# Patient Record
Sex: FEMALE | Race: WHITE
[De-identification: names, ages, dates, MRNs, and addresses within clinical notes are randomized per-mention and may not be internally consistent; named-entity substitution may affect disease eponyms.]

---

## 2022-01-01 ENCOUNTER — TRANSCRIPTION ENCOUNTER (OUTPATIENT)
Age: 82
End: 2022-01-01

## 2022-01-01 ENCOUNTER — NON-APPOINTMENT (OUTPATIENT)
Age: 82
End: 2022-01-01

## 2022-01-01 ENCOUNTER — APPOINTMENT (OUTPATIENT)
Dept: ORTHOPEDIC SURGERY | Facility: CLINIC | Age: 82
End: 2022-01-01

## 2022-01-01 ENCOUNTER — APPOINTMENT (OUTPATIENT)
Dept: NEUROLOGY | Facility: CLINIC | Age: 82
End: 2022-01-01

## 2022-01-01 ENCOUNTER — APPOINTMENT (OUTPATIENT)
Dept: CARDIOLOGY | Facility: CLINIC | Age: 82
End: 2022-01-01

## 2022-01-01 ENCOUNTER — APPOINTMENT (OUTPATIENT)
Dept: MRI IMAGING | Facility: CLINIC | Age: 82
End: 2022-01-01

## 2022-01-01 ENCOUNTER — RESULT CHARGE (OUTPATIENT)
Age: 82
End: 2022-01-01

## 2022-01-01 ENCOUNTER — INPATIENT (INPATIENT)
Facility: HOSPITAL | Age: 82
LOS: 3 days | Discharge: HOME HEALTH | End: 2022-09-13
Attending: STUDENT IN AN ORGANIZED HEALTH CARE EDUCATION/TRAINING PROGRAM | Admitting: STUDENT IN AN ORGANIZED HEALTH CARE EDUCATION/TRAINING PROGRAM

## 2022-01-01 ENCOUNTER — INPATIENT (INPATIENT)
Facility: HOSPITAL | Age: 82
LOS: 6 days | Discharge: NURSING, REHAB OR LONG-TERM CARE | End: 2022-09-21
Attending: SURGERY | Admitting: SURGERY

## 2022-01-01 ENCOUNTER — INPATIENT (INPATIENT)
Facility: HOSPITAL | Age: 82
LOS: 4 days | Discharge: HOME HEALTH | End: 2022-12-07
Attending: INTERNAL MEDICINE | Admitting: INTERNAL MEDICINE
Payer: MEDICARE

## 2022-01-01 ENCOUNTER — APPOINTMENT (OUTPATIENT)
Dept: SURGERY | Facility: CLINIC | Age: 82
End: 2022-01-01

## 2022-01-01 ENCOUNTER — EMERGENCY (EMERGENCY)
Facility: HOSPITAL | Age: 82
LOS: 0 days | Discharge: HOME | End: 2022-08-16
Attending: EMERGENCY MEDICINE | Admitting: EMERGENCY MEDICINE

## 2022-01-01 VITALS — BODY MASS INDEX: 20.49 KG/M2 | HEIGHT: 64 IN | WEIGHT: 120 LBS

## 2022-01-01 VITALS
HEART RATE: 85 BPM | TEMPERATURE: 96 F | OXYGEN SATURATION: 90 % | DIASTOLIC BLOOD PRESSURE: 58 MMHG | SYSTOLIC BLOOD PRESSURE: 160 MMHG | RESPIRATION RATE: 22 BRPM

## 2022-01-01 VITALS
TEMPERATURE: 98 F | DIASTOLIC BLOOD PRESSURE: 67 MMHG | WEIGHT: 117.95 LBS | RESPIRATION RATE: 17 BRPM | OXYGEN SATURATION: 98 % | SYSTOLIC BLOOD PRESSURE: 165 MMHG | HEART RATE: 73 BPM

## 2022-01-01 VITALS
SYSTOLIC BLOOD PRESSURE: 120 MMHG | HEART RATE: 80 BPM | TEMPERATURE: 99 F | DIASTOLIC BLOOD PRESSURE: 52 MMHG | HEART RATE: 67 BPM | TEMPERATURE: 97 F | SYSTOLIC BLOOD PRESSURE: 111 MMHG | DIASTOLIC BLOOD PRESSURE: 56 MMHG | RESPIRATION RATE: 17 BRPM

## 2022-01-01 VITALS
HEART RATE: 69 BPM | RESPIRATION RATE: 189 BRPM | HEIGHT: 64 IN | SYSTOLIC BLOOD PRESSURE: 135 MMHG | WEIGHT: 117.95 LBS | OXYGEN SATURATION: 99 % | DIASTOLIC BLOOD PRESSURE: 75 MMHG | TEMPERATURE: 98 F

## 2022-01-01 VITALS
HEART RATE: 66 BPM | WEIGHT: 118 LBS | BODY MASS INDEX: 20.14 KG/M2 | SYSTOLIC BLOOD PRESSURE: 121 MMHG | DIASTOLIC BLOOD PRESSURE: 55 MMHG | HEIGHT: 64 IN

## 2022-01-01 VITALS
HEIGHT: 64 IN | WEIGHT: 118 LBS | SYSTOLIC BLOOD PRESSURE: 126 MMHG | DIASTOLIC BLOOD PRESSURE: 64 MMHG | BODY MASS INDEX: 20.14 KG/M2

## 2022-01-01 VITALS
TEMPERATURE: 98 F | OXYGEN SATURATION: 96 % | DIASTOLIC BLOOD PRESSURE: 62 MMHG | HEART RATE: 97 BPM | SYSTOLIC BLOOD PRESSURE: 116 MMHG | RESPIRATION RATE: 18 BRPM

## 2022-01-01 VITALS
BODY MASS INDEX: 17.07 KG/M2 | HEIGHT: 64 IN | OXYGEN SATURATION: 100 % | HEART RATE: 69 BPM | WEIGHT: 100 LBS | SYSTOLIC BLOOD PRESSURE: 130 MMHG | DIASTOLIC BLOOD PRESSURE: 70 MMHG

## 2022-01-01 VITALS
OXYGEN SATURATION: 96 % | WEIGHT: 100.09 LBS | TEMPERATURE: 98 F | HEART RATE: 62 BPM | DIASTOLIC BLOOD PRESSURE: 69 MMHG | RESPIRATION RATE: 18 BRPM | SYSTOLIC BLOOD PRESSURE: 155 MMHG | HEIGHT: 64 IN

## 2022-01-01 VITALS — BODY MASS INDEX: 20.14 KG/M2 | WEIGHT: 118 LBS | HEIGHT: 64 IN

## 2022-01-01 DIAGNOSIS — R21 RASH AND OTHER NONSPECIFIC SKIN ERUPTION: ICD-10-CM

## 2022-01-01 DIAGNOSIS — Z88.2 ALLERGY STATUS TO SULFONAMIDES: ICD-10-CM

## 2022-01-01 DIAGNOSIS — M06.9 RHEUMATOID ARTHRITIS, UNSPECIFIED: ICD-10-CM

## 2022-01-01 DIAGNOSIS — D64.9 ANEMIA, UNSPECIFIED: ICD-10-CM

## 2022-01-01 DIAGNOSIS — Z90.710 ACQUIRED ABSENCE OF BOTH CERVIX AND UTERUS: Chronic | ICD-10-CM

## 2022-01-01 DIAGNOSIS — E78.00 PURE HYPERCHOLESTEROLEMIA, UNSPECIFIED: ICD-10-CM

## 2022-01-01 DIAGNOSIS — Z86.79 PERSONAL HISTORY OF OTHER DISEASES OF THE CIRCULATORY SYSTEM: ICD-10-CM

## 2022-01-01 DIAGNOSIS — K74.60 UNSPECIFIED CIRRHOSIS OF LIVER: ICD-10-CM

## 2022-01-01 DIAGNOSIS — Z98.890 OTHER SPECIFIED POSTPROCEDURAL STATES: Chronic | ICD-10-CM

## 2022-01-01 DIAGNOSIS — D62 ACUTE POSTHEMORRHAGIC ANEMIA: ICD-10-CM

## 2022-01-01 DIAGNOSIS — Z82.49 FAMILY HISTORY OF ISCHEMIC HEART DISEASE AND OTHER DISEASES OF THE CIRCULATORY SYSTEM: ICD-10-CM

## 2022-01-01 DIAGNOSIS — L89.151 PRESSURE ULCER OF SACRAL REGION, STAGE 1: ICD-10-CM

## 2022-01-01 DIAGNOSIS — Z79.890 HORMONE REPLACEMENT THERAPY: ICD-10-CM

## 2022-01-01 DIAGNOSIS — Z86.39 PERSONAL HISTORY OF OTHER ENDOCRINE, NUTRITIONAL AND METABOLIC DISEASE: ICD-10-CM

## 2022-01-01 DIAGNOSIS — R06.02 SHORTNESS OF BREATH: ICD-10-CM

## 2022-01-01 DIAGNOSIS — Z79.82 LONG TERM (CURRENT) USE OF ASPIRIN: ICD-10-CM

## 2022-01-01 DIAGNOSIS — I10 ESSENTIAL (PRIMARY) HYPERTENSION: ICD-10-CM

## 2022-01-01 DIAGNOSIS — R32 UNSPECIFIED URINARY INCONTINENCE: ICD-10-CM

## 2022-01-01 DIAGNOSIS — K76.6 PORTAL HYPERTENSION: ICD-10-CM

## 2022-01-01 DIAGNOSIS — F03.90 UNSPECIFIED DEMENTIA WITHOUT BEHAVIORAL DISTURBANCE: ICD-10-CM

## 2022-01-01 DIAGNOSIS — Z87.891 PERSONAL HISTORY OF NICOTINE DEPENDENCE: ICD-10-CM

## 2022-01-01 DIAGNOSIS — K92.0 HEMATEMESIS: ICD-10-CM

## 2022-01-01 DIAGNOSIS — M10.9 GOUT, UNSPECIFIED: ICD-10-CM

## 2022-01-01 DIAGNOSIS — Z63.4 DISAPPEARANCE AND DEATH OF FAMILY MEMBER: ICD-10-CM

## 2022-01-01 DIAGNOSIS — K74.69 OTHER CIRRHOSIS OF LIVER: ICD-10-CM

## 2022-01-01 DIAGNOSIS — E03.9 HYPOTHYROIDISM, UNSPECIFIED: ICD-10-CM

## 2022-01-01 DIAGNOSIS — Z20.822 CONTACT WITH AND (SUSPECTED) EXPOSURE TO COVID-19: ICD-10-CM

## 2022-01-01 DIAGNOSIS — I47.20 VENTRICULAR TACHYCARDIA, UNSPECIFIED: ICD-10-CM

## 2022-01-01 DIAGNOSIS — I73.9 PERIPHERAL VASCULAR DISEASE, UNSPECIFIED: ICD-10-CM

## 2022-01-01 DIAGNOSIS — M25.531 PAIN IN RIGHT WRIST: ICD-10-CM

## 2022-01-01 DIAGNOSIS — Z88.5 ALLERGY STATUS TO NARCOTIC AGENT: ICD-10-CM

## 2022-01-01 DIAGNOSIS — E87.6 HYPOKALEMIA: ICD-10-CM

## 2022-01-01 DIAGNOSIS — F32.A DEPRESSION, UNSPECIFIED: ICD-10-CM

## 2022-01-01 DIAGNOSIS — I50.33 ACUTE ON CHRONIC DIASTOLIC (CONGESTIVE) HEART FAILURE: ICD-10-CM

## 2022-01-01 DIAGNOSIS — G62.9 POLYNEUROPATHY, UNSPECIFIED: ICD-10-CM

## 2022-01-01 DIAGNOSIS — Z87.19 PERSONAL HISTORY OF OTHER DISEASES OF THE DIGESTIVE SYSTEM: ICD-10-CM
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
## 2022-01-01 DIAGNOSIS — Z85.820 PERSONAL HISTORY OF MALIGNANT MELANOMA OF SKIN: ICD-10-CM

## 2022-01-01 DIAGNOSIS — S72.452D DISPLACED SUPRACONDYLAR FRACTURE W/OUT INTRACONDYLAR EXTENSION OF LOWER END OF LEFT FEMUR, SUBSEQUENT ENCOUNTER FOR CLOSED FRACTURE WITH ROUTINE HEALING: ICD-10-CM

## 2022-01-01 DIAGNOSIS — Z95.820 PERIPHERAL VASCULAR ANGIOPLASTY STATUS WITH IMPLANTS AND GRAFTS: ICD-10-CM

## 2022-01-01 DIAGNOSIS — F41.9 ANXIETY DISORDER, UNSPECIFIED: ICD-10-CM

## 2022-01-01 DIAGNOSIS — Z88.1 ALLERGY STATUS TO OTHER ANTIBIOTIC AGENTS STATUS: ICD-10-CM

## 2022-01-01 DIAGNOSIS — G31.84 MILD COGNITIVE IMPAIRMENT, SO STATED: ICD-10-CM
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
## 2022-01-01 DIAGNOSIS — E78.5 HYPERLIPIDEMIA, UNSPECIFIED: ICD-10-CM

## 2022-01-01 DIAGNOSIS — K21.9 GASTRO-ESOPHAGEAL REFLUX DISEASE WITHOUT ESOPHAGITIS: ICD-10-CM

## 2022-01-01 DIAGNOSIS — K29.00 ACUTE GASTRITIS WITHOUT BLEEDING: ICD-10-CM

## 2022-01-01 DIAGNOSIS — I34.0 NONRHEUMATIC MITRAL (VALVE) INSUFFICIENCY: ICD-10-CM

## 2022-01-01 DIAGNOSIS — S72.452A DISPLACED SUPRACONDYLAR FRACTURE WITHOUT INTRACONDYLAR EXTENSION OF LOWER END OF LEFT FEMUR, INITIAL ENCOUNTER FOR CLOSED FRACTURE: ICD-10-CM

## 2022-01-01 DIAGNOSIS — E87.70 FLUID OVERLOAD, UNSPECIFIED: ICD-10-CM

## 2022-01-01 DIAGNOSIS — R06.89 OTHER ABNORMALITIES OF BREATHING: ICD-10-CM

## 2022-01-01 DIAGNOSIS — I85.11 SECONDARY ESOPHAGEAL VARICES WITH BLEEDING: ICD-10-CM

## 2022-01-01 DIAGNOSIS — Z86.718 PERSONAL HISTORY OF OTHER VENOUS THROMBOSIS AND EMBOLISM: ICD-10-CM

## 2022-01-01 DIAGNOSIS — S52.501A UNSPECIFIED FRACTURE OF THE LOWER END OF RIGHT RADIUS, INITIAL ENCOUNTER FOR CLOSED FRACTURE: ICD-10-CM
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
## 2022-01-01 DIAGNOSIS — I34.9 NONRHEUMATIC MITRAL VALVE DISORDER, UNSPECIFIED: ICD-10-CM

## 2022-01-01 DIAGNOSIS — G89.11 ACUTE PAIN DUE TO TRAUMA: ICD-10-CM

## 2022-01-01 DIAGNOSIS — Z87.39 PERSONAL HISTORY OF OTHER DISEASES OF THE MUSCULOSKELETAL SYSTEM AND CONNECTIVE TISSUE: ICD-10-CM

## 2022-01-01 DIAGNOSIS — I51.7 CARDIOMEGALY: ICD-10-CM
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
## 2022-01-01 DIAGNOSIS — I11.0 HYPERTENSIVE HEART DISEASE WITH HEART FAILURE: ICD-10-CM

## 2022-01-01 DIAGNOSIS — K74.4: ICD-10-CM
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
## 2022-01-01 DIAGNOSIS — I67.9 CEREBROVASCULAR DISEASE, UNSPECIFIED: ICD-10-CM

## 2022-01-01 DIAGNOSIS — S72.92XA UNSPECIFIED FRACTURE OF LEFT FEMUR, INITIAL ENCOUNTER FOR CLOSED FRACTURE: ICD-10-CM

## 2022-01-01 DIAGNOSIS — Z96.642 PRESENCE OF LEFT ARTIFICIAL HIP JOINT: ICD-10-CM

## 2022-01-01 DIAGNOSIS — R18.8 OTHER ASCITES: ICD-10-CM

## 2022-01-01 DIAGNOSIS — I11.9 HYPERTENSIVE HEART DISEASE WITHOUT HEART FAILURE: ICD-10-CM

## 2022-01-01 DIAGNOSIS — Z88.8 ALLERGY STATUS TO OTHER DRUGS, MEDICAMENTS AND BIOLOGICAL SUBSTANCES STATUS: ICD-10-CM

## 2022-01-01 DIAGNOSIS — Z79.01 LONG TERM (CURRENT) USE OF ANTICOAGULANTS: ICD-10-CM

## 2022-01-01 DIAGNOSIS — T45.1X5A ADVERSE EFFECT OF ANTINEOPLASTIC AND IMMUNOSUPPRESSIVE DRUGS, INITIAL ENCOUNTER: ICD-10-CM

## 2022-01-01 DIAGNOSIS — E83.42 HYPOMAGNESEMIA: ICD-10-CM

## 2022-01-01 LAB
ALBUMIN SERPL ELPH-MCNC: 2.4 G/DL — LOW (ref 3.5–5.2)
ALBUMIN SERPL ELPH-MCNC: 2.6 G/DL — LOW (ref 3.5–5.2)
ALBUMIN SERPL ELPH-MCNC: 2.7 G/DL — LOW (ref 3.5–5.2)
ALBUMIN SERPL ELPH-MCNC: 2.7 G/DL — LOW (ref 3.5–5.2)
ALBUMIN SERPL ELPH-MCNC: 2.8 G/DL
ALBUMIN SERPL ELPH-MCNC: 2.8 G/DL — LOW (ref 3.5–5.2)
ALBUMIN SERPL ELPH-MCNC: 2.8 G/DL — LOW (ref 3.5–5.2)
ALBUMIN SERPL ELPH-MCNC: 2.9 G/DL — LOW (ref 3.5–5.2)
ALBUMIN SERPL ELPH-MCNC: 3 G/DL — LOW (ref 3.5–5.2)
ALBUMIN SERPL ELPH-MCNC: 3 G/DL — LOW (ref 3.5–5.2)
ALBUMIN SERPL ELPH-MCNC: 3.2 G/DL — LOW (ref 3.5–5.2)
ALBUMIN SERPL ELPH-MCNC: 3.3 G/DL — LOW (ref 3.5–5.2)
ALP BLD-CCNC: 142 U/L
ALP SERPL-CCNC: 102 U/L — SIGNIFICANT CHANGE UP (ref 30–115)
ALP SERPL-CCNC: 104 U/L — SIGNIFICANT CHANGE UP (ref 30–115)
ALP SERPL-CCNC: 105 U/L — SIGNIFICANT CHANGE UP (ref 30–115)
ALP SERPL-CCNC: 111 U/L — SIGNIFICANT CHANGE UP (ref 30–115)
ALP SERPL-CCNC: 112 U/L — SIGNIFICANT CHANGE UP (ref 30–115)
ALP SERPL-CCNC: 135 U/L — HIGH (ref 30–115)
ALP SERPL-CCNC: 139 U/L — HIGH (ref 30–115)
ALP SERPL-CCNC: 148 U/L — HIGH (ref 30–115)
ALP SERPL-CCNC: 149 U/L — HIGH (ref 30–115)
ALP SERPL-CCNC: 166 U/L — HIGH (ref 30–115)
ALP SERPL-CCNC: 173 U/L — HIGH (ref 30–115)
ALP SERPL-CCNC: 99 U/L — SIGNIFICANT CHANGE UP (ref 30–115)
ALT FLD-CCNC: 10 U/L — SIGNIFICANT CHANGE UP (ref 0–41)
ALT FLD-CCNC: 10 U/L — SIGNIFICANT CHANGE UP (ref 0–41)
ALT FLD-CCNC: 11 U/L — SIGNIFICANT CHANGE UP (ref 0–41)
ALT FLD-CCNC: 12 U/L — SIGNIFICANT CHANGE UP (ref 0–41)
ALT FLD-CCNC: 14 U/L — SIGNIFICANT CHANGE UP (ref 0–41)
ALT FLD-CCNC: 16 U/L — SIGNIFICANT CHANGE UP (ref 0–41)
ALT FLD-CCNC: 19 U/L — SIGNIFICANT CHANGE UP (ref 0–41)
ALT FLD-CCNC: 19 U/L — SIGNIFICANT CHANGE UP (ref 0–41)
ALT SERPL-CCNC: 16 U/L
ANION GAP SERPL CALC-SCNC: 10 MMOL/L — SIGNIFICANT CHANGE UP (ref 7–14)
ANION GAP SERPL CALC-SCNC: 10 MMOL/L — SIGNIFICANT CHANGE UP (ref 7–14)
ANION GAP SERPL CALC-SCNC: 11 MMOL/L — SIGNIFICANT CHANGE UP (ref 7–14)
ANION GAP SERPL CALC-SCNC: 12 MMOL/L — SIGNIFICANT CHANGE UP (ref 7–14)
ANION GAP SERPL CALC-SCNC: 13 MMOL/L
ANION GAP SERPL CALC-SCNC: 6 MMOL/L — LOW (ref 7–14)
ANION GAP SERPL CALC-SCNC: 7 MMOL/L — SIGNIFICANT CHANGE UP (ref 7–14)
ANION GAP SERPL CALC-SCNC: 8 MMOL/L — SIGNIFICANT CHANGE UP (ref 7–14)
ANION GAP SERPL CALC-SCNC: 9 MMOL/L — SIGNIFICANT CHANGE UP (ref 7–14)
ANISOCYTOSIS BLD QL: SIGNIFICANT CHANGE UP
ANISOCYTOSIS BLD QL: SIGNIFICANT CHANGE UP
APTT BLD: 31.9 SEC — SIGNIFICANT CHANGE UP (ref 27–39.2)
APTT BLD: 32 SEC — SIGNIFICANT CHANGE UP (ref 27–39.2)
APTT BLD: 32.1 SEC — SIGNIFICANT CHANGE UP (ref 27–39.2)
APTT BLD: 32.4 SEC — SIGNIFICANT CHANGE UP (ref 27–39.2)
APTT BLD: 32.5 SEC — SIGNIFICANT CHANGE UP (ref 27–39.2)
APTT BLD: 33.4 SEC — SIGNIFICANT CHANGE UP (ref 27–39.2)
APTT BLD: 37.7 SEC — SIGNIFICANT CHANGE UP (ref 27–39.2)
AST SERPL-CCNC: 18 U/L — SIGNIFICANT CHANGE UP (ref 0–41)
AST SERPL-CCNC: 19 U/L — SIGNIFICANT CHANGE UP (ref 0–41)
AST SERPL-CCNC: 19 U/L — SIGNIFICANT CHANGE UP (ref 0–41)
AST SERPL-CCNC: 20 U/L — SIGNIFICANT CHANGE UP (ref 0–41)
AST SERPL-CCNC: 21 U/L — SIGNIFICANT CHANGE UP (ref 0–41)
AST SERPL-CCNC: 22 U/L — SIGNIFICANT CHANGE UP (ref 0–41)
AST SERPL-CCNC: 22 U/L — SIGNIFICANT CHANGE UP (ref 0–41)
AST SERPL-CCNC: 25 U/L — SIGNIFICANT CHANGE UP (ref 0–41)
AST SERPL-CCNC: 27 U/L — SIGNIFICANT CHANGE UP (ref 0–41)
AST SERPL-CCNC: 35 U/L
AST SERPL-CCNC: 37 U/L — SIGNIFICANT CHANGE UP (ref 0–41)
AST SERPL-CCNC: 38 U/L — SIGNIFICANT CHANGE UP (ref 0–41)
AST SERPL-CCNC: 42 U/L — HIGH (ref 0–41)
BASE EXCESS BLDA CALC-SCNC: -2.3 MMOL/L — LOW (ref -2–3)
BASE EXCESS BLDV CALC-SCNC: 5.8 MMOL/L — HIGH (ref -2–3)
BASOPHILS # BLD AUTO: 0.01 K/UL — SIGNIFICANT CHANGE UP (ref 0–0.2)
BASOPHILS # BLD AUTO: 0.02 K/UL
BASOPHILS # BLD AUTO: 0.02 K/UL — SIGNIFICANT CHANGE UP (ref 0–0.2)
BASOPHILS # BLD AUTO: 0.03 K/UL — SIGNIFICANT CHANGE UP (ref 0–0.2)
BASOPHILS # BLD AUTO: 0.04 K/UL — SIGNIFICANT CHANGE UP (ref 0–0.2)
BASOPHILS # BLD AUTO: 0.09 K/UL — SIGNIFICANT CHANGE UP (ref 0–0.2)
BASOPHILS NFR BLD AUTO: 0.3 % — SIGNIFICANT CHANGE UP (ref 0–1)
BASOPHILS NFR BLD AUTO: 0.4 %
BASOPHILS NFR BLD AUTO: 0.4 % — SIGNIFICANT CHANGE UP (ref 0–1)
BASOPHILS NFR BLD AUTO: 0.5 % — SIGNIFICANT CHANGE UP (ref 0–1)
BASOPHILS NFR BLD AUTO: 0.6 % — SIGNIFICANT CHANGE UP (ref 0–1)
BASOPHILS NFR BLD AUTO: 0.7 % — SIGNIFICANT CHANGE UP (ref 0–1)
BILIRUB DIRECT SERPL-MCNC: <0.2 MG/DL — SIGNIFICANT CHANGE UP (ref 0–0.3)
BILIRUB INDIRECT FLD-MCNC: >0.1 MG/DL — LOW (ref 0.2–1.2)
BILIRUB SERPL-MCNC: 0.3 MG/DL — SIGNIFICANT CHANGE UP (ref 0.2–1.2)
BILIRUB SERPL-MCNC: 0.3 MG/DL — SIGNIFICANT CHANGE UP (ref 0.2–1.2)
BILIRUB SERPL-MCNC: 0.4 MG/DL
BILIRUB SERPL-MCNC: 0.4 MG/DL — SIGNIFICANT CHANGE UP (ref 0.2–1.2)
BILIRUB SERPL-MCNC: 0.5 MG/DL — SIGNIFICANT CHANGE UP (ref 0.2–1.2)
BILIRUB SERPL-MCNC: 0.8 MG/DL — SIGNIFICANT CHANGE UP (ref 0.2–1.2)
BILIRUB SERPL-MCNC: 0.9 MG/DL — SIGNIFICANT CHANGE UP (ref 0.2–1.2)
BILIRUB SERPL-MCNC: 0.9 MG/DL — SIGNIFICANT CHANGE UP (ref 0.2–1.2)
BLD GP AB SCN SERPL QL: SIGNIFICANT CHANGE UP
BUN SERPL-MCNC: 13 MG/DL — SIGNIFICANT CHANGE UP (ref 10–20)
BUN SERPL-MCNC: 14 MG/DL — SIGNIFICANT CHANGE UP (ref 10–20)
BUN SERPL-MCNC: 15 MG/DL — SIGNIFICANT CHANGE UP (ref 10–20)
BUN SERPL-MCNC: 15 MG/DL — SIGNIFICANT CHANGE UP (ref 10–20)
BUN SERPL-MCNC: 16 MG/DL — SIGNIFICANT CHANGE UP (ref 10–20)
BUN SERPL-MCNC: 16 MG/DL — SIGNIFICANT CHANGE UP (ref 10–20)
BUN SERPL-MCNC: 18 MG/DL — SIGNIFICANT CHANGE UP (ref 10–20)
BUN SERPL-MCNC: 19 MG/DL — SIGNIFICANT CHANGE UP (ref 10–20)
BUN SERPL-MCNC: 21 MG/DL — HIGH (ref 10–20)
BUN SERPL-MCNC: 22 MG/DL — HIGH (ref 10–20)
BUN SERPL-MCNC: 29 MG/DL — HIGH (ref 10–20)
BUN SERPL-MCNC: 33 MG/DL
BUN SERPL-MCNC: 36 MG/DL — HIGH (ref 10–20)
CA-I SERPL-SCNC: 1.16 MMOL/L — SIGNIFICANT CHANGE UP (ref 1.15–1.33)
CALCIUM SERPL-MCNC: 7.4 MG/DL — LOW (ref 8.4–10.5)
CALCIUM SERPL-MCNC: 7.6 MG/DL — LOW (ref 8.4–10.5)
CALCIUM SERPL-MCNC: 7.7 MG/DL — LOW (ref 8.4–10.5)
CALCIUM SERPL-MCNC: 7.8 MG/DL — LOW (ref 8.4–10.4)
CALCIUM SERPL-MCNC: 7.8 MG/DL — LOW (ref 8.4–10.5)
CALCIUM SERPL-MCNC: 7.9 MG/DL — LOW (ref 8.4–10.5)
CALCIUM SERPL-MCNC: 8 MG/DL — LOW (ref 8.4–10.4)
CALCIUM SERPL-MCNC: 8.1 MG/DL — LOW (ref 8.4–10.4)
CALCIUM SERPL-MCNC: 8.1 MG/DL — LOW (ref 8.5–10.1)
CALCIUM SERPL-MCNC: 8.2 MG/DL — LOW (ref 8.4–10.5)
CALCIUM SERPL-MCNC: 8.2 MG/DL — LOW (ref 8.5–10.1)
CALCIUM SERPL-MCNC: 8.2 MG/DL — LOW (ref 8.5–10.1)
CALCIUM SERPL-MCNC: 8.3 MG/DL — LOW (ref 8.4–10.5)
CALCIUM SERPL-MCNC: 8.4 MG/DL — SIGNIFICANT CHANGE UP (ref 8.4–10.5)
CALCIUM SERPL-MCNC: 8.5 MG/DL — SIGNIFICANT CHANGE UP (ref 8.4–10.5)
CALCIUM SERPL-MCNC: 8.5 MG/DL — SIGNIFICANT CHANGE UP (ref 8.5–10.1)
CALCIUM SERPL-MCNC: 8.6 MG/DL — SIGNIFICANT CHANGE UP (ref 8.4–10.5)
CALCIUM SERPL-MCNC: 8.6 MG/DL — SIGNIFICANT CHANGE UP (ref 8.4–10.5)
CALCIUM SERPL-MCNC: 8.7 MG/DL — SIGNIFICANT CHANGE UP (ref 8.4–10.5)
CALCIUM SERPL-MCNC: 8.8 MG/DL
CALCIUM SERPL-MCNC: 8.9 MG/DL — SIGNIFICANT CHANGE UP (ref 8.4–10.5)
CALCIUM SERPL-MCNC: 9.1 MG/DL — SIGNIFICANT CHANGE UP (ref 8.4–10.5)
CHLORIDE SERPL-SCNC: 100 MMOL/L — SIGNIFICANT CHANGE UP (ref 98–110)
CHLORIDE SERPL-SCNC: 101 MMOL/L — SIGNIFICANT CHANGE UP (ref 98–110)
CHLORIDE SERPL-SCNC: 101 MMOL/L — SIGNIFICANT CHANGE UP (ref 98–110)
CHLORIDE SERPL-SCNC: 102 MMOL/L — SIGNIFICANT CHANGE UP (ref 98–110)
CHLORIDE SERPL-SCNC: 103 MMOL/L — SIGNIFICANT CHANGE UP (ref 98–110)
CHLORIDE SERPL-SCNC: 104 MMOL/L — SIGNIFICANT CHANGE UP (ref 98–110)
CHLORIDE SERPL-SCNC: 104 MMOL/L — SIGNIFICANT CHANGE UP (ref 98–110)
CHLORIDE SERPL-SCNC: 105 MMOL/L — SIGNIFICANT CHANGE UP (ref 98–110)
CHLORIDE SERPL-SCNC: 109 MMOL/L — SIGNIFICANT CHANGE UP (ref 98–110)
CHLORIDE SERPL-SCNC: 96 MMOL/L
CHLORIDE SERPL-SCNC: 96 MMOL/L — LOW (ref 98–110)
CHLORIDE SERPL-SCNC: 97 MMOL/L — LOW (ref 98–110)
CHOLEST SERPL-MCNC: 169 MG/DL
CK SERPL-CCNC: 434 U/L — HIGH (ref 0–225)
CK SERPL-CCNC: 68 U/L — SIGNIFICANT CHANGE UP (ref 0–225)
CO2 SERPL-SCNC: 19 MMOL/L — SIGNIFICANT CHANGE UP (ref 17–32)
CO2 SERPL-SCNC: 21 MMOL/L — SIGNIFICANT CHANGE UP (ref 17–32)
CO2 SERPL-SCNC: 21 MMOL/L — SIGNIFICANT CHANGE UP (ref 17–32)
CO2 SERPL-SCNC: 22 MMOL/L — SIGNIFICANT CHANGE UP (ref 17–32)
CO2 SERPL-SCNC: 22 MMOL/L — SIGNIFICANT CHANGE UP (ref 17–32)
CO2 SERPL-SCNC: 23 MMOL/L — SIGNIFICANT CHANGE UP (ref 17–32)
CO2 SERPL-SCNC: 24 MMOL/L — SIGNIFICANT CHANGE UP (ref 17–32)
CO2 SERPL-SCNC: 25 MMOL/L — SIGNIFICANT CHANGE UP (ref 17–32)
CO2 SERPL-SCNC: 26 MMOL/L — SIGNIFICANT CHANGE UP (ref 17–32)
CO2 SERPL-SCNC: 27 MMOL/L — SIGNIFICANT CHANGE UP (ref 17–32)
CO2 SERPL-SCNC: 28 MMOL/L
CO2 SERPL-SCNC: 28 MMOL/L — SIGNIFICANT CHANGE UP (ref 17–32)
CO2 SERPL-SCNC: 29 MMOL/L — SIGNIFICANT CHANGE UP (ref 17–32)
CO2 SERPL-SCNC: 29 MMOL/L — SIGNIFICANT CHANGE UP (ref 17–32)
CO2 SERPL-SCNC: 30 MMOL/L — SIGNIFICANT CHANGE UP (ref 17–32)
CO2 SERPL-SCNC: 30 MMOL/L — SIGNIFICANT CHANGE UP (ref 17–32)
CREAT SERPL-MCNC: 0.5 MG/DL — LOW (ref 0.7–1.5)
CREAT SERPL-MCNC: 0.6 MG/DL — LOW (ref 0.7–1.5)
CREAT SERPL-MCNC: 0.6 MG/DL — LOW (ref 0.7–1.5)
CREAT SERPL-MCNC: 0.7 MG/DL — SIGNIFICANT CHANGE UP (ref 0.7–1.5)
CREAT SERPL-MCNC: 0.8 MG/DL
CREAT SERPL-MCNC: 0.8 MG/DL — SIGNIFICANT CHANGE UP (ref 0.7–1.5)
CREAT SERPL-MCNC: 0.9 MG/DL — SIGNIFICANT CHANGE UP (ref 0.7–1.5)
CREAT SERPL-MCNC: 0.9 MG/DL — SIGNIFICANT CHANGE UP (ref 0.7–1.5)
EGFR: 64 ML/MIN/1.73M2 — SIGNIFICANT CHANGE UP
EGFR: 64 ML/MIN/1.73M2 — SIGNIFICANT CHANGE UP
EGFR: 74 ML/MIN/1.73M2
EGFR: 74 ML/MIN/1.73M2 — SIGNIFICANT CHANGE UP
EGFR: 87 ML/MIN/1.73M2 — SIGNIFICANT CHANGE UP
EGFR: 90 ML/MIN/1.73M2 — SIGNIFICANT CHANGE UP
EGFR: 90 ML/MIN/1.73M2 — SIGNIFICANT CHANGE UP
EGFR: 94 ML/MIN/1.73M2 — SIGNIFICANT CHANGE UP
EOSINOPHIL # BLD AUTO: 0.06 K/UL — SIGNIFICANT CHANGE UP (ref 0–0.7)
EOSINOPHIL # BLD AUTO: 0.12 K/UL — SIGNIFICANT CHANGE UP (ref 0–0.7)
EOSINOPHIL # BLD AUTO: 0.14 K/UL — SIGNIFICANT CHANGE UP (ref 0–0.7)
EOSINOPHIL # BLD AUTO: 0.15 K/UL
EOSINOPHIL # BLD AUTO: 0.15 K/UL — SIGNIFICANT CHANGE UP (ref 0–0.7)
EOSINOPHIL # BLD AUTO: 0.15 K/UL — SIGNIFICANT CHANGE UP (ref 0–0.7)
EOSINOPHIL # BLD AUTO: 0.17 K/UL — SIGNIFICANT CHANGE UP (ref 0–0.7)
EOSINOPHIL # BLD AUTO: 0.2 K/UL — SIGNIFICANT CHANGE UP (ref 0–0.7)
EOSINOPHIL # BLD AUTO: 0.22 K/UL — SIGNIFICANT CHANGE UP (ref 0–0.7)
EOSINOPHIL # BLD AUTO: 0.23 K/UL — SIGNIFICANT CHANGE UP (ref 0–0.7)
EOSINOPHIL # BLD AUTO: 0.24 K/UL — SIGNIFICANT CHANGE UP (ref 0–0.7)
EOSINOPHIL # BLD AUTO: 0.25 K/UL — SIGNIFICANT CHANGE UP (ref 0–0.7)
EOSINOPHIL # BLD AUTO: 0.26 K/UL — SIGNIFICANT CHANGE UP (ref 0–0.7)
EOSINOPHIL # BLD AUTO: 0.3 K/UL — SIGNIFICANT CHANGE UP (ref 0–0.7)
EOSINOPHIL # BLD AUTO: 0.32 K/UL — SIGNIFICANT CHANGE UP (ref 0–0.7)
EOSINOPHIL # BLD AUTO: 0.36 K/UL — SIGNIFICANT CHANGE UP (ref 0–0.7)
EOSINOPHIL # BLD AUTO: 0.76 K/UL — HIGH (ref 0–0.7)
EOSINOPHIL NFR BLD AUTO: 1.3 % — SIGNIFICANT CHANGE UP (ref 0–8)
EOSINOPHIL NFR BLD AUTO: 1.5 % — SIGNIFICANT CHANGE UP (ref 0–8)
EOSINOPHIL NFR BLD AUTO: 1.5 % — SIGNIFICANT CHANGE UP (ref 0–8)
EOSINOPHIL NFR BLD AUTO: 1.6 % — SIGNIFICANT CHANGE UP (ref 0–8)
EOSINOPHIL NFR BLD AUTO: 2.6 % — SIGNIFICANT CHANGE UP (ref 0–8)
EOSINOPHIL NFR BLD AUTO: 2.7 % — SIGNIFICANT CHANGE UP (ref 0–8)
EOSINOPHIL NFR BLD AUTO: 2.8 %
EOSINOPHIL NFR BLD AUTO: 2.9 % — SIGNIFICANT CHANGE UP (ref 0–8)
EOSINOPHIL NFR BLD AUTO: 3 % — SIGNIFICANT CHANGE UP (ref 0–8)
EOSINOPHIL NFR BLD AUTO: 3.1 % — SIGNIFICANT CHANGE UP (ref 0–8)
EOSINOPHIL NFR BLD AUTO: 4.3 % — SIGNIFICANT CHANGE UP (ref 0–8)
EOSINOPHIL NFR BLD AUTO: 4.3 % — SIGNIFICANT CHANGE UP (ref 0–8)
EOSINOPHIL NFR BLD AUTO: 4.7 % — SIGNIFICANT CHANGE UP (ref 0–8)
EOSINOPHIL NFR BLD AUTO: 5.4 % — SIGNIFICANT CHANGE UP (ref 0–8)
EOSINOPHIL NFR BLD AUTO: 5.6 % — SIGNIFICANT CHANGE UP (ref 0–8)
EOSINOPHIL NFR BLD AUTO: 5.7 % — SIGNIFICANT CHANGE UP (ref 0–8)
EOSINOPHIL NFR BLD AUTO: 5.9 % — SIGNIFICANT CHANGE UP (ref 0–8)
EOSINOPHIL NFR BLD AUTO: 6.1 % — SIGNIFICANT CHANGE UP (ref 0–8)
EOSINOPHIL NFR BLD AUTO: 7.2 % — SIGNIFICANT CHANGE UP (ref 0–8)
FLUAV AG NPH QL: SIGNIFICANT CHANGE UP
FLUBV AG NPH QL: SIGNIFICANT CHANGE UP
GAS PNL BLDA: SIGNIFICANT CHANGE UP
GAS PNL BLDV: 134 MMOL/L — LOW (ref 136–145)
GAS PNL BLDV: SIGNIFICANT CHANGE UP
GLUCOSE SERPL-MCNC: 101 MG/DL — HIGH (ref 70–99)
GLUCOSE SERPL-MCNC: 104 MG/DL
GLUCOSE SERPL-MCNC: 108 MG/DL — HIGH (ref 70–99)
GLUCOSE SERPL-MCNC: 109 MG/DL — HIGH (ref 70–99)
GLUCOSE SERPL-MCNC: 109 MG/DL — HIGH (ref 70–99)
GLUCOSE SERPL-MCNC: 113 MG/DL — HIGH (ref 70–99)
GLUCOSE SERPL-MCNC: 122 MG/DL — HIGH (ref 70–99)
GLUCOSE SERPL-MCNC: 124 MG/DL — HIGH (ref 70–99)
GLUCOSE SERPL-MCNC: 125 MG/DL — HIGH (ref 70–99)
GLUCOSE SERPL-MCNC: 62 MG/DL — LOW (ref 70–99)
GLUCOSE SERPL-MCNC: 79 MG/DL — SIGNIFICANT CHANGE UP (ref 70–99)
GLUCOSE SERPL-MCNC: 84 MG/DL — SIGNIFICANT CHANGE UP (ref 70–99)
GLUCOSE SERPL-MCNC: 86 MG/DL — SIGNIFICANT CHANGE UP (ref 70–99)
GLUCOSE SERPL-MCNC: 88 MG/DL — SIGNIFICANT CHANGE UP (ref 70–99)
GLUCOSE SERPL-MCNC: 90 MG/DL — SIGNIFICANT CHANGE UP (ref 70–99)
GLUCOSE SERPL-MCNC: 92 MG/DL — SIGNIFICANT CHANGE UP (ref 70–99)
GLUCOSE SERPL-MCNC: 93 MG/DL — SIGNIFICANT CHANGE UP (ref 70–99)
GLUCOSE SERPL-MCNC: 94 MG/DL — SIGNIFICANT CHANGE UP (ref 70–99)
GLUCOSE SERPL-MCNC: 97 MG/DL — SIGNIFICANT CHANGE UP (ref 70–99)
GLUCOSE SERPL-MCNC: 98 MG/DL — SIGNIFICANT CHANGE UP (ref 70–99)
HCO3 BLDA-SCNC: 21 MMOL/L — SIGNIFICANT CHANGE UP (ref 21–28)
HCO3 BLDV-SCNC: 32 MMOL/L — HIGH (ref 22–29)
HCT VFR BLD CALC: 20.6 % — LOW (ref 37–47)
HCT VFR BLD CALC: 22.2 % — LOW (ref 37–47)
HCT VFR BLD CALC: 22.6 % — LOW (ref 37–47)
HCT VFR BLD CALC: 23.4 % — LOW (ref 37–47)
HCT VFR BLD CALC: 23.7 % — LOW (ref 37–47)
HCT VFR BLD CALC: 24.2 % — LOW (ref 37–47)
HCT VFR BLD CALC: 24.3 % — LOW (ref 37–47)
HCT VFR BLD CALC: 25.2 % — LOW (ref 37–47)
HCT VFR BLD CALC: 25.4 % — LOW (ref 37–47)
HCT VFR BLD CALC: 25.5 % — LOW (ref 37–47)
HCT VFR BLD CALC: 26.1 % — LOW (ref 37–47)
HCT VFR BLD CALC: 26.8 % — LOW (ref 37–47)
HCT VFR BLD CALC: 27.9 % — LOW (ref 37–47)
HCT VFR BLD CALC: 29.6 % — LOW (ref 37–47)
HCT VFR BLD CALC: 30.8 % — LOW (ref 37–47)
HCT VFR BLD CALC: 32.9 % — LOW (ref 37–47)
HCT VFR BLD CALC: 33.1 % — LOW (ref 37–47)
HCT VFR BLD CALC: 33.3 %
HCT VFR BLD CALC: 33.8 % — LOW (ref 37–47)
HCT VFR BLD CALC: 34.4 % — LOW (ref 37–47)
HCT VFR BLD CALC: 36.4 % — LOW (ref 37–47)
HCT VFR BLD CALC: 36.9 % — LOW (ref 37–47)
HCT VFR BLD CALC: 37 % — SIGNIFICANT CHANGE UP (ref 37–47)
HCT VFR BLD CALC: 37.3 % — SIGNIFICANT CHANGE UP (ref 37–47)
HCT VFR BLD CALC: 38.9 % — SIGNIFICANT CHANGE UP (ref 37–47)
HCT VFR BLDA CALC: 41 % — SIGNIFICANT CHANGE UP (ref 39–51)
HDLC SERPL-MCNC: 34 MG/DL
HGB BLD CALC-MCNC: 13.8 G/DL — SIGNIFICANT CHANGE UP (ref 12.6–17.4)
HGB BLD-MCNC: 10.2 G/DL — LOW (ref 12–16)
HGB BLD-MCNC: 10.2 G/DL — LOW (ref 12–16)
HGB BLD-MCNC: 10.4 G/DL — LOW (ref 12–16)
HGB BLD-MCNC: 10.8 G/DL — LOW (ref 12–16)
HGB BLD-MCNC: 11.4 G/DL — LOW (ref 12–16)
HGB BLD-MCNC: 11.4 G/DL — LOW (ref 12–16)
HGB BLD-MCNC: 11.6 G/DL — LOW (ref 12–16)
HGB BLD-MCNC: 12.1 G/DL — SIGNIFICANT CHANGE UP (ref 12–16)
HGB BLD-MCNC: 12.1 G/DL — SIGNIFICANT CHANGE UP (ref 12–16)
HGB BLD-MCNC: 6.4 G/DL — CRITICAL LOW (ref 12–16)
HGB BLD-MCNC: 6.8 G/DL — CRITICAL LOW (ref 12–16)
HGB BLD-MCNC: 7 G/DL — LOW (ref 12–16)
HGB BLD-MCNC: 7.3 G/DL — LOW (ref 12–16)
HGB BLD-MCNC: 7.4 G/DL — LOW (ref 12–16)
HGB BLD-MCNC: 7.6 G/DL — LOW (ref 12–16)
HGB BLD-MCNC: 8 G/DL — LOW (ref 12–16)
HGB BLD-MCNC: 8 G/DL — LOW (ref 12–16)
HGB BLD-MCNC: 8.1 G/DL — LOW (ref 12–16)
HGB BLD-MCNC: 8.2 G/DL — LOW (ref 12–16)
HGB BLD-MCNC: 9.2 G/DL — LOW (ref 12–16)
HGB BLD-MCNC: 9.7 G/DL — LOW (ref 12–16)
HGB BLD-MCNC: 9.9 G/DL
HGB BLD-MCNC: 9.9 G/DL — LOW (ref 12–16)
HYPOCHROMIA BLD QL: SLIGHT — SIGNIFICANT CHANGE UP
HYPOCHROMIA BLD QL: SLIGHT — SIGNIFICANT CHANGE UP
IMM GRANULOCYTES NFR BLD AUTO: 0.2 % — SIGNIFICANT CHANGE UP (ref 0.1–0.3)
IMM GRANULOCYTES NFR BLD AUTO: 0.4 %
IMM GRANULOCYTES NFR BLD AUTO: 0.4 % — HIGH (ref 0.1–0.3)
IMM GRANULOCYTES NFR BLD AUTO: 0.5 % — HIGH (ref 0.1–0.3)
IMM GRANULOCYTES NFR BLD AUTO: 0.6 % — HIGH (ref 0.1–0.3)
IMM GRANULOCYTES NFR BLD AUTO: 0.7 % — HIGH (ref 0.1–0.3)
IMM GRANULOCYTES NFR BLD AUTO: 0.9 % — HIGH (ref 0.1–0.3)
INR BLD: 1.09 RATIO — SIGNIFICANT CHANGE UP (ref 0.65–1.3)
INR BLD: 1.12 RATIO — SIGNIFICANT CHANGE UP (ref 0.65–1.3)
INR BLD: 1.12 RATIO — SIGNIFICANT CHANGE UP (ref 0.65–1.3)
INR BLD: 1.16 RATIO — SIGNIFICANT CHANGE UP (ref 0.65–1.3)
INR BLD: 1.17 RATIO — SIGNIFICANT CHANGE UP (ref 0.65–1.3)
INR BLD: 1.19 RATIO — SIGNIFICANT CHANGE UP (ref 0.65–1.3)
INR BLD: 1.25 RATIO — SIGNIFICANT CHANGE UP (ref 0.65–1.3)
LACTATE BLDV-MCNC: 1.1 MMOL/L — SIGNIFICANT CHANGE UP (ref 0.5–2)
LDLC SERPL CALC-MCNC: 124 MG/DL
LYMPHOCYTES # BLD AUTO: 0.46 K/UL — LOW (ref 1.2–3.4)
LYMPHOCYTES # BLD AUTO: 0.52 K/UL — LOW (ref 1.2–3.4)
LYMPHOCYTES # BLD AUTO: 0.52 K/UL — LOW (ref 1.2–3.4)
LYMPHOCYTES # BLD AUTO: 0.53 K/UL — LOW (ref 1.2–3.4)
LYMPHOCYTES # BLD AUTO: 0.54 K/UL — LOW (ref 1.2–3.4)
LYMPHOCYTES # BLD AUTO: 0.57 K/UL — LOW (ref 1.2–3.4)
LYMPHOCYTES # BLD AUTO: 0.58 K/UL — LOW (ref 1.2–3.4)
LYMPHOCYTES # BLD AUTO: 0.62 K/UL — LOW (ref 1.2–3.4)
LYMPHOCYTES # BLD AUTO: 0.62 K/UL — LOW (ref 1.2–3.4)
LYMPHOCYTES # BLD AUTO: 0.63 K/UL — LOW (ref 1.2–3.4)
LYMPHOCYTES # BLD AUTO: 0.65 K/UL — LOW (ref 1.2–3.4)
LYMPHOCYTES # BLD AUTO: 0.65 K/UL — LOW (ref 1.2–3.4)
LYMPHOCYTES # BLD AUTO: 0.66 K/UL — LOW (ref 1.2–3.4)
LYMPHOCYTES # BLD AUTO: 0.69 K/UL — LOW (ref 1.2–3.4)
LYMPHOCYTES # BLD AUTO: 0.69 K/UL — LOW (ref 1.2–3.4)
LYMPHOCYTES # BLD AUTO: 0.71 K/UL
LYMPHOCYTES # BLD AUTO: 0.71 K/UL — LOW (ref 1.2–3.4)
LYMPHOCYTES # BLD AUTO: 0.9 K/UL — LOW (ref 1.2–3.4)
LYMPHOCYTES # BLD AUTO: 0.95 K/UL — LOW (ref 1.2–3.4)
LYMPHOCYTES # BLD AUTO: 1.43 K/UL — SIGNIFICANT CHANGE UP (ref 1.2–3.4)
LYMPHOCYTES # BLD AUTO: 1.44 K/UL — SIGNIFICANT CHANGE UP (ref 1.2–3.4)
LYMPHOCYTES # BLD AUTO: 10.2 % — LOW (ref 20.5–51.1)
LYMPHOCYTES # BLD AUTO: 10.2 % — LOW (ref 20.5–51.1)
LYMPHOCYTES # BLD AUTO: 10.7 % — LOW (ref 20.5–51.1)
LYMPHOCYTES # BLD AUTO: 10.8 % — LOW (ref 20.5–51.1)
LYMPHOCYTES # BLD AUTO: 12.3 % — LOW (ref 20.5–51.1)
LYMPHOCYTES # BLD AUTO: 12.5 % — LOW (ref 20.5–51.1)
LYMPHOCYTES # BLD AUTO: 12.8 % — LOW (ref 20.5–51.1)
LYMPHOCYTES # BLD AUTO: 13.2 % — LOW (ref 20.5–51.1)
LYMPHOCYTES # BLD AUTO: 13.9 % — LOW (ref 20.5–51.1)
LYMPHOCYTES # BLD AUTO: 14.3 % — LOW (ref 20.5–51.1)
LYMPHOCYTES # BLD AUTO: 15.2 % — LOW (ref 20.5–51.1)
LYMPHOCYTES # BLD AUTO: 15.6 % — LOW (ref 20.5–51.1)
LYMPHOCYTES # BLD AUTO: 16.4 % — LOW (ref 20.5–51.1)
LYMPHOCYTES # BLD AUTO: 17.8 % — LOW (ref 20.5–51.1)
LYMPHOCYTES # BLD AUTO: 6.5 % — LOW (ref 20.5–51.1)
LYMPHOCYTES # BLD AUTO: 8.8 % — LOW (ref 20.5–51.1)
LYMPHOCYTES # BLD AUTO: 9.1 % — LOW (ref 20.5–51.1)
LYMPHOCYTES # BLD AUTO: 9.4 % — LOW (ref 20.5–51.1)
LYMPHOCYTES # BLD AUTO: 9.7 % — LOW (ref 20.5–51.1)
LYMPHOCYTES # BLD AUTO: 9.9 % — LOW (ref 20.5–51.1)
LYMPHOCYTES NFR BLD AUTO: 13.3 %
MACROCYTES BLD QL: SLIGHT — SIGNIFICANT CHANGE UP
MACROCYTES BLD QL: SLIGHT — SIGNIFICANT CHANGE UP
MAGNESIUM SERPL-MCNC: 1.7 MG/DL — LOW (ref 1.8–2.4)
MAGNESIUM SERPL-MCNC: 1.8 MG/DL — SIGNIFICANT CHANGE UP (ref 1.8–2.4)
MAGNESIUM SERPL-MCNC: 1.8 MG/DL — SIGNIFICANT CHANGE UP (ref 1.8–2.4)
MAGNESIUM SERPL-MCNC: 1.9 MG/DL — SIGNIFICANT CHANGE UP (ref 1.8–2.4)
MAGNESIUM SERPL-MCNC: 2 MG/DL — SIGNIFICANT CHANGE UP (ref 1.8–2.4)
MAGNESIUM SERPL-MCNC: 2.1 MG/DL — SIGNIFICANT CHANGE UP (ref 1.8–2.4)
MAGNESIUM SERPL-MCNC: 2.1 MG/DL — SIGNIFICANT CHANGE UP (ref 1.8–2.4)
MAGNESIUM SERPL-MCNC: 2.2 MG/DL — SIGNIFICANT CHANGE UP (ref 1.8–2.4)
MAGNESIUM SERPL-MCNC: 2.6 MG/DL — HIGH (ref 1.8–2.4)
MAGNESIUM SERPL-MCNC: 2.9 MG/DL — HIGH (ref 1.8–2.4)
MAN DIFF?: NORMAL
MANUAL SMEAR VERIFICATION: SIGNIFICANT CHANGE UP
MANUAL SMEAR VERIFICATION: SIGNIFICANT CHANGE UP
MCHC RBC-ENTMCNC: 25.4 PG — LOW (ref 27–31)
MCHC RBC-ENTMCNC: 25.5 PG — LOW (ref 27–31)
MCHC RBC-ENTMCNC: 25.6 PG — LOW (ref 27–31)
MCHC RBC-ENTMCNC: 25.8 PG — LOW (ref 27–31)
MCHC RBC-ENTMCNC: 25.9 PG — LOW (ref 27–31)
MCHC RBC-ENTMCNC: 25.9 PG — LOW (ref 27–31)
MCHC RBC-ENTMCNC: 26 PG — LOW (ref 27–31)
MCHC RBC-ENTMCNC: 26.3 PG — LOW (ref 27–31)
MCHC RBC-ENTMCNC: 26.4 PG
MCHC RBC-ENTMCNC: 26.4 PG — LOW (ref 27–31)
MCHC RBC-ENTMCNC: 26.6 PG — LOW (ref 27–31)
MCHC RBC-ENTMCNC: 26.6 PG — LOW (ref 27–31)
MCHC RBC-ENTMCNC: 26.7 PG — LOW (ref 27–31)
MCHC RBC-ENTMCNC: 26.9 PG — LOW (ref 27–31)
MCHC RBC-ENTMCNC: 27.3 PG — SIGNIFICANT CHANGE UP (ref 27–31)
MCHC RBC-ENTMCNC: 27.6 PG — SIGNIFICANT CHANGE UP (ref 27–31)
MCHC RBC-ENTMCNC: 28 PG — SIGNIFICANT CHANGE UP (ref 27–31)
MCHC RBC-ENTMCNC: 28.3 PG — SIGNIFICANT CHANGE UP (ref 27–31)
MCHC RBC-ENTMCNC: 28.3 PG — SIGNIFICANT CHANGE UP (ref 27–31)
MCHC RBC-ENTMCNC: 28.5 PG — SIGNIFICANT CHANGE UP (ref 27–31)
MCHC RBC-ENTMCNC: 29.7 G/DL
MCHC RBC-ENTMCNC: 30 G/DL — LOW (ref 32–37)
MCHC RBC-ENTMCNC: 30.2 G/DL — LOW (ref 32–37)
MCHC RBC-ENTMCNC: 30.6 G/DL — LOW (ref 32–37)
MCHC RBC-ENTMCNC: 30.7 G/DL — LOW (ref 32–37)
MCHC RBC-ENTMCNC: 30.8 G/DL — LOW (ref 32–37)
MCHC RBC-ENTMCNC: 30.9 G/DL — LOW (ref 32–37)
MCHC RBC-ENTMCNC: 31 G/DL — LOW (ref 32–37)
MCHC RBC-ENTMCNC: 31.1 G/DL — LOW (ref 32–37)
MCHC RBC-ENTMCNC: 31.2 G/DL — LOW (ref 32–37)
MCHC RBC-ENTMCNC: 31.4 G/DL — LOW (ref 32–37)
MCHC RBC-ENTMCNC: 31.9 G/DL — LOW (ref 32–37)
MCHC RBC-ENTMCNC: 32.1 G/DL — SIGNIFICANT CHANGE UP (ref 32–37)
MCHC RBC-ENTMCNC: 32.8 G/DL — SIGNIFICANT CHANGE UP (ref 32–37)
MCHC RBC-ENTMCNC: 32.8 G/DL — SIGNIFICANT CHANGE UP (ref 32–37)
MCHC RBC-ENTMCNC: 33 G/DL — SIGNIFICANT CHANGE UP (ref 32–37)
MCHC RBC-ENTMCNC: 33.2 G/DL — SIGNIFICANT CHANGE UP (ref 32–37)
MCV RBC AUTO: 81.4 FL — SIGNIFICANT CHANGE UP (ref 81–99)
MCV RBC AUTO: 82.4 FL — SIGNIFICANT CHANGE UP (ref 81–99)
MCV RBC AUTO: 83.3 FL — SIGNIFICANT CHANGE UP (ref 81–99)
MCV RBC AUTO: 83.6 FL — SIGNIFICANT CHANGE UP (ref 81–99)
MCV RBC AUTO: 83.7 FL — SIGNIFICANT CHANGE UP (ref 81–99)
MCV RBC AUTO: 84.2 FL — SIGNIFICANT CHANGE UP (ref 81–99)
MCV RBC AUTO: 84.4 FL — SIGNIFICANT CHANGE UP (ref 81–99)
MCV RBC AUTO: 84.5 FL — SIGNIFICANT CHANGE UP (ref 81–99)
MCV RBC AUTO: 84.7 FL — SIGNIFICANT CHANGE UP (ref 81–99)
MCV RBC AUTO: 85 FL — SIGNIFICANT CHANGE UP (ref 81–99)
MCV RBC AUTO: 85.4 FL — SIGNIFICANT CHANGE UP (ref 81–99)
MCV RBC AUTO: 85.5 FL — SIGNIFICANT CHANGE UP (ref 81–99)
MCV RBC AUTO: 85.6 FL — SIGNIFICANT CHANGE UP (ref 81–99)
MCV RBC AUTO: 85.8 FL — SIGNIFICANT CHANGE UP (ref 81–99)
MCV RBC AUTO: 85.9 FL — SIGNIFICANT CHANGE UP (ref 81–99)
MCV RBC AUTO: 86 FL — SIGNIFICANT CHANGE UP (ref 81–99)
MCV RBC AUTO: 86.1 FL — SIGNIFICANT CHANGE UP (ref 81–99)
MCV RBC AUTO: 86.3 FL — SIGNIFICANT CHANGE UP (ref 81–99)
MCV RBC AUTO: 86.4 FL — SIGNIFICANT CHANGE UP (ref 81–99)
MCV RBC AUTO: 86.7 FL — SIGNIFICANT CHANGE UP (ref 81–99)
MCV RBC AUTO: 86.9 FL — SIGNIFICANT CHANGE UP (ref 81–99)
MCV RBC AUTO: 87 FL — SIGNIFICANT CHANGE UP (ref 81–99)
MCV RBC AUTO: 88.8 FL
MICROCYTES BLD QL: SLIGHT — SIGNIFICANT CHANGE UP
MICROCYTES BLD QL: SLIGHT — SIGNIFICANT CHANGE UP
MONOCYTES # BLD AUTO: 0.11 K/UL — SIGNIFICANT CHANGE UP (ref 0.1–0.6)
MONOCYTES # BLD AUTO: 0.17 K/UL — SIGNIFICANT CHANGE UP (ref 0.1–0.6)
MONOCYTES # BLD AUTO: 0.19 K/UL — SIGNIFICANT CHANGE UP (ref 0.1–0.6)
MONOCYTES # BLD AUTO: 0.21 K/UL — SIGNIFICANT CHANGE UP (ref 0.1–0.6)
MONOCYTES # BLD AUTO: 0.23 K/UL — SIGNIFICANT CHANGE UP (ref 0.1–0.6)
MONOCYTES # BLD AUTO: 0.24 K/UL — SIGNIFICANT CHANGE UP (ref 0.1–0.6)
MONOCYTES # BLD AUTO: 0.25 K/UL — SIGNIFICANT CHANGE UP (ref 0.1–0.6)
MONOCYTES # BLD AUTO: 0.26 K/UL
MONOCYTES # BLD AUTO: 0.26 K/UL — SIGNIFICANT CHANGE UP (ref 0.1–0.6)
MONOCYTES # BLD AUTO: 0.27 K/UL — SIGNIFICANT CHANGE UP (ref 0.1–0.6)
MONOCYTES # BLD AUTO: 0.27 K/UL — SIGNIFICANT CHANGE UP (ref 0.1–0.6)
MONOCYTES # BLD AUTO: 0.28 K/UL — SIGNIFICANT CHANGE UP (ref 0.1–0.6)
MONOCYTES # BLD AUTO: 0.29 K/UL — SIGNIFICANT CHANGE UP (ref 0.1–0.6)
MONOCYTES # BLD AUTO: 0.29 K/UL — SIGNIFICANT CHANGE UP (ref 0.1–0.6)
MONOCYTES # BLD AUTO: 0.31 K/UL — SIGNIFICANT CHANGE UP (ref 0.1–0.6)
MONOCYTES # BLD AUTO: 0.32 K/UL — SIGNIFICANT CHANGE UP (ref 0.1–0.6)
MONOCYTES # BLD AUTO: 0.34 K/UL — SIGNIFICANT CHANGE UP (ref 0.1–0.6)
MONOCYTES # BLD AUTO: 0.4 K/UL — SIGNIFICANT CHANGE UP (ref 0.1–0.6)
MONOCYTES # BLD AUTO: 0.47 K/UL — SIGNIFICANT CHANGE UP (ref 0.1–0.6)
MONOCYTES # BLD AUTO: 0.61 K/UL — HIGH (ref 0.1–0.6)
MONOCYTES # BLD AUTO: 0.62 K/UL — HIGH (ref 0.1–0.6)
MONOCYTES NFR BLD AUTO: 1.9 % — SIGNIFICANT CHANGE UP (ref 1.7–9.3)
MONOCYTES NFR BLD AUTO: 3.8 % — SIGNIFICANT CHANGE UP (ref 1.7–9.3)
MONOCYTES NFR BLD AUTO: 3.9 % — SIGNIFICANT CHANGE UP (ref 1.7–9.3)
MONOCYTES NFR BLD AUTO: 4 % — SIGNIFICANT CHANGE UP (ref 1.7–9.3)
MONOCYTES NFR BLD AUTO: 4.2 % — SIGNIFICANT CHANGE UP (ref 1.7–9.3)
MONOCYTES NFR BLD AUTO: 4.3 % — SIGNIFICANT CHANGE UP (ref 1.7–9.3)
MONOCYTES NFR BLD AUTO: 4.5 % — SIGNIFICANT CHANGE UP (ref 1.7–9.3)
MONOCYTES NFR BLD AUTO: 4.6 % — SIGNIFICANT CHANGE UP (ref 1.7–9.3)
MONOCYTES NFR BLD AUTO: 4.9 %
MONOCYTES NFR BLD AUTO: 5.2 % — SIGNIFICANT CHANGE UP (ref 1.7–9.3)
MONOCYTES NFR BLD AUTO: 5.3 % — SIGNIFICANT CHANGE UP (ref 1.7–9.3)
MONOCYTES NFR BLD AUTO: 5.6 % — SIGNIFICANT CHANGE UP (ref 1.7–9.3)
MONOCYTES NFR BLD AUTO: 5.7 % — SIGNIFICANT CHANGE UP (ref 1.7–9.3)
MONOCYTES NFR BLD AUTO: 5.8 % — SIGNIFICANT CHANGE UP (ref 1.7–9.3)
MONOCYTES NFR BLD AUTO: 5.9 % — SIGNIFICANT CHANGE UP (ref 1.7–9.3)
MONOCYTES NFR BLD AUTO: 6.1 % — SIGNIFICANT CHANGE UP (ref 1.7–9.3)
MONOCYTES NFR BLD AUTO: 6.2 % — SIGNIFICANT CHANGE UP (ref 1.7–9.3)
MONOCYTES NFR BLD AUTO: 6.5 % — SIGNIFICANT CHANGE UP (ref 1.7–9.3)
MONOCYTES NFR BLD AUTO: 8.1 % — SIGNIFICANT CHANGE UP (ref 1.7–9.3)
NEUTROPHILS # BLD AUTO: 10.26 K/UL — HIGH (ref 1.4–6.5)
NEUTROPHILS # BLD AUTO: 2.04 K/UL — SIGNIFICANT CHANGE UP (ref 1.4–6.5)
NEUTROPHILS # BLD AUTO: 2.41 K/UL — SIGNIFICANT CHANGE UP (ref 1.4–6.5)
NEUTROPHILS # BLD AUTO: 2.96 K/UL — SIGNIFICANT CHANGE UP (ref 1.4–6.5)
NEUTROPHILS # BLD AUTO: 3.4 K/UL — SIGNIFICANT CHANGE UP (ref 1.4–6.5)
NEUTROPHILS # BLD AUTO: 3.58 K/UL — SIGNIFICANT CHANGE UP (ref 1.4–6.5)
NEUTROPHILS # BLD AUTO: 3.59 K/UL — SIGNIFICANT CHANGE UP (ref 1.4–6.5)
NEUTROPHILS # BLD AUTO: 3.76 K/UL — SIGNIFICANT CHANGE UP (ref 1.4–6.5)
NEUTROPHILS # BLD AUTO: 3.95 K/UL — SIGNIFICANT CHANGE UP (ref 1.4–6.5)
NEUTROPHILS # BLD AUTO: 4.03 K/UL — SIGNIFICANT CHANGE UP (ref 1.4–6.5)
NEUTROPHILS # BLD AUTO: 4.18 K/UL
NEUTROPHILS # BLD AUTO: 4.39 K/UL — SIGNIFICANT CHANGE UP (ref 1.4–6.5)
NEUTROPHILS # BLD AUTO: 4.64 K/UL — SIGNIFICANT CHANGE UP (ref 1.4–6.5)
NEUTROPHILS # BLD AUTO: 4.71 K/UL — SIGNIFICANT CHANGE UP (ref 1.4–6.5)
NEUTROPHILS # BLD AUTO: 5.33 K/UL — SIGNIFICANT CHANGE UP (ref 1.4–6.5)
NEUTROPHILS # BLD AUTO: 5.39 K/UL — SIGNIFICANT CHANGE UP (ref 1.4–6.5)
NEUTROPHILS # BLD AUTO: 5.84 K/UL — SIGNIFICANT CHANGE UP (ref 1.4–6.5)
NEUTROPHILS # BLD AUTO: 5.99 K/UL — SIGNIFICANT CHANGE UP (ref 1.4–6.5)
NEUTROPHILS # BLD AUTO: 6.18 K/UL — SIGNIFICANT CHANGE UP (ref 1.4–6.5)
NEUTROPHILS # BLD AUTO: 8.12 K/UL — HIGH (ref 1.4–6.5)
NEUTROPHILS # BLD AUTO: 8.55 K/UL — HIGH (ref 1.4–6.5)
NEUTROPHILS NFR BLD AUTO: 69.6 % — SIGNIFICANT CHANGE UP (ref 42.2–75.2)
NEUTROPHILS NFR BLD AUTO: 72.8 % — SIGNIFICANT CHANGE UP (ref 42.2–75.2)
NEUTROPHILS NFR BLD AUTO: 72.8 % — SIGNIFICANT CHANGE UP (ref 42.2–75.2)
NEUTROPHILS NFR BLD AUTO: 72.9 % — SIGNIFICANT CHANGE UP (ref 42.2–75.2)
NEUTROPHILS NFR BLD AUTO: 73.1 % — SIGNIFICANT CHANGE UP (ref 42.2–75.2)
NEUTROPHILS NFR BLD AUTO: 73.8 % — SIGNIFICANT CHANGE UP (ref 42.2–75.2)
NEUTROPHILS NFR BLD AUTO: 74.6 % — SIGNIFICANT CHANGE UP (ref 42.2–75.2)
NEUTROPHILS NFR BLD AUTO: 76.9 % — HIGH (ref 42.2–75.2)
NEUTROPHILS NFR BLD AUTO: 77.1 % — HIGH (ref 42.2–75.2)
NEUTROPHILS NFR BLD AUTO: 77.5 % — HIGH (ref 42.2–75.2)
NEUTROPHILS NFR BLD AUTO: 77.5 % — HIGH (ref 42.2–75.2)
NEUTROPHILS NFR BLD AUTO: 78.2 %
NEUTROPHILS NFR BLD AUTO: 79.9 % — HIGH (ref 42.2–75.2)
NEUTROPHILS NFR BLD AUTO: 80.4 % — HIGH (ref 42.2–75.2)
NEUTROPHILS NFR BLD AUTO: 81.5 % — HIGH (ref 42.2–75.2)
NEUTROPHILS NFR BLD AUTO: 81.5 % — HIGH (ref 42.2–75.2)
NEUTROPHILS NFR BLD AUTO: 81.9 % — HIGH (ref 42.2–75.2)
NEUTROPHILS NFR BLD AUTO: 82.5 % — HIGH (ref 42.2–75.2)
NEUTROPHILS NFR BLD AUTO: 83.3 % — HIGH (ref 42.2–75.2)
NEUTROPHILS NFR BLD AUTO: 84.5 % — HIGH (ref 42.2–75.2)
NEUTROPHILS NFR BLD AUTO: 86.5 % — HIGH (ref 42.2–75.2)
NEUTS BAND # BLD: 2 % — SIGNIFICANT CHANGE UP (ref 0–6)
NONHDLC SERPL-MCNC: 135 MG/DL
NRBC # BLD: 0 /100 WBCS — SIGNIFICANT CHANGE UP (ref 0–0)
NRBC # BLD: 0 /100 — SIGNIFICANT CHANGE UP (ref 0–0)
NT-PROBNP SERPL-MCNC: 1782 PG/ML
NT-PROBNP SERPL-SCNC: HIGH PG/ML (ref 0–300)
OVALOCYTES BLD QL SMEAR: SLIGHT — SIGNIFICANT CHANGE UP
OVALOCYTES BLD QL SMEAR: SLIGHT — SIGNIFICANT CHANGE UP
PCO2 BLDA: 33 MMHG — SIGNIFICANT CHANGE UP (ref 25–48)
PCO2 BLDV: 50 MMHG — HIGH (ref 39–42)
PH BLDA: 7.42 — SIGNIFICANT CHANGE UP (ref 7.35–7.45)
PH BLDV: 7.41 — SIGNIFICANT CHANGE UP (ref 7.32–7.43)
PHOSPHATE SERPL-MCNC: 2.8 MG/DL — SIGNIFICANT CHANGE UP (ref 2.1–4.9)
PHOSPHATE SERPL-MCNC: 2.9 MG/DL — SIGNIFICANT CHANGE UP (ref 2.1–4.9)
PHOSPHATE SERPL-MCNC: 3 MG/DL — SIGNIFICANT CHANGE UP (ref 2.1–4.9)
PHOSPHATE SERPL-MCNC: 3.1 MG/DL — SIGNIFICANT CHANGE UP (ref 2.1–4.9)
PHOSPHATE SERPL-MCNC: 3.1 MG/DL — SIGNIFICANT CHANGE UP (ref 2.1–4.9)
PHOSPHATE SERPL-MCNC: 3.4 MG/DL — SIGNIFICANT CHANGE UP (ref 2.1–4.9)
PHOSPHATE SERPL-MCNC: 3.5 MG/DL — SIGNIFICANT CHANGE UP (ref 2.1–4.9)
PHOSPHATE SERPL-MCNC: 3.6 MG/DL — SIGNIFICANT CHANGE UP (ref 2.1–4.9)
PLAT MORPH BLD: NORMAL — SIGNIFICANT CHANGE UP
PLAT MORPH BLD: NORMAL — SIGNIFICANT CHANGE UP
PLATELET # BLD AUTO: 133 K/UL — SIGNIFICANT CHANGE UP (ref 130–400)
PLATELET # BLD AUTO: 136 K/UL — SIGNIFICANT CHANGE UP (ref 130–400)
PLATELET # BLD AUTO: 142 K/UL — SIGNIFICANT CHANGE UP (ref 130–400)
PLATELET # BLD AUTO: 144 K/UL — SIGNIFICANT CHANGE UP (ref 130–400)
PLATELET # BLD AUTO: 146 K/UL — SIGNIFICANT CHANGE UP (ref 130–400)
PLATELET # BLD AUTO: 147 K/UL — SIGNIFICANT CHANGE UP (ref 130–400)
PLATELET # BLD AUTO: 149 K/UL — SIGNIFICANT CHANGE UP (ref 130–400)
PLATELET # BLD AUTO: 150 K/UL — SIGNIFICANT CHANGE UP (ref 130–400)
PLATELET # BLD AUTO: 151 K/UL — SIGNIFICANT CHANGE UP (ref 130–400)
PLATELET # BLD AUTO: 161 K/UL — SIGNIFICANT CHANGE UP (ref 130–400)
PLATELET # BLD AUTO: 169 K/UL — SIGNIFICANT CHANGE UP (ref 130–400)
PLATELET # BLD AUTO: 178 K/UL — SIGNIFICANT CHANGE UP (ref 130–400)
PLATELET # BLD AUTO: 181 K/UL — SIGNIFICANT CHANGE UP (ref 130–400)
PLATELET # BLD AUTO: 182 K/UL — SIGNIFICANT CHANGE UP (ref 130–400)
PLATELET # BLD AUTO: 185 K/UL — SIGNIFICANT CHANGE UP (ref 130–400)
PLATELET # BLD AUTO: 186 K/UL — SIGNIFICANT CHANGE UP (ref 130–400)
PLATELET # BLD AUTO: 195 K/UL — SIGNIFICANT CHANGE UP (ref 130–400)
PLATELET # BLD AUTO: 212 K/UL — SIGNIFICANT CHANGE UP (ref 130–400)
PLATELET # BLD AUTO: 215 K/UL — SIGNIFICANT CHANGE UP (ref 130–400)
PLATELET # BLD AUTO: 218 K/UL
PLATELET # BLD AUTO: 220 K/UL — SIGNIFICANT CHANGE UP (ref 130–400)
PLATELET # BLD AUTO: 222 K/UL — SIGNIFICANT CHANGE UP (ref 130–400)
PLATELET # BLD AUTO: 247 K/UL — SIGNIFICANT CHANGE UP (ref 130–400)
PLATELET # BLD AUTO: 259 K/UL — SIGNIFICANT CHANGE UP (ref 130–400)
PLATELET # BLD AUTO: 264 K/UL — SIGNIFICANT CHANGE UP (ref 130–400)
PO2 BLDA: 109 MMHG — HIGH (ref 83–108)
PO2 BLDV: 28 MMHG — SIGNIFICANT CHANGE UP
POTASSIUM BLDV-SCNC: 4.1 MMOL/L — SIGNIFICANT CHANGE UP (ref 3.5–5.1)
POTASSIUM SERPL-MCNC: 3.5 MMOL/L — SIGNIFICANT CHANGE UP (ref 3.5–5)
POTASSIUM SERPL-MCNC: 3.7 MMOL/L — SIGNIFICANT CHANGE UP (ref 3.5–5)
POTASSIUM SERPL-MCNC: 3.8 MMOL/L — SIGNIFICANT CHANGE UP (ref 3.5–5)
POTASSIUM SERPL-MCNC: 3.9 MMOL/L — SIGNIFICANT CHANGE UP (ref 3.5–5)
POTASSIUM SERPL-MCNC: 4 MMOL/L — SIGNIFICANT CHANGE UP (ref 3.5–5)
POTASSIUM SERPL-MCNC: 4.1 MMOL/L — SIGNIFICANT CHANGE UP (ref 3.5–5)
POTASSIUM SERPL-MCNC: 4.2 MMOL/L — SIGNIFICANT CHANGE UP (ref 3.5–5)
POTASSIUM SERPL-MCNC: 4.2 MMOL/L — SIGNIFICANT CHANGE UP (ref 3.5–5)
POTASSIUM SERPL-MCNC: 4.3 MMOL/L — SIGNIFICANT CHANGE UP (ref 3.5–5)
POTASSIUM SERPL-MCNC: 4.4 MMOL/L — SIGNIFICANT CHANGE UP (ref 3.5–5)
POTASSIUM SERPL-MCNC: 4.5 MMOL/L — SIGNIFICANT CHANGE UP (ref 3.5–5)
POTASSIUM SERPL-MCNC: 4.6 MMOL/L — SIGNIFICANT CHANGE UP (ref 3.5–5)
POTASSIUM SERPL-MCNC: 4.8 MMOL/L — SIGNIFICANT CHANGE UP (ref 3.5–5)
POTASSIUM SERPL-MCNC: 4.8 MMOL/L — SIGNIFICANT CHANGE UP (ref 3.5–5)
POTASSIUM SERPL-SCNC: 3.5 MMOL/L — SIGNIFICANT CHANGE UP (ref 3.5–5)
POTASSIUM SERPL-SCNC: 3.7 MMOL/L — SIGNIFICANT CHANGE UP (ref 3.5–5)
POTASSIUM SERPL-SCNC: 3.8 MMOL/L — SIGNIFICANT CHANGE UP (ref 3.5–5)
POTASSIUM SERPL-SCNC: 3.9 MMOL/L — SIGNIFICANT CHANGE UP (ref 3.5–5)
POTASSIUM SERPL-SCNC: 4 MMOL/L — SIGNIFICANT CHANGE UP (ref 3.5–5)
POTASSIUM SERPL-SCNC: 4.1 MMOL/L — SIGNIFICANT CHANGE UP (ref 3.5–5)
POTASSIUM SERPL-SCNC: 4.2 MMOL/L — SIGNIFICANT CHANGE UP (ref 3.5–5)
POTASSIUM SERPL-SCNC: 4.2 MMOL/L — SIGNIFICANT CHANGE UP (ref 3.5–5)
POTASSIUM SERPL-SCNC: 4.3 MMOL/L
POTASSIUM SERPL-SCNC: 4.3 MMOL/L — SIGNIFICANT CHANGE UP (ref 3.5–5)
POTASSIUM SERPL-SCNC: 4.4 MMOL/L — SIGNIFICANT CHANGE UP (ref 3.5–5)
POTASSIUM SERPL-SCNC: 4.5 MMOL/L — SIGNIFICANT CHANGE UP (ref 3.5–5)
POTASSIUM SERPL-SCNC: 4.6 MMOL/L — SIGNIFICANT CHANGE UP (ref 3.5–5)
POTASSIUM SERPL-SCNC: 4.8 MMOL/L — SIGNIFICANT CHANGE UP (ref 3.5–5)
POTASSIUM SERPL-SCNC: 4.8 MMOL/L — SIGNIFICANT CHANGE UP (ref 3.5–5)
PROT SERPL-MCNC: 5.4 G/DL — LOW (ref 6–8)
PROT SERPL-MCNC: 5.4 G/DL — LOW (ref 6–8)
PROT SERPL-MCNC: 5.8 G/DL — LOW (ref 6–8)
PROT SERPL-MCNC: 5.9 G/DL — LOW (ref 6–8)
PROT SERPL-MCNC: 6 G/DL — SIGNIFICANT CHANGE UP (ref 6–8)
PROT SERPL-MCNC: 6.1 G/DL — SIGNIFICANT CHANGE UP (ref 6–8)
PROT SERPL-MCNC: 6.2 G/DL — SIGNIFICANT CHANGE UP (ref 6–8)
PROT SERPL-MCNC: 6.7 G/DL — SIGNIFICANT CHANGE UP (ref 6–8)
PROT SERPL-MCNC: 6.9 G/DL — SIGNIFICANT CHANGE UP (ref 6–8)
PROT SERPL-MCNC: 6.9 G/DL — SIGNIFICANT CHANGE UP (ref 6–8)
PROT SERPL-MCNC: 7.3 G/DL — SIGNIFICANT CHANGE UP (ref 6–8)
PROT SERPL-MCNC: 7.6 G/DL — SIGNIFICANT CHANGE UP (ref 6–8)
PROT SERPL-MCNC: 7.6 G/DL — SIGNIFICANT CHANGE UP (ref 6–8)
PROT SERPL-MCNC: 7.8 G/DL
PROT SERPL-MCNC: 8 G/DL — SIGNIFICANT CHANGE UP (ref 6–8)
PROTHROM AB SERPL-ACNC: 12.5 SEC — SIGNIFICANT CHANGE UP (ref 9.95–12.87)
PROTHROM AB SERPL-ACNC: 12.9 SEC — HIGH (ref 9.95–12.87)
PROTHROM AB SERPL-ACNC: 12.9 SEC — HIGH (ref 9.95–12.87)
PROTHROM AB SERPL-ACNC: 13.3 SEC — HIGH (ref 9.95–12.87)
PROTHROM AB SERPL-ACNC: 13.4 SEC — HIGH (ref 9.95–12.87)
PROTHROM AB SERPL-ACNC: 13.7 SEC — HIGH (ref 9.95–12.87)
PROTHROM AB SERPL-ACNC: 14.4 SEC — HIGH (ref 9.95–12.87)
RBC # BLD: 2.4 M/UL — LOW (ref 4.2–5.4)
RBC # BLD: 2.56 M/UL — LOW (ref 4.2–5.4)
RBC # BLD: 2.6 M/UL — LOW (ref 4.2–5.4)
RBC # BLD: 2.74 M/UL — LOW (ref 4.2–5.4)
RBC # BLD: 2.8 M/UL — LOW (ref 4.2–5.4)
RBC # BLD: 2.83 M/UL — LOW (ref 4.2–5.4)
RBC # BLD: 2.86 M/UL — LOW (ref 4.2–5.4)
RBC # BLD: 2.92 M/UL — LOW (ref 4.2–5.4)
RBC # BLD: 2.93 M/UL — LOW (ref 4.2–5.4)
RBC # BLD: 3.04 M/UL — LOW (ref 4.2–5.4)
RBC # BLD: 3.12 M/UL — LOW (ref 4.2–5.4)
RBC # BLD: 3.17 M/UL — LOW (ref 4.2–5.4)
RBC # BLD: 3.25 M/UL — LOW (ref 4.2–5.4)
RBC # BLD: 3.46 M/UL — LOW (ref 4.2–5.4)
RBC # BLD: 3.59 M/UL — LOW (ref 4.2–5.4)
RBC # BLD: 3.75 M/UL
RBC # BLD: 3.82 M/UL — LOW (ref 4.2–5.4)
RBC # BLD: 3.92 M/UL — LOW (ref 4.2–5.4)
RBC # BLD: 3.93 M/UL — LOW (ref 4.2–5.4)
RBC # BLD: 4.06 M/UL — LOW (ref 4.2–5.4)
RBC # BLD: 4.25 M/UL — SIGNIFICANT CHANGE UP (ref 4.2–5.4)
RBC # BLD: 4.38 M/UL — SIGNIFICANT CHANGE UP (ref 4.2–5.4)
RBC # BLD: 4.46 M/UL — SIGNIFICANT CHANGE UP (ref 4.2–5.4)
RBC # BLD: 4.49 M/UL — SIGNIFICANT CHANGE UP (ref 4.2–5.4)
RBC # BLD: 4.67 M/UL — SIGNIFICANT CHANGE UP (ref 4.2–5.4)
RBC # FLD: 17.3 % — HIGH (ref 11.5–14.5)
RBC # FLD: 17.9 % — HIGH (ref 11.5–14.5)
RBC # FLD: 18.4 % — HIGH (ref 11.5–14.5)
RBC # FLD: 18.5 % — HIGH (ref 11.5–14.5)
RBC # FLD: 18.6 % — HIGH (ref 11.5–14.5)
RBC # FLD: 18.6 % — HIGH (ref 11.5–14.5)
RBC # FLD: 19.5 % — HIGH (ref 11.5–14.5)
RBC # FLD: 19.6 % — HIGH (ref 11.5–14.5)
RBC # FLD: 19.8 % — HIGH (ref 11.5–14.5)
RBC # FLD: 19.8 % — HIGH (ref 11.5–14.5)
RBC # FLD: 19.9 % — HIGH (ref 11.5–14.5)
RBC # FLD: 19.9 % — HIGH (ref 11.5–14.5)
RBC # FLD: 20.2 % — HIGH (ref 11.5–14.5)
RBC # FLD: 20.3 % — HIGH (ref 11.5–14.5)
RBC # FLD: 20.4 % — HIGH (ref 11.5–14.5)
RBC # FLD: 20.6 % — HIGH (ref 11.5–14.5)
RBC # FLD: 20.9 % — HIGH (ref 11.5–14.5)
RBC # FLD: 21 % — HIGH (ref 11.5–14.5)
RBC # FLD: 21.2 % — HIGH (ref 11.5–14.5)
RBC # FLD: 21.2 % — HIGH (ref 11.5–14.5)
RBC # FLD: 21.5 % — HIGH (ref 11.5–14.5)
RBC # FLD: 21.5 % — HIGH (ref 11.5–14.5)
RBC # FLD: 21.6 %
RBC # FLD: 21.7 % — HIGH (ref 11.5–14.5)
RBC # FLD: 22 % — HIGH (ref 11.5–14.5)
RBC BLD AUTO: ABNORMAL
RBC BLD AUTO: ABNORMAL
RSV RNA NPH QL NAA+NON-PROBE: SIGNIFICANT CHANGE UP
SAO2 % BLDA: 98.9 % — HIGH (ref 94–98)
SAO2 % BLDV: 43.4 % — SIGNIFICANT CHANGE UP
SARS-COV-2 RNA SPEC QL NAA+PROBE: SIGNIFICANT CHANGE UP
SCHISTOCYTES BLD QL AUTO: SIGNIFICANT CHANGE UP
SODIUM SERPL-SCNC: 131 MMOL/L — LOW (ref 135–146)
SODIUM SERPL-SCNC: 131 MMOL/L — LOW (ref 135–146)
SODIUM SERPL-SCNC: 132 MMOL/L — LOW (ref 135–146)
SODIUM SERPL-SCNC: 133 MMOL/L — LOW (ref 135–146)
SODIUM SERPL-SCNC: 134 MMOL/L — LOW (ref 135–146)
SODIUM SERPL-SCNC: 135 MMOL/L — SIGNIFICANT CHANGE UP (ref 135–146)
SODIUM SERPL-SCNC: 136 MMOL/L — SIGNIFICANT CHANGE UP (ref 135–146)
SODIUM SERPL-SCNC: 137 MMOL/L
SODIUM SERPL-SCNC: 137 MMOL/L — SIGNIFICANT CHANGE UP (ref 135–146)
SODIUM SERPL-SCNC: 137 MMOL/L — SIGNIFICANT CHANGE UP (ref 135–146)
SODIUM SERPL-SCNC: 138 MMOL/L — SIGNIFICANT CHANGE UP (ref 135–146)
SODIUM SERPL-SCNC: 138 MMOL/L — SIGNIFICANT CHANGE UP (ref 135–146)
SODIUM SERPL-SCNC: 142 MMOL/L — SIGNIFICANT CHANGE UP (ref 135–146)
TRIGL SERPL-MCNC: 53 MG/DL
TROPONIN T SERPL-MCNC: <0.01 NG/ML — SIGNIFICANT CHANGE UP
TSH SERPL-ACNC: 2.62 UIU/ML
WBC # BLD: 13.24 K/UL — HIGH (ref 4.8–10.8)
WBC # BLD: 2.8 K/UL — LOW (ref 4.8–10.8)
WBC # BLD: 3.46 K/UL — LOW (ref 4.8–10.8)
WBC # BLD: 4.06 K/UL — LOW (ref 4.8–10.8)
WBC # BLD: 4.47 K/UL — LOW (ref 4.8–10.8)
WBC # BLD: 4.66 K/UL — LOW (ref 4.8–10.8)
WBC # BLD: 4.85 K/UL — SIGNIFICANT CHANGE UP (ref 4.8–10.8)
WBC # BLD: 4.92 K/UL — SIGNIFICANT CHANGE UP (ref 4.8–10.8)
WBC # BLD: 5.13 K/UL — SIGNIFICANT CHANGE UP (ref 4.8–10.8)
WBC # BLD: 5.38 K/UL — SIGNIFICANT CHANGE UP (ref 4.8–10.8)
WBC # BLD: 5.4 K/UL — SIGNIFICANT CHANGE UP (ref 4.8–10.8)
WBC # BLD: 5.62 K/UL — SIGNIFICANT CHANGE UP (ref 4.8–10.8)
WBC # BLD: 5.64 K/UL — SIGNIFICANT CHANGE UP (ref 4.8–10.8)
WBC # BLD: 5.78 K/UL — SIGNIFICANT CHANGE UP (ref 4.8–10.8)
WBC # BLD: 5.84 K/UL — SIGNIFICANT CHANGE UP (ref 4.8–10.8)
WBC # BLD: 6.5 K/UL — SIGNIFICANT CHANGE UP (ref 4.8–10.8)
WBC # BLD: 6.5 K/UL — SIGNIFICANT CHANGE UP (ref 4.8–10.8)
WBC # BLD: 6.75 K/UL — SIGNIFICANT CHANGE UP (ref 4.8–10.8)
WBC # BLD: 7.42 K/UL — SIGNIFICANT CHANGE UP (ref 4.8–10.8)
WBC # BLD: 7.58 K/UL — SIGNIFICANT CHANGE UP (ref 4.8–10.8)
WBC # BLD: 8.11 K/UL — SIGNIFICANT CHANGE UP (ref 4.8–10.8)
WBC # BLD: 8.45 K/UL — SIGNIFICANT CHANGE UP (ref 4.8–10.8)
WBC # BLD: 9.6 K/UL — SIGNIFICANT CHANGE UP (ref 4.8–10.8)
WBC # BLD: 9.89 K/UL — SIGNIFICANT CHANGE UP (ref 4.8–10.8)
WBC # FLD AUTO: 13.24 K/UL — HIGH (ref 4.8–10.8)
WBC # FLD AUTO: 2.8 K/UL — LOW (ref 4.8–10.8)
WBC # FLD AUTO: 3.46 K/UL — LOW (ref 4.8–10.8)
WBC # FLD AUTO: 4.06 K/UL — LOW (ref 4.8–10.8)
WBC # FLD AUTO: 4.47 K/UL — LOW (ref 4.8–10.8)
WBC # FLD AUTO: 4.66 K/UL — LOW (ref 4.8–10.8)
WBC # FLD AUTO: 4.85 K/UL — SIGNIFICANT CHANGE UP (ref 4.8–10.8)
WBC # FLD AUTO: 4.92 K/UL — SIGNIFICANT CHANGE UP (ref 4.8–10.8)
WBC # FLD AUTO: 5.13 K/UL — SIGNIFICANT CHANGE UP (ref 4.8–10.8)
WBC # FLD AUTO: 5.34 K/UL
WBC # FLD AUTO: 5.38 K/UL — SIGNIFICANT CHANGE UP (ref 4.8–10.8)
WBC # FLD AUTO: 5.4 K/UL — SIGNIFICANT CHANGE UP (ref 4.8–10.8)
WBC # FLD AUTO: 5.62 K/UL — SIGNIFICANT CHANGE UP (ref 4.8–10.8)
WBC # FLD AUTO: 5.64 K/UL — SIGNIFICANT CHANGE UP (ref 4.8–10.8)
WBC # FLD AUTO: 5.78 K/UL — SIGNIFICANT CHANGE UP (ref 4.8–10.8)
WBC # FLD AUTO: 5.84 K/UL — SIGNIFICANT CHANGE UP (ref 4.8–10.8)
WBC # FLD AUTO: 6.5 K/UL — SIGNIFICANT CHANGE UP (ref 4.8–10.8)
WBC # FLD AUTO: 6.5 K/UL — SIGNIFICANT CHANGE UP (ref 4.8–10.8)
WBC # FLD AUTO: 6.75 K/UL — SIGNIFICANT CHANGE UP (ref 4.8–10.8)
WBC # FLD AUTO: 7.42 K/UL — SIGNIFICANT CHANGE UP (ref 4.8–10.8)
WBC # FLD AUTO: 7.58 K/UL — SIGNIFICANT CHANGE UP (ref 4.8–10.8)
WBC # FLD AUTO: 8.11 K/UL — SIGNIFICANT CHANGE UP (ref 4.8–10.8)
WBC # FLD AUTO: 8.45 K/UL — SIGNIFICANT CHANGE UP (ref 4.8–10.8)
WBC # FLD AUTO: 9.6 K/UL — SIGNIFICANT CHANGE UP (ref 4.8–10.8)
WBC # FLD AUTO: 9.89 K/UL — SIGNIFICANT CHANGE UP (ref 4.8–10.8)

## 2022-01-01 PROCEDURE — L3908: CPT | Mod: RT

## 2022-01-01 PROCEDURE — 99285 EMERGENCY DEPT VISIT HI MDM: CPT

## 2022-01-01 PROCEDURE — 71260 CT THORAX DX C+: CPT | Mod: 26,MA

## 2022-01-01 PROCEDURE — 93010 ELECTROCARDIOGRAM REPORT: CPT

## 2022-01-01 PROCEDURE — 71045 X-RAY EXAM CHEST 1 VIEW: CPT | Mod: 26

## 2022-01-01 PROCEDURE — 99233 SBSQ HOSP IP/OBS HIGH 50: CPT

## 2022-01-01 PROCEDURE — 99232 SBSQ HOSP IP/OBS MODERATE 35: CPT

## 2022-01-01 PROCEDURE — 73110 X-RAY EXAM OF WRIST: CPT | Mod: RT

## 2022-01-01 PROCEDURE — 99204 OFFICE O/P NEW MOD 45 MIN: CPT | Mod: 25

## 2022-01-01 PROCEDURE — 99204 OFFICE O/P NEW MOD 45 MIN: CPT

## 2022-01-01 PROCEDURE — 99221 1ST HOSP IP/OBS SF/LOW 40: CPT

## 2022-01-01 PROCEDURE — 70450 CT HEAD/BRAIN W/O DYE: CPT | Mod: 26,MA

## 2022-01-01 PROCEDURE — 72170 X-RAY EXAM OF PELVIS: CPT | Mod: 26

## 2022-01-01 PROCEDURE — 73560 X-RAY EXAM OF KNEE 1 OR 2: CPT | Mod: 26,LT

## 2022-01-01 PROCEDURE — 99223 1ST HOSP IP/OBS HIGH 75: CPT

## 2022-01-01 PROCEDURE — 95910 NRV CNDJ TEST 7-8 STUDIES: CPT

## 2022-01-01 PROCEDURE — 73552 X-RAY EXAM OF FEMUR 2/>: CPT | Mod: 26,LT

## 2022-01-01 PROCEDURE — 99223 1ST HOSP IP/OBS HIGH 75: CPT | Mod: 25

## 2022-01-01 PROCEDURE — 99238 HOSP IP/OBS DSCHRG MGMT 30/<: CPT

## 2022-01-01 PROCEDURE — 99203 OFFICE O/P NEW LOW 30 MIN: CPT | Mod: 25

## 2022-01-01 PROCEDURE — 73552 X-RAY EXAM OF FEMUR 2/>: CPT | Mod: LT

## 2022-01-01 PROCEDURE — 74177 CT ABD & PELVIS W/CONTRAST: CPT | Mod: 26,MA

## 2022-01-01 PROCEDURE — 99024 POSTOP FOLLOW-UP VISIT: CPT

## 2022-01-01 PROCEDURE — 70551 MRI BRAIN STEM W/O DYE: CPT

## 2022-01-01 PROCEDURE — 29075 APPL CST ELBW FNGR SHORT ARM: CPT | Mod: RT

## 2022-01-01 PROCEDURE — 99213 OFFICE O/P EST LOW 20 MIN: CPT

## 2022-01-01 PROCEDURE — 73590 X-RAY EXAM OF LOWER LEG: CPT | Mod: 26,LT

## 2022-01-01 PROCEDURE — 99291 CRITICAL CARE FIRST HOUR: CPT

## 2022-01-01 PROCEDURE — 95886 MUSC TEST DONE W/N TEST COMP: CPT

## 2022-01-01 PROCEDURE — 99214 OFFICE O/P EST MOD 30 MIN: CPT

## 2022-01-01 PROCEDURE — 43244 EGD VARICES LIGATION: CPT

## 2022-01-01 PROCEDURE — 99214 OFFICE O/P EST MOD 30 MIN: CPT | Mod: 95

## 2022-01-01 PROCEDURE — 93925 LOWER EXTREMITY STUDY: CPT | Mod: 26

## 2022-01-01 PROCEDURE — 93306 TTE W/DOPPLER COMPLETE: CPT | Mod: 26

## 2022-01-01 PROCEDURE — 72125 CT NECK SPINE W/O DYE: CPT | Mod: 26,MA

## 2022-01-01 PROCEDURE — 71275 CT ANGIOGRAPHY CHEST: CPT | Mod: 26,MA

## 2022-01-01 PROCEDURE — 93000 ELECTROCARDIOGRAM COMPLETE: CPT

## 2022-01-01 PROCEDURE — 99239 HOSP IP/OBS DSCHRG MGMT >30: CPT

## 2022-01-01 RX ORDER — METOPROLOL TARTRATE 50 MG
50 TABLET ORAL
Refills: 0 | Status: DISCONTINUED | OUTPATIENT
Start: 2022-01-01 | End: 2022-01-01

## 2022-01-01 RX ORDER — LEVOTHYROXINE SODIUM 125 MCG
1 TABLET ORAL
Qty: 0 | Refills: 0 | DISCHARGE

## 2022-01-01 RX ORDER — DULOXETINE HYDROCHLORIDE 30 MG/1
60 CAPSULE, DELAYED RELEASE ORAL DAILY
Refills: 0 | Status: DISCONTINUED | OUTPATIENT
Start: 2022-01-01 | End: 2022-01-01

## 2022-01-01 RX ORDER — SACUBITRIL AND VALSARTAN 24; 26 MG/1; MG/1
24-26 TABLET, FILM COATED ORAL TWICE DAILY
Qty: 30 | Refills: 5 | Status: ACTIVE | COMMUNITY
Start: 2022-01-01 | End: 1900-01-01

## 2022-01-01 RX ORDER — ACETAMINOPHEN 500 MG
650 TABLET ORAL EVERY 6 HOURS
Refills: 0 | Status: DISCONTINUED | OUTPATIENT
Start: 2022-01-01 | End: 2022-01-01

## 2022-01-01 RX ORDER — PRASUGREL 5 MG/1
5 TABLET, FILM COATED ORAL DAILY
Refills: 0 | Status: DISCONTINUED | OUTPATIENT
Start: 2022-01-01 | End: 2022-01-01

## 2022-01-01 RX ORDER — OXYCODONE HYDROCHLORIDE 5 MG/1
10 TABLET ORAL ONCE
Refills: 0 | Status: DISCONTINUED | OUTPATIENT
Start: 2022-01-01 | End: 2022-01-01

## 2022-01-01 RX ORDER — CIPROFLOXACIN LACTATE 400MG/40ML
1 VIAL (ML) INTRAVENOUS
Qty: 6 | Refills: 0
Start: 2022-01-01 | End: 2022-01-01

## 2022-01-01 RX ORDER — METOPROLOL TARTRATE 50 MG
1 TABLET ORAL
Qty: 0 | Refills: 0 | DISCHARGE

## 2022-01-01 RX ORDER — KETOROLAC TROMETHAMINE 30 MG/ML
15 SYRINGE (ML) INJECTION ONCE
Refills: 0 | Status: DISCONTINUED | OUTPATIENT
Start: 2022-01-01 | End: 2022-01-01

## 2022-01-01 RX ORDER — OXYCODONE HYDROCHLORIDE 5 MG/1
5 TABLET ORAL EVERY 4 HOURS
Refills: 0 | Status: DISCONTINUED | OUTPATIENT
Start: 2022-01-01 | End: 2022-01-01

## 2022-01-01 RX ORDER — FERROUS SULFATE 325(65) MG
325 TABLET ORAL DAILY
Refills: 0 | Status: DISCONTINUED | OUTPATIENT
Start: 2022-01-01 | End: 2022-01-01

## 2022-01-01 RX ORDER — PANTOPRAZOLE SODIUM 20 MG/1
1 TABLET, DELAYED RELEASE ORAL
Qty: 112 | Refills: 0
Start: 2022-01-01 | End: 2022-01-01

## 2022-01-01 RX ORDER — CARVEDILOL PHOSPHATE 80 MG/1
6.25 CAPSULE, EXTENDED RELEASE ORAL EVERY 12 HOURS
Refills: 0 | Status: DISCONTINUED | OUTPATIENT
Start: 2022-01-01 | End: 2022-01-01

## 2022-01-01 RX ORDER — AMLODIPINE BESYLATE 2.5 MG/1
5 TABLET ORAL DAILY
Refills: 0 | Status: DISCONTINUED | OUTPATIENT
Start: 2022-01-01 | End: 2022-01-01

## 2022-01-01 RX ORDER — FOLIC ACID 1 MG/1
1 TABLET ORAL
Refills: 0 | Status: ACTIVE | COMMUNITY

## 2022-01-01 RX ORDER — VALSARTAN 80 MG/1
40 TABLET ORAL DAILY
Refills: 0 | Status: DISCONTINUED | OUTPATIENT
Start: 2022-01-01 | End: 2022-01-01

## 2022-01-01 RX ORDER — AMLODIPINE BESYLATE 2.5 MG/1
50 TABLET ORAL
Qty: 0 | Refills: 0 | DISCHARGE

## 2022-01-01 RX ORDER — AMLODIPINE BESYLATE 2.5 MG/1
1 TABLET ORAL
Qty: 0 | Refills: 0 | DISCHARGE

## 2022-01-01 RX ORDER — DONEPEZIL HYDROCHLORIDE 10 MG/1
10 TABLET, FILM COATED ORAL AT BEDTIME
Refills: 0 | Status: DISCONTINUED | OUTPATIENT
Start: 2022-01-01 | End: 2022-01-01

## 2022-01-01 RX ORDER — METOPROLOL TARTRATE 50 MG
100 TABLET ORAL EVERY 12 HOURS
Refills: 0 | Status: DISCONTINUED | OUTPATIENT
Start: 2022-01-01 | End: 2022-01-01

## 2022-01-01 RX ORDER — ALLOPURINOL 300 MG
100 TABLET ORAL
Refills: 0 | Status: DISCONTINUED | OUTPATIENT
Start: 2022-01-01 | End: 2022-01-01

## 2022-01-01 RX ORDER — OXYBUTYNIN CHLORIDE 5 MG
10 TABLET ORAL DAILY
Refills: 0 | Status: DISCONTINUED | OUTPATIENT
Start: 2022-01-01 | End: 2022-01-01

## 2022-01-01 RX ORDER — ASPIRIN/CALCIUM CARB/MAGNESIUM 324 MG
81 TABLET ORAL DAILY
Refills: 0 | Status: DISCONTINUED | OUTPATIENT
Start: 2022-01-01 | End: 2022-01-01

## 2022-01-01 RX ORDER — PANTOPRAZOLE SODIUM 40 MG/1
40 GRANULE, DELAYED RELEASE ORAL
Refills: 0 | Status: ACTIVE | COMMUNITY

## 2022-01-01 RX ORDER — FOLIC ACID 0.8 MG
1 TABLET ORAL DAILY
Refills: 0 | Status: DISCONTINUED | OUTPATIENT
Start: 2022-01-01 | End: 2022-01-01

## 2022-01-01 RX ORDER — LEVOTHYROXINE SODIUM 50 MCG
50 TABLET ORAL
Refills: 0 | Status: ACTIVE | COMMUNITY

## 2022-01-01 RX ORDER — LEVOTHYROXINE SODIUM 125 MCG
50 TABLET ORAL DAILY
Refills: 0 | Status: DISCONTINUED | OUTPATIENT
Start: 2022-01-01 | End: 2022-01-01

## 2022-01-01 RX ORDER — HYDRALAZINE HCL 50 MG
10 TABLET ORAL DAILY
Refills: 0 | Status: DISCONTINUED | OUTPATIENT
Start: 2022-01-01 | End: 2022-01-01

## 2022-01-01 RX ORDER — CHLORHEXIDINE GLUCONATE 213 G/1000ML
1 SOLUTION TOPICAL ONCE
Refills: 0 | Status: DISCONTINUED | OUTPATIENT
Start: 2022-01-01 | End: 2022-01-01

## 2022-01-01 RX ORDER — OXYCODONE HYDROCHLORIDE 5 MG/1
5 TABLET ORAL EVERY 6 HOURS
Refills: 0 | Status: DISCONTINUED | OUTPATIENT
Start: 2022-01-01 | End: 2022-01-01

## 2022-01-01 RX ORDER — ASPIRIN/CALCIUM CARB/MAGNESIUM 324 MG
1 TABLET ORAL
Qty: 0 | Refills: 0 | DISCHARGE

## 2022-01-01 RX ORDER — HYDRALAZINE HCL 50 MG
1 TABLET ORAL
Qty: 0 | Refills: 0 | DISCHARGE

## 2022-01-01 RX ORDER — ONDANSETRON 8 MG/1
4 TABLET, FILM COATED ORAL EVERY 8 HOURS
Refills: 0 | Status: DISCONTINUED | OUTPATIENT
Start: 2022-01-01 | End: 2022-01-01

## 2022-01-01 RX ORDER — SODIUM CHLORIDE 9 MG/ML
500 INJECTION, SOLUTION INTRAVENOUS ONCE
Refills: 0 | Status: DISCONTINUED | OUTPATIENT
Start: 2022-01-01 | End: 2022-01-01

## 2022-01-01 RX ORDER — PANTOPRAZOLE SODIUM 20 MG/1
40 TABLET, DELAYED RELEASE ORAL
Refills: 0 | Status: DISCONTINUED | OUTPATIENT
Start: 2022-01-01 | End: 2022-01-01

## 2022-01-01 RX ORDER — CLOPIDOGREL BISULFATE 75 MG/1
75 TABLET, FILM COATED ORAL DAILY
Refills: 0 | Status: DISCONTINUED | OUTPATIENT
Start: 2022-01-01 | End: 2022-01-01

## 2022-01-01 RX ORDER — CIPROFLOXACIN LACTATE 400MG/40ML
500 VIAL (ML) INTRAVENOUS EVERY 12 HOURS
Refills: 0 | Status: DISCONTINUED | OUTPATIENT
Start: 2022-01-01 | End: 2022-01-01

## 2022-01-01 RX ORDER — ALLOPURINOL 300 MG
100 TABLET ORAL DAILY
Refills: 0 | Status: DISCONTINUED | OUTPATIENT
Start: 2022-01-01 | End: 2022-01-01

## 2022-01-01 RX ORDER — DONEPEZIL HYDROCHLORIDE 10 MG/1
10 TABLET ORAL
Qty: 30 | Refills: 0 | Status: ACTIVE | COMMUNITY
Start: 2022-01-01 | End: 1900-01-01

## 2022-01-01 RX ORDER — AMLODIPINE BESYLATE 2.5 MG/1
5 TABLET ORAL
Refills: 0 | Status: DISCONTINUED | OUTPATIENT
Start: 2022-01-01 | End: 2022-01-01

## 2022-01-01 RX ORDER — NIFEDIPINE 30 MG
30 TABLET, EXTENDED RELEASE 24 HR ORAL ONCE
Refills: 0 | Status: COMPLETED | OUTPATIENT
Start: 2022-01-01 | End: 2022-01-01

## 2022-01-01 RX ORDER — METOPROLOL TARTRATE 50 MG
50 TABLET ORAL DAILY
Refills: 0 | Status: DISCONTINUED | OUTPATIENT
Start: 2022-01-01 | End: 2022-01-01

## 2022-01-01 RX ORDER — AMLODIPINE BESYLATE 2.5 MG/1
1 TABLET ORAL
Qty: 0 | Refills: 0 | DISCHARGE
Start: 2022-01-01

## 2022-01-01 RX ORDER — ERGOCALCIFEROL 1.25 MG/1
50000 CAPSULE ORAL DAILY
Refills: 0 | Status: DISCONTINUED | OUTPATIENT
Start: 2022-01-01 | End: 2022-01-01

## 2022-01-01 RX ORDER — FERROUS SULFATE 325(65) MG
0 TABLET ORAL
Qty: 0 | Refills: 0 | DISCHARGE

## 2022-01-01 RX ORDER — SODIUM CHLORIDE 9 MG/ML
1000 INJECTION, SOLUTION INTRAVENOUS
Refills: 0 | Status: DISCONTINUED | OUTPATIENT
Start: 2022-01-01 | End: 2022-01-01

## 2022-01-01 RX ORDER — OXYBUTYNIN CHLORIDE 5 MG
10 TABLET ORAL
Refills: 0 | Status: DISCONTINUED | OUTPATIENT
Start: 2022-01-01 | End: 2022-01-01

## 2022-01-01 RX ORDER — HEPARIN SODIUM 5000 [USP'U]/ML
5000 INJECTION INTRAVENOUS; SUBCUTANEOUS EVERY 8 HOURS
Refills: 0 | Status: DISCONTINUED | OUTPATIENT
Start: 2022-01-01 | End: 2022-01-01

## 2022-01-01 RX ORDER — LANOLIN ALCOHOL/MO/W.PET/CERES
3 CREAM (GRAM) TOPICAL AT BEDTIME
Refills: 0 | Status: DISCONTINUED | OUTPATIENT
Start: 2022-01-01 | End: 2022-01-01

## 2022-01-01 RX ORDER — FUROSEMIDE 40 MG
40 TABLET ORAL DAILY
Refills: 0 | Status: DISCONTINUED | OUTPATIENT
Start: 2022-01-01 | End: 2022-01-01

## 2022-01-01 RX ORDER — CARVEDILOL PHOSPHATE 80 MG/1
1 CAPSULE, EXTENDED RELEASE ORAL
Qty: 60 | Refills: 0
Start: 2022-01-01 | End: 2022-01-01

## 2022-01-01 RX ORDER — KETOROLAC TROMETHAMINE 30 MG/ML
30 SYRINGE (ML) INJECTION EVERY 8 HOURS
Refills: 0 | Status: DISCONTINUED | OUTPATIENT
Start: 2022-01-01 | End: 2022-01-01

## 2022-01-01 RX ORDER — OMEPRAZOLE 10 MG/1
1 CAPSULE, DELAYED RELEASE ORAL
Qty: 0 | Refills: 0 | DISCHARGE

## 2022-01-01 RX ORDER — FERROUS SULFATE 325(65) MG
75 TABLET ORAL DAILY
Refills: 0 | Status: DISCONTINUED | OUTPATIENT
Start: 2022-01-01 | End: 2022-01-01

## 2022-01-01 RX ORDER — ASPIRIN/CALCIUM CARB/MAGNESIUM 324 MG
1 TABLET ORAL
Qty: 42 | Refills: 0
Start: 2022-01-01 | End: 2022-01-01

## 2022-01-01 RX ORDER — IPRATROPIUM/ALBUTEROL SULFATE 18-103MCG
3 AEROSOL WITH ADAPTER (GRAM) INHALATION ONCE
Refills: 0 | Status: DISCONTINUED | OUTPATIENT
Start: 2022-01-01 | End: 2022-01-01

## 2022-01-01 RX ORDER — FUROSEMIDE 40 MG
1 TABLET ORAL
Qty: 30 | Refills: 0
Start: 2022-01-01 | End: 2023-01-01

## 2022-01-01 RX ORDER — CHLORHEXIDINE GLUCONATE 4 %
325 (65 FE) LIQUID (ML) TOPICAL
Refills: 0 | Status: ACTIVE | COMMUNITY

## 2022-01-01 RX ORDER — MAGNESIUM SULFATE 500 MG/ML
2 VIAL (ML) INJECTION ONCE
Refills: 0 | Status: COMPLETED | OUTPATIENT
Start: 2022-01-01 | End: 2022-01-01

## 2022-01-01 RX ORDER — FUROSEMIDE 40 MG
40 TABLET ORAL
Refills: 0 | Status: DISCONTINUED | OUTPATIENT
Start: 2022-01-01 | End: 2022-01-01

## 2022-01-01 RX ORDER — HYDRALAZINE HCL 50 MG
10 TABLET ORAL ONCE
Refills: 0 | Status: COMPLETED | OUTPATIENT
Start: 2022-01-01 | End: 2022-01-01

## 2022-01-01 RX ORDER — CLOPIDOGREL BISULFATE 75 MG/1
1 TABLET, FILM COATED ORAL
Qty: 30 | Refills: 0
Start: 2022-01-01 | End: 2022-01-01

## 2022-01-01 RX ORDER — DULOXETINE HYDROCHLORIDE 60 MG/1
60 CAPSULE, DELAYED RELEASE PELLETS ORAL
Refills: 0 | Status: ACTIVE | COMMUNITY

## 2022-01-01 RX ORDER — CARVEDILOL PHOSPHATE 80 MG/1
3.12 CAPSULE, EXTENDED RELEASE ORAL EVERY 12 HOURS
Refills: 0 | Status: DISCONTINUED | OUTPATIENT
Start: 2022-01-01 | End: 2022-01-01

## 2022-01-01 RX ORDER — CLOPIDOGREL 75 MG/1
75 TABLET, FILM COATED ORAL
Refills: 0 | Status: ACTIVE | COMMUNITY

## 2022-01-01 RX ORDER — PRASUGREL 5 MG/1
10 TABLET, FILM COATED ORAL DAILY
Refills: 0 | Status: DISCONTINUED | OUTPATIENT
Start: 2022-01-01 | End: 2022-01-01

## 2022-01-01 RX ORDER — OXYBUTYNIN CHLORIDE 5 MG
5 TABLET ORAL
Refills: 0 | Status: DISCONTINUED | OUTPATIENT
Start: 2022-01-01 | End: 2022-01-01

## 2022-01-01 RX ORDER — FUROSEMIDE 40 MG
40 TABLET ORAL EVERY 12 HOURS
Refills: 0 | Status: DISCONTINUED | OUTPATIENT
Start: 2022-01-01 | End: 2022-01-01

## 2022-01-01 RX ORDER — ABATACEPT 125 MG/ML
125 INJECTION, SOLUTION SUBCUTANEOUS
Refills: 0 | Status: ACTIVE | COMMUNITY

## 2022-01-01 RX ORDER — AMLODIPINE BESYLATE 5 MG/1
5 TABLET ORAL
Refills: 0 | Status: DISCONTINUED | COMMUNITY
End: 2022-01-01

## 2022-01-01 RX ORDER — PRASUGREL 5 MG/1
1 TABLET, FILM COATED ORAL
Qty: 0 | Refills: 0 | DISCHARGE

## 2022-01-01 RX ORDER — FUROSEMIDE 40 MG
10 TABLET ORAL ONCE
Refills: 0 | Status: COMPLETED | OUTPATIENT
Start: 2022-01-01 | End: 2022-01-01

## 2022-01-01 RX ADMIN — Medication 100 MILLIGRAM(S): at 18:20

## 2022-01-01 RX ADMIN — OXYCODONE HYDROCHLORIDE 5 MILLIGRAM(S): 5 TABLET ORAL at 21:29

## 2022-01-01 RX ADMIN — Medication 1 MILLIGRAM(S): at 11:41

## 2022-01-01 RX ADMIN — CARVEDILOL PHOSPHATE 6.25 MILLIGRAM(S): 80 CAPSULE, EXTENDED RELEASE ORAL at 17:28

## 2022-01-01 RX ADMIN — Medication 40 MILLIGRAM(S): at 06:37

## 2022-01-01 RX ADMIN — Medication 81 MILLIGRAM(S): at 11:27

## 2022-01-01 RX ADMIN — Medication 30 MILLIGRAM(S): at 11:59

## 2022-01-01 RX ADMIN — Medication 50 MICROGRAM(S): at 05:50

## 2022-01-01 RX ADMIN — Medication 50 MICROGRAM(S): at 06:28

## 2022-01-01 RX ADMIN — HEPARIN SODIUM 5000 UNIT(S): 5000 INJECTION INTRAVENOUS; SUBCUTANEOUS at 22:00

## 2022-01-01 RX ADMIN — Medication 100 MILLIGRAM(S): at 06:19

## 2022-01-01 RX ADMIN — Medication 650 MILLIGRAM(S): at 14:01

## 2022-01-01 RX ADMIN — Medication 100 MILLIGRAM(S): at 05:20

## 2022-01-01 RX ADMIN — Medication 1 MILLIGRAM(S): at 11:39

## 2022-01-01 RX ADMIN — Medication 10 MILLIGRAM(S): at 06:28

## 2022-01-01 RX ADMIN — Medication 325 MILLIGRAM(S): at 12:51

## 2022-01-01 RX ADMIN — HEPARIN SODIUM 5000 UNIT(S): 5000 INJECTION INTRAVENOUS; SUBCUTANEOUS at 15:36

## 2022-01-01 RX ADMIN — Medication 5 MILLIGRAM(S): at 06:27

## 2022-01-01 RX ADMIN — AMLODIPINE BESYLATE 5 MILLIGRAM(S): 2.5 TABLET ORAL at 17:24

## 2022-01-01 RX ADMIN — Medication 100 MILLIGRAM(S): at 05:57

## 2022-01-01 RX ADMIN — Medication 325 MILLIGRAM(S): at 11:46

## 2022-01-01 RX ADMIN — Medication 62.5 MILLIMOLE(S): at 03:23

## 2022-01-01 RX ADMIN — Medication 10 MILLIGRAM(S): at 11:41

## 2022-01-01 RX ADMIN — Medication 325 MILLIGRAM(S): at 13:08

## 2022-01-01 RX ADMIN — DULOXETINE HYDROCHLORIDE 60 MILLIGRAM(S): 30 CAPSULE, DELAYED RELEASE ORAL at 11:38

## 2022-01-01 RX ADMIN — Medication 50 MICROGRAM(S): at 05:22

## 2022-01-01 RX ADMIN — Medication 25 GRAM(S): at 14:14

## 2022-01-01 RX ADMIN — AMLODIPINE BESYLATE 5 MILLIGRAM(S): 2.5 TABLET ORAL at 05:57

## 2022-01-01 RX ADMIN — Medication 40 MILLIGRAM(S): at 05:41

## 2022-01-01 RX ADMIN — HEPARIN SODIUM 5000 UNIT(S): 5000 INJECTION INTRAVENOUS; SUBCUTANEOUS at 21:17

## 2022-01-01 RX ADMIN — Medication 1 MILLIGRAM(S): at 11:46

## 2022-01-01 RX ADMIN — CARVEDILOL PHOSPHATE 6.25 MILLIGRAM(S): 80 CAPSULE, EXTENDED RELEASE ORAL at 06:00

## 2022-01-01 RX ADMIN — Medication 50 MICROGRAM(S): at 05:35

## 2022-01-01 RX ADMIN — Medication 100 MILLIGRAM(S): at 12:53

## 2022-01-01 RX ADMIN — Medication 650 MILLIGRAM(S): at 06:27

## 2022-01-01 RX ADMIN — Medication 100 MILLIGRAM(S): at 17:28

## 2022-01-01 RX ADMIN — Medication 100 MILLIGRAM(S): at 17:24

## 2022-01-01 RX ADMIN — Medication 325 MILLIGRAM(S): at 13:50

## 2022-01-01 RX ADMIN — Medication 30 MILLIGRAM(S): at 12:30

## 2022-01-01 RX ADMIN — Medication 1 MILLIGRAM(S): at 12:22

## 2022-01-01 RX ADMIN — HEPARIN SODIUM 5000 UNIT(S): 5000 INJECTION INTRAVENOUS; SUBCUTANEOUS at 21:52

## 2022-01-01 RX ADMIN — Medication 50 MICROGRAM(S): at 06:38

## 2022-01-01 RX ADMIN — Medication 100 MILLIGRAM(S): at 05:29

## 2022-01-01 RX ADMIN — Medication 325 MILLIGRAM(S): at 12:23

## 2022-01-01 RX ADMIN — DULOXETINE HYDROCHLORIDE 60 MILLIGRAM(S): 30 CAPSULE, DELAYED RELEASE ORAL at 12:22

## 2022-01-01 RX ADMIN — Medication 81 MILLIGRAM(S): at 13:48

## 2022-01-01 RX ADMIN — CARVEDILOL PHOSPHATE 3.12 MILLIGRAM(S): 80 CAPSULE, EXTENDED RELEASE ORAL at 05:29

## 2022-01-01 RX ADMIN — Medication 15 MILLIGRAM(S): at 04:33

## 2022-01-01 RX ADMIN — Medication 650 MILLIGRAM(S): at 06:22

## 2022-01-01 RX ADMIN — SODIUM CHLORIDE 100 MILLILITER(S): 9 INJECTION, SOLUTION INTRAVENOUS at 22:15

## 2022-01-01 RX ADMIN — Medication 325 MILLIGRAM(S): at 11:39

## 2022-01-01 RX ADMIN — Medication 100 MILLIGRAM(S): at 05:53

## 2022-01-01 RX ADMIN — Medication 5 MILLIGRAM(S): at 05:09

## 2022-01-01 RX ADMIN — HEPARIN SODIUM 5000 UNIT(S): 5000 INJECTION INTRAVENOUS; SUBCUTANEOUS at 06:07

## 2022-01-01 RX ADMIN — Medication 10 MILLIGRAM(S): at 05:53

## 2022-01-01 RX ADMIN — Medication 650 MILLIGRAM(S): at 11:56

## 2022-01-01 RX ADMIN — Medication 10 MILLIGRAM(S): at 17:24

## 2022-01-01 RX ADMIN — Medication 1 MILLIGRAM(S): at 11:57

## 2022-01-01 RX ADMIN — CARVEDILOL PHOSPHATE 6.25 MILLIGRAM(S): 80 CAPSULE, EXTENDED RELEASE ORAL at 05:56

## 2022-01-01 RX ADMIN — PANTOPRAZOLE SODIUM 40 MILLIGRAM(S): 20 TABLET, DELAYED RELEASE ORAL at 06:27

## 2022-01-01 RX ADMIN — Medication 325 MILLIGRAM(S): at 12:27

## 2022-01-01 RX ADMIN — CARVEDILOL PHOSPHATE 6.25 MILLIGRAM(S): 80 CAPSULE, EXTENDED RELEASE ORAL at 05:09

## 2022-01-01 RX ADMIN — Medication 81 MILLIGRAM(S): at 12:00

## 2022-01-01 RX ADMIN — Medication 50 MILLIGRAM(S): at 05:11

## 2022-01-01 RX ADMIN — Medication 100 MILLIGRAM(S): at 17:58

## 2022-01-01 RX ADMIN — Medication 10 MILLIGRAM(S): at 17:10

## 2022-01-01 RX ADMIN — Medication 100 MILLIGRAM(S): at 17:20

## 2022-01-01 RX ADMIN — Medication 40 MILLIGRAM(S): at 06:28

## 2022-01-01 RX ADMIN — Medication 650 MILLIGRAM(S): at 18:54

## 2022-01-01 RX ADMIN — Medication 1 MILLIGRAM(S): at 12:51

## 2022-01-01 RX ADMIN — PANTOPRAZOLE SODIUM 40 MILLIGRAM(S): 20 TABLET, DELAYED RELEASE ORAL at 18:24

## 2022-01-01 RX ADMIN — Medication 50 MICROGRAM(S): at 06:17

## 2022-01-01 RX ADMIN — AMLODIPINE BESYLATE 5 MILLIGRAM(S): 2.5 TABLET ORAL at 06:37

## 2022-01-01 RX ADMIN — Medication 5 MILLIGRAM(S): at 18:06

## 2022-01-01 RX ADMIN — Medication 650 MILLIGRAM(S): at 17:35

## 2022-01-01 RX ADMIN — Medication 1 MILLIGRAM(S): at 12:27

## 2022-01-01 RX ADMIN — DULOXETINE HYDROCHLORIDE 60 MILLIGRAM(S): 30 CAPSULE, DELAYED RELEASE ORAL at 11:39

## 2022-01-01 RX ADMIN — HEPARIN SODIUM 5000 UNIT(S): 5000 INJECTION INTRAVENOUS; SUBCUTANEOUS at 13:44

## 2022-01-01 RX ADMIN — SODIUM CHLORIDE 75 MILLILITER(S): 9 INJECTION, SOLUTION INTRAVENOUS at 17:25

## 2022-01-01 RX ADMIN — CARVEDILOL PHOSPHATE 6.25 MILLIGRAM(S): 80 CAPSULE, EXTENDED RELEASE ORAL at 21:16

## 2022-01-01 RX ADMIN — Medication 100 MILLIGRAM(S): at 17:22

## 2022-01-01 RX ADMIN — PANTOPRAZOLE SODIUM 40 MILLIGRAM(S): 20 TABLET, DELAYED RELEASE ORAL at 10:07

## 2022-01-01 RX ADMIN — AMLODIPINE BESYLATE 5 MILLIGRAM(S): 2.5 TABLET ORAL at 06:00

## 2022-01-01 RX ADMIN — AMLODIPINE BESYLATE 5 MILLIGRAM(S): 2.5 TABLET ORAL at 17:28

## 2022-01-01 RX ADMIN — Medication 1 MILLIGRAM(S): at 11:27

## 2022-01-01 RX ADMIN — Medication 100 MILLIGRAM(S): at 06:37

## 2022-01-01 RX ADMIN — Medication 325 MILLIGRAM(S): at 11:09

## 2022-01-01 RX ADMIN — Medication 25 GRAM(S): at 12:17

## 2022-01-01 RX ADMIN — AMLODIPINE BESYLATE 5 MILLIGRAM(S): 2.5 TABLET ORAL at 05:11

## 2022-01-01 RX ADMIN — Medication 10 MILLIGRAM(S): at 05:56

## 2022-01-01 RX ADMIN — CARVEDILOL PHOSPHATE 3.12 MILLIGRAM(S): 80 CAPSULE, EXTENDED RELEASE ORAL at 18:20

## 2022-01-01 RX ADMIN — Medication 50 MICROGRAM(S): at 05:29

## 2022-01-01 RX ADMIN — Medication 325 MILLIGRAM(S): at 11:58

## 2022-01-01 RX ADMIN — DULOXETINE HYDROCHLORIDE 60 MILLIGRAM(S): 30 CAPSULE, DELAYED RELEASE ORAL at 13:27

## 2022-01-01 RX ADMIN — PANTOPRAZOLE SODIUM 40 MILLIGRAM(S): 20 TABLET, DELAYED RELEASE ORAL at 06:19

## 2022-01-01 RX ADMIN — Medication 1 MILLIGRAM(S): at 13:08

## 2022-01-01 RX ADMIN — SODIUM CHLORIDE 75 MILLILITER(S): 9 INJECTION, SOLUTION INTRAVENOUS at 14:09

## 2022-01-01 RX ADMIN — Medication 30 MILLIGRAM(S): at 08:20

## 2022-01-01 RX ADMIN — Medication 100 MILLIGRAM(S): at 06:32

## 2022-01-01 RX ADMIN — DULOXETINE HYDROCHLORIDE 60 MILLIGRAM(S): 30 CAPSULE, DELAYED RELEASE ORAL at 13:48

## 2022-01-01 RX ADMIN — Medication 500 MILLIGRAM(S): at 17:20

## 2022-01-01 RX ADMIN — Medication 500 MILLIGRAM(S): at 05:14

## 2022-01-01 RX ADMIN — Medication 1 MILLIGRAM(S): at 13:19

## 2022-01-01 RX ADMIN — Medication 100 MILLIGRAM(S): at 05:21

## 2022-01-01 RX ADMIN — CLOPIDOGREL BISULFATE 75 MILLIGRAM(S): 75 TABLET, FILM COATED ORAL at 11:27

## 2022-01-01 RX ADMIN — Medication 650 MILLIGRAM(S): at 23:28

## 2022-01-01 RX ADMIN — Medication 10 MILLIGRAM(S): at 18:42

## 2022-01-01 RX ADMIN — Medication 10 MILLIGRAM(S): at 05:22

## 2022-01-01 RX ADMIN — Medication 650 MILLIGRAM(S): at 00:00

## 2022-01-01 RX ADMIN — Medication 650 MILLIGRAM(S): at 12:15

## 2022-01-01 RX ADMIN — Medication 25 GRAM(S): at 04:33

## 2022-01-01 RX ADMIN — DULOXETINE HYDROCHLORIDE 60 MILLIGRAM(S): 30 CAPSULE, DELAYED RELEASE ORAL at 12:52

## 2022-01-01 RX ADMIN — Medication 650 MILLIGRAM(S): at 06:57

## 2022-01-01 RX ADMIN — Medication 325 MILLIGRAM(S): at 12:00

## 2022-01-01 RX ADMIN — AMLODIPINE BESYLATE 5 MILLIGRAM(S): 2.5 TABLET ORAL at 17:13

## 2022-01-01 RX ADMIN — Medication 100 MILLIGRAM(S): at 06:29

## 2022-01-01 RX ADMIN — AMLODIPINE BESYLATE 5 MILLIGRAM(S): 2.5 TABLET ORAL at 21:16

## 2022-01-01 RX ADMIN — PANTOPRAZOLE SODIUM 40 MILLIGRAM(S): 20 TABLET, DELAYED RELEASE ORAL at 06:18

## 2022-01-01 RX ADMIN — Medication 650 MILLIGRAM(S): at 18:50

## 2022-01-01 RX ADMIN — CARVEDILOL PHOSPHATE 6.25 MILLIGRAM(S): 80 CAPSULE, EXTENDED RELEASE ORAL at 17:44

## 2022-01-01 RX ADMIN — PANTOPRAZOLE SODIUM 40 MILLIGRAM(S): 20 TABLET, DELAYED RELEASE ORAL at 05:59

## 2022-01-01 RX ADMIN — Medication 81 MILLIGRAM(S): at 12:52

## 2022-01-01 RX ADMIN — DONEPEZIL HYDROCHLORIDE 10 MILLIGRAM(S): 10 TABLET, FILM COATED ORAL at 21:56

## 2022-01-01 RX ADMIN — AMLODIPINE BESYLATE 5 MILLIGRAM(S): 2.5 TABLET ORAL at 17:43

## 2022-01-01 RX ADMIN — Medication 1 MILLIGRAM(S): at 12:00

## 2022-01-01 RX ADMIN — PANTOPRAZOLE SODIUM 40 MILLIGRAM(S): 20 TABLET, DELAYED RELEASE ORAL at 05:30

## 2022-01-01 RX ADMIN — CLOPIDOGREL BISULFATE 75 MILLIGRAM(S): 75 TABLET, FILM COATED ORAL at 13:49

## 2022-01-01 RX ADMIN — AMLODIPINE BESYLATE 5 MILLIGRAM(S): 2.5 TABLET ORAL at 05:35

## 2022-01-01 RX ADMIN — HEPARIN SODIUM 5000 UNIT(S): 5000 INJECTION INTRAVENOUS; SUBCUTANEOUS at 14:31

## 2022-01-01 RX ADMIN — Medication 100 MILLIGRAM(S): at 05:49

## 2022-01-01 RX ADMIN — AMLODIPINE BESYLATE 5 MILLIGRAM(S): 2.5 TABLET ORAL at 05:21

## 2022-01-01 RX ADMIN — Medication 40 MILLIGRAM(S): at 18:31

## 2022-01-01 RX ADMIN — Medication 500 MILLIGRAM(S): at 05:21

## 2022-01-01 RX ADMIN — AMLODIPINE BESYLATE 5 MILLIGRAM(S): 2.5 TABLET ORAL at 17:37

## 2022-01-01 RX ADMIN — Medication 50 MICROGRAM(S): at 05:09

## 2022-01-01 RX ADMIN — Medication 100 MILLIGRAM(S): at 11:27

## 2022-01-01 RX ADMIN — Medication 650 MILLIGRAM(S): at 00:19

## 2022-01-01 RX ADMIN — HEPARIN SODIUM 5000 UNIT(S): 5000 INJECTION INTRAVENOUS; SUBCUTANEOUS at 05:53

## 2022-01-01 RX ADMIN — PANTOPRAZOLE SODIUM 40 MILLIGRAM(S): 20 TABLET, DELAYED RELEASE ORAL at 17:20

## 2022-01-01 RX ADMIN — OXYCODONE HYDROCHLORIDE 10 MILLIGRAM(S): 5 TABLET ORAL at 05:38

## 2022-01-01 RX ADMIN — Medication 40 MILLIGRAM(S): at 05:35

## 2022-01-01 RX ADMIN — Medication 325 MILLIGRAM(S): at 13:19

## 2022-01-01 RX ADMIN — Medication 500 MILLIGRAM(S): at 18:21

## 2022-01-01 RX ADMIN — Medication 650 MILLIGRAM(S): at 11:38

## 2022-01-01 RX ADMIN — CARVEDILOL PHOSPHATE 6.25 MILLIGRAM(S): 80 CAPSULE, EXTENDED RELEASE ORAL at 05:52

## 2022-01-01 RX ADMIN — Medication 10 MILLIGRAM(S): at 11:10

## 2022-01-01 RX ADMIN — HEPARIN SODIUM 5000 UNIT(S): 5000 INJECTION INTRAVENOUS; SUBCUTANEOUS at 06:03

## 2022-01-01 RX ADMIN — AMLODIPINE BESYLATE 5 MILLIGRAM(S): 2.5 TABLET ORAL at 06:28

## 2022-01-01 RX ADMIN — CARVEDILOL PHOSPHATE 6.25 MILLIGRAM(S): 80 CAPSULE, EXTENDED RELEASE ORAL at 05:21

## 2022-01-01 RX ADMIN — Medication 50 MICROGRAM(S): at 05:57

## 2022-01-01 RX ADMIN — Medication 325 MILLIGRAM(S): at 11:38

## 2022-01-01 RX ADMIN — Medication 650 MILLIGRAM(S): at 06:01

## 2022-01-01 RX ADMIN — Medication 100 MILLIGRAM(S): at 06:01

## 2022-01-01 RX ADMIN — Medication 100 MILLIGRAM(S): at 13:49

## 2022-01-01 RX ADMIN — Medication 325 MILLIGRAM(S): at 12:36

## 2022-01-01 RX ADMIN — SODIUM CHLORIDE 75 MILLILITER(S): 9 INJECTION, SOLUTION INTRAVENOUS at 11:03

## 2022-01-01 RX ADMIN — Medication 100 MILLIGRAM(S): at 13:36

## 2022-01-01 RX ADMIN — Medication 100 MILLIGRAM(S): at 05:38

## 2022-01-01 RX ADMIN — Medication 5 MILLIGRAM(S): at 17:58

## 2022-01-01 RX ADMIN — Medication 650 MILLIGRAM(S): at 01:00

## 2022-01-01 RX ADMIN — Medication 10 MILLIGRAM(S): at 06:18

## 2022-01-01 RX ADMIN — Medication 5 MILLIGRAM(S): at 17:22

## 2022-01-01 RX ADMIN — HEPARIN SODIUM 5000 UNIT(S): 5000 INJECTION INTRAVENOUS; SUBCUTANEOUS at 22:09

## 2022-01-01 RX ADMIN — Medication 1 MILLIGRAM(S): at 12:37

## 2022-01-01 RX ADMIN — Medication 10 MILLIGRAM(S): at 17:44

## 2022-01-01 RX ADMIN — AMLODIPINE BESYLATE 5 MILLIGRAM(S): 2.5 TABLET ORAL at 05:38

## 2022-01-01 RX ADMIN — HEPARIN SODIUM 5000 UNIT(S): 5000 INJECTION INTRAVENOUS; SUBCUTANEOUS at 14:37

## 2022-01-01 RX ADMIN — Medication 50 MICROGRAM(S): at 06:00

## 2022-01-01 RX ADMIN — Medication 650 MILLIGRAM(S): at 06:50

## 2022-01-01 RX ADMIN — Medication 650 MILLIGRAM(S): at 05:56

## 2022-01-01 RX ADMIN — PANTOPRAZOLE SODIUM 40 MILLIGRAM(S): 20 TABLET, DELAYED RELEASE ORAL at 05:14

## 2022-01-01 RX ADMIN — Medication 50 MICROGRAM(S): at 05:21

## 2022-01-01 RX ADMIN — SODIUM CHLORIDE 100 MILLILITER(S): 9 INJECTION, SOLUTION INTRAVENOUS at 11:59

## 2022-01-01 RX ADMIN — Medication 650 MILLIGRAM(S): at 18:10

## 2022-01-01 RX ADMIN — HEPARIN SODIUM 5000 UNIT(S): 5000 INJECTION INTRAVENOUS; SUBCUTANEOUS at 21:59

## 2022-01-01 RX ADMIN — Medication 650 MILLIGRAM(S): at 14:40

## 2022-01-01 RX ADMIN — HEPARIN SODIUM 5000 UNIT(S): 5000 INJECTION INTRAVENOUS; SUBCUTANEOUS at 22:15

## 2022-01-01 RX ADMIN — Medication 1 MILLIGRAM(S): at 11:09

## 2022-01-01 RX ADMIN — SODIUM CHLORIDE 75 MILLILITER(S): 9 INJECTION, SOLUTION INTRAVENOUS at 18:27

## 2022-01-01 RX ADMIN — PANTOPRAZOLE SODIUM 40 MILLIGRAM(S): 20 TABLET, DELAYED RELEASE ORAL at 05:55

## 2022-01-01 RX ADMIN — Medication 10 MILLIGRAM(S): at 11:50

## 2022-01-01 RX ADMIN — DULOXETINE HYDROCHLORIDE 60 MILLIGRAM(S): 30 CAPSULE, DELAYED RELEASE ORAL at 11:09

## 2022-01-01 RX ADMIN — Medication 650 MILLIGRAM(S): at 13:08

## 2022-01-01 RX ADMIN — Medication 100 MILLIGRAM(S): at 00:00

## 2022-01-01 RX ADMIN — DULOXETINE HYDROCHLORIDE 60 MILLIGRAM(S): 30 CAPSULE, DELAYED RELEASE ORAL at 13:08

## 2022-01-01 RX ADMIN — Medication 325 MILLIGRAM(S): at 11:27

## 2022-01-01 RX ADMIN — Medication 500 MILLIGRAM(S): at 05:29

## 2022-01-01 RX ADMIN — Medication 30 MILLIGRAM(S): at 07:46

## 2022-01-01 RX ADMIN — PANTOPRAZOLE SODIUM 40 MILLIGRAM(S): 20 TABLET, DELAYED RELEASE ORAL at 06:07

## 2022-01-01 RX ADMIN — Medication 30 MILLIGRAM(S): at 00:47

## 2022-01-01 RX ADMIN — Medication 650 MILLIGRAM(S): at 05:52

## 2022-01-01 RX ADMIN — DONEPEZIL HYDROCHLORIDE 10 MILLIGRAM(S): 10 TABLET, FILM COATED ORAL at 21:05

## 2022-01-01 RX ADMIN — Medication 1 MILLIGRAM(S): at 13:50

## 2022-01-01 RX ADMIN — PANTOPRAZOLE SODIUM 40 MILLIGRAM(S): 20 TABLET, DELAYED RELEASE ORAL at 06:28

## 2022-01-01 RX ADMIN — DULOXETINE HYDROCHLORIDE 60 MILLIGRAM(S): 30 CAPSULE, DELAYED RELEASE ORAL at 11:59

## 2022-01-01 RX ADMIN — Medication 81 MILLIGRAM(S): at 13:19

## 2022-01-01 RX ADMIN — PANTOPRAZOLE SODIUM 40 MILLIGRAM(S): 20 TABLET, DELAYED RELEASE ORAL at 05:11

## 2022-01-01 RX ADMIN — Medication 650 MILLIGRAM(S): at 17:43

## 2022-01-01 RX ADMIN — Medication 81 MILLIGRAM(S): at 11:39

## 2022-01-01 RX ADMIN — CARVEDILOL PHOSPHATE 6.25 MILLIGRAM(S): 80 CAPSULE, EXTENDED RELEASE ORAL at 06:28

## 2022-01-01 RX ADMIN — DONEPEZIL HYDROCHLORIDE 10 MILLIGRAM(S): 10 TABLET, FILM COATED ORAL at 21:10

## 2022-01-01 RX ADMIN — Medication 5 MILLIGRAM(S): at 05:35

## 2022-01-01 RX ADMIN — PANTOPRAZOLE SODIUM 40 MILLIGRAM(S): 20 TABLET, DELAYED RELEASE ORAL at 05:53

## 2022-01-01 RX ADMIN — Medication 1 MILLIGRAM(S): at 11:37

## 2022-01-01 RX ADMIN — Medication 10 MILLIGRAM(S): at 18:30

## 2022-01-01 RX ADMIN — Medication 100 MILLIGRAM(S): at 17:13

## 2022-01-01 RX ADMIN — PANTOPRAZOLE SODIUM 40 MILLIGRAM(S): 20 TABLET, DELAYED RELEASE ORAL at 06:59

## 2022-01-01 RX ADMIN — Medication 100 MILLIGRAM(S): at 17:44

## 2022-01-01 RX ADMIN — Medication 325 MILLIGRAM(S): at 11:41

## 2022-01-01 RX ADMIN — Medication 50 MICROGRAM(S): at 05:38

## 2022-01-01 RX ADMIN — Medication 5 MILLIGRAM(S): at 06:37

## 2022-01-01 RX ADMIN — PANTOPRAZOLE SODIUM 40 MILLIGRAM(S): 20 TABLET, DELAYED RELEASE ORAL at 17:28

## 2022-01-01 RX ADMIN — Medication 100 MILLIGRAM(S): at 13:19

## 2022-01-01 RX ADMIN — DULOXETINE HYDROCHLORIDE 60 MILLIGRAM(S): 30 CAPSULE, DELAYED RELEASE ORAL at 11:58

## 2022-01-01 RX ADMIN — Medication 30 MILLIGRAM(S): at 14:25

## 2022-01-01 RX ADMIN — CLOPIDOGREL BISULFATE 75 MILLIGRAM(S): 75 TABLET, FILM COATED ORAL at 12:18

## 2022-01-01 RX ADMIN — DULOXETINE HYDROCHLORIDE 60 MILLIGRAM(S): 30 CAPSULE, DELAYED RELEASE ORAL at 11:41

## 2022-01-01 RX ADMIN — Medication 10 MILLIGRAM(S): at 11:39

## 2022-01-01 RX ADMIN — HEPARIN SODIUM 5000 UNIT(S): 5000 INJECTION INTRAVENOUS; SUBCUTANEOUS at 06:28

## 2022-01-01 RX ADMIN — DONEPEZIL HYDROCHLORIDE 10 MILLIGRAM(S): 10 TABLET, FILM COATED ORAL at 21:47

## 2022-01-01 RX ADMIN — CLOPIDOGREL BISULFATE 75 MILLIGRAM(S): 75 TABLET, FILM COATED ORAL at 13:19

## 2022-01-01 RX ADMIN — Medication 100 MILLIGRAM(S): at 05:35

## 2022-01-01 RX ADMIN — Medication 3 MILLIGRAM(S): at 21:47

## 2022-01-01 RX ADMIN — Medication 650 MILLIGRAM(S): at 00:44

## 2022-01-01 RX ADMIN — Medication 10 MILLIGRAM(S): at 06:00

## 2022-01-01 RX ADMIN — Medication 81 MILLIGRAM(S): at 12:18

## 2022-01-01 RX ADMIN — SODIUM CHLORIDE 75 MILLILITER(S): 9 INJECTION, SOLUTION INTRAVENOUS at 05:21

## 2022-01-01 RX ADMIN — Medication 650 MILLIGRAM(S): at 12:23

## 2022-01-01 RX ADMIN — Medication 100 MILLIGRAM(S): at 18:05

## 2022-01-01 RX ADMIN — DULOXETINE HYDROCHLORIDE 60 MILLIGRAM(S): 30 CAPSULE, DELAYED RELEASE ORAL at 11:27

## 2022-01-01 RX ADMIN — HEPARIN SODIUM 5000 UNIT(S): 5000 INJECTION INTRAVENOUS; SUBCUTANEOUS at 14:54

## 2022-01-01 RX ADMIN — CARVEDILOL PHOSPHATE 6.25 MILLIGRAM(S): 80 CAPSULE, EXTENDED RELEASE ORAL at 17:13

## 2022-01-01 RX ADMIN — Medication 10 MILLIGRAM(S): at 17:36

## 2022-01-01 RX ADMIN — SODIUM CHLORIDE 100 MILLILITER(S): 9 INJECTION, SOLUTION INTRAVENOUS at 09:15

## 2022-01-01 RX ADMIN — CARVEDILOL PHOSPHATE 6.25 MILLIGRAM(S): 80 CAPSULE, EXTENDED RELEASE ORAL at 05:20

## 2022-01-01 RX ADMIN — DONEPEZIL HYDROCHLORIDE 10 MILLIGRAM(S): 10 TABLET, FILM COATED ORAL at 21:14

## 2022-01-01 RX ADMIN — Medication 5 MILLIGRAM(S): at 17:39

## 2022-01-01 RX ADMIN — Medication 50 MICROGRAM(S): at 05:14

## 2022-01-01 RX ADMIN — Medication 1 MILLIGRAM(S): at 12:19

## 2022-01-01 RX ADMIN — Medication 5 MILLIGRAM(S): at 05:38

## 2022-01-01 RX ADMIN — Medication 100 MILLIGRAM(S): at 12:45

## 2022-01-01 RX ADMIN — PANTOPRAZOLE SODIUM 40 MILLIGRAM(S): 20 TABLET, DELAYED RELEASE ORAL at 05:18

## 2022-01-01 RX ADMIN — CARVEDILOL PHOSPHATE 6.25 MILLIGRAM(S): 80 CAPSULE, EXTENDED RELEASE ORAL at 17:36

## 2022-01-01 RX ADMIN — Medication 650 MILLIGRAM(S): at 05:20

## 2022-01-01 RX ADMIN — Medication 50 MICROGRAM(S): at 05:52

## 2022-01-01 RX ADMIN — CARVEDILOL PHOSPHATE 6.25 MILLIGRAM(S): 80 CAPSULE, EXTENDED RELEASE ORAL at 17:24

## 2022-01-01 RX ADMIN — Medication 100 MILLIGRAM(S): at 05:14

## 2022-01-01 RX ADMIN — DULOXETINE HYDROCHLORIDE 60 MILLIGRAM(S): 30 CAPSULE, DELAYED RELEASE ORAL at 12:27

## 2022-01-01 RX ADMIN — HEPARIN SODIUM 5000 UNIT(S): 5000 INJECTION INTRAVENOUS; SUBCUTANEOUS at 06:23

## 2022-01-01 RX ADMIN — PANTOPRAZOLE SODIUM 40 MILLIGRAM(S): 20 TABLET, DELAYED RELEASE ORAL at 06:36

## 2022-01-01 RX ADMIN — DULOXETINE HYDROCHLORIDE 60 MILLIGRAM(S): 30 CAPSULE, DELAYED RELEASE ORAL at 12:18

## 2022-01-01 RX ADMIN — CLOPIDOGREL BISULFATE 75 MILLIGRAM(S): 75 TABLET, FILM COATED ORAL at 12:52

## 2022-01-01 RX ADMIN — SODIUM CHLORIDE 100 MILLILITER(S): 9 INJECTION, SOLUTION INTRAVENOUS at 03:18

## 2022-01-01 RX ADMIN — HEPARIN SODIUM 5000 UNIT(S): 5000 INJECTION INTRAVENOUS; SUBCUTANEOUS at 06:29

## 2022-01-01 RX ADMIN — DULOXETINE HYDROCHLORIDE 60 MILLIGRAM(S): 30 CAPSULE, DELAYED RELEASE ORAL at 12:36

## 2022-01-01 RX ADMIN — PANTOPRAZOLE SODIUM 40 MILLIGRAM(S): 20 TABLET, DELAYED RELEASE ORAL at 06:04

## 2022-01-01 RX ADMIN — Medication 5 MILLIGRAM(S): at 18:31

## 2022-01-01 RX ADMIN — AMLODIPINE BESYLATE 5 MILLIGRAM(S): 2.5 TABLET ORAL at 05:52

## 2022-01-01 RX ADMIN — Medication 325 MILLIGRAM(S): at 12:18

## 2022-01-01 RX ADMIN — Medication 500 MILLIGRAM(S): at 17:28

## 2022-01-01 RX ADMIN — SODIUM CHLORIDE 100 MILLILITER(S): 9 INJECTION, SOLUTION INTRAVENOUS at 07:34

## 2022-01-01 RX ADMIN — DULOXETINE HYDROCHLORIDE 60 MILLIGRAM(S): 30 CAPSULE, DELAYED RELEASE ORAL at 11:45

## 2022-01-01 RX ADMIN — Medication 40 MILLIGRAM(S): at 05:10

## 2022-01-01 RX ADMIN — Medication 100 MILLIGRAM(S): at 17:39

## 2022-01-01 RX ADMIN — Medication 100 MILLIGRAM(S): at 17:37

## 2022-01-01 RX ADMIN — Medication 500 MILLIGRAM(S): at 09:29

## 2022-01-01 RX ADMIN — OXYCODONE HYDROCHLORIDE 5 MILLIGRAM(S): 5 TABLET ORAL at 03:17

## 2022-01-01 RX ADMIN — CARVEDILOL PHOSPHATE 6.25 MILLIGRAM(S): 80 CAPSULE, EXTENDED RELEASE ORAL at 17:20

## 2022-01-01 RX ADMIN — SODIUM CHLORIDE 100 MILLILITER(S): 9 INJECTION, SOLUTION INTRAVENOUS at 13:26

## 2022-01-01 RX ADMIN — Medication 10 MILLIGRAM(S): at 17:28

## 2022-01-01 RX ADMIN — CARVEDILOL PHOSPHATE 6.25 MILLIGRAM(S): 80 CAPSULE, EXTENDED RELEASE ORAL at 05:14

## 2022-01-01 SDOH — SOCIAL STABILITY - SOCIAL INSECURITY: DISSAPEARANCE AND DEATH OF FAMILY MEMBER: Z63.4

## 2022-07-11 PROBLEM — Z00.00 ENCOUNTER FOR PREVENTIVE HEALTH EXAMINATION: Status: ACTIVE | Noted: 2022-01-01

## 2022-07-12 PROBLEM — M25.531 RIGHT WRIST PAIN: Status: ACTIVE | Noted: 2022-01-01

## 2022-07-12 NOTE — HISTORY OF PRESENT ILLNESS
[de-identified] : 81-year-old female accompanied by her daughter is here today for evaluation of her right wrist.  Patient states she tripped on a box and fell, this happened on Friday she was in [place] at the time and was seen in an emergency room there.  They told her she had a fracture and placed her in a splint. she is moving back air from [place] so she is here today for further evaluation and treatment.  She denies any pain in the elbow, she denies any numbness or tingling in the hand or fingers.

## 2022-07-12 NOTE — IMAGING
[de-identified] :   On examination of her right wrist she has some mild swelling and ecchymosis of the wrist.  She is tender over the distal radius.  She is nontender over the distal ulna.  She is nontender over the snuffbox.  She is nontender over the metacarpals.  She has no tenderness palpation of the fingers.  She is nontender over the proximal forearm or the elbow.  She has full range of motion of the elbow.  She has pain with range of motion of the wrist.  She is able to open and close her fist.  Sensation is intact to all the fingers.  Good brisk capillary refill, 2+ radial pulse.\par \par   X-rays taken in the office today of the right wrist show a nondisplaced distal radius fracture.  Questionable nondisplaced distal ulna fracture.  Degenerative changes.

## 2022-07-12 NOTE — DISCUSSION/SUMMARY
[de-identified] :   At this time I placed her in a well fitted well-molded short-arm cast.  Patient tolerated the procedure well.  I discussed with patient and her daughter she cannot get the cast wet.  Keep the hand up and elevated and work on range of motion of the fingers.  She can take Tylenol or Motrin as needed for pain.  Will see her back in 2 weeks for repeat x-rays and evaluation. Patient will call me if any other problems or concerns.  Patient verbalized understanding and agreed with the plan, all questions were answered in the office today.\par

## 2022-07-29 NOTE — HISTORY OF PRESENT ILLNESS
[de-identified] : Patient comes in for follow-up of her right distal radius fracture.  She is doing relatively well.  The cast is bothering her and causing her some skin irritation.  She does not have other complaints today.  She comes in with her daughter.  It has been almost 3 weeks since her injury.

## 2022-07-29 NOTE — IMAGING
[de-identified] :   Right wrist:  skin intact after cast removal, mild ulcer seen on the dorsal aspect of the index metacarpal head without evidence of infection, mild swelling in that area, minimally tender palpation over distal radius, decreased range of motion of wrist secondary to casting and stiffness, full range of motion of fingers, neurovascularly intact

## 2022-07-29 NOTE — ASSESSMENT
[FreeTextEntry1] :  patient is status post right distal radius fracture.  Because she had the cast irritation on the dorsal aspect of the index finger we took the cast off a little bit early and she was given a cock-up wrist splint.  She is going to wear the cock-up wrist splint as though it is a cast.  She will take it off for bathing.  She will care for the wound on the hand as well which is not look infected in should heal up quite nicely.  In another 3 weeks she will see Rebekah for re-evaluation of the wrist.  She will start with range of motion.  She may start with range of motion in another week which will make it 4 weeks.  She will get new films when she returns as well.

## 2022-08-09 NOTE — PHYSICAL EXAM
[de-identified] :   Physical exam of her right wrist:  Minimal swelling.  Resolving ecchymosis.  Minimal tenderness over the fracture site.  She has some pain with wrist flexion extension.  She can make a full fist.  Sensory and motor are intact.

## 2022-08-09 NOTE — HISTORY OF PRESENT ILLNESS
[de-identified] : Patient is an 81-year-old female here for repeat evaluation of her right wrist.  She is accompanied by family member.  She is 1 month status post a distal radius fracture.  She is doing well.  She is in a cock-up wrist brace.

## 2022-08-09 NOTE — DISCUSSION/SUMMARY
[de-identified] :   She may start to wean herself out of the cock-up wrist brace and work on range of motion of the wrist and hand.\par The patient understands that fractures take about 6 weeks to heal.  Random residual pain can occur for 6 months to a year.\par I wrote a prescription for occupational/physical therapy for stretching, strengthening, range of motion, and different modalities to help with the pain and inflammation.\par I will see her back in 3-4 weeks for repeat evaluation.\par No x-rays needed if she is pain-free.\par All questions were answered today.

## 2022-08-16 NOTE — ED ADULT NURSE NOTE - NSICDXPASTMEDICALHX_GEN_ALL_CORE_FT
PAST MEDICAL HISTORY:  Cirrhosis     High cholesterol     Hypertension     Hypothyroid     Incontinence     Poor circulation     RA (rheumatoid arthritis)

## 2022-08-16 NOTE — ED ADULT NURSE NOTE - NSIMPLEMENTINTERV_GEN_ALL_ED
Implemented All Fall Risk Interventions:  [place] to call system. Call bell, personal items and telephone within reach. Instruct patient to call for assistance. Room bathroom lighting operational. Non-slip footwear when patient is off stretcher. Physically safe environment: no spills, clutter or unnecessary equipment. Stretcher in lowest position, wheels locked, appropriate side rails in place. Provide visual cue, wrist band, yellow gown, etc. Monitor gait and stability. Monitor for mental status changes and reorient to person, place, and time. Review medications for side effects contributing to fall risk. Reinforce activity limits and safety measures with patient and family.

## 2022-08-16 NOTE — ED PROVIDER NOTE - NSFOLLOWUPINSTRUCTIONS_ED_ALL_ED_FT
Vasculitis     Overview  Vasculitis involves inflammation of the blood vessels. The inflammation can cause the walls of the blood vessels to thicken, which reduces the width of the passageway through the vessel. If blood flow is restricted, it can result in organ and tissue damage.    There are many types of vasculitis, and most of them are rare. Vasculitis might affect just one organ, or several. The condition can be short term or long lasting.    Vasculitis can affect anyone, though some types are more common among certain age groups. Depending on the type you have, you may improve without treatment. Most types require medications to control the inflammation and prevent flare-ups.    Symptoms  General signs and symptoms of most types of vasculitis include:    Fever  Headache  Fatigue  Weight loss  General aches and pains  Other signs and symptoms are related to the parts of the body affected, including:    Digestive system. If your stomach or intestines are affected, you may experience pain after eating. Ulcers and perforations are possible and may result in blood in the stool.  Ears. Dizziness, ringing in the ears and abrupt hearing loss may occur.  Eyes. Vasculitis can make your eyes look red and itch or burn. Giant cell arteritis can cause double vision and temporary or permanent blindness in one or both eyes. This is sometimes the first sign of the disease.  Hands or feet. Some types of vasculitis can cause numbness or weakness in a hand or foot. The palms of the hands and soles of the feet might swell or harden.  Lungs. You may develop shortness of breath or even cough up blood if vasculitis affects your lungs.  Skin. Bleeding under the skin can show up as red spots. Vasculitis can also cause lumps or open sores on your skin.  When to see a doctor  Make an appointment with your doctor if you have any signs or symptoms that worry you. Some types of vasculitis can worsen quickly, so early diagnosis is the johnson to getting effective treatment.    Complications  Vasculitis complications depend on the type and severity of your condition. Or they may be related to side effects of the prescription medications you use to treat the condition. Complications of vasculitis include:    Organ damage. Some types of vasculitis can be severe, causing damage to major organs.  Blood clots and aneurysms. A blood clot may form in a blood vessel, obstructing blood flow. Rarely, vasculitis will cause a blood vessel to weaken and bulge, forming an aneurysm ([id number]).  Vision loss or blindness. This is a possible complication of untreated giant cell arteritis.  Infections. Some of the medications used to treat vasculitis may weaken your immune system. This can make you more prone to infections.

## 2022-08-16 NOTE — ED PROVIDER NOTE - PROVIDER TOKENS
PROVIDER:[TOKEN:[[id number]],FOLLOWUP:[1-3 Days]],PROVIDER:[TOKEN:[[id number]],FOLLOWUP:[1-3 Days]]

## 2022-08-16 NOTE — ED PROVIDER NOTE - ATTENDING CONTRIBUTION TO CARE
I personally evaluated patient. I agree with the findings and plan with all documentation on chart except as documented  in my note.    81-year-old female past medical history of hypertension, dyslipidemia, mitral valvular disease presents to the emergency department for an ongoing rash and also patient had a fall this morning.  Patient fell backwards on her buttocks and denies any head trauma.  She is ambulatory at baseline denies any LOC.  Prior to this episode patient in normal state of health.  Patient nonblanching erythematous rash to bilateral legs has been going on for the past several weeks.  This started after patient was scratching the area.  No fever or signs of infectious etiology.  On exam, vital signs reviewed.  Patient well-appearing.  Patient has murmur on exam with clear lungs.  No abdominal tenderness.  GCS 15.  ED work-up performed and shows no acute issues.  Daughter also in the ED as patient is in from [place].  Patient to follow-up with vascular doctor, dermatologist, PMD and cardiologist.  All results given to patient.  Etiology of this rash, though it does not seem to be emergent or deadly, discussed and patient aware they may require further work-up and/or biopsy with dermatology.  This is likely a vasculitis.    Full DC instructions discussed and patient knows when to seek immediate medical attention.  Patient has proper follow up.  All results discussed and patient aware they may require further work up.  Proper follow up ensured. Limitations of ED work up discussed.  Medications administered and prescribed/OTC home meds discussed.  All questions and concerns from patient or family addressed. Understanding of instructions verbalized.

## 2022-08-16 NOTE — ED PROVIDER NOTE - PATIENT PORTAL LINK FT
You can access the FollowMyHealth Patient Portal offered by [facility] by registering at the following website: [email or web address]. By joining [facility]’s FollowMyHealth portal, you will also be able to view your health information using other applications (apps) compatible with our system.

## 2022-08-16 NOTE — ED ADULT TRIAGE NOTE - CHIEF COMPLAINT QUOTE
felt a feeling of falling, fell in shower on sacrum, denies any pain, no LOC  has been going on for a year, has not seen doctor

## 2022-08-16 NOTE — ED PROVIDER NOTE - CARE PROVIDER_API CALL
Caron Davis)  Surgery; Vascular Surgery  [address] [place] [address]  Phone: [phone]  Fax: [phone]  Follow Up Time: 1-3 Days    Vincent Bruner)  Dermatology; Internal Medicine  [address] [place] [address]  Phone: [phone]  Fax: [phone]  Follow Up Time: 1-3 Days

## 2022-08-16 NOTE — ED PROVIDER NOTE - OBJECTIVE STATEMENT
Patient is an 81y Female with Patient is an 81y Female with history of HTN, HChol, and "Mitral valve issue" on anticoagulation brought in by daughter for evaluation of two complaints. The first complaint is of an itchy, erythematous rash to the bilateral legs, buttocks, and back. Patient states that the itching started last night and she scratched her skin causing large areas of erythema. Patient's second complaint is of a fall that occurred this morning around 8am. Patient was attempting to shower on her own when she felt a 'weird sensation in my head' causing her to fall backwards onto her buttocks. Patient denies head trauma, LOC or hip pain. Denies any fevers, chills, recent illness, cp, sob, abd pain, n/v/d, dysuria, hematuria, back pain, extremity pain.

## 2022-08-16 NOTE — ED PROVIDER NOTE - NS ED ROS FT
Constitutional:  See HPI  Eyes:  No visual changes  ENMT: No neck pain or stiffness  Cardiac:  No chest pain  Respiratory:  No cough or respiratory distress.   GI:  No nausea, vomiting, diarrhea or abdominal pain.  :  No dysuria, frequency or burning.  MS:  No back pain.  Neuro:  No headache   Skin: + skin rash  Except as documented in the HPI,  all other systems are negative

## 2022-08-16 NOTE — ED PROVIDER NOTE - PHYSICAL EXAMINATION
CONSTITUTIONAL: NAD  SKIN: Warm dry, erythematous, non blanching rash to the bilateral legs, lower back, and buttocks.   HEAD: NCAT  EYES: NL inspection  ENT: MMM  NECK: Supple; non tender.  CARD: RRR  RESP: CTAB  ABD: S/NT no R/G  EXT: no pedal edema, strength 5/5 upper and lower ext, FROM  NEURO: Grossly unremarkable  PSYCH: Cooperative, appropriate.

## 2022-08-16 NOTE — ED ADULT NURSE NOTE - OBJECTIVE STATEMENT
Patient state she felt like she was falling while she was in the shower, fell back onto her sacrum. No pain, no bleeding, no loc. Patient endorses a rash that started last night.

## 2022-08-16 NOTE — ED PROVIDER NOTE - PROGRESS NOTE DETAILS
PK: Labs, CT, CXR, XR plevis ordered. PK: Labs and imaging reviewed demonstrating no acute abnormalities. PK: Labs and imaging reviewed demonstrating no acute abnormalities.    Discussed all available results with Pt.  Pt understands results, plan of care, outpt follow up as discussed, and signs and symptoms for ED return.  Pt is comfortable with discharge. DC home.

## 2022-08-16 NOTE — ED PROVIDER NOTE - CLINICAL SUMMARY MEDICAL DECISION MAKING FREE TEXT BOX
81-year-old female past medical history of hypertension, dyslipidemia, mitral valvular disease presents to the emergency department for an ongoing rash and also patient had a fall this morning.  Patient fell backwards on her buttocks and denies any head trauma.  She is ambulatory at baseline denies any LOC.  Prior to this episode patient in normal state of health.  Patient nonblanching erythematous rash to bilateral legs has been going on for the past several weeks.  This started after patient was scratching the area.  No fever or signs of infectious etiology.  On exam, vital signs reviewed.  Patient well-appearing.  Patient has murmur on exam with clear lungs.  No abdominal tenderness.  GCS 15.  ED work-up performed and shows no acute issues.  Daughter also in the ED as patient is in from [place].  Patient to follow-up with vascular doctor, dermatologist, PMD and cardiologist.  All results given to patient.  Etiology of this rash, though it does not seem to be emergent or deadly, discussed and patient aware they may require further work-up and/or biopsy with dermatology.  This is likely a vasculitis.    Full DC instructions discussed and patient knows when to seek immediate medical attention.  Patient has proper follow up.  All results discussed and patient aware they may require further work up.  Proper follow up ensured. Limitations of ED work up discussed.  Medications administered and prescribed/OTC home meds discussed.  All questions and concerns from patient or family addressed. Understanding of instructions verbalized.

## 2022-08-16 NOTE — ED ADULT NURSE NOTE - CHPI ED NUR SYMPTOMS NEG
no body aches/no chills/no confusion/no decreased eating/drinking/no fever/no inflammation/no pain/no scaly patches on skin/no vomiting

## 2022-08-25 PROBLEM — R32 UNSPECIFIED URINARY INCONTINENCE: Chronic | Status: ACTIVE | Noted: 2022-01-01

## 2022-08-25 PROBLEM — M06.9 RHEUMATOID ARTHRITIS, UNSPECIFIED: Chronic | Status: ACTIVE | Noted: 2022-01-01

## 2022-08-25 PROBLEM — E03.9 HYPOTHYROIDISM, UNSPECIFIED: Chronic | Status: ACTIVE | Noted: 2022-01-01

## 2022-08-25 PROBLEM — E78.00 PURE HYPERCHOLESTEROLEMIA, UNSPECIFIED: Chronic | Status: ACTIVE | Noted: 2022-01-01

## 2022-08-25 PROBLEM — I10 ESSENTIAL (PRIMARY) HYPERTENSION: Chronic | Status: ACTIVE | Noted: 2022-01-01

## 2022-09-01 PROBLEM — I51.7 LVH (LEFT VENTRICULAR HYPERTROPHY): Status: ACTIVE | Noted: 2022-01-01

## 2022-09-01 PROBLEM — Z87.891 FORMER SMOKER: Status: ACTIVE | Noted: 2022-01-01

## 2022-09-01 PROBLEM — Z82.49 FAMILY HISTORY OF CONGESTIVE HEART FAILURE: Status: ACTIVE | Noted: 2022-01-01

## 2022-09-01 PROBLEM — K74.4 SECONDARY BILIARY CIRRHOSIS: Status: ACTIVE | Noted: 2022-01-01

## 2022-09-01 PROBLEM — Z63.4 WIDOWED: Status: ACTIVE | Noted: 2022-01-01

## 2022-09-01 PROBLEM — M10.9 GOUT: Status: ACTIVE | Noted: 2022-01-01

## 2022-09-01 NOTE — PHYSICAL EXAM
[Well Developed] : well developed [Well Nourished] : well nourished [Normal Conjunctiva] : normal conjunctiva [Normal Venous Pressure] : normal venous pressure [No Carotid Bruit] : no carotid bruit [Normal Rate] : normal [Rhythm Regular] : regular [Normal S1] : normal S1 [Normal S2] : normal S2 [S3] : no S3 [S4] : no S4 [I] : a grade 1 [Clear Lung Fields] : clear lung fields [Soft] : abdomen soft [No Edema] : no edema [No Rash] : no rash [Moves all extremities] : moves all extremities [Alert and Oriented] : alert and oriented

## 2022-09-01 NOTE — HISTORY OF PRESENT ILLNESS
[FreeTextEntry1] : 82 yo was in [place]. PVD stents legs HTN. RA cirrhosis secondary methotrexate..She denies chest pain .She has grijalva. She use walker. Poor balance

## 2022-09-01 NOTE — ASSESSMENT
[FreeTextEntry1] : 82 yo was in [place]. PVD stents legs HTN. RA cirrhosis secondary methotrexate..She denies chest pain .She has grijalva. She use walker. Poor balance. To see [facility] neuro. Told in [place] . She has mitral problem. Will echo. \par Blood by pmd Bp today good. Told try walk. She is sedentary. 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

## 2022-09-01 NOTE — REVIEW OF SYSTEMS
[Fever] : no fever [Chills] : no chills [Blurry Vision] : no blurred vision [Hearing Loss] : hearing loss [Dyspnea on exertion] : dyspnea during exertion [Cough] : no cough [Abdominal Pain] : no abdominal pain [Joint Pain] : joint pain [Finger Pain] : pain in the finger [Dizziness] : no dizziness [Confusion] : no confusion was observed [Easy Bleeding] : no tendency for easy bleeding

## 2022-09-01 NOTE — REASON FOR VISIT
[Symptom and Test Evaluation] : symptom and test evaluation [Hypertension] : hypertension PAST MEDICAL HISTORY:  Breast cancer s/p RT lumpectomy and radiation    Chronic CHF     Glaucoma     HTN (hypertension)     Hypothyroid

## 2022-09-07 PROBLEM — S52.501A NONDISPLACED FRACTURE OF DISTAL END OF RIGHT RADIUS: Status: ACTIVE | Noted: 2022-01-01

## 2022-09-07 NOTE — HISTORY OF PRESENT ILLNESS
[de-identified] : Patient is an 82-year-old female here for repeat evaluation of her right wrist.  She is about 2 months status post a distal radius fracture.  Overall she is doing well.  She recently moved and is still living out of boxes so she feels that she over does it a little bit, but in general she is feeling better

## 2022-09-07 NOTE — DISCUSSION/SUMMARY
[de-identified] :   Patient is 8 weeks out from the injury.\par The patient understands that fractures take about 6 weeks to heal.  Random residual pain can occur for 6 months to a year.\par I wrote a prescription for occupational/physical therapy for stretching, strengthening, range of motion, and different modalities to help with the pain and inflammation.\par She may return to normal activities as tolerated.\par Follow up on an as needed basis. \par All questions were answered in the office today.

## 2022-09-07 NOTE — PHYSICAL EXAM
[de-identified] :  Physical exam of her right wrist:  No obvious deformities.  No swelling or ecchymosis.  Nontender over the distal radius or distal ulna.  Negative anatomical snuffbox tenderness.  She has good range of motion of the wrist and hand.  Sensory and motor are intact.

## 2022-09-09 NOTE — H&P ADULT - NSHPLABSRESULTS_GEN_ALL_CORE
7.3    9.89  )-----------( 259      ( 09 Sep 2022 11:00 )             24.3       09-09    137  |  104  |  18  ----------------------------<  124<H>  4.5   |  24  |  0.8    Ca    8.2<L>      09 Sep 2022 11:00    TPro  5.9<L>  /  Alb  2.8<L>  /  TBili  0.5  /  DBili  x   /  AST  22  /  ALT  12  /  AlkPhos  111  09-09                      PT/INR - ( 09 Sep 2022 11:00 )   PT: 14.40 sec;   INR: 1.25 ratio         PTT - ( 09 Sep 2022 11:00 )  PTT:33.4 sec    Lactate Trend 7.3    9.89  )-----------( 259      ( 09 Sep 2022 11:00 )             24.3       09-09    137  |  104  |  18  ----------------------------<  124<H>  4.5   |  24  |  0.8    Ca    8.2<L>      09 Sep 2022 11:00    TPro  5.9<L>  /  Alb  2.8<L>  /  TBili  0.5  /  DBili  x   /  AST  22  /  ALT  12  /  AlkPhos  111  09-09    PT/INR - ( 09 Sep 2022 11:00 )   PT: 14.40 sec;   INR: 1.25 ratio       PTT - ( 09 Sep 2022 11:00 )  PTT:33.4 sec    RADIOLOGY:  CT Chest w/ IV Cont (09.09.22 @ 12:49)     IMPRESSION:  No contrast extravasation seen.  Small bilateral pleural effusions.  Cirrhosis, mild ascites, portal hypertension, noting a recanalized   umbilical vein and mild splenomegaly. No evidence of a portal vein   thrombosis.      NAY SHIRLEY MD; Attending Radiologist  This document has been electronically signed. Sep  9 2022  1:15PM

## 2022-09-09 NOTE — PATIENT PROFILE ADULT - FALL HARM RISK - HARM RISK INTERVENTIONS

## 2022-09-09 NOTE — CONSULT NOTE ADULT - ASSESSMENT
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    Problem 1-Coffee ground emesis   history of cirrhosis, unlikely variceal bleed, daily aspirin use  ddx PUD, erosive gastritis, erosive esophagitis, less likely esophageal varices   Rec  -EGD today  -patient NPO  -The benefits of endoscopy as well as the risks, such as GI bleeding, aspiration pneumonia, perforation, damage or loss of teeth, reaction to medication, or death  were reviewed with the patient.   -Maintain Hemodynamic Stability   -Monitor CBC  -Negative COVID-19 Test within 3 days for intervention   -CMP,Optimize Electrolytes  -PT,PTT,INR  -EKG, Chest-Xray   -Transfuse prn to hgb >8  -Two large bore IV lines  - PPI BID  -Monitor Vital Signs  -Keep patient NPO  -Monitor Stool For blood, frequency, consistency, melena  -Active Type and Screen  -Iron Studies, Folate, Vitamin B12 levels     Problem 2-Cirrhosis, mild ascites, portal hypertension, noting a recanalized   umbilical vein and mild splenomegaly. No evidence of a portal vein   thrombosis.  Rec  -Abdominal ultrasound AFP every 6 months for HCC screening  -EGD outpatient for esophageal varices screening   -Follow up with our [facility] located at [address] [place] [address]. 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    Problem 1-Coffee ground emesis   history of cirrhosis, unlikely variceal bleed, daily aspirin use  ddx PUD, erosive gastritis, erosive esophagitis, less likely esophageal varices   Rec  -EGD today  -patient NPO  -The benefits of endoscopy as well as the risks, such as GI bleeding, aspiration pneumonia, perforation, damage or loss of teeth, reaction to medication, or death  were reviewed with the patient.   -Maintain Hemodynamic Stability   -MELD score 9  -Monitor CBC  -Negative COVID-19 Test within 3 days for intervention   -CMP,Optimize Electrolytes  -PT,PTT,INR  -EKG, Chest-Xray   -Transfuse prn to hgb >8  -Two large bore IV lines  - PPI BID  -Monitor Vital Signs  -Keep patient NPO  -Monitor Stool For blood, frequency, consistency, melena  -Active Type and Screen  -Iron Studies, Folate, Vitamin B12 levels     Problem 2-Cirrhosis, mild ascites, portal hypertension, noting a recanalized   umbilical vein and mild splenomegaly. No evidence of a portal vein   thrombosis.  Rec  -Abdominal ultrasound AFP every 6 months for HCC screening  -EGD outpatient for esophageal varices screening   -Follow up with our [facility] located at [address] [place] [address]. 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    Problem 1- Coffee ground emesis   history of cirrhosis that is compensated, unlikely variceal bleed, daily aspirin use  nl PLT count and INR < 1.5 do not suggest portal hypertension    ddx PUD, erosive gastritis, erosive esophagitis, less likely esophageal varices   Rec  -EGD today  -patient NPO  -The benefits of endoscopy as well as the risks, such as GI bleeding, aspiration pneumonia, perforation, damage or loss of teeth, reaction to medication, or death  were reviewed with the patient.   -Maintain Hemodynamic Stability   -MELD score 9  -Monitor CBC  -Negative COVID-19 Test within 3 days for intervention   -CMP,Optimize Electrolytes  -PT,PTT,INR  -EKG, Chest-Xray   -Transfuse prn to hgb >8  -Two large bore IV lines  - PPI BID  -Monitor Vital Signs  -Keep patient NPO  -Monitor Stool For blood, frequency, consistency, melena  -Active Type and Screen  -Iron Studies, Folate, Vitamin B12 levels     Problem 2-Cirrhosis, mild ascites, portal hypertension, noting a recanalized   umbilical vein and mild splenomegaly. No evidence of a portal vein   thrombosis.  Rec  -Abdominal ultrasound AFP every 6 months for HCC screening  -avoid NSAIDs  -Follow up with our [facility] located at [address] [place] [address]. Phone Number: [phone] for possible liver transplant referral  -Alcohol Cessation Strongly Advised and Encouraged

## 2022-09-09 NOTE — ED PROVIDER NOTE - ATTENDING CONTRIBUTION TO CARE
Patient complains of vomiting blood.  She has a history of cirrhosis.  Vital signs noted.  NAD.  KRANTHI showed no stool in the vault.  Diagnostic testing reviewed.  Transfusion ordered.  Patient seen by the GI service in the emergency department.  Patient will receive EGD today.

## 2022-09-09 NOTE — CONSULT NOTE ADULT - SUBJECTIVE AND OBJECTIVE BOX
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 Patient denies nausea, vomiting, diarrhea, constipation, abdominal pain. + hematemesis. Patient reports Colonoscopy some time back normal to her knowledge.      PAST MEDICAL & SURGICAL HISTORY:   Hypothyroid      Hypertension      Cirrhosis      RA (rheumatoid arthritis)      High cholesterol      Poor circulation      Incontinence      History of hip surgery            Family history:  FAMILY HISTORY:    No GI cancers in first or second degree relatives    Social History: No smoking. No alcohol. No illegal drug use.    Allergies:   Keflex (Unknown)  losartan (Unknown)  morphine (Unknown)  Rocephin (Unknown)  sulfamethoxazole (Hives)                MEDICATIONS: Home Medications:  allopurinol 100 mg oral tablet: 1 tab(s) orally 2 times a day (09 Sep 2022 14:49)  amLODIPine 5 mg oral tablet: 1 tab(s) orally 2 times a day (09 Sep 2022 14:49)  aspirin 81 mg oral delayed release tablet: 1 tab(s) orally once a day (09 Sep 2022 14:49)  DULoxetine 60 mg oral delayed release capsule: 1 cap(s) orally once a day (09 Sep 2022 14:49)  ferrous sulfate 75 mg (15 mg elemental iron) oral tablet:  (09 Sep 2022 14:49)  folic acid 1 mg oral tablet: 1 tab(s) orally once a day (09 Sep 2022 14:49)  hydrALAZINE 10 mg oral tablet: 1 tab(s) orally once a day (09 Sep 2022 14:49)  levothyroxine 50 mcg (0.05 mg) oral tablet: 1 tab(s) orally once a day (09 Sep 2022 14:49)  metoprolol tartrate 50 mg oral tablet: 1 tab(s) orally 2 times a day (09 Sep 2022 14:49)  omeprazole 20 mg oral delayed release capsule: 1 cap(s) orally once a day (09 Sep 2022 14:49)  oxybutynin 10 mg/24 hr oral tablet, extended release: 1 tab(s) orally 2 times a day (09 Sep 2022 14:49)  prasugrel 10 mg oral tablet: 1 tab(s) orally once a day (09 Sep 2022 14:49)  predniSONE 10 mg oral tablet: 1 tab(s) orally once a day (09 Sep 2022 14:49)          REVIEW OF SYSTEMS  General:  No weight loss, fevers, or chills.  Eyes:  No reported pain or visual changes  ENT:  No sore throat or runny nose.  NECK: No stiffness or lymphadenopathy  CV:  No chest pain or palpitations.  Resp:  No shortness of breath, cough, wheezing or hemoptysis  GI:  No abdominal pain, nausea, vomiting, dysphagia, diarrhea or constipation. + hematemesis, no melena reported  Muscle:  No aches or weakness  Neuro:  No tingling, numbness       VITALS:   T(F): 97.8 (09-09-22 @ 14:28), Max: 98.3 (09-09-22 @ 14:27)  HR: 64 (09-09-22 @ 14:28) (63 - 85)  BP: 136/68 (09-09-22 @ 14:28) (136/68 - 161/65)  RR: 18 (09-09-22 @ 14:28) (18 - 22)  SpO2: 100% (09-09-22 @ 14:28) (90% - 100%)    PHYSICAL EXAM:  GENERAL: AAOx3, no acute distress.  HEAD:  Atraumatic, Normocephalic  EYES: conjunctiva and sclera clear  NECK: Supple, No thyromegaly   CHEST/LUNG: Clear to auscultation bilaterally; No wheeze, rhonchi, or rales  HEART: Regular rate and rhythm; normal S1, S2, No murmurs.  ABDOMEN: Soft, nontender, nondistended; Bowel sounds present  NEUROLOGY: No asterixis or tremor  SKIN: Intact, no jaundice          LABS:  09-09    137  |  104  |  18  ----------------------------<  124<H>  4.5   |  24  |  0.8    Ca    8.2<L>      09 Sep 2022 11:00    TPro  5.9<L>  /  Alb  2.8<L>  /  TBili  0.5  /  DBili  x   /  AST  22  /  ALT  12  /  AlkPhos  111  09-09                          7.3    9.89  )-----------( 259      ( 09 Sep 2022 11:00 )             24.3     LIVER FUNCTIONS - ( 09 Sep 2022 11:00 )  Alb: 2.8 g/dL / Pro: 5.9 g/dL / ALK PHOS: 111 U/L / ALT: 12 U/L / AST: 22 U/L / GGT: x           PT/INR - ( 09 Sep 2022 11:00 )   PT: 14.40 sec;   INR: 1.25 ratio         PTT - ( 09 Sep 2022 11:00 )  PTT:33.4 sec    IMAGING:    < from: CT Abdomen and Pelvis w/ IV Cont (09.09.22 @ 12:48) >    ACC: [id number] EXAM:  CT CHEST IC                        ACC: [id number] EXAM:  CT ABDOMEN AND PELVIS IC                          PROCEDURE DATE:  09/09/2022          INTERPRETATION:  CLINICAL HISTORY / REASON FOR EXAM: History of bright   red blood emesis    TECHNIQUE: Contiguous axial CT images were obtained from the lower chest   to the pubic symphysis following administration of 100 mL Optiray 320   intravenous contrast. Oral contrast was not administered. Coronal,   sagittal and 3D/MIP reformatted images are also submitted.    COMPARISON CT: None.    OTHER STUDIES USED FOR CORRELATION: None.      FINDINGS:    CHEST:    TUBES/LINES: None.    LUNGS, PLEURA, AND AIRWAYS: Central airways are patent. Small bilateral   pleural effusions. Bibasilar atelectasis.    MEDIASTINUM/THORACIC NODES: No mediastinal or axillary lymphadenopathy.   Coarse calcification seen in the left thyroid lobe.    HEART/GREAT VESSELS: No pericardial effusion. Heart size unremarkable.   Coronary artery and thoracic aorta calcifications. No aneurysmal dilation   of the thoracic aorta.      ABDOMEN/PELVIS:    HEPATOBILIARY: Cirrhotic liver. No focal liver lesions seen on this   single phase study. No portal vein thrombosis seen. Recanalized umbilical   vein, compatible with portal hypertension. Cholelithiasis noted.    SPLEEN: Splenomegaly measuring 14.0 cm in AP dimension.    PANCREAS: Unremarkable.    ADRENAL GLANDS: Unremarkable.    KIDNEYS: Excreted contrast seen in the collecting system bilaterally.    ABDOMINOPELVIC NODES: Unremarkable.    PELVIC ORGANS: Status post hysterectomy.    PERITONEUM/MESENTERY/BOWEL: No bowel obstruction or free air. Mild   abdominal pelvic ascites. Normal appendix.    BONES/SOFT TISSUES: No acute osseous abnormality. Status post left gamma   nail placement.    VASCULAR: Calcified atherosclerotic disease of the abdominal aorta.      IMPRESSION:    No contrast extravasation seen.    Small bilateral pleural effusions.    Cirrhosis, mild ascites, portal hypertension, noting a recanalized   umbilical vein and mild splenomegaly. No evidence of a portal vein   thrombosis.    --- End of Report ---            NAY SHIRLEY MD; Attending Radiologist  This document has been electronically signed. 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   Patient denies nausea, vomiting, diarrhea, constipation, abdominal pain.   + hematemesis of dark bloody material yesterday x 1.   she had endoscopic eval recently in [place] for the same chief complaint and was unsure of the findings.   Patient reports Colonoscopy some time back normal to her knowledge.    Hb significantly dropped from baseline of 11 around 3 weeks ago down to 7.3 on admission.  no CP or SOB, no dizziness or lightheadedness.   only c/o fatigue.     ROS otherwise negative.      PAST MEDICAL & SURGICAL HISTORY:   Hypothyroid      Hypertension      Cirrhosis      RA (rheumatoid arthritis)      High cholesterol      Poor circulation      Incontinence      History of hip surgery      Surgical hx: hysterectomy      Family history:  FAMILY HISTORY:    No GI cancers in first or second degree relatives    Social History: No smoking. No alcohol. No illegal drug use.    Allergies:   Keflex (Unknown)  losartan (Unknown)  morphine (Unknown)  Rocephin (Unknown)  sulfamethoxazole (Hives)                MEDICATIONS: Home Medications:  allopurinol 100 mg oral tablet: 1 tab(s) orally 2 times a day (09 Sep 2022 14:49)  amLODIPine 5 mg oral tablet: 1 tab(s) orally 2 times a day (09 Sep 2022 14:49)  aspirin 81 mg oral delayed release tablet: 1 tab(s) orally once a day (09 Sep 2022 14:49)  DULoxetine 60 mg oral delayed release capsule: 1 cap(s) orally once a day (09 Sep 2022 14:49)  ferrous sulfate 75 mg (15 mg elemental iron) oral tablet:  (09 Sep 2022 14:49)  folic acid 1 mg oral tablet: 1 tab(s) orally once a day (09 Sep 2022 14:49)  hydrALAZINE 10 mg oral tablet: 1 tab(s) orally once a day (09 Sep 2022 14:49)  levothyroxine 50 mcg (0.05 mg) oral tablet: 1 tab(s) orally once a day (09 Sep 2022 14:49)  metoprolol tartrate 50 mg oral tablet: 1 tab(s) orally 2 times a day (09 Sep 2022 14:49)  omeprazole 20 mg oral delayed release capsule: 1 cap(s) orally once a day (09 Sep 2022 14:49)  oxybutynin 10 mg/24 hr oral tablet, extended release: 1 tab(s) orally 2 times a day (09 Sep 2022 14:49)  prasugrel 10 mg oral tablet: 1 tab(s) orally once a day (09 Sep 2022 14:49)  predniSONE 10 mg oral tablet: 1 tab(s) orally once a day (09 Sep 2022 14:49)          REVIEW OF SYSTEMS  General:  No weight loss, fevers, or chills.  Eyes:  No reported pain or visual changes  ENT:  No sore throat or runny nose.  NECK: No stiffness or lymphadenopathy  CV:  No chest pain or palpitations.  Resp:  No shortness of breath, cough, wheezing or hemoptysis  GI:  No abdominal pain, nausea, vomiting, dysphagia, diarrhea or constipation. + hematemesis, no melena reported  Muscle:  No aches or weakness  Neuro:  No tingling, numbness       VITALS:   T(F): 97.8 (09-09-22 @ 14:28), Max: 98.3 (09-09-22 @ 14:27)  HR: 64 (09-09-22 @ 14:28) (63 - 85)  BP: 136/68 (09-09-22 @ 14:28) (136/68 - 161/65)  RR: 18 (09-09-22 @ 14:28) (18 - 22)  SpO2: 100% (09-09-22 @ 14:28) (90% - 100%)    PHYSICAL EXAM:  GENERAL: AAOx3, no acute distress.  HEAD:  Atraumatic, Normocephalic  EYES: conjunctiva and sclera clear  NECK: Supple, No thyromegaly   CHEST/LUNG: Clear to auscultation bilaterally; No wheeze, rhonchi, or rales  HEART: Regular rate and rhythm; normal S1, S2, No murmurs.  ABDOMEN: Soft, nontender, slightly distended; Bowel sounds present  NEUROLOGY: No asterixis or tremor  SKIN: Intact, no jaundice          LABS:  09-09    137  |  104  |  18  ----------------------------<  124<H>  4.5   |  24  |  0.8    Ca    8.2<L>      09 Sep 2022 11:00    TPro  5.9<L>  /  Alb  2.8<L>  /  TBili  0.5  /  DBili  x   /  AST  22  /  ALT  12  /  AlkPhos  111  09-09                          7.3    9.89  )-----------( 259      ( 09 Sep 2022 11:00 )             24.3     LIVER FUNCTIONS - ( 09 Sep 2022 11:00 )  Alb: 2.8 g/dL / Pro: 5.9 g/dL / ALK PHOS: 111 U/L / ALT: 12 U/L / AST: 22 U/L / GGT: x           PT/INR - ( 09 Sep 2022 11:00 )   PT: 14.40 sec;   INR: 1.25 ratio         PTT - ( 09 Sep 2022 11:00 )  PTT:33.4 sec    IMAGING:    < from: CT Abdomen and Pelvis w/ IV Cont (09.09.22 @ 12:48) >    ACC: [id number] EXAM:  CT CHEST IC                        ACC: [id number] EXAM:  CT ABDOMEN AND PELVIS IC                          PROCEDURE DATE:  09/09/2022          INTERPRETATION:  CLINICAL HISTORY / REASON FOR EXAM: History of bright   red blood emesis    TECHNIQUE: Contiguous axial CT images were obtained from the lower chest   to the pubic symphysis following administration of 100 mL Optiray 320   intravenous contrast. Oral contrast was not administered. Coronal,   sagittal and 3D/MIP reformatted images are also submitted.    COMPARISON CT: None.    OTHER STUDIES USED FOR CORRELATION: None.      FINDINGS:    CHEST:    TUBES/LINES: None.    LUNGS, PLEURA, AND AIRWAYS: Central airways are patent. Small bilateral   pleural effusions. Bibasilar atelectasis.    MEDIASTINUM/THORACIC NODES: No mediastinal or axillary lymphadenopathy.   Coarse calcification seen in the left thyroid lobe.    HEART/GREAT VESSELS: No pericardial effusion. Heart size unremarkable.   Coronary artery and thoracic aorta calcifications. No aneurysmal dilation   of the thoracic aorta.      ABDOMEN/PELVIS:    HEPATOBILIARY: Cirrhotic liver. No focal liver lesions seen on this   single phase study. No portal vein thrombosis seen. Recanalized umbilical   vein, compatible with portal hypertension. Cholelithiasis noted.    SPLEEN: Splenomegaly measuring 14.0 cm in AP dimension.    PANCREAS: Unremarkable.    ADRENAL GLANDS: Unremarkable.    KIDNEYS: Excreted contrast seen in the collecting system bilaterally.    ABDOMINOPELVIC NODES: Unremarkable.    PELVIC ORGANS: Status post hysterectomy.    PERITONEUM/MESENTERY/BOWEL: No bowel obstruction or free air. Mild   abdominal pelvic ascites. Normal appendix.    BONES/SOFT TISSUES: No acute osseous abnormality. Status post left gamma   nail placement.    VASCULAR: Calcified atherosclerotic disease of the abdominal aorta.      IMPRESSION:    No contrast extravasation seen.    Small bilateral pleural effusions.    Cirrhosis, mild ascites, portal hypertension, noting a recanalized   umbilical vein and mild splenomegaly. No evidence of a portal vein   thrombosis.    --- End of Report ---            NAY SHIRLEY MD; Attending Radiologist  This document has been electronically signed. Sep  9 2022  1:15PM    < end of copied text >

## 2022-09-09 NOTE — CONSULT NOTE ADULT - NS ATTEND AMEND GEN_ALL_CORE FT
I edited the note .     Time-based billing (NON-critical care).     70 minutes spent on total encounter; more than 50% of the visit was spent counseling and / or coordinating care by the attending physician.  The necessity of the time spent during the encounter on this date of service was due to:     Coordination of care.

## 2022-09-09 NOTE — CHART NOTE - NSCHARTNOTEFT_GEN_A_CORE
As per FORD Willis during sign out, HTN meds are to be held as per day shift Hospitalist. After PACU for EGD, I reconciled all meds, but held lopressor, hydralazine, and amlodipine.
PACU ANESTHESIA ADMISSION NOTE      Procedure: EGD with banding  Post op diagnosis:  upper GI bleed    ____  Intubated  TV:______       Rate: ______      FiO2: ______    _x_  Patent Airway    _x___  Full return of protective reflexes  x  ____  Full recovery from anesthesia / back to baseline     Vitals:   VSS      Mental Status:  ____ Awake   _____ Alert   ___x__ Drowsy   _____ Sedated    Nausea/Vomiting:  __x__ NO  ______Yes,   See Post - Op Orders          Pain Scale (0-10):  _0____    Treatment: _x___ None    ____ See Post - Op/PCA Orders    Post - Operative Fluids:   ____ Oral   _x___ See Post - Op Orders    Plan: Discharge:   ____Home       x_____Floor     _____Critical Care    _____  Other:_________________    Comments:

## 2022-09-09 NOTE — H&P ADULT - NSHPPHYSICALEXAM_GEN_ALL_CORE
T(C): 36.6 (09-09-22 @ 14:28), Max: 36.8 (09-09-22 @ 14:27)  HR: 64 (09-09-22 @ 14:28) (63 - 85)  BP: 136/68 (09-09-22 @ 14:28) (136/68 - 161/65)  RR: 18 (09-09-22 @ 14:28) (18 - 22)  SpO2: 100% (09-09-22 @ 14:28) (90% - 100%)    PHYSICAL EXAM:  GENERAL: NAD, well-developed, well nourished, looks stated age  HEAD:  Atraumatic, Normocephalic  EYES: EOMI, PERRLA, conjunctiva and sclera clear  NECK: Supple, No JVD, no bruits, no masses, no thyroid enlargement  ENMT: No tonsillar erythema, exudated or enlargement, moist mucous membranes  CHEST/LUNG: Clear to auscultation bilaterally; No rales, rhonchi or wheezing, no dullness to percussion  HEART: S1,S2 Regular rate and rhythm; No murmurs, rubs, or gallops  ABDOMEN: Soft, nontender, nondistended, no rebound tenderness; No palpable masses, no hernias, no organomegaly; Bowel sounds present and normoactive  EXTREMITIES:  2+ peripheral pulses bilaterally and symmetrically, no clubbing, cyanosis, or edema  LYMPH: No lymphadenopathy  PSYCH: AAOx3, normal mood and affect  NEUROLOGY: non-focal, muscle strength 5/5 all extremities, DTRs 2+ symmetrically  SKIN: No rashes or lesions T(C): 36.6 (09-09-22 @ 14:28), Max: 36.8 (09-09-22 @ 14:27)  HR: 64 (09-09-22 @ 14:28) (63 - 85)  BP: 136/68 (09-09-22 @ 14:28) (136/68 - 161/65)  RR: 18 (09-09-22 @ 14:28) (18 - 22)  SpO2: 100% (09-09-22 @ 14:28) (90% - 100%)    PHYSICAL EXAM:  GENERAL: female in NAD  HEAD:  Atraumatic, Normocephalic  EYES: EOMI, PERRLA  NECK: Supple, No JVD  CHEST/LUNG: Clear to auscultation bilaterally; No rales, rhonchi or wheezing  HEART: S1,S2 Regular rate and rhythm; no rubs, or gallops  ABDOMEN: Soft, nontender, nondistended, +BS  EXTREMITIES: No LE edema b/l  LYMPH: No lymphadenopathy  PSYCH: AAOx3, no gross deficits  NEUROLOGY: non-focal, muscle strength 5/5 all extremities  SKIN: No rashes or lesions T(C): 36.6 (09-09-22 @ 14:28), Max: 36.8 (09-09-22 @ 14:27)  HR: 64 (09-09-22 @ 14:28) (63 - 85)  BP: 136/68 (09-09-22 @ 14:28) (136/68 - 161/65)  RR: 18 (09-09-22 @ 14:28) (18 - 22)  SpO2: 100% (09-09-22 @ 14:28) (90% - 100%)    PHYSICAL EXAM:  GENERAL: female in NAD, appears very dry   HEAD:  Atraumatic, Normocephalic  EYES: EOMI, PERRLA  NECK: Supple, No JVD  CHEST/LUNG: Clear to auscultation bilaterally; No rales, rhonchi or wheezing  HEART: S1,S2 Regular rate and rhythm; no rubs, or gallops  ABDOMEN: Soft, nontender, nondistended, +BS  EXTREMITIES: No LE edema b/l  LYMPH: No lymphadenopathy  PSYCH: AAOx3, no gross deficits  NEUROLOGY: non-focal, muscle strength 5/5 all extremities  SKIN: RLE ulcer appears chronic and not infected , RLE toe ulcer no infection noted

## 2022-09-09 NOTE — ED ADULT NURSE NOTE - NS PRO PASSIVE SMOKE EXP
"Libertad Sanchez is a 82 year old female who is being evaluated via a billable telephone visit.      The patient has been notified of following:     \"This telephone visit will be conducted via a call between you and your physician/provider. We have found that certain health care needs can be provided without the need for a physical exam.  This service lets us provide the care you need with a short phone conversation.  If a prescription is necessary we can send it directly to your pharmacy.  If lab work is needed we can place an order for that and you can then stop by our lab to have the test done at a later time.    Telephone visits are billed at different rates depending on your insurance coverage. During this emergency period, for some insurers they may be billed the same as an in-person visit.  Please reach out to your insurance provider with any questions.    If during the course of the call the physician/provider feels a telephone visit is not appropriate, you will not be charged for this service.\"    Patient has given verbal consent for Telephone visit? Yes  What phone number would you like to be contacted at? [phone] (V)    How would you like to obtain your AVS? My Chart    Andie Grimes LPN    Rheumatology / Pulmonology  [facility]        Ms. Sanchez has PMR since February 2019. +CHELSEY, +RNP, dsDNA-, Sm-, SSA/B-.     She has now stopped her prednisone a few weeks ago. She continues to have low back aching and bilateral leg burning sensation between the knees and thighs and worse with standing and walking and better with sitting. This has been going on for months. She denies numbness or paresthesias in the legs.  She is overall weaker, but not necessarily in the legs. No formal exercise program. She shoulder function and comfort are good. Energy seems lower overall.  AM stiffness     Her shoulders and hands are improved and are no longer a problem.    Her  has been diagnosed with " COVID-19 a little over a week ago. He was hospitalized but is now back at home. She has not developed COVID symptoms herself. No fevers, chills or sweats. Her breathing is fine and her oxymetry remains around 94.     Recent adrenal insufficiency work has been normal. Her dizziness has passed.     PMI:  Medical-hypothyroidism, osteoporosis (T = -1.5 3-2019), empyema, carpal tunnel disease right, collagenous colitis, plantar fasciitis, eczema  Surgical-bunionectomy bilateral, carpal tunnel release, cataract surgery bilateral, right lung resection/pleural surgery/evacuation, tonsillectomy  Injuries-tailbone injury    SH:  , previous  provider, 5 children, no tobacco or EtOH. Right handed.    FH:  Mother dead with alzheimer's  Father dead with MI  Sibs and children are healthy    PMSF history personally obtained and updated by me this visit.    ROS:  +actonel and alendronate intolerant  Remainder of the 14 point ROS obtained and found negative.    Laboratory:    View Detailed Reports   View Condensed Results Report   SEDIMENTATION RATE  Order: [id number]  (suggestion)  Information displayed in this report will not trend or trigger automated decision support.    Ref Range & Units 10d ago   SEDIMENTATION RATE        <30 mm/hr 19    Specimen Collected: 11/10/20  2:18 PM Last Resulted: 11/10/20  6:10 PM   Received From: [facility]  Result Received: 11/17/20  9:19 AM    Received Information   C-REACTIVE PROTEIN  Order: [id number]  (suggestion)  Information displayed in this report will not trend or trigger automated decision support.    Ref Range & Units 10d ago   C-REACTIVE PROTEIN        <0.50 mg/dL 0.45    Specimen Collected: 11/10/20  2:18 PM Last Resulted: 11/10/20  6:13 PM   Received From: [facility]  Result Received: 11/17/20  9:19 AM    Received Information   Contains abnormal data RENAL FUNCTION PANEL  Order: [id number]  (suggestion)   Information displayed in this report will not trend or trigger automated decision support.    Ref Range & Units 10d ago   SODIUM 135 - 145 mmol/L 139    POTASSIUM 3.5 - 5.0 mmol/L 4.1    CHLORIDE 98 - 110 mmol/L 105    CO2,TOTAL 21 - 31 mmol/L 26    ANION GAP 5 - 18  8    GLUCOSE 65 - 100 mg/dL 85    CALCIUM 8.5 - 10.5 mg/dL 9.1    BUN 8 - 25 mg/dL 15    CREATININE 0.57 - 1.11 mg/dL 1.03    BUN/CREAT RATIO           10 - 20  15    GFR if African American >60 ml/min/1.73m2 >60    GFR if not African American >60 ml/min/1.73m2 51Low     PHOSPHORUS 2.3 - 4.7 mg/dL 3.4    ALBUMIN 3.2 - 4.6 g/dL 3.9    Specimen Collected: 11/10/20  2:18 PM Last Resulted: 11/10/20  6:14 PM   Received From: [facility]  Result Received: 11/17/20  9:19 AM    Received Information     Contains abnormal data CBC W PLT NO DIFF  Order: [id number]  (suggestion)  Information displayed in this report will not trend or trigger automated decision support.    Ref Range & Units 4mo ago   WHITE BLOOD COUNT         4.5 - 11.0 thou/cu mm 6.5    RED BLOOD COUNT           4.00 - 5.20 mil/cu mm 3.93Low     HEMOGLOBIN                12.0 - 16.0 g/dL 12.8    HEMATOCRIT                33.0 - 51.0 % 39.4    MCV                       80 - 100 fL 100    MCH                       26.0 - 34.0 pg 32.6    MCHC                      32.0 - 36.0 g/dL 32.5    RDW                       11.5 - 15.5 % 12.6    PLATELET COUNT            140 - 440 thou/cu mm 243    MPV                       6.5 - 11.0 fL 9.9    Resulting Agency  [facility]   Specimen Collected: 07/01/20  2:02 PM Last Resulted: 07/01/20  2:08 PM   [facility]: [facility]  Result Received: 07/16/20 11:04 AM    Received Information   Result Report    CBC W PLT NO DIFF (Order #[id number]) on 7/1/20    External System: External ID:       Base CPT Code (Reference Only)    Code CPT [employer]   [id number]              Impression:    Polymyalgia rheumatica-it is my impression that her PMR has remitted. Normal inflammatory markers a consistent with this.  I do not think that her low back pain and quadriceps claudication symptoms reflect PMR. No further therapy for PMR is warranted.    Positive CHELSEY-without evidence of connective tissue disease. No signs of systemic inflammatory disease today.    Low back pain and leg pain without clear weakness-with symptoms reminiscent of spinal stenosis. I will get a lumbar Xrays series to investigate for any spondylolisthesis, compression fracture, or severe DJD. If so, then CT of the low back would be indicated to evaluate for nerve impingement.     Osteoarthritis-stable.    Follow with me in 3 months.        Phone call duration: 22 minutes    Allan Beaulieu MD     No

## 2022-09-09 NOTE — ED PROVIDER NOTE - PROGRESS NOTE DETAILS
Hg noted to 7.3. Plan to transfuse 1 unit of blood. Consent obtained and placed in chart. GI consulted and spoke, plan to see patient in ED. seen by GI, recommend urgent EGD today, then admission to the floor. Spoke to Hospitalist Dr Francisco, requesting transfusion of 2nd unit of blood

## 2022-09-09 NOTE — H&P ADULT - ASSESSMENT
- Patient is a 82 year old female with a past medical history of HTN, HLD, RA, hypothyroidism, PAD on ASA/Prasugrel, former smoker, recent dx of cirrhosis who presents with one episode of hematemesis.     #Upper GI Bleed   - Pt is on daily aspirin use   - Admit to medicine   - No active bleed on CT of chest, abdomen, and pelvis   - Pt was seen by GI in the ED and is planned for an EGD today  - Keep NPO   - 2 units of blood ordered   - Monitor CBC Q12  - Active type & screen   - Maintain hemodynamic stability   - Protonix BID    #Hx of PAD   -   #Liver Cirrhosis   - not on any medications     #RA  - continue daily Prednisone      #Hypertension   - Continue metoprolol and amlodipine   - Monitor BP     #Hypothyroidism   - continue synthroid    #DVT Ppx   - not indicated due to active bleed     #Diet   - NPO      - Patient is a 82 year old female with a past medical history of HTN, HLD, RA, hypothyroidism, PAD on ASA/Prasugrel, urinary retention, former smoker, recent dx of cirrhosis who presents with one episode of hematemesis.     #Upper GI Bleed   Pt with hx liver cirrhosis and on daily aspirin use   - Admit to medicine   - Pt was seen by GI in the ED and is planned for an EGD today  - Keep NPO   - 2 units of blood ordered   - Monitor CBC Q12h  - Active type & screen   - Maintain hemodynamic stability   - Protonix BID    #Hx of PAD   - hold ASA, prasugrel for now    #RA  - continue daily Prednisone      #Hypertension   - Continue metoprolol and amlodipine   - Monitor BP     #Hypothyroidism   - continue synthroid    # Urinary incontinence  -continue Oxybutunin    #DVT PPX: hold due to active bleed   #Diet: NPO for now     - Patient is a 82 year old female with a past medical history of HTN, HLD, RA, hypothyroidism, gout, PAD on ASA/Prasugrel, urinary retention, recent dx of cirrhosis who presents with one episode of hematemesis.     #Upper GI Bleed   Pt with hx liver cirrhosis and on daily aspirin use   CT A/P/C demonstrated no contrast extravasation. Small bilateral pleural effusions. Cirrhosis, mild ascites, portal hypertension  - Admit to medicine   - Pt was seen by GI in the ED and is planned for an EGD today  - Keep NPO   - 2 units of blood ordered   - Monitor CBC Q12h  - Active type & screen   - Maintain hemodynamic stability   - PPI BID  - case d/w GI: no octreotide indicated at this time    #Hx of PAD   - hold ASA, prasugrel for now    #RA  - will hold Prednisone for now    #Hypertension   - Continue metoprolol and amlodipine   - Monitor BP     #Hypothyroidism   - continue synthroid    # Urinary incontinence  -continue Oxybutynin    # Gout   - continue Allopurinol    #DVT PPX: hold due to active bleed   #Diet: NPO for now    Case d/w Dr. Redd   - Patient is a 82 year old female with a past medical history of HTN, HLD, RA, hypothyroidism, gout, PAD on ASA/Prasugrel, urinary retention, recent dx of cirrhosis who presents with one episode of hematemesis.     #Hematemesis   # h/o ? liver cirrhosis in setting of mehotrexate use   - vital signs stable , but dizzy  - Hb baseline ~ 11, presented with 7.3   - 2 units prbc ordered in ED  - CT A/P/C demonstrated no contrast extravasation. Small bilateral pleural effusions. Cirrhosis, mild ascites, portal hypertension  - Pt was seen by GI in the ED and is planned for an EGD today  - Keep NPO   - 2 units of blood ordered   - Monitor CBC Q12h  - Active type & screen, 2 large bore IV's    - PPI BID  - case d/w GI: no octreotide indicated at this time  - f/u GI recommendations post- EGD       #Hx of PAD s/p stent with chronic le ulcers - hold ASA, prasugrel for now, op vascular fu   #RA- hold Prednisone for now  #HTN- stable, can hold bp medications in setting of gi bleed , can resume if needed   #Hypothyroidism- c/w synthroid   #Urinary incontinence -continue Oxybutynin  # Gout - continue Allopurinol    #DVT PPX: SCD  #Diet: NPO   # GI PPX: PPI BID  # Dispo: acute  # Handoff: urgent EGD today     Case d/w Dr. Redd

## 2022-09-09 NOTE — ED PROVIDER NOTE - OBJECTIVE STATEMENT
83 y/o female with PMHx of HTN, HLD, RA, former smoker, recent dx of cirrhosis presented to the ED for 1 episode of hematemesis. Patient and daughter at bedside reports that yesterday she had 1 episode of vomiting of red-brown colored emesis. Aid at bedside reports that she noted the color to be bright red. No other episodes since then. No melena or BRBPR. Reports having some dizziness upon ambulation. She recently was on amoxicillin for a tooth infection, and chronic prednisone for her arthritis. She recently moved to [place] from [place] where all her doctors are. She was diagnosed with cirrhosis likely secondary to methotrexate use. Had endoscopy done 2 months ago for possible 'internal bleeding."   No recent fevers, chills, chest pain, SOB, cough, abdominal pain, diarrhea, constipation. 81 y/o female with PMHx of HTN, HLD, RA, hypothyroidism, PAD on ASA/Prasugrel, former smoker, recent dx of cirrhosis presented to the ED for 1 episode of hematemesis. Patient and daughter at bedside reports that yesterday she had 1 episode of vomiting of red-brown colored emesis. Aid at bedside reports that she noted the color to be bright red. No other episodes since then. No melena or BRBPR. Reports having some dizziness upon ambulation. She recently was on amoxicillin for a tooth infection, and chronic prednisone for her arthritis. She recently moved to [place] from [place] where all her doctors are. She was diagnosed with cirrhosis likely secondary to methotrexate use. Had endoscopy done 2 months ago for possible 'internal bleeding."   No recent fevers, chills, chest pain, SOB, cough, abdominal pain, diarrhea, constipation. 81 y/o female with PMHx of HTN, HLD, RA, hypothyroidism, PAD on ASA/Prasugrel, former smoker, recent dx of cirrhosis presented to the ED for 1 episode of hematemesis. Patient and daughter at bedside reports that yesterday she had 1 episode of vomiting of red-brown colored emesis. Aid at bedside reports that she noted the color to be bright red. No other episodes since then. No melena or BRBPR. Reports having some dizziness upon ambulation. She recently was on amoxicillin for a tooth infection, and chronic prednisone for her arthritis. She recently moved to [place] from [place] where all her doctors are. She was diagnosed with cirrhosis likely secondary to methotrexate use. Had endoscopy done 2 months ago for possible 'internal bleeding."   KRANTHI shows empty rectal vault   No recent fevers, chills, chest pain, SOB, cough, abdominal pain, diarrhea, constipation.

## 2022-09-09 NOTE — H&P ADULT - HISTORY OF PRESENT ILLNESS
Patient is a 82 year old female with a past medical history of HTN, HLD, RA, hypothyroidism, PAD on ASA/Prasugrel, former smoker, recent dx of cirrhosis who presents with one episode of hematemesis. As per collateral from the patient's daughter, the patient had one episode of brown/red emesis which her mother told her occurred at 4am. Her daughter states that the hematemesis was preceded by complaints of dizziness the day prior. Pt tells staff that she has a problem with falling and will get lightheaded and dizzy to the point that she needs to hold on to something to stop herself from falling. The pt recently completed a course of amoxicillin for a tooth infection, is on chronic prednisone, and was recently diagnosed with liver cirrhosis secondary to methotrexate use. Hemoglobin was 11 as 8/16 of and 7.3 as of today, for which the pt is being given 2 units of blood in the ED. Pt had an endoscopy 2 months ago indicated by her provider for potential "internal bleeding". No hematuria, melena, BRBPR, fever, chills, chest pain, SOB, cough, abdominal pain, diarrhea, or constipation.  Patient is a 82 year old female with a past medical history of HTN, HLD, RA, hypothyroidism, PAD on ASA/Prasugrel, former smoker, recent dx of liver cirrhosis, urinary incontinence, moved from [place] to [place] a month ago  who presents with one episode of hematemesis. As per collateral from the patient's daughter, the patient had one episode of brown/red emesis which her mother told her occurred at 4am. Her daughter states that the hematemesis was preceded by complaints of dizziness the day prior. Pt tells staff that she has a problem with falling and will get lightheaded and dizzy to the point that she needs to hold on to something to stop herself from falling. The pt recently completed a course of amoxicillin for a tooth infection, is on chronic prednisone, and was recently diagnosed with liver cirrhosis secondary to methotrexate use. Reportedly pt had an endoscopy 1 month ago, however, pt  is unsure what the results were. She denies gross hematuria, BRBPR, fever, chills, chest pain, SOB, cough, abdominal pain, diarrhea, or constipation.   Hemoglobin was 11 as 8/16 of and 7.3 as of today, for which the pt is being given 2 units of blood in the ED. GI consulted in ED, plan for urgent EGD  Patient is a 82 year old female with a past medical history of HTN, HLD, RA, hypothyroidism, PAD on ASA/Prasugrel, gout, recent dx of liver cirrhosis, urinary incontinence, moved from [place] to [place] a month ago  who presents with one episode of hematemesis. As per collateral from the patient's daughter, the patient had one episode of brown/red emesis which her mother told her occurred at 4am. Her daughter states that the hematemesis was preceded by complaints of dizziness the day prior. Pt tells staff that she has a problem with falling and will get lightheaded and dizzy to the point that she needs to hold on to something to stop herself from falling. The pt recently completed a course of amoxicillin for a tooth infection, is on chronic prednisone, and was recently diagnosed with liver cirrhosis secondary to methotrexate use. Reportedly pt had an endoscopy 1 month ago, however, pt  is unsure what the results were. She denies gross hematuria, BRBPR, fever, chills, chest pain, SOB, cough, abdominal pain, diarrhea, or constipation.   Hemoglobin was 11 as 8/16 of and 7.3 as of today, for which the pt is being given 2 units of blood in the ED. GI consulted in ED, plan for urgent EGD  83 yo F with PMH of HTN, HLD, RA on prednisone, hypothyroidism, PAD s/p stent on ASA/Prasugrel, gout, recent dx of liver cirrhosis 2/2 methotrexate use (no history of esophageal varices), urinary incontinence, moved from [place] to [place] a month ago, recently had EGD as per patient and stopped taking all of her medications > 1 month ago including blood thinners. She now presents with one episode of hematemesis which started today.     As per collateral from the patient's daughter, the patient had one episode of brown/red emesis which her mother told her occurred at 4am. Her daughter states that the hematemesis was preceded by complaints of dizziness the day prior. Pt tells staff that she has a problem with falling and will get lightheaded and dizzy to the point that she needs to hold on to something to stop herself from falling. The pt recently completed a course of amoxicillin for a tooth infection, is on chronic prednisone, and was recently diagnosed with liver cirrhosis secondary to methotrexate use. Reportedly pt had an endoscopy 1 month ago, however, pt  is unsure what the results were. She denies gross hematuria, BRBPR, fever, chills, chest pain, SOB, cough, abdominal pain, diarrhea, or constipation.     Hemoglobin was 11 as 8/16 of and 7.3 as of today, for which the pt is being given 2 units of blood in the ED. GI consulted in ED, plan for urgent EGD   Vital signs are stable in ED and the patient not actively vomiting blood

## 2022-09-09 NOTE — ED PROVIDER NOTE - CLINICAL SUMMARY MEDICAL DECISION MAKING FREE TEXT BOX
Is a frail elderly woman with active upper GI bleeding.  Case discussed with GI service.  Urgent EGD will be performed today.  My opinion inpatient care is medically appropriate.  Urgent blood transfusion ordered.

## 2022-09-10 NOTE — PROGRESS NOTE ADULT - SUBJECTIVE AND OBJECTIVE BOX
SUBJECTIVE:    Patient is a 82y old Female who presents with a chief complaint of hematemesis (09 Sep 2022 14:54)    Currently admitted to medicine with the primary diagnosis of Upper gastrointestinal bleed       Today is hospital day 1d. This morning she is resting comfortably in bed and reports no new issues or overnight events.     ROS:   CONSTITUTIONAL: No weakness, fevers or chills   EYES/ENT: No visual changes; No vertigo or throat pain   NECK: No pain or stiffness   RESPIRATORY: No cough, wheezing, hemoptysis; No shortness of breath   CARDIOVASCULAR: No chest pain or palpitations   GASTROINTESTINAL: No abdominal or epigastric pain. No nausea, vomiting, or hematemesis; No diarrhea or constipation. No melena or hematochezia.  GENITOURINARY: No dysuria, frequency or hematuria  NEUROLOGICAL: No numbness or weakness  SKIN: No itching, rashes      PAST MEDICAL & SURGICAL HISTORY  Hypothyroid    Hypertension    Cirrhosis    RA (rheumatoid arthritis)    High cholesterol    Poor circulation    Incontinence    History of hip surgery    H/O: hysterectomy      SOCIAL HISTORY:    ALLERGIES:  Keflex (Unknown)  losartan (Unknown)  morphine (Unknown)  Rocephin (Unknown)  sulfamethoxazole (Hives)    MEDICATIONS:  STANDING MEDICATIONS  allopurinol 100 milliGRAM(s) Oral two times a day  carvedilol 3.125 milliGRAM(s) Oral every 12 hours  ciprofloxacin     Tablet 500 milliGRAM(s) Oral every 12 hours  DULoxetine 60 milliGRAM(s) Oral daily  ferrous    sulfate 325 milliGRAM(s) Oral daily  folic acid 1 milliGRAM(s) Oral daily  lactated ringers. 1000 milliLiter(s) IV Continuous <Continuous>  levothyroxine 50 MICROGram(s) Oral daily  oxybutynin 10 milliGRAM(s) Oral daily  pantoprazole  Injectable 40 milliGRAM(s) IV Push two times a day    PRN MEDICATIONS    VITALS:   T(F): 97.3  HR: 94  BP: 139/65  RR: 18  SpO2: 96%    LABS:  Negative for smoking/alcohol/drug use.                         8.1    8.45  )-----------( 222      ( 10 Sep 2022 06:20 )             25.4     09-10    133<L>  |  102  |  36<H>  ----------------------------<  92  4.4   |  19  |  0.9    Ca    8.1<L>      10 Sep 2022 06:20    TPro  6.1  /  Alb  2.9<L>  /  TBili  0.4  /  DBili  x   /  AST  19  /  ALT  11  /  AlkPhos  102  09-10    PT/INR - ( 09 Sep 2022 11:00 )   PT: 14.40 sec;   INR: 1.25 ratio         PTT - ( 09 Sep 2022 11:00 )  PTT:33.4 sec      RADIOLOGY:  < from: EGD (09.09.22 @ 11:45) >  Impressions:    Normal duodenum.    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 (Ligation).    Erythema in the antrum compatible with erosive gastritis.    Coffee ground material was noted in the stomach. It was washed and suctioned.  There were no gastric ulcers, varices, AVMs or obvious Dieulafoy lesions.    < end of copied text >    PHYSICAL EXAM:  GEN: No acute distress  HEENT: normocephalic, atraumatic, aniceteric  LUNGS: Clear to auscultation bilaterally, no rales/wheezing/ rhonchi  HEART: S1/S2 present. RRR, no murmurs  ABD: Soft, non-tender, non-distended. Bowel sounds present  EXT: b/l le faint pulses ; b/l le non infectious ulcers noted   NEURO: AAOX3, normal affect      ASSESSMENT AND PLAN:    82 year old female with a past medical history of HTN, HLD, RA, hypothyroidism, gout, PAD on ASA/Prasugrel, urinary retention, recent dx of cirrhosis who presents with one episode of hematemesis.     #Hematemesis , resolved   # h/o ? liver cirrhosis in setting of mehotrexate use   # s/p EGD 9/9/2022 - Varices / Erosive gastritis   - vital signs stable , but dizzy  - Hb baseline ~ 11, presented with 7.3   - 2 units prbc ordered in ED  - CT A/P/C demonstrated no contrast extravasation. Small bilateral pleural effusions. Cirrhosis, mild ascites, portal hypertension  - Pt was seen by GI in the ED and is planned for an EGD today  - Keep NPO   - 2 units of blood ordered   - Monitor CBC   - Active type & screen, 2 large bore IV's    - PPI BID  - started carvedilol, can titrate for HR> 60  - Ciprofloxacin 500 mg BID for 7 days given varices   - GI recommendations appreciated: will need repeat EGD in 3 weeks , patient aware       #Hx of PAD s/p stent with chronic le ulcers - hold ASA, prasugrel for now will need to f/u with GI when ok to restart ; arterial duplex ordered   #RA- hold Prednisone for now  #HTN- stable, can hold bp medications , bp controlled, restart prn   #Hypothyroidism- c/w synthroid   #Urinary incontinence -continue Oxybutynin  # Gout - continue Allopurinol    #DVT PPX: SCD  #Diet: dash/tlc   # GI PPX: PPI BID  # Dispo: acute ; PT consult   # Handoff: monitor Hb; arterial duplex   # Family discussion: attempted call Ela daughter (cellphone [phone]), unable to reach , will try again if not at bedside

## 2022-09-10 NOTE — PHYSICAL THERAPY INITIAL EVALUATION ADULT - ADDITIONAL COMMENTS
Pt lives on 1st floor of [facility] with 2 steps to enter front door, no handrails. Pt lives with private daytime HHA and daughter visits frequently to assist with ADLs. Pt states she used RW with supervision for ambulation prior to hospital admission.

## 2022-09-10 NOTE — PHYSICAL THERAPY INITIAL EVALUATION ADULT - GENERAL OBSERVATIONS, REHAB EVAL
13:15 - 13:45 Chart reviewed. Patient available to be seen for physical therapy, denies pain, +IV, daughter at bedside, confirmed with VAISHALI Medley

## 2022-09-11 NOTE — PROGRESS NOTE ADULT - SUBJECTIVE AND OBJECTIVE BOX
SUBJECTIVE:    Patient is a 82y old Female who presents with a chief complaint of hematemesis (10 Sep 2022 13:01)    Currently admitted to medicine with the primary diagnosis of Upper gastrointestinal bleed       Today is hospital day 2d. This morning she is resting comfortably in bed and reports no new issues or overnight events.     ROS:   CONSTITUTIONAL: No weakness, fevers or chills   EYES/ENT: No visual changes; No vertigo or throat pain   NECK: No pain or stiffness   RESPIRATORY: No cough, wheezing, hemoptysis; No shortness of breath   CARDIOVASCULAR: No chest pain or palpitations   GASTROINTESTINAL: No abdominal or epigastric pain. No nausea, vomiting, or hematemesis; No diarrhea or constipation. No melena or hematochezia.  GENITOURINARY: No dysuria, frequency or hematuria  NEUROLOGICAL: No numbness or weakness  SKIN: No itching, rashes      PAST MEDICAL & SURGICAL HISTORY  Hypothyroid    Hypertension    Cirrhosis    RA (rheumatoid arthritis)    High cholesterol    Poor circulation    Incontinence    History of hip surgery    H/O: hysterectomy      SOCIAL HISTORY:    ALLERGIES:  Keflex (Unknown)  losartan (Unknown)  morphine (Unknown)  Rocephin (Unknown)  sulfamethoxazole (Hives)    MEDICATIONS:  STANDING MEDICATIONS  allopurinol 100 milliGRAM(s) Oral two times a day  carvedilol 3.125 milliGRAM(s) Oral every 12 hours  ciprofloxacin     Tablet 500 milliGRAM(s) Oral every 12 hours  DULoxetine 60 milliGRAM(s) Oral daily  ferrous    sulfate 325 milliGRAM(s) Oral daily  folic acid 1 milliGRAM(s) Oral daily  lactated ringers. 1000 milliLiter(s) IV Continuous <Continuous>  levothyroxine 50 MICROGram(s) Oral daily  oxybutynin 10 milliGRAM(s) Oral daily  pantoprazole  Injectable 40 milliGRAM(s) IV Push two times a day    PRN MEDICATIONS    VITALS:   T(F): 96.4  HR: 72  BP: 165/78  RR: 18  SpO2: --    LABS:  Negative for smoking/alcohol/drug use.                         8.2    4.66  )-----------( 144      ( 11 Sep 2022 06:05 )             26.8     09-11    142  |  109  |  29<H>  ----------------------------<  101<H>  4.2   |  24  |  0.8    Ca    8.2<L>      11 Sep 2022 06:05    TPro  6.2  /  Alb  3.0<L>  /  TBili  0.4  /  DBili  x   /  AST  19  /  ALT  10  /  AlkPhos  102  09-11    PT/INR - ( 11 Sep 2022 06:05 )   PT: 12.90 sec;   INR: 1.12 ratio         PTT - ( 11 Sep 2022 06:05 )  PTT:32.0 sec      RADIOLOGY:  < from: [facility] Duplex Lower Extrem Arterial, Bilat (09.10.22 @ 14:27) >  Impression:    Estimated 20-50% stenosis of the left mid superficial femoral artery.    Estimated 20-50% stenosis of the right popliteal artery.    < end of copied text >    PHYSICAL EXAM:  GEN: No acute distress  HEENT: normocephalic, atraumatic, aniceteric  LUNGS: Clear to auscultation bilaterally, no rales/wheezing/ rhonchi  HEART: S1/S2 present. RRR, no murmurs  ABD: Soft, non-tender, non-distended. Bowel sounds present  EXT: warm b/l le, b/l ulcer do not appear infected  NEURO: AAOX3, normal affect      ASSESSMENT AND PLAN:    82 year old female with a past medical history of HTN, HLD, RA, hypothyroidism, gout, PAD on ASA/Prasugrel, urinary retention, recent dx of cirrhosis who presents with one episode of hematemesis.     #Hematemesis , resolved   # h/o liver cirrhosis in setting of mehotrexate use   # s/p EGD 9/9/2022 - Varices / Erosive gastritis   - vital signs stable , but dizzy  - Hb baseline ~ 11, presented with 7.3   - s/p  2 units prbc   - CT A/P/C demonstrated no contrast extravasation. Small bilateral pleural effusions. Cirrhosis, mild ascites, portal hypertension  - Monitor CBC   - Active type & screen, 2 large bore IV's    - PPI BID  - started carvedilol, can titrate for HR> 60  - Ciprofloxacin 500 mg BID for 7 days given varices   - GI recommendations appreciated: will need repeat EGD in 3 weeks , patient aware       #Hx of PAD s/p stent with chronic le ulcers - hold ASA, prasugrel for now will need to f/u with GI when ok to restart ; arterial duplex ordered   #RA- hold Prednisone for now  #HTN- stable, can hold bp medications , bp controlled, restart prn   #Hypothyroidism- c/w synthroid   #Urinary incontinence -continue Oxybutynin  # Gout - continue Allopurinol    #DVT PPX: SCD  #Diet: dash/tlc   # GI PPX: PPI BID  # Dispo: acute ; rehab vs home PT as per physical therapy eval  # Handoff: GI f/u   # Handoff: monitor Hb; arterial duplex   # Family discussion: attempted call Ela daughter (cellphone [phone]), unable to reach , will try again if not at bedside

## 2022-09-12 NOTE — PROGRESS NOTE ADULT - SUBJECTIVE AND OBJECTIVE BOX
SUBJECTIVE:    Patient is a 82y old Female who presents with a chief complaint of hematemesis (11 Sep 2022 13:07)    Currently admitted to medicine with the primary diagnosis of Upper gastrointestinal bleed       Today is hospital day 3d. This morning she is resting comfortably in bed and reports no new issues or overnight events.     ROS:   CONSTITUTIONAL: No weakness, fevers or chills   EYES/ENT: No visual changes; No vertigo or throat pain   NECK: No pain or stiffness   RESPIRATORY: No cough, wheezing, hemoptysis; No shortness of breath   CARDIOVASCULAR: No chest pain or palpitations   GASTROINTESTINAL: No abdominal or epigastric pain. No nausea, vomiting, or hematemesis; No diarrhea or constipation. No melena or hematochezia.  GENITOURINARY: No dysuria, frequency or hematuria  NEUROLOGICAL: No numbness or weakness  SKIN: No itching, rashes      PAST MEDICAL & SURGICAL HISTORY  Hypothyroid    Hypertension    Cirrhosis    RA (rheumatoid arthritis)    High cholesterol    Poor circulation    Incontinence    History of hip surgery    H/O: hysterectomy      SOCIAL HISTORY:    ALLERGIES:  Keflex (Unknown)  losartan (Unknown)  morphine (Unknown)  Rocephin (Unknown)  sulfamethoxazole (Hives)    MEDICATIONS:  STANDING MEDICATIONS  allopurinol 100 milliGRAM(s) Oral two times a day  aspirin  chewable 81 milliGRAM(s) Oral daily  carvedilol 6.25 milliGRAM(s) Oral every 12 hours  ciprofloxacin     Tablet 500 milliGRAM(s) Oral every 12 hours  DULoxetine 60 milliGRAM(s) Oral daily  ferrous    sulfate 325 milliGRAM(s) Oral daily  folic acid 1 milliGRAM(s) Oral daily  lactated ringers. 1000 milliLiter(s) IV Continuous <Continuous>  levothyroxine 50 MICROGram(s) Oral daily  oxybutynin 10 milliGRAM(s) Oral daily  pantoprazole  Injectable 40 milliGRAM(s) IV Push two times a day    PRN MEDICATIONS    VITALS:   T(F): 96.5  HR: 70  BP: 170/72  RR: 16  SpO2: --    LABS:  Negative for smoking/alcohol/drug use.                         7.6    3.46  )-----------( 151      ( 12 Sep 2022 05:50 )             25.2     09-12    136  |  103  |  18  ----------------------------<  90  4.1   |  27  |  0.7    Ca    8.2<L>      12 Sep 2022 05:50    TPro  5.8<L>  /  Alb  2.8<L>  /  TBili  0.4  /  DBili  x   /  AST  21  /  ALT  11  /  AlkPhos  102  09-12    PT/INR - ( 11 Sep 2022 06:05 )   PT: 12.90 sec;   INR: 1.12 ratio         PTT - ( 11 Sep 2022 06:05 )  PTT:32.0 sec      RADIOLOGY:    PHYSICAL EXAM:  GEN: No acute distress  HEENT: normocephalic, atraumatic, aniceteric  LUNGS: Clear to auscultation bilaterally, no rales/wheezing/ rhonchi  HEART: S1/S2 present. RRR, no murmurs  ABD: Soft, non-tender, non-distended. Bowel sounds present  EXT: b/l le ulcers clean, legs with + pulses   NEURO: AAOX3, normal affect      ASSESSMENT AND PLAN:    82 year old female with a past medical history of HTN, HLD, RA, hypothyroidism, gout, PAD on ASA/Prasugrel, urinary retention, recent dx of cirrhosis who presents with one episode of hematemesis.     #Hematemesis , resolved   # h/o liver cirrhosis in setting of mehotrexate use   # s/p EGD 9/9/2022 - Varices / Erosive gastritis   - vital signs stable , but dizzy  - Hb baseline ~ 11, presented with 7.3   - s/p  2 units prbc   - CT A/P/C demonstrated no contrast extravasation. Small bilateral pleural effusions. Cirrhosis, mild ascites, portal hypertension  - Monitor CBC   - Active type & screen, 2 large bore IV's    - PPI BID  - started carvedilol, can titrate for HR> 60  - Ciprofloxacin 500 mg BID for 7 days given varices   - GI recommendations appreciated: will need repeat EGD in 3 weeks , patient aware       #Hx of PAD s/p stent with chronic le ulcers - hold ASA, prasugrel for now will need to f/u with GI when ok to restart ; arterial duplex ordered   #RA- hold Prednisone for now  #HTN- stable, can hold bp medications , bp controlled, restart prn   #Hypothyroidism- c/w synthroid   #Urinary incontinence -continue Oxybutynin  # Gout - continue Allopurinol    #DVT PPX: SCD  #Diet: dash/tlc   # GI PPX: PPI BID  # Dispo: acute ; rehab vs home PT as per physical therapy eval  # Handoff: GI f/u   # Handoff: monitor Hb; arterial duplex   # Family discussion: attempted call Ela daughter (cellphone [phone]), unable to reach , will try again if not at bedside      SUBJECTIVE:    Patient is a 82y old Female who presents with a chief complaint of hematemesis (11 Sep 2022 13:07)    Currently admitted to medicine with the primary diagnosis of Upper gastrointestinal bleed       Today is hospital day 3d. This morning she is resting comfortably in bed and reports no new issues or overnight events.     ROS:   CONSTITUTIONAL: No weakness, fevers or chills   EYES/ENT: No visual changes; No vertigo or throat pain   NECK: No pain or stiffness   RESPIRATORY: No cough, wheezing, hemoptysis; No shortness of breath   CARDIOVASCULAR: No chest pain or palpitations   GASTROINTESTINAL: No abdominal or epigastric pain. No nausea, vomiting, or hematemesis; No diarrhea or constipation. No melena or hematochezia.  GENITOURINARY: No dysuria, frequency or hematuria  NEUROLOGICAL: No numbness or weakness  SKIN: No itching, rashes      PAST MEDICAL & SURGICAL HISTORY  Hypothyroid    Hypertension    Cirrhosis    RA (rheumatoid arthritis)    High cholesterol    Poor circulation    Incontinence    History of hip surgery    H/O: hysterectomy      SOCIAL HISTORY:    ALLERGIES:  Keflex (Unknown)  losartan (Unknown)  morphine (Unknown)  Rocephin (Unknown)  sulfamethoxazole (Hives)    MEDICATIONS:  STANDING MEDICATIONS  allopurinol 100 milliGRAM(s) Oral two times a day  aspirin  chewable 81 milliGRAM(s) Oral daily  carvedilol 6.25 milliGRAM(s) Oral every 12 hours  ciprofloxacin     Tablet 500 milliGRAM(s) Oral every 12 hours  DULoxetine 60 milliGRAM(s) Oral daily  ferrous    sulfate 325 milliGRAM(s) Oral daily  folic acid 1 milliGRAM(s) Oral daily  lactated ringers. 1000 milliLiter(s) IV Continuous <Continuous>  levothyroxine 50 MICROGram(s) Oral daily  oxybutynin 10 milliGRAM(s) Oral daily  pantoprazole  Injectable 40 milliGRAM(s) IV Push two times a day    PRN MEDICATIONS    VITALS:   T(F): 96.5  HR: 70  BP: 170/72  RR: 16  SpO2: --    LABS:  Negative for smoking/alcohol/drug use.                         7.6    3.46  )-----------( 151      ( 12 Sep 2022 05:50 )             25.2     09-12    136  |  103  |  18  ----------------------------<  90  4.1   |  27  |  0.7    Ca    8.2<L>      12 Sep 2022 05:50    TPro  5.8<L>  /  Alb  2.8<L>  /  TBili  0.4  /  DBili  x   /  AST  21  /  ALT  11  /  AlkPhos  102  09-12    PT/INR - ( 11 Sep 2022 06:05 )   PT: 12.90 sec;   INR: 1.12 ratio         PTT - ( 11 Sep 2022 06:05 )  PTT:32.0 sec      RADIOLOGY:    PHYSICAL EXAM:  GEN: No acute distress  HEENT: normocephalic, atraumatic, aniceteric  LUNGS: Clear to auscultation bilaterally, no rales/wheezing/ rhonchi  HEART: S1/S2 present. RRR, no murmurs  ABD: Soft, non-tender, non-distended. Bowel sounds present  EXT: b/l le ulcers clean, legs with + pulses   NEURO: AAOX3, normal affect      ASSESSMENT AND PLAN:    82 year old female with a past medical history of HTN, HLD, RA, hypothyroidism, gout, PAD on ASA/Prasugrel, urinary retention, recent dx of cirrhosis who presents with one episode of hematemesis.     #Hematemesis , resolved   # h/o liver cirrhosis in setting of mehotrexate use   # s/p EGD 9/9/2022 - Varices / Erosive gastritis   - vital signs stable , but dizzy  - Hb baseline ~ 11, presented with 7.3   - s/p  2 units prbc   - CT A/P/C demonstrated no contrast extravasation. Small bilateral pleural effusions. Cirrhosis, mild ascites, portal hypertension  - Monitor CBC   - Active type & screen, 2 large bore IV's    - PPI BID  - started carvedilol, can titrate for HR> 60  - Ciprofloxacin 500 mg BID for 7 days given varices   - GI recommendations appreciated: will need repeat EGD in 3 weeks , patient aware       #Hx of PAD s/p stent with chronic le ulcers - GI ok to restart anti platelets; restart ASA, patient on prasugrel 10 mg QD (her weight is < 60 kg) , unsure the reason behind effient as P2Y12 of choice and not plavix , especially with increased risk of bleeding, will consult vascular sx. Daughter needs to bring records from vascular doctor in [place] (she says she does not have the records but patient was following Dr. Spencer). Per daughter, the stent was placed in ? June/ July 2022   #RA- hold Prednisone, patient was not taking it prior to admission (was taking sporadically when had discomfort)  #HTN- stable, can hold bp medications , bp controlled, restart prn   #Hypothyroidism- c/w synthroid   #Urinary incontinence -continue Oxybutynin  # Gout - continue Allopurinol    #DVT PPX: SCD  #Diet: dash/tlc   # GI PPX: PPI BID  # Dispo: acute ; home with PT as per daughter and patient wishes   # Handoff: vascular sx eval  # Family discussion: updated daughter Ela today , updates were given and all questions answered      SUBJECTIVE:    Patient is a 82y old Female who presents with a chief complaint of hematemesis (11 Sep 2022 13:07)    Currently admitted to medicine with the primary diagnosis of Upper gastrointestinal bleed       Today is hospital day 3d. This morning she is resting comfortably in bed and reports no new issues or overnight events.     ROS:   CONSTITUTIONAL: No weakness, fevers or chills   EYES/ENT: No visual changes; No vertigo or throat pain   NECK: No pain or stiffness   RESPIRATORY: No cough, wheezing, hemoptysis; No shortness of breath   CARDIOVASCULAR: No chest pain or palpitations   GASTROINTESTINAL: No abdominal or epigastric pain. No nausea, vomiting, or hematemesis; No diarrhea or constipation. No melena or hematochezia.  GENITOURINARY: No dysuria, frequency or hematuria  NEUROLOGICAL: No numbness or weakness  SKIN: No itching, rashes      PAST MEDICAL & SURGICAL HISTORY  Hypothyroid    Hypertension    Cirrhosis    RA (rheumatoid arthritis)    High cholesterol    Poor circulation    Incontinence    History of hip surgery    H/O: hysterectomy      SOCIAL HISTORY:    ALLERGIES:  Keflex (Unknown)  losartan (Unknown)  morphine (Unknown)  Rocephin (Unknown)  sulfamethoxazole (Hives)    MEDICATIONS:  STANDING MEDICATIONS  allopurinol 100 milliGRAM(s) Oral two times a day  aspirin  chewable 81 milliGRAM(s) Oral daily  carvedilol 6.25 milliGRAM(s) Oral every 12 hours  ciprofloxacin     Tablet 500 milliGRAM(s) Oral every 12 hours  DULoxetine 60 milliGRAM(s) Oral daily  ferrous    sulfate 325 milliGRAM(s) Oral daily  folic acid 1 milliGRAM(s) Oral daily  lactated ringers. 1000 milliLiter(s) IV Continuous <Continuous>  levothyroxine 50 MICROGram(s) Oral daily  oxybutynin 10 milliGRAM(s) Oral daily  pantoprazole  Injectable 40 milliGRAM(s) IV Push two times a day    PRN MEDICATIONS    VITALS:   T(F): 96.5  HR: 70  BP: 170/72  RR: 16  SpO2: --    LABS:  Negative for smoking/alcohol/drug use.                         7.6    3.46  )-----------( 151      ( 12 Sep 2022 05:50 )             25.2     09-12    136  |  103  |  18  ----------------------------<  90  4.1   |  27  |  0.7    Ca    8.2<L>      12 Sep 2022 05:50    TPro  5.8<L>  /  Alb  2.8<L>  /  TBili  0.4  /  DBili  x   /  AST  21  /  ALT  11  /  AlkPhos  102  09-12    PT/INR - ( 11 Sep 2022 06:05 )   PT: 12.90 sec;   INR: 1.12 ratio         PTT - ( 11 Sep 2022 06:05 )  PTT:32.0 sec      RADIOLOGY:    PHYSICAL EXAM:  GEN: No acute distress  HEENT: normocephalic, atraumatic, aniceteric  LUNGS: Clear to auscultation bilaterally, no rales/wheezing/ rhonchi  HEART: S1/S2 present. RRR, no murmurs  ABD: Soft, non-tender, non-distended. Bowel sounds present  EXT: b/l le ulcers clean, legs with + pulses   NEURO: AAOX3, normal affect      ASSESSMENT AND PLAN:    82 year old female with a past medical history of HTN, HLD, RA, hypothyroidism, gout, PAD on ASA/Prasugrel, urinary retention, recent dx of cirrhosis who presents with one episode of hematemesis.     #Hematemesis , resolved   # h/o liver cirrhosis in setting of mehotrexate use   # s/p EGD 9/9/2022 - Varices / Erosive gastritis   - vital signs stable , but dizzy  - Hb baseline ~ 11, presented with 7.3   - s/p  2 units prbc   - CT A/P/C demonstrated no contrast extravasation. Small bilateral pleural effusions. Cirrhosis, mild ascites, portal hypertension  - Monitor CBC   - Active type & screen, 2 large bore IV's    - PPI BID  - started carvedilol, can titrate for HR> 60  - Ciprofloxacin 500 mg BID for 7 days given varices   - GI recommendations appreciated: will need repeat EGD in 3 weeks , patient aware       #Hx of PAD s/p stent with chronic le ulcers - GI ok to restart anti platelets; restart ASA, patient on prasugrel 10 mg QD (her weight is < 60 kg) , unsure the reason behind effient as P2Y12 of choice and not plavix , especially with increased risk of bleeding, will consult vascular sx. Daughter needs to bring records from vascular doctor in [place] (she says she does not have the records but patient was following Dr. Spencer). Per daughter, the stent was placed in ? June/ July 2022   #RA- hold Prednisone, patient was not taking it prior to admission (was taking sporadically when had discomfort), needs referral to rheumatologist on d/c to establish care as patient new to [place] and recently moved from [place]   #HTN- stable, can hold bp medications , bp controlled, restart prn   #Hypothyroidism- c/w synthroid   #Urinary incontinence -continue Oxybutynin  # Gout - continue Allopurinol    #DVT PPX: SCD  #Diet: dash/tlc   # GI PPX: PPI BID  # Dispo: acute ; home with PT as per daughter and patient wishes   # Handoff: vascular sx eval  # Family discussion: updated daughter Ela today , updates were given and all questions answered

## 2022-09-12 NOTE — CONSULT NOTE ADULT - CONSULT REASON
Per GI plan:    Can resume antiplatelets therapy if deemed necessary , no evidence of active GI bleed, please have vacular surgery evaluation if patient can be on alternative antiplatelet to prasugrel given recent GI bleed
hematemesis

## 2022-09-12 NOTE — CONSULT NOTE ADULT - SUBJECTIVE AND OBJECTIVE BOX
Vascular Surgical Attending:  Dr. Mejia's Gastroenterologist:  Last Colonoscopy:   Last EGD:       HPI:  83 yo F with PMH of HTN, HLD, RA on prednisone, hypothyroidism, PAD s/p stent on ASA/Prasugrel, gout, recent dx of liver cirrhosis 2/2 methotrexate use (no history of esophageal varices), urinary incontinence, moved from [place] to [place] a month ago, recently had EGD as per patient and stopped taking all of her medications > 1 month ago including blood thinners. She now presents with one episode of hematemesis which started today.   As per collateral from the patient's daughter, the patient had one episode of brown/red emesis which her mother told her occurred at 4am. Her daughter states that the hematemesis was preceded by complaints of dizziness the day prior. Pt tells staff that she has a problem with falling and will get lightheaded and dizzy to the point that she needs to hold on to something to stop herself from falling. The pt recently completed a course of amoxicillin for a tooth infection, is on chronic prednisone, and was recently diagnosed with liver cirrhosis secondary to methotrexate use. Reportedly pt had an endoscopy 1 month ago, however, pt  is unsure what the results were. She denies gross hematuria, BRBPR, fever, chills, chest pain, SOB, cough, abdominal pain, diarrhea, or constipation.   Hemoglobin was 11 as 8/16 of and 7.3 as of today, for which the pt is being given 2 units of blood in the ED. GI consulted in ED, plan for urgent EGD   Vital signs are stable in ED and the patient not actively vomiting blood  (09 Sep 2022 14:42)      PAST MEDICAL & SURGICAL HISTORY:  Hypothyroid      Hypertension      Cirrhosis      RA (rheumatoid arthritis)      High cholesterol      Poor circulation      Incontinence      History of hip surgery      H/O: hysterectomy          MEDICATIONS  (STANDING):  allopurinol 100 milliGRAM(s) Oral two times a day  aspirin  chewable 81 milliGRAM(s) Oral daily  carvedilol 6.25 milliGRAM(s) Oral every 12 hours  ciprofloxacin     Tablet 500 milliGRAM(s) Oral every 12 hours  DULoxetine 60 milliGRAM(s) Oral daily  ferrous    sulfate 325 milliGRAM(s) Oral daily  folic acid 1 milliGRAM(s) Oral daily  lactated ringers. 1000 milliLiter(s) (75 mL/Hr) IV Continuous <Continuous>  levothyroxine 50 MICROGram(s) Oral daily  oxybutynin 10 milliGRAM(s) Oral daily  pantoprazole  Injectable 40 milliGRAM(s) IV Push two times a day    MEDICATIONS  (PRN):      Allergies    Keflex (Unknown)  losartan (Unknown)  morphine (Unknown)  Rocephin (Unknown)  sulfamethoxazole (Hives)    Intolerances        SOCIAL HISTORY:  Etoh:                                    Drugs:                                   Smoke:      Vital Signs Last 24 Hrs  T(C): 36.3 (12 Sep 2022 20:50), Max: 36.3 (12 Sep 2022 20:50)  T(F): 97.3 (12 Sep 2022 20:50), Max: 97.3 (12 Sep 2022 20:50)  HR: 76 (12 Sep 2022 20:50) (70 - 76)  BP: 174/74 (12 Sep 2022 20:50) (170/70 - 174/74)  BP(mean): --  RR: 18 (12 Sep 2022 20:50) (16 - 18)  SpO2: --        I&O's Summary    11 Sep 2022 07:01  -  12 Sep 2022 07:00  --------------------------------------------------------  IN: 0 mL / OUT: 1600 mL / NET: -1600 mL        Physical Exam:  General: NAD    Abdominal: +BS, Soft, ND/NT, no organomegaly, no rebound, no guarding.    HEENT: NC/AT, EOMI, normal hearing, no oral lesions, no LAD, neck supple  Pulmonary: normal resp effort, CTA-B  Cardiovascular: NSR, no murmurs  Extremities: WWP, normal strength, no clubbing/cyanosis/edema  Neuro: A/O x 3, CNs II-XII grossly intact, normal sensation, no focal deficits  Pulses: palpable distal pulses    Lines/drains/tubes:    LABS:                        7.6    3.46  )-----------( 151      ( 12 Sep 2022 05:50 )             25.2     09-12    136  |  103  |  18  ----------------------------<  90  4.1   |  27  |  0.7    Ca    8.2<L>      12 Sep 2022 05:50    TPro  5.8<L>  /  Alb  2.8<L>  /  TBili  0.4  /  DBili  x   /  AST  21  /  ALT  11  /  AlkPhos  102  09-12    PT/INR - ( 11 Sep 2022 06:05 )   PT: 12.90 sec;   INR: 1.12 ratio         PTT - ( 11 Sep 2022 06:05 )  PTT:32.0 sec    CAPILLARY BLOOD GLUCOSE        LIVER FUNCTIONS - ( 12 Sep 2022 05:50 )  Alb: 2.8 g/dL / Pro: 5.8 g/dL / ALK PHOS: 102 U/L / ALT: 11 U/L / AST: 21 U/L / GGT: x             Cultures:      RADIOLOGY & ADDITIONAL STUDIES:      Assessment: 82y Female    Recommendations:  -   -                                                                                     Vascular Surgical Attending:  Dr. Mejia's Gastroenterologist:  Dr De La Cruz  Last Colonoscopy:  outpt in [place]  Last EGD:  2022 while inpt.      HPI:  81 yo F with PMH of HTN, HLD, RA on prednisone, hypothyroidism, PAD s/p stent on ASA/Prasugrel, gout, recent dx of liver cirrhosis 2/2 methotrexate use (no history of esophageal varices), urinary incontinence, moved from [place] to [place] a month ago, recently had EGD as per patient and stopped taking all of her medications > 1 month ago including blood thinners. She now presents with one episode of hematemesis which started today.   As per collateral from the patient's daughter, the patient had one episode of brown/red emesis which her mother told her occurred at 4am. Her daughter states that the hematemesis was preceded by complaints of dizziness the day prior. Pt tells staff that she has a problem with falling and will get lightheaded and dizzy to the point that she needs to hold on to something to stop herself from falling. The pt recently completed a course of amoxicillin for a tooth infection, is on chronic prednisone, and was recently diagnosed with liver cirrhosis secondary to methotrexate use. Reportedly pt had an endoscopy 1 month ago, however, pt  is unsure what the results were. She denies gross hematuria, BRBPR, fever, chills, chest pain, SOB, cough, abdominal pain, diarrhea, or constipation.   Hemoglobin was 11 as  of and 7.3 as of today, for which the pt is being given 2 units of blood in the ED. GI consulted in ED, plan for urgent EGD   Vital signs are stable in ED and the patient not actively vomiting blood  (09 Sep 2022 14:42)      PAST MEDICAL & SURGICAL HISTORY:  Hypothyroid      Hypertension      Cirrhosis      RA (rheumatoid arthritis)      High cholesterol      Poor circulation      Incontinence      History of hip surgery      H/O: hysterectomy          MEDICATIONS  (STANDING):  allopurinol 100 milliGRAM(s) Oral two times a day  aspirin  chewable 81 milliGRAM(s) Oral daily  carvedilol 6.25 milliGRAM(s) Oral every 12 hours  ciprofloxacin     Tablet 500 milliGRAM(s) Oral every 12 hours  DULoxetine 60 milliGRAM(s) Oral daily  ferrous    sulfate 325 milliGRAM(s) Oral daily  folic acid 1 milliGRAM(s) Oral daily  lactated ringers. 1000 milliLiter(s) (75 mL/Hr) IV Continuous <Continuous>  levothyroxine 50 MICROGram(s) Oral daily  oxybutynin 10 milliGRAM(s) Oral daily  pantoprazole  Injectable 40 milliGRAM(s) IV Push two times a day    MEDICATIONS  (PRN):      Allergies    Keflex (Unknown)  losartan (Unknown)  morphine (Unknown)  Rocephin (Unknown)  sulfamethoxazole (Hives)    Intolerances        SOCIAL HISTORY:  Etoh:                                    Drugs:                                   Smoke:      Vital Signs Last 24 Hrs  T(C): 36.3 (12 Sep 2022 20:50), Max: 36.3 (12 Sep 2022 20:50)  T(F): 97.3 (12 Sep 2022 20:50), Max: 97.3 (12 Sep 2022 20:50)  HR: 76 (12 Sep 2022 20:50) (70 - 76)  BP: 174/74 (12 Sep 2022 20:50) (170/70 - 174/74)  BP(mean): --  RR: 18 (12 Sep 2022 20:50) (16 - 18)  SpO2: --        I&O's Summary    11 Sep 2022 07:01  -  12 Sep 2022 07:00  --------------------------------------------------------  IN: 0 mL / OUT: 1600 mL / NET: -1600 mL        Physical Exam:  General: NAD    Abdominal: +BS, Soft, ND/NT, no organomegaly, no rebound, no guarding.    HEENT: NC/AT, EOMI, normal hearing, no oral lesions, no LAD, neck supple  Pulmonary: normal resp effort, CTA-B  Cardiovascular: NSR, no murmurs  Extremities: WWP, normal strength, no clubbing/cyanosis/edema  Neuro: A/O x 3, CNs II-XII grossly intact, normal sensation, no focal deficits  Pulses: palpable distal pulses    Lines/drains/tubes:    LABS:                        7.6    3.46  )-----------( 151      ( 12 Sep 2022 05:50 )             25.2     09-12    136  |  103  |  18  ----------------------------<  90  4.1   |  27  |  0.7    Ca    8.2<L>      12 Sep 2022 05:50    TPro  5.8<L>  /  Alb  2.8<L>  /  TBili  0.4  /  DBili  x   /  AST  21  /  ALT  11  /  AlkPhos  102  09-12    PT/INR - ( 11 Sep 2022 06:05 )   PT: 12.90 sec;   INR: 1.12 ratio         PTT - ( 11 Sep 2022 06:05 )  PTT:32.0 sec    CAPILLARY BLOOD GLUCOSE        LIVER FUNCTIONS - ( 12 Sep 2022 05:50 )  Alb: 2.8 g/dL / Pro: 5.8 g/dL / ALK PHOS: 102 U/L / ALT: 11 U/L / AST: 21 U/L / GGT: x             Cultures:      RADIOLOGY & ADDITIONAL STUDIES:  < from: EGD (22 @ 11:45) >  EGD Report    Date: 2022 11:45 AM    Patient Name: ARIEL INIGUEZ    MRN: [id number]    Account Number:    [id number]    Gender: Female     (age): 1940 (82)    Instrument(s):    GIF- ([id number])([id number])    Attending/Fellow:    Louis De La Cruz MD        Referring Physician:    ED PHYSICIAN UNASSIGNED        Administered Medications:    As per Anesthesiology Record    Indications:    Hematemesis - K92.0    Procedure:    The procedure, indications, preparation and potential complications were  explained to the patient, who indicated understanding and signed the  corresponding consent forms. IV general anesthesia was administered by  anesthesiologist. Continuous pulse oximetry and blood pressure monitoring were  used throughout the procedure. Supplemental oxygen was used. Patient was placed  in the left lateral decubitus position. The endoscope was introduced through the  mouth and advanced under direct visualization until the second part of the  duodenum was reached. Patient tolerance to the procedure was good. The procedure  was not difficult. Blood loss was minimal.    Limitations/Complications:    There were no apparent limitations or complications    Findings:    Esophagus Protruding lesions 1 cord of grade III varices were seen in the lower  third of the esophagus. There were stigmata of recent bleeding.2 bands were  applied for hemostasis successfully. 2 bands were applied using the banding  device.    Stomach Mucosa Diffuse continuous erythema of the mucosa with no bleeding was  noted in the antrum. These findings are compatible with erosive gastritis.    Additional stomach findings Coffee ground material was noted in the stomach. It  was washed and suctioned. There were no gastric ulcers, varices, AVMs or obvious  Dieulafoy lesions.    Duodenum Normal duodenum.    Impressions:    Normal duodenum.    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 (Ligation).    Erythema in the antrum compatible with erosive gastritis.    Coffee ground material was noted in the stomach. It was washed and suctioned.  There were no gastric ulcers, varices, AVMs or obvious Dieulafoy lesions.    Plan:    -IV PPI q12h for now    -Initiate ciprofloxacin for total of 7 d (pt allergic to cefriaxone)    -Hb daily    -Transfuse to keepHb > 8?    -Daily LFTs and INR    -Monitor for recurrent bleeding    -If patient re-bleeds with HD instability, will need CTA    -Would recommend switching to NSBB on discharge with carvedilol q12 to titrate  to HR > 60 and SBP > 90    -Patient will need repeat EGD in 3 weeks    -We will follow    Attending Participation:    I was present and participated during the entire procedure, including non-key  portions.    Louis De La Cruz MD    Version 2, Electronically signed on 2022 5:35:27 PM by Louis De La Cruz MD    < end of copied text >        Assessment:   83 y/o female s/p EGD w/ Variceal band ligation (see report above).  Per GI     Recommendations:  -   -                                                                                     Vascular Surgical Attending:  Dr. Mejia's Gastroenterologist:  Dr De La Cruz  Last Colonoscopy:  outpt in [place]  Last EGD:  2022 while inpt.      HPI:  81 yo F with PMH of HTN, HLD, RA on prednisone, hypothyroidism, PAD s/p stent on ASA/Prasugrel, gout, recent dx of liver cirrhosis 2/2 methotrexate use (no history of esophageal varices), urinary incontinence, moved from [place] to [place] a month ago, recently had EGD as per patient and stopped taking all of her medications > 1 month ago including blood thinners. She now presents with one episode of hematemesis which started today.   As per collateral from the patient's daughter, the patient had one episode of brown/red emesis which her mother told her occurred at 4am. Her daughter states that the hematemesis was preceded by complaints of dizziness the day prior. Pt tells staff that she has a problem with falling and will get lightheaded and dizzy to the point that she needs to hold on to something to stop herself from falling. The pt recently completed a course of amoxicillin for a tooth infection, is on chronic prednisone, and was recently diagnosed with liver cirrhosis secondary to methotrexate use. Reportedly pt had an endoscopy 1 month ago, however, pt  is unsure what the results were. She denies gross hematuria, BRBPR, fever, chills, chest pain, SOB, cough, abdominal pain, diarrhea, or constipation.   Hemoglobin was 11 as  of and 7.3 as of today, for which the pt is being given 2 units of blood in the ED. GI consulted in ED, plan for urgent EGD   Vital signs are stable in ED and the patient not actively vomiting blood  (09 Sep 2022 14:42)      PAST MEDICAL & SURGICAL HISTORY:  Hypothyroid      Hypertension      Cirrhosis      RA (rheumatoid arthritis)      High cholesterol      Poor circulation      Incontinence      History of hip surgery      H/O: hysterectomy          MEDICATIONS  (STANDING):  allopurinol 100 milliGRAM(s) Oral two times a day  aspirin  chewable 81 milliGRAM(s) Oral daily  carvedilol 6.25 milliGRAM(s) Oral every 12 hours  ciprofloxacin     Tablet 500 milliGRAM(s) Oral every 12 hours  DULoxetine 60 milliGRAM(s) Oral daily  ferrous    sulfate 325 milliGRAM(s) Oral daily  folic acid 1 milliGRAM(s) Oral daily  lactated ringers. 1000 milliLiter(s) (75 mL/Hr) IV Continuous <Continuous>  levothyroxine 50 MICROGram(s) Oral daily  oxybutynin 10 milliGRAM(s) Oral daily  pantoprazole  Injectable 40 milliGRAM(s) IV Push two times a day    MEDICATIONS  (PRN):      Allergies    Keflex (Unknown)  losartan (Unknown)  morphine (Unknown)  Rocephin (Unknown)  sulfamethoxazole (Hives)    Intolerances        SOCIAL HISTORY:  Etoh:                                    Drugs:                                   Smoke:      Vital Signs Last 24 Hrs  T(C): 36.3 (12 Sep 2022 20:50), Max: 36.3 (12 Sep 2022 20:50)  T(F): 97.3 (12 Sep 2022 20:50), Max: 97.3 (12 Sep 2022 20:50)  HR: 76 (12 Sep 2022 20:50) (70 - 76)  BP: 174/74 (12 Sep 2022 20:50) (170/70 - 174/74)  BP(mean): --  RR: 18 (12 Sep 2022 20:50) (16 - 18)  SpO2: --        I&O's Summary    11 Sep 2022 07:01  -  12 Sep 2022 07:00  --------------------------------------------------------  IN: 0 mL / OUT: 1600 mL / NET: -1600 mL        Physical Exam:  General: NAD    Abdominal: +BS, Soft, ND/NT, no organomegaly, no rebound, no guarding.    HEENT: NC/AT, EOMI, normal hearing, no oral lesions, no LAD, neck supple  Pulmonary: normal resp effort, CTA-B  Cardiovascular: NSR, no murmurs  Extremities: WWP, normal strength, no clubbing/cyanosis/edema  Neuro: A/O x 3, CNs II-XII grossly intact, normal sensation, no focal deficits  Pulses: palpable distal pulses    Lines/drains/tubes:    LABS:                        7.6    3.46  )-----------( 151      ( 12 Sep 2022 05:50 )             25.2     09-12    136  |  103  |  18  ----------------------------<  90  4.1   |  27  |  0.7    Ca    8.2<L>      12 Sep 2022 05:50    TPro  5.8<L>  /  Alb  2.8<L>  /  TBili  0.4  /  DBili  x   /  AST  21  /  ALT  11  /  AlkPhos  102  09-12    PT/INR - ( 11 Sep 2022 06:05 )   PT: 12.90 sec;   INR: 1.12 ratio         PTT - ( 11 Sep 2022 06:05 )  PTT:32.0 sec    CAPILLARY BLOOD GLUCOSE        LIVER FUNCTIONS - ( 12 Sep 2022 05:50 )  Alb: 2.8 g/dL / Pro: 5.8 g/dL / ALK PHOS: 102 U/L / ALT: 11 U/L / AST: 21 U/L / GGT: x             Cultures:      RADIOLOGY & ADDITIONAL STUDIES:  < from: EGD (22 @ 11:45) >  EGD Report    Date: 2022 11:45 AM    Patient Name: ARIEL INIGUEZ    MRN: [id number]    Account Number:    [id number]    Gender: Female     (age): 1940 (82)    Instrument(s):    GIF- ([id number])([id number])    Attending/Fellow:    Louis De La Cruz MD        Referring Physician:    ED PHYSICIAN UNASSIGNED        Administered Medications:    As per Anesthesiology Record    Indications:    Hematemesis - K92.0    Procedure:    The procedure, indications, preparation and potential complications were  explained to the patient, who indicated understanding and signed the  corresponding consent forms. IV general anesthesia was administered by  anesthesiologist. Continuous pulse oximetry and blood pressure monitoring were  used throughout the procedure. Supplemental oxygen was used. Patient was placed  in the left lateral decubitus position. The endoscope was introduced through the  mouth and advanced under direct visualization until the second part of the  duodenum was reached. Patient tolerance to the procedure was good. The procedure  was not difficult. Blood loss was minimal.    Limitations/Complications:    There were no apparent limitations or complications    Findings:    Esophagus Protruding lesions 1 cord of grade III varices were seen in the lower  third of the esophagus. There were stigmata of recent bleeding.2 bands were  applied for hemostasis successfully. 2 bands were applied using the banding  device.    Stomach Mucosa Diffuse continuous erythema of the mucosa with no bleeding was  noted in the antrum. These findings are compatible with erosive gastritis.    Additional stomach findings Coffee ground material was noted in the stomach. It  was washed and suctioned. There were no gastric ulcers, varices, AVMs or obvious  Dieulafoy lesions.    Duodenum Normal duodenum.    Impressions:    Normal duodenum.    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 (Ligation).    Erythema in the antrum compatible with erosive gastritis.    Coffee ground material was noted in the stomach. It was washed and suctioned.  There were no gastric ulcers, varices, AVMs or obvious Dieulafoy lesions.    Plan:    -IV PPI q12h for now    -Initiate ciprofloxacin for total of 7 d (pt allergic to cefriaxone)    -Hb daily    -Transfuse to keepHb > 8?    -Daily LFTs and INR    -Monitor for recurrent bleeding    -If patient re-bleeds with HD instability, will need CTA    -Would recommend switching to NSBB on discharge with carvedilol q12 to titrate  to HR > 60 and SBP > 90    -Patient will need repeat EGD in 3 weeks    -We will follow    Attending Participation:    I was present and participated during the entire procedure, including non-key  portions.    Louis De La Cruz MD    Version 2, Electronically signed on 2022 5:35:27 PM by Louis De La Cruz MD    < end of copied text >        Assessment:   83 y/o female s/p EGD w/ Variceal band ligation (see report above).  Per GI Recommendations: - Can resume antiplatelets therapy if deemed necessary , no evidence of active GI bleed, please have vacular surgery evaluation if patient can be on alternative antiplatelet to prasugrel given recent GI bleed.      Recommendations:  -   -                                                                                     Vascular Surgical Attending:  Dr. Mejia's Gastroenterologist:  Dr De La Cruz  Last Colonoscopy:  outpt in [place]  Last EGD:  2022 while inpt.      HPI:  81 yo F with PMH of HTN, HLD, RA on prednisone, hypothyroidism, PAD s/p stent on ASA/Prasugrel, gout, recent dx of liver cirrhosis 2/2 methotrexate use (no history of esophageal varices), urinary incontinence, moved from [place] to [place] a month ago, recently had EGD as per patient and stopped taking all of her medications > 1 month ago including blood thinners. She now presents with one episode of hematemesis which started today.   As per collateral from the patient's daughter, the patient had one episode of brown/red emesis which her mother told her occurred at 4am. Her daughter states that the hematemesis was preceded by complaints of dizziness the day prior. Pt tells staff that she has a problem with falling and will get lightheaded and dizzy to the point that she needs to hold on to something to stop herself from falling. The pt recently completed a course of amoxicillin for a tooth infection, is on chronic prednisone, and was recently diagnosed with liver cirrhosis secondary to methotrexate use. Reportedly pt had an endoscopy 1 month ago, however, pt  is unsure what the results were. She denies gross hematuria, BRBPR, fever, chills, chest pain, SOB, cough, abdominal pain, diarrhea, or constipation.   Hemoglobin was 11 as  of and 7.3 as of today, for which the pt is being given 2 units of blood in the ED. GI consulted in ED, plan for urgent EGD   Vital signs are stable in ED and the patient not actively vomiting blood  (09 Sep 2022 14:42)      PAST MEDICAL & SURGICAL HISTORY:  Hypothyroid      Hypertension      Cirrhosis      RA (rheumatoid arthritis)      High cholesterol      Poor circulation      Incontinence      History of hip surgery      H/O: hysterectomy          MEDICATIONS  (STANDING):  allopurinol 100 milliGRAM(s) Oral two times a day  aspirin  chewable 81 milliGRAM(s) Oral daily  carvedilol 6.25 milliGRAM(s) Oral every 12 hours  ciprofloxacin     Tablet 500 milliGRAM(s) Oral every 12 hours  DULoxetine 60 milliGRAM(s) Oral daily  ferrous    sulfate 325 milliGRAM(s) Oral daily  folic acid 1 milliGRAM(s) Oral daily  lactated ringers. 1000 milliLiter(s) (75 mL/Hr) IV Continuous <Continuous>  levothyroxine 50 MICROGram(s) Oral daily  oxybutynin 10 milliGRAM(s) Oral daily  pantoprazole  Injectable 40 milliGRAM(s) IV Push two times a day    MEDICATIONS  (PRN):      Allergies    Keflex (Unknown)  losartan (Unknown)  morphine (Unknown)  Rocephin (Unknown)  sulfamethoxazole (Hives)    Intolerances        SOCIAL HISTORY:  Etoh:                                    Drugs:                                   Smoke:      Vital Signs Last 24 Hrs  T(C): 36.3 (12 Sep 2022 20:50), Max: 36.3 (12 Sep 2022 20:50)  T(F): 97.3 (12 Sep 2022 20:50), Max: 97.3 (12 Sep 2022 20:50)  HR: 76 (12 Sep 2022 20:50) (70 - 76)  BP: 174/74 (12 Sep 2022 20:50) (170/70 - 174/74)  BP(mean): --  RR: 18 (12 Sep 2022 20:50) (16 - 18)  SpO2: --        I&O's Summary    11 Sep 2022 07:01  -  12 Sep 2022 07:00  --------------------------------------------------------  IN: 0 mL / OUT: 1600 mL / NET: -1600 mL        Physical Exam:  General: NAD    Abdominal: +BS, Soft, ND/NT, no organomegaly, no rebound, no guarding.    HEENT: NC/AT, EOMI, normal hearing, no oral lesions, no LAD, neck supple  Pulmonary: normal resp effort, CTA-B  Cardiovascular: NSR, no murmurs  Extremities: WWP, normal strength, no clubbing/cyanosis/edema  Neuro: A/O x 3, CNs II-XII grossly intact, normal sensation, no focal deficits  Pulses: palpable distal pulses    Lines/drains/tubes:    LABS:                        7.6    3.46  )-----------( 151      ( 12 Sep 2022 05:50 )             25.2     09-12    136  |  103  |  18  ----------------------------<  90  4.1   |  27  |  0.7    Ca    8.2<L>      12 Sep 2022 05:50    TPro  5.8<L>  /  Alb  2.8<L>  /  TBili  0.4  /  DBili  x   /  AST  21  /  ALT  11  /  AlkPhos  102  09-12    PT/INR - ( 11 Sep 2022 06:05 )   PT: 12.90 sec;   INR: 1.12 ratio         PTT - ( 11 Sep 2022 06:05 )  PTT:32.0 sec    CAPILLARY BLOOD GLUCOSE        LIVER FUNCTIONS - ( 12 Sep 2022 05:50 )  Alb: 2.8 g/dL / Pro: 5.8 g/dL / ALK PHOS: 102 U/L / ALT: 11 U/L / AST: 21 U/L / GGT: x             Cultures:      RADIOLOGY & ADDITIONAL STUDIES:  < from: EGD (22 @ 11:45) >  EGD Report    Date: 2022 11:45 AM    Patient Name: ARIEL INIGUEZ    MRN: [id number]    Account Number:    [id number]    Gender: Female     (age): 1940 (82)    Instrument(s):    GIF- ([id number])([id number])    Attending/Fellow:    Louis De La Cruz MD        Referring Physician:    ED PHYSICIAN UNASSIGNED        Administered Medications:    As per Anesthesiology Record    Indications:    Hematemesis - K92.0    Procedure:    The procedure, indications, preparation and potential complications were  explained to the patient, who indicated understanding and signed the  corresponding consent forms. IV general anesthesia was administered by  anesthesiologist. Continuous pulse oximetry and blood pressure monitoring were  used throughout the procedure. Supplemental oxygen was used. Patient was placed  in the left lateral decubitus position. The endoscope was introduced through the  mouth and advanced under direct visualization until the second part of the  duodenum was reached. Patient tolerance to the procedure was good. The procedure  was not difficult. Blood loss was minimal.    Limitations/Complications:    There were no apparent limitations or complications    Findings:    Esophagus Protruding lesions 1 cord of grade III varices were seen in the lower  third of the esophagus. There were stigmata of recent bleeding.2 bands were  applied for hemostasis successfully. 2 bands were applied using the banding  device.    Stomach Mucosa Diffuse continuous erythema of the mucosa with no bleeding was  noted in the antrum. These findings are compatible with erosive gastritis.    Additional stomach findings Coffee ground material was noted in the stomach. It  was washed and suctioned. There were no gastric ulcers, varices, AVMs or obvious  Dieulafoy lesions.    Duodenum Normal duodenum.    Impressions:    Normal duodenum.    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 (Ligation).    Erythema in the antrum compatible with erosive gastritis.    Coffee ground material was noted in the stomach. It was washed and suctioned.  There were no gastric ulcers, varices, AVMs or obvious Dieulafoy lesions.    Plan:    -IV PPI q12h for now    -Initiate ciprofloxacin for total of 7 d (pt allergic to cefriaxone)    -Hb daily    -Transfuse to keepHb > 8?    -Daily LFTs and INR    -Monitor for recurrent bleeding    -If patient re-bleeds with HD instability, will need CTA    -Would recommend switching to NSBB on discharge with carvedilol q12 to titrate  to HR > 60 and SBP > 90    -Patient will need repeat EGD in 3 weeks    -We will follow    Attending Participation:    I was present and participated during the entire procedure, including non-key  portions.    Louis De La Cruz MD    Version 2, Electronically signed on 2022 5:35:27 PM by Louis De La Cruz MD    < end of copied text >        Assessment:   81 y/o female s/p EGD w/ Variceal band ligation (see report above).  Per GI Recommendations: - Can resume antiplatelets therapy if deemed necessary , no evidence of active GI bleed, please have vacular surgery evaluation if patient can be on alternative antiplatelet to prasugrel given recent GI bleed.  Pt states she has bilateral lower extremity vessel stenting and her "vein" doctor in [place] recommended she take prasugrel.      Recommendations:  -   -

## 2022-09-12 NOTE — PROGRESS NOTE ADULT - ASSESSMENT
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    Problem 1- Liver Cirrhosis, esophageal varices   -compensated   Impressions:  -Normal duodenum.  -Varices in the lower third of the esophagus. (Ligation).  -Erythema in the antrum compatible with erosive gastritis.  -Coffee ground material was noted in the stomach. It was washed and suctioned.  There were no gastric ulcers, varices, AVMs or obvious Dieulafoy lesions.  Plan:  -diet as tolerated   -PPI BID  -Can resume dual antiplatelet therapy if needed   - ciprofloxacin for total of 7 d (pt allergic to ceftriaxone  -Hb daily  -Transfuse to keep Hb > 8?  -Daily LFTs and INR  -Monitor for recurrent bleeding  -If patient re-bleeds with HD instability, will need CTA  -Would recommend switching to NSBB on discharge with carvedilol q12 to titrate  to HR > 60 and SBP > 90  -Patient will need repeat EGD in 3 weeks  -Maintain Hemodynamic Stability   -Monitor CBC  -CMP,Optimize Electrolytes  -PT,PTT,INR  -EKG, Chest-Xray   -Transfuse prn to hgb >8  -Two large bore IV lines  - PPI BID  -Monitor Vital Signs  -Active Type and Screen  -Iron Studies, Folate, Vitamin B12 levels     Problem 2-Cirrhosis, mild ascites, portal hypertension, noting a recanalized   umbilical vein and mild splenomegaly. No evidence of a portal vein   thrombosis.  Rec  -MELD score 9  -Abdominal ultrasound AFP every 6 months for HCC screening  -avoid NSAIDs  -Follow up with our [facility] located at [address] [place] [address]. 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    Problem 1- Liver Cirrhosis, esophageal varices presenting with hematemesis s/p EGD with band ligation   EGD :   Impressions:  -Normal duodenum.  -Varices in the lower third of the esophagus. (Ligation).  -Erythema in the antrum compatible with erosive gastritis.  -Coffee ground material was noted in the stomach. It was washed and suctioned.  There were no gastric ulcers, varices, AVM or obvious Dieulafoy lesions.    Plan:  -diet as tolerated   -PPI BID  -Can resume antiplatelets therapy if deemed necessary , no evidence of active GI bleed, please have vacular surgery evaluation if patient can be on alternative antiplatelet to prasugrel given recent GI bleed   - ciprofloxacin for total of 7 d if no contraindication (pt allergic to ceftriaxone)   -Hb daily  -Transfuse to keep Hb > 7  -Daily LFTs and INR  -Monitor for recurrent bleeding  -continue with carvedilol , titrate to HR > 60 and SBP > 90  -Patient will need repeat EGD in 3 weeks to confirm eradication of varices   -Maintain Hemodynamic Stability   -Monitor CBC  -CMP, Optimize Electrolytes  -Transfuse prn to hgb >8  -Two large bore IV lines  - PPI BID for total of 8 weeks   -Monitor Vital Signs  -Active Type and Screen      Problem 2-Cirrhosis, mild ascites, portal hypertension, noting a recanalized   umbilical vein and mild splenomegaly. No evidence of a portal vein   thrombosis.  Rec  -MELD score 9  -Abdominal ultrasound AFP every 6 months for HCC screening  -avoid NSAIDs  -Follow up with our [facility] located at [address] [place] [address]. Phone Number: [phone] for possible liver transplant referral  -Alcohol Cessation Strongly Advised and Encouraged

## 2022-09-12 NOTE — PROGRESS NOTE ADULT - SUBJECTIVE AND OBJECTIVE BOX
82yFemale  Being followed for liver cirrhosis, portal HTN   Interval history: Patient denies nausea, vomiting, hematemesis, melena, blood in stool, diarrhea, constipation, abdominal pain. Patient s/p EGD.      PAST MEDICAL & SURGICAL HISTORY:   Hypothyroid      Hypertension      Cirrhosis      RA (rheumatoid arthritis)      High cholesterol      Poor circulation      Incontinence      History of hip surgery      H/O: hysterectomy                Social History: No smoking. No alcohol. No illegal drug use.          MEDICATIONS  (STANDING):  allopurinol 100 milliGRAM(s) Oral two times a day  aspirin  chewable 81 milliGRAM(s) Oral daily  carvedilol 6.25 milliGRAM(s) Oral every 12 hours  ciprofloxacin     Tablet 500 milliGRAM(s) Oral every 12 hours  DULoxetine 60 milliGRAM(s) Oral daily  ferrous    sulfate 325 milliGRAM(s) Oral daily  folic acid 1 milliGRAM(s) Oral daily  lactated ringers. 1000 milliLiter(s) (75 mL/Hr) IV Continuous <Continuous>  levothyroxine 50 MICROGram(s) Oral daily  oxybutynin 10 milliGRAM(s) Oral daily  pantoprazole  Injectable 40 milliGRAM(s) IV Push two times a day      Allergies:   Keflex (Unknown)  losartan (Unknown)  morphine (Unknown)  Rocephin (Unknown)  sulfamethoxazole (Hives)              REVIEW OF SYSTEMS:  General:  No weight loss, fevers, or chills.  Eyes:  No reported pain or visual changes  ENT:  No sore throat or runny nose.  NECK: No stiffness   CV:  No chest pain or palpitations.  Resp:  No shortness of breath, cough  GI:  No abdominal pain, nausea, vomiting, dysphagia, diarrhea or constipation. No rectal bleeding, melena, or hematemesis.  Neuro:  No tingling, numbness           VITAL SIGNS:   T(F): 96.5 (22 @ 14:54), Max: 96.5 (22 @ 05:00)  HR: 76 (22 @ 14:54) (67 - 76)  BP: 170/70 (22 @ 14:54) (137/67 - 170/72)  RR: 16 (22 @ 14:54) (16 - 18)  SpO2: --    PHYSICAL EXAM:  GENERAL: AAOx3, no acute distress.  HEAD:  Atraumatic, Normocephalic  EYES: conjunctiva and sclera clear  NECK: Supple, no JVD or thyromegaly  CHEST/LUNG: Clear to auscultation bilaterally; No wheeze, rhonchi, or rales  HEART: Regular rate and rhythm; normal S1, S2, No murmurs.  ABDOMEN: Soft, nontender, nondistended; Bowel sounds present  NEUROLOGY: No asterixis or tremor.   SKIN: Intact, no jaundice            LABS:                        7.6    3.46  )-----------( 151      ( 12 Sep 2022 05:50 )             25.2         136  |  103  |  18  ----------------------------<  90  4.1   |  27  |  0.7    Ca    8.2<L>      12 Sep 2022 05:50    TPro  5.8<L>  /  Alb  2.8<L>  /  TBili  0.4  /  DBili  x   /  AST  21  /  ALT  11  /  AlkPhos  102  09-12    LIVER FUNCTIONS - ( 12 Sep 2022 05:50 )  Alb: 2.8 g/dL / Pro: 5.8 g/dL / ALK PHOS: 102 U/L / ALT: 11 U/L / AST: 21 U/L / GGT: x           PT/INR - ( 11 Sep 2022 06:05 )   PT: 12.90 sec;   INR: 1.12 ratio         PTT - ( 11 Sep 2022 06:05 )  PTT:32.0 sec    IMAGING:    < from: EGD (22 @ 11:45) >  EGD Report    Date: 2022 11:45 AM    Patient Name: ARIEL INIGUEZ    MRN: [id number]    Account Number:    [id number]    Gender: Female     (age): 1940 (82)    Instrument(s):    GIF- ([id number])([id number])    Attending/Fellow:    Louis De La Cruz MD        Referring Physician:    ED PHYSICIAN UNASSIGNED        Administered Medications:    As per Anesthesiology Record    Indications:    Hematemesis - K92.0    Procedure:    The procedure, indications, preparation and potential complications were  explained to the patient, who indicated understanding and signed the  corresponding consent forms. IV general anesthesia was administered by  anesthesiologist. Continuous pulse oximetry and blood pressure monitoring were  used throughout the procedure. Supplemental oxygen was used. Patient was placed  in the left lateral decubitus position. The endoscope was introduced through the  mouth and advanced under direct visualization until the second part of the  duodenum was reached. Patient tolerance to the procedure was good. The procedure  was not difficult. Blood loss was minimal.    Limitations/Complications:    There were no apparent limitations or complications    Findings:    Esophagus Protruding lesions 1 cord of grade III varices were seen in the lower  third of the esophagus. There were stigmata of recent bleeding.2 bands were  applied for hemostasis successfully. 2 bands were applied using the banding  device.    Stomach Mucosa Diffuse continuous erythema of the mucosa with no bleeding was  noted in the antrum. These findings are compatible with erosive gastritis.    Additional stomach findings Coffee ground material was noted in the stomach. It  was washed and suctioned. There were no gastric ulcers, varices, AVMs or obvious  Dieulafoy lesions.    Duodenum Normal duodenum.    Impressions:    Normal duodenum.    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 (Ligation).    Erythema in the antrum compatible with erosive gastritis.    Coffee ground material was noted in the stomach. It was washed and suctioned.  There were no gastric ulcers, varices, AVMs or obvious Dieulafoy lesions.    Plan:    -IV PPI q12h for now    -Initiate ciprofloxacin for total of 7 d (pt allergic to cefriaxone)    -Hb daily    -Transfuse to keepHb > 8?    -Daily LFTs and INR    -Monitor for recurrent bleeding    -If patient re-bleeds with HD instability, will need CTA    -Would recommend switching to NSBB on discharge with carvedilol q12 to titrate  to HR > 60 and SBP > 90    -Patient will need repeat EGD in 3 weeks    -We will follow        Attending Participation:    I was present and participated during the entire procedure, including non-key  portions.    Louis De La Cruz MD    Version 2, Electronically signed on 2022 5:35:27 PM by Louis De La Cruz MD    < end of copied text >         82yFemale  Being followed for liver cirrhosis, portal HTN   Interval history: Patient denies nausea, vomiting, hematemesis, melena, blood in stool, diarrhea, constipation, abdominal pain. Patient s/p EGD with variceal band ligation       PAST MEDICAL & SURGICAL HISTORY:   Hypothyroid      Hypertension      Cirrhosis      RA (rheumatoid arthritis)      High cholesterol      Poor circulation      Incontinence      History of hip surgery      H/O: hysterectomy                Social History: No smoking. No alcohol. No illegal drug use.          MEDICATIONS  (STANDING):  allopurinol 100 milliGRAM(s) Oral two times a day  aspirin  chewable 81 milliGRAM(s) Oral daily  carvedilol 6.25 milliGRAM(s) Oral every 12 hours  ciprofloxacin     Tablet 500 milliGRAM(s) Oral every 12 hours  DULoxetine 60 milliGRAM(s) Oral daily  ferrous    sulfate 325 milliGRAM(s) Oral daily  folic acid 1 milliGRAM(s) Oral daily  lactated ringers. 1000 milliLiter(s) (75 mL/Hr) IV Continuous <Continuous>  levothyroxine 50 MICROGram(s) Oral daily  oxybutynin 10 milliGRAM(s) Oral daily  pantoprazole  Injectable 40 milliGRAM(s) IV Push two times a day      Allergies:   Keflex (Unknown)  losartan (Unknown)  morphine (Unknown)  Rocephin (Unknown)  sulfamethoxazole (Hives)              REVIEW OF SYSTEMS:  General:  No weight loss, fevers, or chills.  Eyes:  No reported pain or visual changes  ENT:  No sore throat or runny nose.  NECK: No stiffness   CV:  No chest pain or palpitations.  Resp:  No shortness of breath, cough  GI:  No abdominal pain, nausea, vomiting, dysphagia, diarrhea or constipation. No rectal bleeding, melena, or hematemesis.  Neuro:  No tingling, numbness           VITAL SIGNS:   T(F): 96.5 (22 @ 14:54), Max: 96.5 (22 @ 05:00)  HR: 76 (22 @ 14:54) (67 - 76)  BP: 170/70 (22 @ 14:54) (137/67 - 170/72)  RR: 16 (22 @ 14:54) (16 - 18)  SpO2: --    PHYSICAL EXAM:  GENERAL: AAOx3, no acute distress.  HEAD:  Atraumatic, Normocephalic  EYES: conjunctiva and sclera clear  NECK: Supple, no JVD or thyromegaly  CHEST/LUNG: Clear to auscultation bilaterally; No wheeze, rhonchi, or rales  HEART: Regular rate and rhythm; normal S1, S2, No murmurs.  ABDOMEN: Soft, nontender, nondistended; Bowel sounds present  NEUROLOGY: No asterixis or tremor.   SKIN: Intact, no jaundice            LABS:                        7.6    3.46  )-----------( 151      ( 12 Sep 2022 05:50 )             25.2         136  |  103  |  18  ----------------------------<  90  4.1   |  27  |  0.7    Ca    8.2<L>      12 Sep 2022 05:50    TPro  5.8<L>  /  Alb  2.8<L>  /  TBili  0.4  /  DBili  x   /  AST  21  /  ALT  11  /  AlkPhos  102  12    LIVER FUNCTIONS - ( 12 Sep 2022 05:50 )  Alb: 2.8 g/dL / Pro: 5.8 g/dL / ALK PHOS: 102 U/L / ALT: 11 U/L / AST: 21 U/L / GGT: x           PT/INR - ( 11 Sep 2022 06:05 )   PT: 12.90 sec;   INR: 1.12 ratio         PTT - ( 11 Sep 2022 06:05 )  PTT:32.0 sec    IMAGING:    < from: EGD (22 @ 11:45) >  EGD Report    Date: 2022 11:45 AM    Patient Name: ARIEL INIGUEZ    MRN: [id number]    Account Number:    [id number]    Gender: Female     (age): 1940 (82)    Instrument(s):    GIF- ([id number])([id number])    Attending/Fellow:    Louis De La Cruz MD        Referring Physician:    ED PHYSICIAN UNASSIGNED        Administered Medications:    As per Anesthesiology Record    Indications:    Hematemesis - K92.0    Procedure:    The procedure, indications, preparation and potential complications were  explained to the patient, who indicated understanding and signed the  corresponding consent forms. IV general anesthesia was administered by  anesthesiologist. Continuous pulse oximetry and blood pressure monitoring were  used throughout the procedure. Supplemental oxygen was used. Patient was placed  in the left lateral decubitus position. The endoscope was introduced through the  mouth and advanced under direct visualization until the second part of the  duodenum was reached. Patient tolerance to the procedure was good. The procedure  was not difficult. Blood loss was minimal.    Limitations/Complications:    There were no apparent limitations or complications    Findings:    Esophagus Protruding lesions 1 cord of grade III varices were seen in the lower  third of the esophagus. There were stigmata of recent bleeding.2 bands were  applied for hemostasis successfully. 2 bands were applied using the banding  device.    Stomach Mucosa Diffuse continuous erythema of the mucosa with no bleeding was  noted in the antrum. These findings are compatible with erosive gastritis.    Additional stomach findings Coffee ground material was noted in the stomach. It  was washed and suctioned. There were no gastric ulcers, varices, AVMs or obvious  Dieulafoy lesions.    Duodenum Normal duodenum.    Impressions:    Normal duodenum.    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 (Ligation).    Erythema in the antrum compatible with erosive gastritis.    Coffee ground material was noted in the stomach. It was washed and suctioned.  There were no gastric ulcers, varices, AVMs or obvious Dieulafoy lesions.    Plan:    -IV PPI q12h for now    -Initiate ciprofloxacin for total of 7 d (pt allergic to cefriaxone)    -Hb daily    -Transfuse to keepHb > 8?    -Daily LFTs and INR    -Monitor for recurrent bleeding    -If patient re-bleeds with HD instability, will need CTA    -Would recommend switching to NSBB on discharge with carvedilol q12 to titrate  to HR > 60 and SBP > 90    -Patient will need repeat EGD in 3 weeks    -We will follow        Attending Participation:    I was present and participated during the entire procedure, including non-key  portions.    Louis De La Cruz MD    Version 2, Electronically signed on 2022 5:35:27 PM by Louis De La Cruz MD    < end of copied text >

## 2022-09-13 NOTE — DISCHARGE NOTE PROVIDER - NSDCMRMEDTOKEN_GEN_ALL_CORE_FT
allopurinol 100 mg oral tablet: 1 tab(s) orally 2 times a day  amLODIPine 5 mg oral tablet: 1 tab(s) orally 2 times a day  aspirin 81 mg oral delayed release tablet: 1 tab(s) orally once a day  DULoxetine 60 mg oral delayed release capsule: 1 cap(s) orally once a day  ferrous sulfate 75 mg (15 mg elemental iron) oral tablet:   folic acid 1 mg oral tablet: 1 tab(s) orally once a day  levothyroxine 50 mcg (0.05 mg) oral tablet: 1 tab(s) orally once a day  oxybutynin 10 mg/24 hr oral tablet, extended release: 1 tab(s) orally 2 times a day  predniSONE 10 mg oral tablet: 1 tab(s) orally once a day   allopurinol 100 mg oral tablet: 1 tab(s) orally 2 times a day  amLODIPine 5 mg oral tablet: 1 tab(s) orally 2 times a day  aspirin 81 mg oral delayed release tablet: 1 tab(s) orally once a day  DULoxetine 60 mg oral delayed release capsule: 1 cap(s) orally once a day  ferrous sulfate 75 mg (15 mg elemental iron) oral tablet:   folic acid 1 mg oral tablet: 1 tab(s) orally once a day  levothyroxine 50 mcg (0.05 mg) oral tablet: 1 tab(s) orally once a day  oxybutynin 10 mg/24 hr oral tablet, extended release: 1 tab(s) orally 2 times a day   allopurinol 100 mg oral tablet: 1 tab(s) orally 2 times a day  amLODIPine 5 mg oral tablet: 1 tab(s) orally 2 times a day  aspirin 81 mg oral delayed release tablet: 1 tab(s) orally once a day  ciprofloxacin 500 mg oral tablet: 1 tab(s) orally 2 times a day   Coreg 6.25 mg oral tablet: 1 tab(s) orally 2 times a day   DULoxetine 60 mg oral delayed release capsule: 1 cap(s) orally once a day  ferrous sulfate 75 mg (15 mg elemental iron) oral tablet: 1 tab(s) orally once a day  folic acid 1 mg oral tablet: 1 tab(s) orally once a day  levothyroxine 50 mcg (0.05 mg) oral tablet: 1 tab(s) orally once a day  oxybutynin 10 mg/24 hr oral tablet, extended release: 1 tab(s) orally 2 times a day  pantoprazole 40 mg oral delayed release tablet: 1 tab(s) orally 2 times a day   Plavix 75 mg oral tablet: 1 tab(s) orally once a day

## 2022-09-13 NOTE — PROGRESS NOTE ADULT - NS ATTEST RISK PROBLEM GEN_ALL_CORE FT
Prescription management of carvedilol, PPI, and antibiotics for cirrhotic with GI bleed, and decision for endoscopic procedure in patient with risk factors

## 2022-09-13 NOTE — DISCHARGE NOTE PROVIDER - NSDCCPCAREPLAN_GEN_ALL_CORE_FT
PRINCIPAL DISCHARGE DIAGNOSIS  Diagnosis: Upper gastrointestinal bleed  Assessment and Plan of Treatment: #Hematemesis   # Hx of liver cirrhosis in setting of methotrexate use   - vital signs stable on admission, patient was dizzy  - Given 2 units pRBCs   - CT A/P/C demonstrated no contrast extravasation. Small bilateral pleural effusions. Cirrhosis, mild ascites, portal hypertension  - GI consulted and preformed EGD with band ligation. Ciprofloxacin 500 mg BID for 3 more days.   - EGD: Normal duodenum. 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 (Ligation). Erythema in the antrum compatible with erosive gastritis. Coffee ground material was noted in the stomach. It was washed and suctioned. There were no gastric ulcers, varices, AVM or obvious Dieulafoy lesions.  -Patient will need repeat EGD in 3 weeks to confirm eradication of varices   -Protonix 40 mg BID x 8 weeks   -Vascular consulted- Prasugrel switched to Plavix 75 mg daily.   -Abdominal ultrasound AFP every 6 months for HCC screening  -avoid NSAIDs  -Follow up with our [facility] located at [address] [place] [address]. Phone Number: [phone] for possible liver transplant referral  -Alcohol Cessation Strongly Advised and Encouraged         SECONDARY DISCHARGE DIAGNOSES  Diagnosis: PAD (peripheral artery disease)  Assessment and Plan of Treatment: #Hx of PAD s/p stent with chronic le ulcers   -Outpatient vascular follow up   -Now on Plavix 75 mg daily        PRINCIPAL DISCHARGE DIAGNOSIS  Diagnosis: Upper gastrointestinal bleed  Assessment and Plan of Treatment: #Hematemesis from varices bleeding underwent ligation, need repeat endoscopy in 3 weeks  -Protonix 40 mg BID x 8 weeks   -Vascular consulted- Prasugrel switched to Plavix 75 mg daily.   -Abdominal ultrasound and AFP every 6 months for HCC screening  -avoid NSAIDs like motrin   -Follow up with our [facility] located at [address] [place] [address]. Phone Number: [phone] for possible liver transplant referral  -Alcohol Cessation Strongly Advised and Encouraged         SECONDARY DISCHARGE DIAGNOSES  Diagnosis: PAD (peripheral artery disease)  Assessment and Plan of Treatment: #Hx of PAD s/p stent with chronic le ulcers   -Outpatient vascular follow up   - effeint was stopped due to risk of bleeding  -Now on aspirin and Plavix 75 mg daily

## 2022-09-13 NOTE — DISCHARGE NOTE PROVIDER - CARE PROVIDER_API CALL
Your PCP,   Phone: (   )    -  Fax: (   )    -  Follow Up Time:    Cullen Granger)  Gastroenterology; Internal Medicine  [address] [place] [address]  Phone: [phone]  Fax: [phone]  Follow Up Time: 1 month

## 2022-09-13 NOTE — DISCHARGE NOTE PROVIDER - NSDCFUSCHEDAPPT_GEN_ALL_CORE_FT
Cullen Hairston  [facility]  ONCNEUROLO [address]  Scheduled Appointment: 10/10/2022    [facility]  CARDIOLOGY [address]  Scheduled Appointment: 10/24/2022

## 2022-09-13 NOTE — DISCHARGE NOTE PROVIDER - NSDCFUADDAPPT_GEN_ALL_CORE_FT
Please follow up with GI for repeat EGD  GI Liver Clinic located at [address] [place] [address]. Phone Number: [phone] for possible liver transplant referral  Please follow up with Vascular surgery   Please follow up with your PCP

## 2022-09-13 NOTE — DISCHARGE NOTE PROVIDER - CARE PROVIDERS DIRECT ADDRESSES
,DirectAddress_Unknown ,[email or web address]

## 2022-09-13 NOTE — DISCHARGE NOTE NURSING/CASE MANAGEMENT/SOCIAL WORK - PATIENT PORTAL LINK FT
You can access the FollowMyHealth Patient Portal offered by [facility] by registering at the following website: [email or web address]. By joining [employer]’s FollowMyHealth portal, you will also be able to view your health information using other applications (apps) compatible with our system.

## 2022-09-13 NOTE — DISCHARGE NOTE NURSING/CASE MANAGEMENT/SOCIAL WORK - NSDCFUADDAPPT_GEN_ALL_CORE_FT
Please follow up with GI for repeat EGD  GI Liver Clinic located at [address] [place] [address]. Phone Number: [phone] for possible liver transplant referral  Please follow up with Vascular surgery   Please follow up with your PCP

## 2022-09-13 NOTE — PROGRESS NOTE ADULT - SUBJECTIVE AND OBJECTIVE BOX
82yFemale  Being followed for liver cirrhosis   Interval history: Patient denies nausea, vomiting, hematemesis, melena, blood in stool, diarrhea, constipation, abdominal pain. Patient tolerating diet. S/p EGD.      PAST MEDICAL & SURGICAL HISTORY:   Hypothyroid      Hypertension      Cirrhosis      RA (rheumatoid arthritis)      High cholesterol      Poor circulation      Incontinence      History of hip surgery      H/O: hysterectomy                Social History: No smoking. No alcohol. No illegal drug use.            MEDICATIONS  (STANDING):  allopurinol 100 milliGRAM(s) Oral two times a day  aspirin  chewable 81 milliGRAM(s) Oral daily  carvedilol 6.25 milliGRAM(s) Oral every 12 hours  ciprofloxacin     Tablet 500 milliGRAM(s) Oral every 12 hours  DULoxetine 60 milliGRAM(s) Oral daily  ferrous    sulfate 325 milliGRAM(s) Oral daily  folic acid 1 milliGRAM(s) Oral daily  lactated ringers. 1000 milliLiter(s) (75 mL/Hr) IV Continuous <Continuous>  levothyroxine 50 MICROGram(s) Oral daily  oxybutynin 10 milliGRAM(s) Oral daily  pantoprazole  Injectable 40 milliGRAM(s) IV Push two times a day      Allergies:   Keflex (Unknown)  losartan (Unknown)  morphine (Unknown)  Rocephin (Unknown)  sulfamethoxazole (Hives)          REVIEW OF SYSTEMS:  General:  No weight loss, fevers, or chills.  Eyes:  No reported pain or visual changes  ENT:  No sore throat or runny nose.  NECK: No stiffness   CV:  No chest pain or palpitations.  Resp:  No shortness of breath, cough  GI:  No abdominal pain, nausea, vomiting, dysphagia, diarrhea or constipation. No rectal bleeding, melena, or hematemesis.  Neuro:  No tingling, numbness         VITAL SIGNS:   T(F): 96.4 (22 @ 05:40), Max: 97.3 (22 @ 20:50)  HR: 70 (22 @ 05:40) (70 - 76)  BP: 156/70 (22 @ 05:40) (156/70 - 174/74)  RR: 18 (22 @ 05:40) (16 - 18)  SpO2: --    PHYSICAL EXAM:  GENERAL: AAOx3, no acute distress.  HEAD:  Atraumatic, Normocephalic  EYES: conjunctiva and sclera clear  NECK: Supple, no JVD or thyromegaly  CHEST/LUNG: Clear to auscultation bilaterally; No wheeze, rhonchi, or rales  HEART: Regular rate and rhythm; normal S1, S2, No murmurs.  ABDOMEN: Soft, nontender, nondistended; Bowel sounds present  NEUROLOGY: No asterixis or tremor.   SKIN: Intact, no jaundice            LABS:                        7.4    2.80  )-----------( 136      ( 13 Sep 2022 06:17 )             24.2         138  |  105  |  15  ----------------------------<  113<H>  4.0   |  26  |  0.7    Ca    8.3<L>      13 Sep 2022 06:17    TPro  5.4<L>  /  Alb  2.7<L>  /  TBili  0.3  /  DBili  x   /  AST  18  /  ALT  10  /  AlkPhos  104      LIVER FUNCTIONS - ( 13 Sep 2022 06:17 )  Alb: 2.7 g/dL / Pro: 5.4 g/dL / ALK PHOS: 104 U/L / ALT: 10 U/L / AST: 18 U/L / GGT: x               IMAGING:    < from: EGD (22 @ 11:45) >  EGD Report    Date: 2022 11:45 AM    Patient Name: ARIEL INIGUEZ    MRN: [id number]    Account Number:    [id number]    Gender: Female     (age): 1940 (82)    Instrument(s):    GIF- ([id number])([id number])    Attending/Fellow:    Louis De La Cruz MD        Referring Physician:    ED PHYSICIAN UNASSIGNED        Administered Medications:    As per Anesthesiology Record    Indications:    Hematemesis - K92.0    Procedure:    The procedure, indications, preparation and potential complications were  explained to the patient, who indicated understanding and signed the  corresponding consent forms. IV general anesthesia was administered by  anesthesiologist. Continuous pulse oximetry and blood pressure monitoring were  used throughout the procedure. Supplemental oxygen was used. Patient was placed  in the left lateral decubitus position. The endoscope was introduced through the  mouth and advanced under direct visualization until the second part of the  duodenum was reached. Patient tolerance to the procedure was good. The procedure  was not difficult. Blood loss was minimal.    Limitations/Complications:    There were no apparent limitations or complications    Findings:    Esophagus Protruding lesions 1 cord of grade III varices were seen in the lower  third of the esophagus. There were stigmata of recent bleeding.2 bands were  applied for hemostasis successfully. 2 bands were applied using the banding  device.    Stomach Mucosa Diffuse continuous erythema of the mucosa with no bleeding was  noted in the antrum. These findings are compatible with erosive gastritis.    Additional stomach findings Coffee ground material was noted in the stomach. It  was washed and suctioned. There were no gastric ulcers, varices, AVMs or obvious  Dieulafoy lesions.    Duodenum Normal duodenum.    Impressions:    Normal duodenum.    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 (Ligation).    Erythema in the antrum compatible with erosive gastritis.    Coffee ground material was noted in the stomach. It was washed and suctioned.  There were no gastric ulcers, varices, AVMs or obvious Dieulafoy lesions.    Plan:    -IV PPI q12h for now    -Initiate ciprofloxacin for total of 7 d (pt allergic to cefriaxone)    -Hb daily    -Transfuse to keepHb > 8?    -Daily LFTs and INR    -Monitor for recurrent bleeding    -If patient re-bleeds with HD instability, will need CTA    -Would recommend switching to NSBB on discharge with carvedilol q12 to titrate  to HR > 60 and SBP > 90    -Patient will need repeat EGD in 3 weeks    -We will follow        Attending Participation:    I was present and participated during the entire procedure, including non-key  portions.    Louis De La Cruz MD    Version 2, Electronically signed on 2022 5:35:27 PM by Louis De La Cruz MD    < end of copied text >         82yFemale  Being followed for liver cirrhosis with esophageal varices    Interval history: Patient denies nausea, vomiting, hematemesis, melena, blood in stool, diarrhea, constipation, abdominal pain. Patient tolerating diet. S/p EGD.      PAST MEDICAL & SURGICAL HISTORY:   Hypothyroid      Hypertension      Cirrhosis      RA (rheumatoid arthritis)      High cholesterol      Poor circulation      Incontinence      History of hip surgery      H/O: hysterectomy                Social History: No smoking. No alcohol. No illegal drug use.            MEDICATIONS  (STANDING):  allopurinol 100 milliGRAM(s) Oral two times a day  aspirin  chewable 81 milliGRAM(s) Oral daily  carvedilol 6.25 milliGRAM(s) Oral every 12 hours  ciprofloxacin     Tablet 500 milliGRAM(s) Oral every 12 hours  DULoxetine 60 milliGRAM(s) Oral daily  ferrous    sulfate 325 milliGRAM(s) Oral daily  folic acid 1 milliGRAM(s) Oral daily  lactated ringers. 1000 milliLiter(s) (75 mL/Hr) IV Continuous <Continuous>  levothyroxine 50 MICROGram(s) Oral daily  oxybutynin 10 milliGRAM(s) Oral daily  pantoprazole  Injectable 40 milliGRAM(s) IV Push two times a day      Allergies:   Keflex (Unknown)  losartan (Unknown)  morphine (Unknown)  Rocephin (Unknown)  sulfamethoxazole (Hives)          REVIEW OF SYSTEMS:  General:  No weight loss, fevers, or chills.  Eyes:  No reported pain or visual changes  ENT:  No sore throat or runny nose.  NECK: No stiffness   CV:  No chest pain or palpitations.  Resp:  No shortness of breath, cough  GI:  No abdominal pain, nausea, vomiting, dysphagia, diarrhea or constipation. No rectal bleeding, melena, or hematemesis.  Neuro:  No tingling, numbness         VITAL SIGNS:   T(F): 96.4 (22 @ 05:40), Max: 97.3 (22 @ 20:50)  HR: 70 (22 @ 05:40) (70 - 76)  BP: 156/70 (22 @ 05:40) (156/70 - 174/74)  RR: 18 (22 @ 05:40) (16 - 18)  SpO2: --    PHYSICAL EXAM:  GENERAL: AAOx3, no acute distress.  HEAD:  Atraumatic, Normocephalic  EYES: conjunctiva and sclera clear  NECK: Supple, no JVD or thyromegaly  CHEST/LUNG: Clear to auscultation bilaterally; No wheeze, rhonchi, or rales  HEART: Regular rate and rhythm; normal S1, S2, No murmurs.  ABDOMEN: Soft, nontender, nondistended; Bowel sounds present  NEUROLOGY: No asterixis or tremor.   SKIN: Intact, no jaundice            LABS:                        7.4    2.80  )-----------( 136      ( 13 Sep 2022 06:17 )             24.2         138  |  105  |  15  ----------------------------<  113<H>  4.0   |  26  |  0.7    Ca    8.3<L>      13 Sep 2022 06:17    TPro  5.4<L>  /  Alb  2.7<L>  /  TBili  0.3  /  DBili  x   /  AST  18  /  ALT  10  /  AlkPhos  104      LIVER FUNCTIONS - ( 13 Sep 2022 06:17 )  Alb: 2.7 g/dL / Pro: 5.4 g/dL / ALK PHOS: 104 U/L / ALT: 10 U/L / AST: 18 U/L / GGT: x               IMAGING:    < from: EGD (22 @ 11:45) >  EGD Report    Date: 2022 11:45 AM    Patient Name: ARIEL INIGUEZ    MRN: [id number]    Account Number:    [id number]    Gender: Female     (age): 1940 (82)    Instrument(s):    GIF- ([id number])([id number])    Attending/Fellow:    Louis De La Cruz MD        Referring Physician:    ED PHYSICIAN UNASSIGNED        Administered Medications:    As per Anesthesiology Record    Indications:    Hematemesis - K92.0    Procedure:    The procedure, indications, preparation and potential complications were  explained to the patient, who indicated understanding and signed the  corresponding consent forms. IV general anesthesia was administered by  anesthesiologist. Continuous pulse oximetry and blood pressure monitoring were  used throughout the procedure. Supplemental oxygen was used. Patient was placed  in the left lateral decubitus position. The endoscope was introduced through the  mouth and advanced under direct visualization until the second part of the  duodenum was reached. Patient tolerance to the procedure was good. The procedure  was not difficult. Blood loss was minimal.    Limitations/Complications:    There were no apparent limitations or complications    Findings:    Esophagus Protruding lesions 1 cord of grade III varices were seen in the lower  third of the esophagus. There were stigmata of recent bleeding.2 bands were  applied for hemostasis successfully. 2 bands were applied using the banding  device.    Stomach Mucosa Diffuse continuous erythema of the mucosa with no bleeding was  noted in the antrum. These findings are compatible with erosive gastritis.    Additional stomach findings Coffee ground material was noted in the stomach. It  was washed and suctioned. There were no gastric ulcers, varices, AVMs or obvious  Dieulafoy lesions.    Duodenum Normal duodenum.    Impressions:    Normal duodenum.    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 (Ligation).    Erythema in the antrum compatible with erosive gastritis.    Coffee ground material was noted in the stomach. It was washed and suctioned.  There were no gastric ulcers, varices, AVMs or obvious Dieulafoy lesions.    Plan:    -IV PPI q12h for now    -Initiate ciprofloxacin for total of 7 d (pt allergic to cefriaxone)    -Hb daily    -Transfuse to keepHb > 8?    -Daily LFTs and INR    -Monitor for recurrent bleeding    -If patient re-bleeds with HD instability, will need CTA    -Would recommend switching to NSBB on discharge with carvedilol q12 to titrate  to HR > 60 and SBP > 90    -Patient will need repeat EGD in 3 weeks    -We will follow        Attending Participation:    I was present and participated during the entire procedure, including non-key  portions.    Louis De La Cruz MD    Version 2, Electronically signed on 2022 5:35:27 PM by Louis De La Cruz MD    < end of copied text >

## 2022-09-13 NOTE — DISCHARGE NOTE PROVIDER - PROVIDER TOKENS
FREE:[LAST:[Your PCP],PHONE:[(   )    -],FAX:[(   )    -]] PROVIDER:[TOKEN:[[id number]],FOLLOWUP:[1 month]]

## 2022-09-13 NOTE — DISCHARGE NOTE PROVIDER - HOSPITAL COURSE
Patient is a 82 year old female with a past medical history of HTN, HLD, RA, hypothyroidism, gout, PAD on ASA/Prasugrel, urinary retention, recent dx of cirrhosis who presented to the ED one episode of hematemesis and admitted for an upper GI bleed.     #Hematemesis   # Hx of liver cirrhosis in setting of methotrexate use   - vital signs stable on admission, patient was dizzy  - Hb baseline ~ 11, presented with 7.3   - 2 units prbc ordered in ED  - CT A/P/C demonstrated no contrast extravasation. Small bilateral pleural effusions. Cirrhosis, mild ascites, portal hypertension  - GI consulted   - EGD   - Keep NPO   - 2 units of blood ordered   - Monitor CBC Q12h  - Active type & screen, 2 large bore IV's    - PPI BID  - case d/w GI: no octreotide indicated at this time  - f/u GI recommendations post- EGD       #Hx of PAD s/p stent with chronic le ulcers - hold ASA, prasugrel for now, op vascular fu   #RA- hold Prednisone for now  #HTN- stable, can hold bp medications in setting of gi bleed , can resume if needed   #Hypothyroidism- c/w synthroid   #Urinary incontinence -continue Oxybutynin  # Gout - continue Allopurinol    #DVT PPX: SCD  #Diet: NPO   # GI PPX: PPI BID  # Dispo: acute  # Handoff: urgent EGD today     Case d/w Dr. Redd   Patient is a 82 year old female with a past medical history of HTN, HLD, RA, hypothyroidism, gout, PAD on ASA/Prasugrel, urinary retention, recent dx of cirrhosis who presented to the ED one episode of hematemesis and admitted for an upper GI bleed.     #Hematemesis   # Hx of liver cirrhosis in setting of methotrexate use   - vital signs stable on admission, patient was dizzy  - Given 2 units pRBCs   - CT A/P/C demonstrated no contrast extravasation. Small bilateral pleural effusions. Cirrhosis, mild ascites, portal hypertension  - GI consulted and preformed EGD with band ligation. Ciprofloxacin 500 mg BID for 3 more days.   - EGD: Normal duodenum. 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 (Ligation). Erythema in the antrum compatible with erosive gastritis. Coffee ground material was noted in the stomach. It was washed and suctioned. There were no gastric ulcers, varices, AVM or obvious Dieulafoy lesions.  -Patient will need repeat EGD in 3 weeks to confirm eradication of varices   -Protonix 40 mg BID x 8 weeks   -Vascular consulted- Prasugrel switched to Plavix 75 mg daily.   -Abdominal ultrasound AFP every 6 months for HCC screening  -avoid NSAIDs  -Follow up with our GI [facility] located at [address] [place] [address]. Phone Number: [phone] for possible liver transplant referral  -Alcohol Cessation Strongly Advised and Encouraged     #Hx of PAD s/p stent with chronic le ulcers   -Outpatient vascular follow up     #RA  -Steroids held     #HTN  -Continue Coreg 6.25 mg BID     #Hypothyroidism  -Continue synthroid     #Urinary incontinence   -continue Oxybutynin    # Gout   -continue Allopurinol     Patient is a 82 year old female with a past medical history of HTN, HLD, RA, hypothyroidism, gout, PAD on ASA/Prasugrel, urinary retention, recent dx of cirrhosis who presented to the ED one episode of hematemesis and admitted for an upper GI bleed.     #Hematemesis   # Hx of liver cirrhosis in setting of methotrexate use   - vital signs stable on admission, patient was dizzy  - Given 2 units pRBCs   - CT A/P/C demonstrated no contrast extravasation. Small bilateral pleural effusions. Cirrhosis, mild ascites, portal hypertension  - GI consulted and preformed EGD with band ligation. Ciprofloxacin 500 mg BID for 3 more days.   - EGD: Normal duodenum. 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 (Ligation). Erythema in the antrum compatible with erosive gastritis. Coffee ground material was noted in the stomach. It was washed and suctioned. There were no gastric ulcers, varices, AVM or obvious Dieulafoy lesions.  -Patient will need repeat EGD in 3 weeks to confirm eradication of varices   -Protonix 40 mg BID x 8 weeks   -Vascular consulted- Prasugrel switched to Plavix 75 mg daily.   -Abdominal ultrasound AFP every 6 months for HCC screening  -avoid NSAIDs  -Follow up with our [facility] located at [address] [place] [address]. Phone Number: [phone] for possible liver transplant referral  -Alcohol Cessation Strongly Advised and Encouraged     #Hx of PAD s/p stent with chronic le ulcers   -Outpatient vascular follow up     #RA  -Steroids held     #HTN  -Continue Coreg 6.25 mg BID     #Hypothyroidism  -Continue synthroid     #Urinary incontinence   -continue Oxybutynin    # Gout   -continue Allopurinol    #Dr. Allen: I spoke to the pt Vascular team (FORD Brady, pt can be switched from Effient to plavix )

## 2022-09-13 NOTE — PROGRESS NOTE ADULT - ASSESSMENT
82yFemale pmh HTN, hypothyroidism, RA, PVD with recent stent in may, compensated liver cirrhosis suspected from methotrexate use presents for hematemesis that started last night.    Problem 1- Liver Cirrhosis, esophageal varices presenting with hematemesis s/p EGD with band ligation   EGD :   Impressions:  -Normal duodenum.  -Varices in the lower third of the esophagus. (Ligation).  -Erythema in the antrum compatible with erosive gastritis.  -Coffee ground material was noted in the stomach. It was washed and suctioned.  There were no gastric ulcers, varices, AVM or obvious Dieulafoy lesions.    Plan:  -diet as tolerated   -PPI BID  -Can resume antiplatelets therapy if deemed necessary , no evidence of active GI bleed, please have vacular surgery evaluation if patient can be on alternative antiplatelet to prasugrel given recent GI bleed   - ciprofloxacin for total of 7 d if no contraindication (pt allergic to ceftriaxone)   -Hb daily  -Transfuse to keep Hb > 7  -Daily LFTs and INR  -Monitor for recurrent bleeding  -continue with carvedilol , titrate to HR > 60 and SBP > 90  -Patient will need repeat EGD in 3 weeks to confirm eradication of varices   -Maintain Hemodynamic Stability   -Monitor CBC  -CMP, Optimize Electrolytes  -Transfuse prn to hgb >8  -Two large bore IV lines  - PPI BID for total of 8 weeks   -Monitor Vital Signs  -Active Type and Screen      Problem 2-Cirrhosis, mild ascites, portal hypertension, noting a recanalized   umbilical vein and mild splenomegaly. No evidence of a portal vein   thrombosis.  Rec  -MELD score 9  -Abdominal ultrasound AFP every 6 months for HCC screening  -avoid NSAIDs  -Follow up with our GI [facility] Clinic located at [address] [place] [address]. 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    Problem 1- Liver Cirrhosis, esophageal varices presenting with hematemesis s/p EGD with band ligation   EGD :   Impressions:  -Normal duodenum.  -Varices in the lower third of the esophagus. (Ligation).  -Erythema in the antrum compatible with erosive gastritis.  -Coffee ground material was noted in the stomach. It was washed and suctioned.  There were no gastric ulcers, varices, AVM or obvious Dieulafoy lesions.    Plan:  -diet as tolerated   -PPI BID  -Can resume antiplatelets therapy if deemed necessary , no evidence of active GI bleed, please have vacular surgery evaluation if patient can be on alternative antiplatelet to prasugrel given recent GI bleed   - ciprofloxacin for total of 7 d if no contraindication (pt allergic to ceftriaxone)   -Hb daily  -Transfuse to keep Hb > 7  -Daily LFTs and INR  -Monitor for recurrent bleeding  -continue with carvedilol , titrate to HR > 60 and SBP > 90  -Patient will need repeat EGD in 3 weeks to confirm eradication of varices   -Maintain Hemodynamic Stability   -Monitor CBC  -CMP, Optimize Electrolytes  -Two large bore IV lines  - PPI BID for total of 8 weeks   -Monitor Vital Signs  -Active Type and Screen      Problem 2-Cirrhosis, mild ascites, portal hypertension, noting a recanalized   umbilical vein and mild splenomegaly. No evidence of a portal vein   thrombosis.  Rec  -MELD score 9  -Abdominal ultrasound AFP every 6 months for HCC screening  -avoid NSAIDs  -Follow up with our GI [facility] Clinic located at [address] [place] [address]. 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    Problem 1-  esophageal varices presenting with hematemesis s/p EGD with band ligation : Chronic illness with exacerbation, now stable  EGD :   Impressions:  -Normal duodenum.  -Varices in the lower third of the esophagus. (Ligation).  -Erythema in the antrum compatible with erosive gastritis.  -Coffee ground material was noted in the stomach. It was washed and suctioned.  There were no gastric ulcers, varices, AVM or obvious Dieulafoy lesions.    Plan:  -diet as tolerated   -PPI BID  -Can resume antiplatelets therapy if deemed necessary , no evidence of active GI bleed, please have vacular surgery evaluation if patient can be on alternative antiplatelet to prasugrel given recent GI bleed   - ciprofloxacin for total of 7 d if no contraindication (pt allergic to ceftriaxone)   -Hb daily  -Transfuse to keep Hb > 7  -Daily LFTs and INR  -Monitor for recurrent bleeding  -continue with carvedilol , titrate to HR > 60 and SBP > 90: Prescription mangagment  -Patient will need repeat EGD in 3 weeks to confirm eradication of varices : decisions for egd in patient with risk factors  -Maintain Hemodynamic Stability   -Monitor CBC  -CMP, Optimize Electrolytes  -Two large bore IV lines  - PPI BID for total of 8 weeks Prescription management  -Monitor Vital Signs  -Active Type and Screen      Problem 2-Cirrhosis, mild ascites, portal hypertension, noting a recanalized   umbilical vein and mild splenomegaly. No evidence of a portal vein   thrombosis. Chronic illness , stable  Rec  -MELD score 9  -Abdominal ultrasound AFP every 6 months for HCC screening  -avoid NSAIDs  -Follow up with our [facility] located at [address] [place] [address]. Phone Number: [phone] for possible liver transplant referral  -Alcohol Cessation Strongly Advised and Encouraged     Recall GI if needed

## 2022-09-14 NOTE — CONSULT NOTE ADULT - SUBJECTIVE AND OBJECTIVE BOX
SICU Consultation Note  =====================================================  82y Female  TRAUMA ACTIVATION LEVEL: CONSULT  ACTIVATED BY: ED  INTUBATED: NO    MECHANISM OF INJURY:   [x] Fall	    GCS: 15 	E: 4	V: 5	M: 6    HPI:  82yF w/ PMHx of HTN, RA, OA, colitis,  Gout, HLD, PAD on ASA/Prasugrel, revascularization of RLE, liver cirrhosis, with recent hospitalization for GI bleed/hematemesis s/p variceal banding seen as Trauma Consult s/p ground level fall. -HT, -LOC, +ASA/Prasugrel.   Patient states that this morning she was walking with out walker, her daughter called her name and she turned around and lost balance and fell on left side. Patient states that she has pain localized to her left thigh and cannot stand and walk. Patient denies syncope, head injury, or loss of consciousness.  Trauma assessment in ED: ABCs intact , GCS 15 , AAOx3.     PAST MEDICAL & SURGICAL HISTORY:  Hypothyroid  Hypertension  Cirrhosis  RA (rheumatoid arthritis)  High cholesterol  Poor circulation  Incontinence  History of hip surgery, L  Gastric reflex  IBS  Colitis  Hiatal hernia   Psoriasis  Depression   Anxiety  Psoriatic arthritis     H/O: hysterectomy, variceal banding, revascularization of the RLE    Allergies  Keflex (Unknown)  losartan (Unknown)  morphine (Unknown)  Rocephin (Unknown)  sulfamethoxazole (Hives)    Home Medications:  allopurinol 100 mg oral tablet: 1 tab(s) orally 2 times a day (09 Sep 2022 14:49)  amLODIPine 5 mg oral tablet: 1 tab(s) orally 2 times a day (09 Sep 2022 14:49)  aspirin 81 mg oral delayed release tablet: 1 tab(s) orally once a day (09 Sep 2022 14:49)  DULoxetine 60 mg oral delayed release capsule: 1 cap(s) orally once a day (09 Sep 2022 14:49)  ferrous sulfate 75 mg (15 mg elemental iron) oral tablet: 1 tab(s) orally once a day (13 Sep 2022 15:33)  folic acid 1 mg oral tablet: 1 tab(s) orally once a day (09 Sep 2022 14:49)  levothyroxine 50 mcg (0.05 mg) oral tablet: 1 tab(s) orally once a day (09 Sep 2022 14:49)  oxybutynin 10 mg/24 hr oral tablet, extended release: 1 tab(s) orally 2 times a day (09 Sep 2022 14:49)    ROS: 10-system review is otherwise negative except HPI above.      Primary Survey:    A - airway intact  B - bilateral breath sounds and good chest rise  C - palpable pulses in all extremities  D - GCS 15 on arrival, [place]  Exposure obtained (14 Sep 2022 10:03)      Home Meds: Home Medications:  allopurinol 100 mg oral tablet: 1 tab(s) orally 2 times a day (09 Sep 2022 14:49)  amLODIPine 5 mg oral tablet: 1 tab(s) orally 2 times a day (09 Sep 2022 14:49)  aspirin 81 mg oral delayed release tablet: 1 tab(s) orally once a day (09 Sep 2022 14:49)  DULoxetine 60 mg oral delayed release capsule: 1 cap(s) orally once a day (09 Sep 2022 14:49)  ferrous sulfate 75 mg (15 mg elemental iron) oral tablet: 1 tab(s) orally once a day (13 Sep 2022 15:33)  folic acid 1 mg oral tablet: 1 tab(s) orally once a day (09 Sep 2022 14:49)  levothyroxine 50 mcg (0.05 mg) oral tablet: 1 tab(s) orally once a day (09 Sep 2022 14:49)  oxybutynin 10 mg/24 hr oral tablet, extended release: 1 tab(s) orally 2 times a day (09 Sep 2022 14:49)    Allergies: Allergies    Keflex (Unknown)  losartan (Unknown)  morphine (Unknown)  Rocephin (Unknown)  sulfamethoxazole (Hives)    Intolerances      Soc:   Advanced Directives: Presumed Full Code     ROS:    REVIEW OF SYSTEMS    [ ] A ten-point review of systems was otherwise negative except as noted.  [ ] Due to altered mental status/intubation, subjective information were not able to be obtained from the patient. History was obtained, to the extent possible, from review of the chart and collateral sources of information.      CURRENT MEDICATIONS:   --------------------------------------------------------------------------------------  Neurologic Medications  Respiratory Medications    Cardiovascular Medications  amLODIPine   Tablet 5 milliGRAM(s) Oral <User Schedule>  carvedilol 6.25 milliGRAM(s) Oral every 12 hours    Gastrointestinal Medications  ferrous    sulfate 325 milliGRAM(s) Oral daily  folic acid 1 milliGRAM(s) Oral daily  lactated ringers. 1000 milliLiter(s) IV Continuous <Continuous>  pantoprazole    Tablet 40 milliGRAM(s) Oral before breakfast    Genitourinary Medications  oxybutynin 10 milliGRAM(s) Oral two times a day    Hematologic/Oncologic Medications  aspirin  chewable 81 milliGRAM(s) Oral daily  heparin   Injectable 5000 Unit(s) SubCutaneous every 8 hours    Antimicrobial/Immunologic Medications    Endocrine/Metabolic Medications  allopurinol 100 milliGRAM(s) Oral two times a day  levothyroxine 50 MICROGram(s) Oral daily    Topical/Other Medications    --------------------------------------------------------------------------------------    VITAL SIGNS, INS/OUTS (last 24 hours):  --------------------------------------------------------------------------------------  ICU Vital Signs Last 24 Hrs  T(C): 36.7 (14 Sep 2022 09:27), Max: 37.2 (13 Sep 2022 14:00)  T(F): 98 (14 Sep 2022 09:27), Max: 99 (13 Sep 2022 14:00)  HR: 69 (14 Sep 2022 09:27) (69 - 81)  BP: 177/73 (14 Sep 2022 09:27) (120/56 - 177/73)  BP(mean): 69 (14 Sep 2022 09:27) (69 - 69)  ABP: --  ABP(mean): --  RR: 17 (14 Sep 2022 09:27) (16 - 189)  SpO2: 98% (14 Sep 2022 09:27) (97% - 99%)    O2 Parameters below as of 14 Sep 2022 09:27  Patient On (Oxygen Delivery Method): room air          I&O's Summary    --------------------------------------------------------------------------------------    EXAM:  General/Neuro  Exam: Normal, NAD, alert, oriented x 3, no focal deficits. PERRLA. EOMI.     Respiratory  Exam: Lungs clear to auscultation, Normal expansion/effort.     Cardiovascular  Exam: S1, S2.  Regular rate and rhythm.     GI  Exam: Abdomen soft, Non-tender, Non-distended. + BS in all four quadrants.     Extremities  Exam: Extremities warm, pink, well-perfused. Non-healing ulcer on RLE s/p revascularization. Patient is in knee immobilizer. LLE skin intact, +swelling to distal thigh, no ecchymosis, +ttp, unable to assess ROM due to injury. Moving all toes, sensation intact. DP and PT pulses difficult to assess.     Derm:  Exam: Good skin turgor, no skin breakdown. Non-healing ulcer on RLE s/p revascularization.    : Adult diaper.     Tubes/Lines/Drains  ***  [x] Peripheral IV  [] Central Venous Line     	[] R	[] L	[] IJ	[] Fem	[] SC        Type:	    Date Placed:   [] Arterial Line		[] R	[] L	[] Fem	[] Rad	[] Ax	Date Placed:   [] PICC:         	[] Midline		[] Mediport           [] Urinary Catheter		Date Placed:       LABS  --------------------------------------------------------------------------------------                        8.0    6.50  )-----------( 185      ( 14 Sep 2022 06:00 )             26.1     09-14    136  |  102  |  15  ----------------------------<  122<H>  4.8   |  24  |  0.8    Ca    8.6      14 Sep 2022 06:00    TPro  6.0  /  Alb  3.0<L>  /  TBili  0.4  /  DBili  x   /  AST  22  /  ALT  11  /  AlkPhos  112  09-14    LIVER FUNCTIONS - ( 14 Sep 2022 06:00 )  Alb: 3.0 g/dL / Pro: 6.0 g/dL / ALK PHOS: 112 U/L / ALT: 11 U/L / AST: 22 U/L / GGT: x               PT/INR - ( 14 Sep 2022 06:00 )   PT: 13.30 sec;   INR: 1.16 ratio         PTT - ( 14 Sep 2022 06:00 )  PTT:32.4 sec      CAPILLARY BLOOD GLUCOSE          --------------------------------------------------------------------------------------    OTHER LABS    IMAGING RESULTS        --------------------------------------------------------------------------------------    Critical Care Diagnoses:       SICU Consultation Note  =====================================================  82y Female  TRAUMA ACTIVATION LEVEL: CONSULT  ACTIVATED BY: ED  INTUBATED: NO    MECHANISM OF INJURY:   [x] Fall	    GCS: 15 	E: 4	V: 5	M: 6    HPI:  82yF w/ PMHx of HTN, RA, OA, colitis,  Gout, HLD, PAD on ASA/Prasugrel, revascularization of RLE, liver cirrhosis, with recent hospitalization for GI bleed/hematemesis s/p variceal banding seen as Trauma Consult s/p ground level fall. -HT, -LOC, +ASA/Prasugrel.   Patient states that this morning she was walking with out walker, her daughter called her name and she turned around and lost balance and fell on left side. Patient states that she has pain localized to her left thigh and cannot stand and walk. Patient denies syncope, head injury, or loss of consciousness.  Trauma assessment in ED: ABCs intact , GCS 15 , AAOx3.     PAST MEDICAL & SURGICAL HISTORY:  Hypothyroid  Hypertension  Cirrhosis  RA (rheumatoid arthritis)  High cholesterol  Poor circulation  Incontinence  History of hip surgery, L  Gastric reflex  IBS  Colitis  Hiatal hernia   Psoriasis  Depression   Anxiety  Psoriatic arthritis     H/O: hysterectomy, variceal banding, revascularization of the RLE    Allergies  Keflex (Unknown)  losartan (Unknown)  morphine (Unknown)  Rocephin (Unknown)  sulfamethoxazole (Hives)    Home Medications:  allopurinol 100 mg oral tablet: 1 tab(s) orally 2 times a day (09 Sep 2022 14:49)  amLODIPine 5 mg oral tablet: 1 tab(s) orally 2 times a day (09 Sep 2022 14:49)  aspirin 81 mg oral delayed release tablet: 1 tab(s) orally once a day (09 Sep 2022 14:49)  DULoxetine 60 mg oral delayed release capsule: 1 cap(s) orally once a day (09 Sep 2022 14:49)  ferrous sulfate 75 mg (15 mg elemental iron) oral tablet: 1 tab(s) orally once a day (13 Sep 2022 15:33)  folic acid 1 mg oral tablet: 1 tab(s) orally once a day (09 Sep 2022 14:49)  levothyroxine 50 mcg (0.05 mg) oral tablet: 1 tab(s) orally once a day (09 Sep 2022 14:49)  oxybutynin 10 mg/24 hr oral tablet, extended release: 1 tab(s) orally 2 times a day (09 Sep 2022 14:49)    ROS: 10-system review is otherwise negative except HPI above.      Primary Survey:    A - airway intact  B - bilateral breath sounds and good chest rise  C - palpable pulses in all extremities  D - GCS 15 on arrival, [place]  Exposure obtained (14 Sep 2022 10:03)      Home Meds: Home Medications:  allopurinol 100 mg oral tablet: 1 tab(s) orally 2 times a day (09 Sep 2022 14:49)  amLODIPine 5 mg oral tablet: 1 tab(s) orally 2 times a day (09 Sep 2022 14:49)  aspirin 81 mg oral delayed release tablet: 1 tab(s) orally once a day (09 Sep 2022 14:49)  DULoxetine 60 mg oral delayed release capsule: 1 cap(s) orally once a day (09 Sep 2022 14:49)  ferrous sulfate 75 mg (15 mg elemental iron) oral tablet: 1 tab(s) orally once a day (13 Sep 2022 15:33)  folic acid 1 mg oral tablet: 1 tab(s) orally once a day (09 Sep 2022 14:49)  levothyroxine 50 mcg (0.05 mg) oral tablet: 1 tab(s) orally once a day (09 Sep 2022 14:49)  oxybutynin 10 mg/24 hr oral tablet, extended release: 1 tab(s) orally 2 times a day (09 Sep 2022 14:49)    Allergies: Allergies    Keflex (Unknown)  losartan (Unknown)  morphine (Unknown)  Rocephin (Unknown)  sulfamethoxazole (Hives)    Intolerances      Soc:   Advanced Directives: Presumed Full Code     ROS:    REVIEW OF SYSTEMS    [ ] A ten-point review of systems was otherwise negative except as noted.  [ ] Due to altered mental status/intubation, subjective information were not able to be obtained from the patient. History was obtained, to the extent possible, from review of the chart and collateral sources of information.      CURRENT MEDICATIONS:   --------------------------------------------------------------------------------------  Neurologic Medications  Respiratory Medications    Cardiovascular Medications  amLODIPine   Tablet 5 milliGRAM(s) Oral <User Schedule>  carvedilol 6.25 milliGRAM(s) Oral every 12 hours    Gastrointestinal Medications  ferrous    sulfate 325 milliGRAM(s) Oral daily  folic acid 1 milliGRAM(s) Oral daily  lactated ringers. 1000 milliLiter(s) IV Continuous <Continuous>  pantoprazole    Tablet 40 milliGRAM(s) Oral before breakfast    Genitourinary Medications  oxybutynin 10 milliGRAM(s) Oral two times a day    Hematologic/Oncologic Medications  aspirin  chewable 81 milliGRAM(s) Oral daily  heparin   Injectable 5000 Unit(s) SubCutaneous every 8 hours    Antimicrobial/Immunologic Medications    Endocrine/Metabolic Medications  allopurinol 100 milliGRAM(s) Oral two times a day  levothyroxine 50 MICROGram(s) Oral daily    Topical/Other Medications    --------------------------------------------------------------------------------------    VITAL SIGNS, INS/OUTS (last 24 hours):  --------------------------------------------------------------------------------------  ICU Vital Signs Last 24 Hrs  T(C): 36.7 (14 Sep 2022 09:27), Max: 37.2 (13 Sep 2022 14:00)  T(F): 98 (14 Sep 2022 09:27), Max: 99 (13 Sep 2022 14:00)  HR: 69 (14 Sep 2022 09:27) (69 - 81)  BP: 177/73 (14 Sep 2022 09:27) (120/56 - 177/73)  BP(mean): 69 (14 Sep 2022 09:27) (69 - 69)  ABP: --  ABP(mean): --  RR: 17 (14 Sep 2022 09:27) (16 - 189)  SpO2: 98% (14 Sep 2022 09:27) (97% - 99%)    O2 Parameters below as of 14 Sep 2022 09:27  Patient On (Oxygen Delivery Method): room air          I&O's Summary    --------------------------------------------------------------------------------------    EXAM:  General/Neuro  Exam: Normal, NAD, alert, oriented x 3, no focal deficits. PERRLA. EOMI.     Respiratory  Exam: Lungs clear to auscultation, Normal expansion/effort.     Cardiovascular  Exam: S1, S2.  Regular rate and rhythm.     GI  Exam: Abdomen soft, Non-tender, Non-distended. + BS in all four quadrants.     Extremities  Exam: Extremities warm, pink, well-perfused. Non-healing ulcer on RLE s/p revascularization. Patient is in knee immobilizer. LLE skin intact, +swelling to distal thigh, no ecchymosis, +ttp, unable to assess ROM due to injury. Moving all toes, sensation intact. DP and PT pulses difficult to assess.     Derm:  Exam: Good skin turgor, no skin breakdown. Non-healing ulcer on RLE s/p revascularization.    : Adult diaper.     Tubes/Lines/Drains  ***  [x] Peripheral IV  [] Central Venous Line     	[] R	[] L	[] IJ	[] Fem	[] SC        Type:	    Date Placed:   [] Arterial Line		[] R	[] L	[] Fem	[] Rad	[] Ax	Date Placed:   [] PICC:         	[] Midline		[] Mediport           [] Urinary Catheter		Date Placed:       LABS  --------------------------------------------------------------------------------------                        8.0    6.50  )-----------( 185      ( 14 Sep 2022 06:00 )             26.1     09-14    136  |  102  |  15  ----------------------------<  122<H>  4.8   |  24  |  0.8    Ca    8.6      14 Sep 2022 06:00    TPro  6.0  /  Alb  3.0<L>  /  TBili  0.4  /  DBili  x   /  AST  22  /  ALT  11  /  AlkPhos  112  09-14    LIVER FUNCTIONS - ( 14 Sep 2022 06:00 )  Alb: 3.0 g/dL / Pro: 6.0 g/dL / ALK PHOS: 112 U/L / ALT: 11 U/L / AST: 22 U/L / GGT: x               PT/INR - ( 14 Sep 2022 06:00 )   PT: 13.30 sec;   INR: 1.16 ratio         PTT - ( 14 Sep 2022 06:00 )  PTT:32.4 sec      CAPILLARY BLOOD GLUCOSE          --------------------------------------------------------------------------------------    OTHER LABS    IMAGING RESULTS  X-rays of the left femur demonstrate a displaced distal femoral shaft fracture with proximal hip gamma nail in place with no disruption. Also left proximal tibia screws in place.         --------------------------------------------------------------------------------------    Critical Care Diagnoses:

## 2022-09-14 NOTE — ED PROVIDER NOTE - WET READ LAUNCH FT
There are no Wet Read(s) to document. There are 2 Wet Read(s) to document. There are 4 Wet Read(s) to document. There is 1 Wet Read(s) to document.

## 2022-09-14 NOTE — ED PROVIDER NOTE - PROGRESS NOTE DETAILS
D/w Ortho reviewed xrays - states to place patient in immobilizer of knee - unable to do so due to pain of patient - patient is in position of comfort and to leave her and they will see in [place] FF: pt arrived in [place] ed. ortho consulted. trauma consulted for left distal femur fracture. FF: spoke with surg resident dr. mack. reports pt can be admitted to surgery under dr. melchor but will go to surgical step down approved by dr. mejia.

## 2022-09-14 NOTE — H&P ADULT - ASSESSMENT
82yF w/ PMHx of HTN, RA, Gout, HLD, PAD on ASA/Prasugrel seen as Trauma Consult s/p ground level fall. -HT, -LOC, +ASA/Prasugrel.  Trauma assessment in ED: ABCs intact , GCS 15 , AAOx3. No external signs of trauma, however patient will not straighten LLEx d/t pain.    Injuries identified:   - L femoral shaft fracture    PLAN:   - Admit to trauma surgical service  - Diet: NPO  - pre-op for OR with orthopedics team   - IVF  - DVT ppx  - GI ppx  - hemodynamic monitoring/vital signs q4h  - Strict Is and Os q4h  - Pain control  - activity as tolerated  - incentive spirometer  - aspiration precautions  - Code: full     Additional consultations:  - Orthopedics  - SICU to assess for step down status given medical burden and advanced age    Above plan discussed with Trauma attending, Dr. Mariscal, patient, and ED team  --------------------------------------------------------------------------------------  09-14-22 @ 10:03 82yF w/ PMHx of HTN, RA, Gout, HLD, PAD on ASA/Prasugrel seen as Trauma Consult s/p ground level fall. -HT, -LOC, +ASA/Prasugrel.  Trauma assessment in ED: ABCs intact , GCS 15 , AAOx3. No external signs of trauma, however patient will not straighten LLEx d/t pain.    Injuries identified:   - L femoral shaft fracture    PLAN:   - Admit to trauma surgical service  - Diet: NPO  - pre-op for OR with orthopedics team   - IVF  - DVT ppx  - GI ppx  - hemodynamic monitoring/vital signs q4h  - Strict Is and Os q4h  - Pain control  - activity as tolerated  - incentive spirometer  - aspiration precautions  - Code: full     Additional consultations:  - Orthopedics  - SICU to assess for step down status given medical burden and advanced age  - medicine and cardiology for pre op risk stratification/optimization    Above plan discussed with Trauma attending, Dr. Mariscal, patient, and ED team  --------------------------------------------------------------------------------------  09-14-22 @ 10:03 82yF w/ PMHx of HTN, RA, Gout, HLD, PAD on ASA/Prasugrel seen as Trauma Consult s/p ground level fall. -HT, -LOC, +ASA/Prasugrel.  Trauma assessment in ED: ABCs intact , GCS 15 , AAOx3. No external signs of trauma, however patient will not straighten LLEx d/t pain.    Injuries identified:   - L femoral shaft fracture    PLAN:   - Admit to trauma surgical service  - Diet: NPO  - pre-op for OR with orthopedics team -- needs t&s, coags, ekg  - IVF  - DVT ppx  - GI ppx  - hemodynamic monitoring/vital signs q4h  - Strict Is and Os q4h  - Pain control  - activity as tolerated  - incentive spirometer  - aspiration precautions  - Code: full     Additional consultations:  - Orthopedics  - SICU to assess for step down status given medical burden and advanced age  - medicine and cardiology for pre op risk stratification/optimization    Above plan discussed with Trauma attending, Dr. Mariscal, patient, and ED team  --------------------------------------------------------------------------------------  09-14-22 @ 10:03 82yF w/ PMHx of HTN, RA, Gout, HLD, PAD on ASA/Prasugrel seen as Trauma Consult s/p ground level fall. -HT, -LOC, +ASA/Prasugrel.  Trauma assessment in ED: ABCs intact , GCS 15 , AAOx3. No external signs of trauma, however patient will not straighten LLEx d/t pain.    Injuries identified:   - L femoral shaft fracture    PLAN:   - Admit to trauma surgical service  - Diet: NPO  - pre-op for OR with orthopedics team -- needs t&s, coags, ekg  - IVF  - Holding home ASA/Plavix and dvt ppx as per ortho team  - GI ppx  - hemodynamic monitoring/vital signs q4h  - Strict Is and Os q4h  - Pain control  - activity as tolerated  - incentive spirometer  - aspiration precautions  - Code: full     Additional consultations:  - Orthopedics  - SICU to assess for step down status given medical burden and advanced age  - medicine and cardiology for pre op risk stratification/optimization    Above plan discussed with Trauma attending, Dr. Mariscal, patient, and ED team  --------------------------------------------------------------------------------------  09-14-22 @ 10:03

## 2022-09-14 NOTE — CONSULT NOTE ADULT - ASSESSMENT
82 yF w/ PMHx of HTN, RA, Gout, HLD, PAD on ASA/ Plavix with recent stents in June? and recent admission and discharge one day ptp for GI bleed secondary to varicose veins in the setting of cirrhosis  seen as Trauma Consult s/p ground level fall.   imaging remarkable for left complex impacted displaced fracture distal femoral shaft.     Impression   -left complex impacted displaced fracture distal femoral shaft after mechanical fall  -liver Cirrhosis with varicose vein s/p recent ligation  -PVD with leg ulcers s/p stent last June?  -RA  -Gout  -HTN/ HLD            * Patient-based characteristics (Functional capacity)  Patient is able to achieve more than 4 MET (walk 4 blocks, climb 2 flights of stairs, etc...)          Y [X] / N []    High-risk patient features:  - Recent (<30 days) or active MI          Y [] / N [X]  - Unstable or severe angina          Y [] / N [X]  - Decompensated heart failure, or worsening or new-onset heart failure          Y [] / N [X]  - Severe valvular disease          Y [] / N [X]  - Significant arrhythmia (Tachy- or Bradyarrhythmia)          Y [] / N [X]    * Surgery/Procedure-based characteristics (Type of surgery)  - Low-risk procedure (outpatient procedure, elective, endoscopy, etc...)          Y [] / N []  - Elevated or Moderate-risk procedure (Inpatient)          Y [x] / N []  - High-risk procedure (urgent/emergent procedure, Intrathoracic, vascular, etc...)          Y [] / N []    * Revised Cardiac Risk Index (RCRI)  1- History of ischemic heart disease          Y [] / N [X]  2- History of congestive heart failure          Y [] / N [X]  3- History of stroke/TIA          Y [] / N [X]  4- History of insulin-dependent diabetes          Y [] / N [X]  5- Chronic kidney disease (Cr >2mg/dL)          Y [] / N [X]  6- Undergoing suprainguinal vascular, intraperitoneal, or intrathoracic surgery          Y [] / N [X]    Class I risk (Zero factors) --> 3.9% (30-day risk of death, MI, or cardiac arrest)      * IMPRESSION & RECOMMENDATIONS:   Moderate risk patient for a Moderate risk surgery/procedure  if needed can hold Plavix in the perioperative period  cont with aspirin and coreg  avoid hypo and hypervolemia in the perioperative period  No further cardiac work-up is needed at the moment. There are no current cardiac contraindications to prevent from proceeding with the scheduled surgery/procedure.    - This consult serves only as a lilly-operative cardiac risk stratification and evaluation to predict 30-days cardiac complications risk and mortality. The decision to proceed with the surgery/procedure is made by the performing physician and the patient - 82 yF w/ PMHx of HTN, RA, Gout, HLD, PAD on ASA/Plavix with recent stents in June? and recent admission and discharge one day ptp for GI bleed secondary to varicose veins in the setting of cirrhosis seen as Trauma Consult s/p ground level fall.   imaging remarkable for left complex impacted displaced fracture distal femoral shaft.     Impression   -left complex impacted displaced fracture distal femoral shaft after mechanical fall  -liver Cirrhosis with varicose vein s/p recent ligation  -PVD with leg ulcers s/p stent last June?  -RA  -Gout  -HTN/ HLD      * Patient-based characteristics (Functional capacity)  Patient is able to achieve more than 4 MET (walk 4 blocks, climb 2 flights of stairs, etc...)          Y [X] / N []    High-risk patient features:  - Recent (<30 days) or active MI          Y [] / N [X]  - Unstable or severe angina          Y [] / N [X]  - Decompensated heart failure, or worsening or new-onset heart failure          Y [] / N [X]  - Severe valvular disease          Y [] / N [X]  - Significant arrhythmia (Tachy- or Bradyarrhythmia)          Y [] / N [X]    * Surgery/Procedure-based characteristics (Type of surgery)  - Elevated or Moderate-risk procedure (Inpatient)          Y [x] / N []    * Revised Cardiac Risk Index (RCRI)  1- History of ischemic heart disease          Y [] / N [X]  2- History of congestive heart failure          Y [] / N [X]  3- History of stroke/TIA          Y [] / N [X]  4- History of insulin-dependent diabetes          Y [] / N [X]  5- Chronic kidney disease (Cr >2mg/dL)          Y [] / N [X]  6- Undergoing suprainguinal vascular, intraperitoneal, or intrathoracic surgery          Y [] / N [X]    Class I risk (Zero factors) --> 3.9% (30-day risk of death, MI, or cardiac arrest)      * IMPRESSION & RECOMMENDATIONS:  Moderate risk patient for a Moderate risk surgery/procedure  if needed can hold Plavix in the perioperative period  cont with aspirin and coreg

## 2022-09-14 NOTE — ED PROVIDER NOTE - PHYSICAL EXAMINATION
Physical Exam    Vital Signs: I have reviewed the initial vital signs.  Constitutional: elderly and frail, no acute distress  Eyes: Conjunctiva pink, Sclera clear, PERRLA, EOMI.  Cardiovascular: S1 and S2, regular rate, regular rhythm, well-perfused extremities, radial pulses equal and 2+  Respiratory: unlabored respiratory effort, clear to auscultation bilaterally no wheezing, rales and rhonchi  Gastrointestinal: soft, non-tender abdomen, no pulsatile mass, normal bowl sounds  Musculoskeletal: supple neck, no lower extremity edema, no midline tenderness + left mid thigh tenderness and swelling unable to extend at hip and knee due to pain. Knee and hip flexion  Integumentary: warm, dry, no rash  Neurologic: awake, alert, cranial nerves II-XII grossly intact, extremities’ motor and sensory functions grossly intact  Psychiatric: appropriate mood, appropriate affect

## 2022-09-14 NOTE — ED PROVIDER NOTE - OBJECTIVE STATEMENT
82 year old female recent hospitalization for GI bleed and hip replacement on left side years ago comes to emergency room status post fall. patient states that she was walking with out walker and turned her head and lost balance and fell on left side. patient states that she has pain ot her mid left thigh and cannot stand and walk. patient denies head injury and loss of consciousness.

## 2022-09-14 NOTE — ED ADULT NURSE REASSESSMENT NOTE - NS ED NURSE REASSESS COMMENT FT1
Pt received from [facility] at 8am . vitals as documented.

## 2022-09-14 NOTE — ED ADULT NURSE NOTE - NSIMPLEMENTINTERV_GEN_ALL_ED
Implemented All Fall with Harm Risk Interventions:  [place]za to call system. Call bell, personal items and telephone within reach. Instruct patient to call for assistance. Room bathroom lighting operational. Non-slip footwear when patient is off stretcher. Physically safe environment: no spills, clutter or unnecessary equipment. Stretcher in lowest position, wheels locked, appropriate side rails in place. Provide visual cue, wrist band, yellow gown, etc. Monitor gait and stability. Monitor for mental status changes and reorient to person, place, and time. Review medications for side effects contributing to fall risk. Reinforce activity limits and safety measures with patient and family. Provide visual clues: red socks.

## 2022-09-14 NOTE — CONSULT NOTE ADULT - ATTENDING COMMENTS
Moderate-risk for perioperative major adverse cardiac events  Echo prior to surgery    There are no current cardiac contraindications that prohibit proceeding with the scheduled surgery/procedure.  This consult serves only as a perioperative cardiac risk stratification and evaluation to predict 30-day cardiac complications risk and mortality.  The decision to proceed with the surgery/procedure is made by the performing physician and the patient.
81 y/o female, S/P Fall.  Closed Left Distal Femur fracture.  Acute pain due to Trauma.  Liver cirrhosis.  Recent Upper GI Bleeding  Acute blood loss anemia.  At risk for hemodynamic instability.  Frail elderly.    PLAN:  - Ortho eval  - pain control  - needs O2sat and hemodynamic monitoring  - cardiac preop eval  - serial H&H  - PPI  - hold ASA  - DVT prophylaxis  Admit to SDU    This note reflects my exam and care of this patient on 09/14/2022

## 2022-09-14 NOTE — H&P ADULT - HISTORY OF PRESENT ILLNESS
TRAUMA ACTIVATION LEVEL: CONSULT  ACTIVATED BY: ED  INTUBATED: NO    MECHANISM OF INJURY:   [x] Fall	    GCS: 15 	E: 4	V: 5	M: 6    HPI:  82yF w/ PMHx of HTN, RA, Gout, HLD, PAD on ASA/Prasugrel seen as Trauma Consult s/p ground level fall. -HT, -LOC, +ASA/Prasugrel. Patient had recent hospitalization for GI bleed/hematemesis s/p variceal banding. Also PSHx or hip replacement on left side years ago.    Patient states that she was walking with out walker, her daughter called her name and she turned around and lost balance and fell on left side. Patient states that she has pain in her left thigh and cannot stand and walk. patient denies head injury and loss of consciousness. Trauma assessment in ED: ABCs intact , GCS 15 , AAOx3.    PAST MEDICAL & SURGICAL HISTORY:  Hypothyroid  Hypertension  Cirrhosis  RA (rheumatoid arthritis)  High cholesterol  Poor circulation  Incontinence  History of hip surgery, L  H/O: hysterectomy    Allergies  Keflex (Unknown)  losartan (Unknown)  morphine (Unknown)  Rocephin (Unknown)  sulfamethoxazole (Hives)    Home Medications:  allopurinol 100 mg oral tablet: 1 tab(s) orally 2 times a day (09 Sep 2022 14:49)  amLODIPine 5 mg oral tablet: 1 tab(s) orally 2 times a day (09 Sep 2022 14:49)  aspirin 81 mg oral delayed release tablet: 1 tab(s) orally once a day (09 Sep 2022 14:49)  DULoxetine 60 mg oral delayed release capsule: 1 cap(s) orally once a day (09 Sep 2022 14:49)  ferrous sulfate 75 mg (15 mg elemental iron) oral tablet: 1 tab(s) orally once a day (13 Sep 2022 15:33)  folic acid 1 mg oral tablet: 1 tab(s) orally once a day (09 Sep 2022 14:49)  levothyroxine 50 mcg (0.05 mg) oral tablet: 1 tab(s) orally once a day (09 Sep 2022 14:49)  oxybutynin 10 mg/24 hr oral tablet, extended release: 1 tab(s) orally 2 times a day (09 Sep 2022 14:49)    ROS: 10-system review is otherwise negative except HPI above.      Primary Survey:    A - airway intact  B - bilateral breath sounds and good chest rise  C - palpable pulses in all extremities  D - GCS 15 on arrival, GARDNER  Exposure obtained TRAUMA ACTIVATION LEVEL: CONSULT  ACTIVATED BY: ED  INTUBATED: NO    MECHANISM OF INJURY:   [x] Fall	    GCS: 15 	E: 4	V: 5	M: 6    HPI:  82yF w/ PMHx of HTN, RA, Gout, HLD, PAD on ASA/Prasugrel seen as Trauma Consult s/p ground level fall. -HT, -LOC, +ASA/Prasugrel. Patient had recent hospitalization for GI bleed/hematemesis s/p variceal banding. Also PSHx or hip replacement on left side years ago.    Patient states that she was walking with out walker, her daughter called her name and she turned around and lost balance and fell on left side. Patient states that she has pain in her left thigh and cannot stand and walk. patient denies head injury and loss of consciousness. Trauma assessment in ED: ABCs intact , GCS 15 , AAOx3.     PAST MEDICAL & SURGICAL HISTORY:  Hypothyroid  Hypertension  Cirrhosis  RA (rheumatoid arthritis)  High cholesterol  Poor circulation  Incontinence  History of hip surgery, L  H/O: hysterectomy    Allergies  Keflex (Unknown)  losartan (Unknown)  morphine (Unknown)  Rocephin (Unknown)  sulfamethoxazole (Hives)    Home Medications:  allopurinol 100 mg oral tablet: 1 tab(s) orally 2 times a day (09 Sep 2022 14:49)  amLODIPine 5 mg oral tablet: 1 tab(s) orally 2 times a day (09 Sep 2022 14:49)  aspirin 81 mg oral delayed release tablet: 1 tab(s) orally once a day (09 Sep 2022 14:49)  DULoxetine 60 mg oral delayed release capsule: 1 cap(s) orally once a day (09 Sep 2022 14:49)  ferrous sulfate 75 mg (15 mg elemental iron) oral tablet: 1 tab(s) orally once a day (13 Sep 2022 15:33)  folic acid 1 mg oral tablet: 1 tab(s) orally once a day (09 Sep 2022 14:49)  levothyroxine 50 mcg (0.05 mg) oral tablet: 1 tab(s) orally once a day (09 Sep 2022 14:49)  oxybutynin 10 mg/24 hr oral tablet, extended release: 1 tab(s) orally 2 times a day (09 Sep 2022 14:49)    ROS: 10-system review is otherwise negative except HPI above.      Primary Survey:    A - airway intact  B - bilateral breath sounds and good chest rise  C - palpable pulses in all extremities  D - GCS 15 on arrival, G[place]NER  Exposure obtained

## 2022-09-14 NOTE — ED PROVIDER NOTE - ATTENDING APP SHARED VISIT CONTRIBUTION OF CARE
I reviewed and verified the documentation and  and independently performed the documented  .   82 year old female with a past medical history of HTN, HLD, RA, hypothyroidism, gout, PAD on ASA/Prasugrel,  cirrhosis , recent admission 9/9-9/13 for hematemesis sp banding of varices -  pw  fall onto left side  + chi no loc  unable to ambulate -  neuro intact  knee flexed   swelling  tenderness left femur

## 2022-09-14 NOTE — H&P ADULT - NSVTERISKASSESS_GEN_ALL_CORE FT
Patient called for CHF f/u and need for appointment  - message left asking how he is doing, asked if there was anything that he needs, asked if he could call the office for follow up as he is overdue and asked if her could call me or the office back with contact information given.    - letter mailed
Surgical Assessment Completed on: 14-Sep-2022 10:10

## 2022-09-14 NOTE — CONSULT NOTE ADULT - SUBJECTIVE AND OBJECTIVE BOX
HPI:  82yF w/ PMHx of HTN, RA, Gout, HLD, PAD on ASA/ Plavix with recent stents in June? and recent admission and discharge one day ptp for GI bleed secondary to varicose veins in the setting of cirrhosis  seen as Trauma Consult s/p ground level fall. -HT, -LOC, +ASA/Plavix. Patient had recent hospitalization for GI bleed/hematemesis s/p variceal banding. Also PSHx or hip replacement on left side years ago.  Patient states that she was walking with out walker, her daughter called her name and she turned around and lost balance and fell on left side. Patient states that she has pain in her left thigh and cannot stand and walk. patient denies head injury and loss of consciousness. Trauma assessment in ED: ABCs intact , GCS 15 , AAOx3.   imaging remarkable for  complex impacted displaced fracture distal femoral shaft.         PAST MEDICAL & SURGICAL HISTORY  Hypothyroid    Hypertension    Cirrhosis    RA (rheumatoid arthritis)    High cholesterol    Poor circulation    Incontinence    History of hip surgery    H/O: hysterectomy        FAMILY HISTORY:  FAMILY HISTORY:      SOCIAL HISTORY:  []smoker  []Alcohol  []Drug    ALLERGIES:  Keflex (Unknown)  losartan (Unknown)  morphine (Unknown)  Rocephin (Unknown)  sulfamethoxazole (Hives)      MEDICATIONS:  MEDICATIONS  (STANDING):  allopurinol 100 milliGRAM(s) Oral two times a day  amLODIPine   Tablet 5 milliGRAM(s) Oral <User Schedule>  aspirin  chewable 81 milliGRAM(s) Oral daily  carvedilol 6.25 milliGRAM(s) Oral every 12 hours  DULoxetine 60 milliGRAM(s) Oral daily  ferrous    sulfate 325 milliGRAM(s) Oral daily  folic acid 1 milliGRAM(s) Oral daily  heparin   Injectable 5000 Unit(s) SubCutaneous every 8 hours  lactated ringers. 1000 milliLiter(s) (100 mL/Hr) IV Continuous <Continuous>  levothyroxine 50 MICROGram(s) Oral daily  oxybutynin 10 milliGRAM(s) Oral two times a day  pantoprazole    Tablet 40 milliGRAM(s) Oral before breakfast    MEDICATIONS  (PRN):  acetaminophen     Tablet .. 650 milliGRAM(s) Oral every 6 hours PRN Mild Pain (1 - 3)  ketorolac   Injectable 30 milliGRAM(s) IV Push every 8 hours PRN Moderate Pain (4 - 6)      HOME MEDICATIONS:  Home Medications:  allopurinol 100 mg oral tablet: 1 tab(s) orally 2 times a day (09 Sep 2022 14:49)  amLODIPine 5 mg oral tablet: 1 tab(s) orally 2 times a day (09 Sep 2022 14:49)  aspirin 81 mg oral delayed release tablet: 1 tab(s) orally once a day (09 Sep 2022 14:49)  DULoxetine 60 mg oral delayed release capsule: 1 cap(s) orally once a day (09 Sep 2022 14:49)  ferrous sulfate 75 mg (15 mg elemental iron) oral tablet: 1 tab(s) orally once a day (13 Sep 2022 15:33)  folic acid 1 mg oral tablet: 1 tab(s) orally once a day (09 Sep 2022 14:49)  levothyroxine 50 mcg (0.05 mg) oral tablet: 1 tab(s) orally once a day (09 Sep 2022 14:49)  oxybutynin 10 mg/24 hr oral tablet, extended release: 1 tab(s) orally 2 times a day (09 Sep 2022 14:49)      VITALS:   T(F): 98 (09-14 @ 09:27), Max: 99 (09-13 @ 14:00)  HR: 69 (09-14 @ 09:27) (67 - 82)  BP: 177/73 (09-14 @ 09:27) (120/56 - 177/73)  BP(mean): 69 (09-14 @ 09:27) (69 - 69)  RR: 17 (09-14 @ 09:27) (16 - 189)  SpO2: 98% (09-14 @ 09:27) (97% - 99%)    I&O's Summary      REVIEW OF SYSTEMS:  CONSTITUTIONAL: No weakness, fevers or chills  EYES: No visual changes  ENT: No vertigo or throat pain   NECK: No pain or stiffness  RESPIRATORY: No cough, wheezing, hemoptysis; No shortness of breath  CARDIOVASCULAR: No chest pain or palpitations  GASTROINTESTINAL: No abdominal or epigastric pain. No nausea, vomiting, or hematemesis; No diarrhea or constipation. No melena or hematochezia.  GENITOURINARY: No dysuria, frequency or hematuria  NEUROLOGICAL: No numbness or weakness  SKIN: right leg ulcer  MSK: left leg pain, RLE wrapped in dressing     PHYSICAL EXAM:  NEURO: patient is awake , alert and oriented  GEN: Not in acute distress  NECK: no thyroid enlargement, no JVD  LUNGS: Clear to auscultation bilaterally   CARDIOVASCULAR: S1/S2 present, RRR , no murmurs or rubs, no carotid bruits,  + PP bilaterally  ABD: Soft, non-tender, non-distended, +BS  EXT: No KIMBERLY  SKIN: Intact    LABS:                        8.0    6.50  )-----------( 185      ( 14 Sep 2022 06:00 )             26.1     09-14    136  |  102  |  15  ----------------------------<  122<H>  4.8   |  24  |  0.8    Ca    8.6      14 Sep 2022 06:00    TPro  6.0  /  Alb  3.0<L>  /  TBili  0.4  /  DBili  x   /  AST  22  /  ALT  11  /  AlkPhos  112  09-14    PT/INR - ( 14 Sep 2022 06:00 )   PT: 13.30 sec;   INR: 1.16 ratio         PTT - ( 14 Sep 2022 06:00 )  PTT:32.4 sec          Troponin trend:            RADIOLOGY:  -CXR:  < from: Xray Chest 1 View AP/PA (09.14.22 @ 06:07) >  Impression:    No radiographic evidence of acute cardiopulmonary disease.        --- End of Report ---    < end of copied text >    < from: CT Abdomen and Pelvis w/ IV Cont (09.14.22 @ 06:39) >    IMPRESSION:    1. No evidence of acute traumatic intrathoracic or intra-abdominal   pathology.    2. Left femoral shaft fracture on  film.    3. Liver cirrhosis with portal hypertension.    4. Stable small bilateral pleural effusions.    --- End of Report ---        < end of copied text >      ECG:    < from: 12 Lead ECG (08.16.22 @ 16:35) >  Normal sinus rhythm  Left ventricular hypertrophy with repolarization abnormality  Abnormal ECG    < end of copied text >       HPI:  82yF w/ PMHx of HTN, RA, Gout, HLD, PAD on ASA/ Plavix with recent stents in June? and recent admission and discharge one day ptp for GI bleed secondary to varicose veins in the setting of cirrhosis  seen as Trauma Consult s/p ground level fall. -HT, -LOC, +ASA/Plavix. Patient had recent hospitalization for GI bleed/hematemesis s/p variceal banding. Also PSHx or hip replacement on left side years ago.  Patient states that she was walking with out walker, her daughter called her name and she turned around and lost balance and fell on left side. Patient states that she has pain in her left thigh and cannot stand and walk. patient denies head injury and loss of consciousness. Trauma assessment in ED: ABCs intact , GCS 15 , AAOx3.   imaging remarkable for complex impacted displaced fracture distal femoral shaft.       PAST MEDICAL & SURGICAL HISTORY  Hypothyroid    Hypertension    Cirrhosis    RA (rheumatoid arthritis)    High cholesterol    Poor circulation    Incontinence    History of hip surgery    H/O: hysterectomy      No pertinent family history of premature CAD in first degree relatives  SOCIAL HISTORY: Denies EtOH, smoking or drug use    ALLERGIES:  Keflex (Unknown)  losartan (Unknown)  morphine (Unknown)  Rocephin (Unknown)  sulfamethoxazole (Hives)      MEDICATIONS:  MEDICATIONS  (STANDING):  allopurinol 100 milliGRAM(s) Oral two times a day  amLODIPine   Tablet 5 milliGRAM(s) Oral <User Schedule>  aspirin  chewable 81 milliGRAM(s) Oral daily  carvedilol 6.25 milliGRAM(s) Oral every 12 hours  DULoxetine 60 milliGRAM(s) Oral daily  ferrous    sulfate 325 milliGRAM(s) Oral daily  folic acid 1 milliGRAM(s) Oral daily  heparin   Injectable 5000 Unit(s) SubCutaneous every 8 hours  lactated ringers. 1000 milliLiter(s) (100 mL/Hr) IV Continuous <Continuous>  levothyroxine 50 MICROGram(s) Oral daily  oxybutynin 10 milliGRAM(s) Oral two times a day  pantoprazole    Tablet 40 milliGRAM(s) Oral before breakfast    MEDICATIONS  (PRN):  acetaminophen     Tablet .. 650 milliGRAM(s) Oral every 6 hours PRN Mild Pain (1 - 3)  ketorolac   Injectable 30 milliGRAM(s) IV Push every 8 hours PRN Moderate Pain (4 - 6)      HOME MEDICATIONS:  Home Medications:  allopurinol 100 mg oral tablet: 1 tab(s) orally 2 times a day (09 Sep 2022 14:49)  amLODIPine 5 mg oral tablet: 1 tab(s) orally 2 times a day (09 Sep 2022 14:49)  aspirin 81 mg oral delayed release tablet: 1 tab(s) orally once a day (09 Sep 2022 14:49)  DULoxetine 60 mg oral delayed release capsule: 1 cap(s) orally once a day (09 Sep 2022 14:49)  ferrous sulfate 75 mg (15 mg elemental iron) oral tablet: 1 tab(s) orally once a day (13 Sep 2022 15:33)  folic acid 1 mg oral tablet: 1 tab(s) orally once a day (09 Sep 2022 14:49)  levothyroxine 50 mcg (0.05 mg) oral tablet: 1 tab(s) orally once a day (09 Sep 2022 14:49)  oxybutynin 10 mg/24 hr oral tablet, extended release: 1 tab(s) orally 2 times a day (09 Sep 2022 14:49)      VITALS:   T(F): 98 (09-14 @ 09:27), Max: 99 (09-13 @ 14:00)  HR: 69 (09-14 @ 09:27) (67 - 82)  BP: 177/73 (09-14 @ 09:27) (120/56 - 177/73)  BP(mean): 69 (09-14 @ 09:27) (69 - 69)  RR: 17 (09-14 @ 09:27) (16 - 189)  SpO2: 98% (09-14 @ 09:27) (97% - 99%)      REVIEW OF SYSTEMS:  CONSTITUTIONAL: No fever, weight loss, fatigue  NECK: No pain or stiffness  RESPIRATORY: See HPI  CARDIOVASCULAR: See HPI  GASTROINTESTINAL: No abdominal/epigastric pain, nausea, vomiting, hematemesis, diarrhea, constipation, melena or hematochezia  GENITOURINARY: No dysuria, frequency, hematuria, incontinence  NEUROLOGICAL: No headaches, memory loss, loss of strength, numbness, tremors   ENDOCRINE: No heat/cold intolerance or hair loss  HEME/LYMPH: No easy bruising or bleeding gums  SKIN: right leg ulcer  MSK: left leg pain, RLE wrapped in dressing     PHYSICAL EXAM:  GEN: NAD  NECK: no JVD  LUNGS: Clear to auscultation bilaterally   CARDIOVASCULAR: S1/S2 present, RRR , no murmurs  ABD: Soft, non-tender, non-distended  EXT: No KIMBERLY  SKIN: Intact      LABS:                        8.0    6.50  )-----------( 185      ( 14 Sep 2022 06:00 )             26.1     09-14    136  |  102  |  15  ----------------------------<  122<H>  4.8   |  24  |  0.8    Ca    8.6      14 Sep 2022 06:00    TPro  6.0  /  Alb  3.0<L>  /  TBili  0.4  /  DBili  x   /  AST  22  /  ALT  11  /  AlkPhos  112  09-14    PT/INR - ( 14 Sep 2022 06:00 )   PT: 13.30 sec;   INR: 1.16 ratio      PTT - ( 14 Sep 2022 06:00 )  PTT:32.4 sec          RADIOLOGY:    < from: Xray Chest 1 View AP/PA (09.14.22 @ 06:07) >  Impression:    No radiographic evidence of acute cardiopulmonary disease.        --- End of Report ---    < end of copied text >        < from: CT Abdomen and Pelvis w/ IV Cont (09.14.22 @ 06:39) >    IMPRESSION:    1. No evidence of acute traumatic intrathoracic or intra-abdominal   pathology.    2. Left femoral shaft fracture on  film.    3. Liver cirrhosis with portal hypertension.    4. Stable small bilateral pleural effusions.    --- End of Report ---        < end of copied text >          < from: 12 Lead ECG (08.16.22 @ 16:35) >  Normal sinus rhythm  Left ventricular hypertrophy with repolarization abnormality  Abnormal ECG    < end of copied text >

## 2022-09-14 NOTE — CONSULT NOTE ADULT - SUBJECTIVE AND OBJECTIVE BOX
Orthopedics Consult Note:    This is a 82y Female who presents to the ED today with c/o left leg pain and inability to bear weight s/p fall onto her left side this morning.  Pt denies any other injuries. Pt denies head trauma or LOC. Pt denies any numbness, tingling or parethesias. Pt denies fever or chills.    Past Medical & Surgical History:  Hypothyroid  Hypertension  Cirrhosis  RA (rheumatoid arthritis)  High cholesterol  Poor circulation  Incontinence  Closed fracture of left distal femur  History of hip surgery  H/O: hysterectomy      Allergies:  Keflex (Unknown)  losartan (Unknown)  morphine (Unknown)  Rocephin (Unknown)  sulfamethoxazole (Hives)      Vital Signs:  T(C): 36.7 (09-14-22 @ 09:27), Max: 37.2 (09-13-22 @ 14:00)  HR: 69 (09-14-22 @ 09:27) (69 - 81)  BP: 177/73 (09-14-22 @ 09:27) (120/56 - 177/73)  RR: 17 (09-14-22 @ 09:27) (16 - 189)  SpO2: 98% (09-14-22 @ 09:27) (97% - 99%)    Labs:                        8.0    6.50  )-----------( 185      ( 14 Sep 2022 06:00 )             26.1     09-14    136  |  102  |  15  ----------------------------<  122<H>  4.8   |  24  |  0.8    Ca    8.6      14 Sep 2022 06:00    TPro  6.0  /  Alb  3.0<L>  /  TBili  0.4  /  DBili  x   /  AST  22  /  ALT  11  /  AlkPhos  112  09-14    PT/INR - ( 14 Sep 2022 06:00 )   PT: 13.30 sec;   INR: 1.16 ratio         PTT - ( 14 Sep 2022 06:00 )  PTT:32.4 sec      X-rays of the left femur demonstrate a displaced distal femoral shaft fracture with proximal hip gamma nail in place with no disruption. Also left proximal tibia screws in place.     PE left lower extremity:  skin intact, +swelling to distal thigh, no ecchymosis, +ttp, unable to assess ROM due to injury. Moving all toes, sensation intact. DP and PT pulses 2+.    Secondary Survey:  Right hip, knee, ankle and B/L UEs with no swelling, no ecchymoses, no abrasions, no lacerations or any other signs of injury with full painless baseline ROM and no bony TTP. Sensation intact distally, moving all digits. Distal pulses intact.     Assessment:  82y Female with a left distal femur fracture    Plan:  Pt and family advised that surgical fixation of left distal femur fracture with ORIF is necessary.  Surgical procedure discussed in detail with pt and family including all R/B/As; Pt/Pts family expressed understanding and consents for surgery obtained.  Admit to Medical service for optimization and medical clearance.  f/u medical clearance.  f/u labs/imaging.  Complete bedrest.  Pain control.  NPO and hold chemical anticoagulation after midnight for possible OR tomorrow pending medical optimization and clearance as well as OR time availability.   DVT ppx    Case discussed with  who agrees with plan. Orthopedics Consult Note:    This is a 82y Female who presents to the ED today with c/o left leg pain and inability to bear weight s/p fall onto her left side this morning.  Pt denies any other injuries. Pt denies head trauma or LOC. Pt denies any numbness, tingling or parethesias. Pt denies fever or chills.    Past Medical & Surgical History:  Hypothyroid  Hypertension  Cirrhosis  RA (rheumatoid arthritis)  High cholesterol  Poor circulation  Incontinence  Closed fracture of left distal femur  History of hip surgery  H/O: hysterectomy      Allergies:  Keflex (Unknown)  losartan (Unknown)  morphine (Unknown)  Rocephin (Unknown)  sulfamethoxazole (Hives)      Vital Signs:  T(C): 36.7 (09-14-22 @ 09:27), Max: 37.2 (09-13-22 @ 14:00)  HR: 69 (09-14-22 @ 09:27) (69 - 81)  BP: 177/73 (09-14-22 @ 09:27) (120/56 - 177/73)  RR: 17 (09-14-22 @ 09:27) (16 - 189)  SpO2: 98% (09-14-22 @ 09:27) (97% - 99%)    Labs:                        8.0    6.50  )-----------( 185      ( 14 Sep 2022 06:00 )             26.1     09-14    136  |  102  |  15  ----------------------------<  122<H>  4.8   |  24  |  0.8    Ca    8.6      14 Sep 2022 06:00    TPro  6.0  /  Alb  3.0<L>  /  TBili  0.4  /  DBili  x   /  AST  22  /  ALT  11  /  AlkPhos  112  09-14    PT/INR - ( 14 Sep 2022 06:00 )   PT: 13.30 sec;   INR: 1.16 ratio         PTT - ( 14 Sep 2022 06:00 )  PTT:32.4 sec      X-rays of the left femur demonstrate a displaced distal femoral shaft fracture with proximal hip gamma nail in place with no disruption. Also left proximal tibia screws in place.     PE left lower extremity:  skin intact, +swelling to distal thigh, no ecchymosis, +ttp, unable to assess ROM due to injury. Moving all toes, sensation intact. DP and PT pulses 2+.    Secondary Survey:  Right hip, knee, ankle and B/L UEs with no swelling, no ecchymoses, no abrasions, no lacerations or any other signs of injury with full painless baseline ROM and no bony TTP. Sensation intact distally, moving all digits. Distal pulses intact.     Assessment:  82y Female with a left distal femur fracture    Plan:  patient placed in bulky tello w/ knee immobilizer  Pt and family advised that surgical fixation of left distal femur fracture with ORIF is necessary.  Surgical procedure discussed in detail with pt and family including all R/B/As; Pt/Pts family expressed understanding and consents for surgery obtained.  Admit to Medical service for optimization and medical clearance.  f/u medical clearance.  f/u labs/imaging.  Complete bedrest.  Pain control.  NPO and hold chemical anticoagulation after midnight for possible OR tomorrow pending medical optimization and clearance as well as OR time availability.   DVT ppx    Case discussed with  who agrees with plan.

## 2022-09-14 NOTE — H&P ADULT - NSHPPHYSICALEXAM_GEN_ALL_CORE
Vital Signs Last 24 Hrs  T(C): 36.7 (14 Sep 2022 09:27), Max: 37.2 (13 Sep 2022 14:00)  T(F): 98 (14 Sep 2022 09:27), Max: 99 (13 Sep 2022 14:00)  HR: 69 (14 Sep 2022 09:27) (69 - 81)  BP: 177/73 (14 Sep 2022 09:27) (120/56 - 177/73)  BP(mean): 69 (14 Sep 2022 09:27) (69 - 69)  RR: 17 (14 Sep 2022 09:27) (16 - 189)  SpO2: 98% (14 Sep 2022 09:27) (97% - 99%)    Parameters below as of 14 Sep 2022 09:27  Patient On (Oxygen Delivery Method): room air    Secondary Survey:   General: NAD, cachetic  HEENT: Normocephalic, atraumatic. no scalp lacerations   Neck: Soft, midline trachea. no c-spine tenderness  Chest: No chest wall tenderness, no subcutaneous emphysema   Cardiac: S1, S2, RRR  Respiratory: Bilateral breath sounds, clear and equal bilaterally  Abdomen: Soft, non-distended, non-tender, no rebound, no guarding.  Groin: Normal appearing, pelvis stable   Ext:  Moving b/l upper and lower extremities. But will not extend LLEx d/t pain. Palpable Radial b/l UE, b/l DP palpable in LE. Rt ankle chronic wound wrapped in kerlix  Back: No T/L/S spine tenderness, No palpable runoff/stepoff/deformity

## 2022-09-14 NOTE — CONSULT NOTE ADULT - ASSESSMENT
ASSESSMENT:  82yF w/ PMHx of HTN, RA, OA, colitis,  Gout, HLD, PAD on ASA/Prasugrel, revascularization of RLE, liver cirrhosis, with recent hospitalization for GI bleed/hematemesis s/p variceal banding seen as Trauma Consult s/p ground level fall. -HT, -LOC, +ASA/Prasugrel. SICU consult called for assessment for stepdown status due to extensive medical history; patient has recent hospitalization for GI bleed/hematemesis s/p variceal banding,  hx liver cirrhosis, and currently on ASA/prasugrel    PLAN:   Neuro: Hx of depression/anxiety   #acute pain  -acetaminophen     Tablet .. 650 milliGRAM(s) Oral every 6 hours PRN Mild Pain (1 - 3)  DULoxetine 60 milliGRAM(s) Oral daily  ketorolac   Injectable 30 milliGRAM(s) IV Push every 8 hours PRN Moderate Pain (4 - 6)        Resp: No chronic dx  on RA, sating well  encourage IS     MSK:   #L distal femur fx  -Ortho consulted. Placed in bulky tello with knee immobilizer. Surgical fixation of left distal femur fracture with ORIF will be needed; possible OR tomorrow. Hold DVT PPX after MN  -bedrest       Cardiovascular:   #HTN   Consult placed for pre-surgical clearance. Moderate risk patient for a Moderate risk surgery/procedure. No further cardiac work-up required.   -Amlodipine 5mg PO  -Carvedilol 6.25 mg PO q 12  -EKG ordered      Gastrointestinal/Nutrition:   -NPO after midnight.  -Protonix 40mg for ppx   -f/u BMP 16:00    :   -Continue monitoring urine output    Hematologic:     DVT prophylaxis-ASA 81mg PO daily, heparin 5000 U subq q 8   Ferrous sulfate 325 mg PO daily     Labs: Hb/Hct:  7.4/24.2  -->,  8.0/26.1  -->                      Plts:  136  -->,  185  -->                 PTT/INR:  32.4/1.16  --->       Home Rx: Plavix 75 mg oral tablet: 1 tab(s) orally once a day   -F/U 16:00 CBC and PT/PTT/INR  T&S Expires:       Lines/Tubes: PIV   Disposition: Stepdown. D/w Dr. Izquierdo  ASSESSMENT:  82yF w/ PMHx of HTN, RA, OA, colitis,  Gout, HLD, PAD on ASA/Prasugrel, revascularization of RLE, liver cirrhosis, with recent hospitalization for GI bleed/hematemesis s/p variceal banding seen as Trauma Consult s/p ground level fall. -HT, -LOC, +ASA/Prasugrel. SICU consult called for assessment for stepdown status due to extensive medical history; patient has recent hospitalization for GI bleed/hematemesis s/p variceal banding,  hx liver cirrhosis, and currently on ASA/prasugrel    PLAN:   Neuro: Hx of depression/anxiety   #acute pain  -acetaminophen     Tablet .. 650 milliGRAM(s) Oral every 6 hours PRN Mild Pain (1 - 3)  DULoxetine 60 milliGRAM(s) Oral daily  ketorolac   Injectable 30 milliGRAM(s) IV Push every 8 hours PRN Moderate Pain (4 - 6)        Resp: No chronic dx  on RA, sating well  encourage IS     MSK:   #L distal femur fx  -Ortho consulted. Placed in bulky tello with knee immobilizer. Surgical fixation of left distal femur fracture with ORIF will be needed; possible OR tomorrow. Hold DVT PPX after MN  -bedrest       Cardiovascular:   #HTN   Consult placed for pre-surgical clearance. Moderate risk patient for a Moderate risk surgery/procedure. No further cardiac work-up required.   -Amlodipine 5mg PO  -Carvedilol 6.25 mg PO q 12  -EKG ordered      Gastrointestinal/Nutrition:   -NPO after midnight.  -Protonix 40mg for ppx   #liver cirrhosis  -LFTs ordered   -f/u BMP 16:00      :   -Continue monitoring urine output    Hematologic:     DVT prophylaxis-ASA 81mg PO daily, heparin 5000 U subq q 8   Ferrous sulfate 325 mg PO daily     Labs: Hb/Hct:  7.4/24.2  -->,  8.0/26.1  -->                      Plts:  136  -->,  185  -->                 PTT/INR:  32.4/1.16  --->       Home Rx: Plavix 75 mg oral tablet: 1 tab(s) orally once a day   -F/U 16:00 CBC and PT/PTT/INR  T&S Expires:       Lines/Tubes: PIV   Disposition: Stepdown. D/w Dr. Izquierdo  ASSESSMENT:  82yF w/ PMHx of HTN, RA, OA, colitis,  Gout, HLD, PAD on ASA/Prasugrel, revascularization of RLE, liver cirrhosis, with recent hospitalization for GI bleed/hematemesis s/p variceal banding seen as Trauma Consult s/p ground level fall. -HT, -LOC, +ASA/Prasugrel. SICU consult called for assessment for stepdown status due to extensive medical history; patient has recent hospitalization for GI bleed/hematemesis s/p variceal banding,  hx liver cirrhosis, and currently on ASA/prasugrel    PLAN:   Neuro: Hx of depression/anxiety   #acute pain  -acetaminophen     Tablet .. 650 milliGRAM(s) Oral every 6 hours PRN Mild Pain (1 - 3)  DULoxetine 60 milliGRAM(s) Oral daily  ketorolac   Injectable 30 milliGRAM(s) IV Push every 8 hours PRN Moderate Pain (4 - 6)        Resp: No chronic dx  on RA, sating well  encourage IS     MSK:   #L distal femur fx  -Ortho consulted. Placed in bulky tello with knee immobilizer. Surgical fixation of left distal femur fracture with ORIF will be needed; possible OR tomorrow. Hold DVT PPX after MN  -bedrest       Cardiovascular:   #HTN   Consult placed for pre-surgical clearance. Moderate risk patient for a Moderate risk surgery/procedure. No further cardiac work-up required.   -Amlodipine 5mg PO  -Carvedilol 6.25 mg PO q 12  -EKG ordered      Gastrointestinal/Nutrition:   -NPO after midnight.  -Protonix 40mg for ppx   #liver cirrhosis  -LFTs ordered f/u 16:00  -f/u BMP 16:00      :   -Continue monitoring urine output    Hematologic:     DVT prophylaxis-ASA 81mg PO daily, heparin 5000 U subq q 8   Ferrous sulfate 325 mg PO daily     Labs: Hb/Hct:  7.4/24.2  -->,  8.0/26.1  -->                      Plts:  136  -->,  185  -->                 PTT/INR:  32.4/1.16  --->       Home Rx: Plavix 75 mg oral tablet: 1 tab(s) orally once a day   -F/U 16:00 CBC and PT/PTT/INR  T&S Expires:       Lines/Tubes: PIV   Disposition: Stepdown. D/w Dr. Izquierdo  ASSESSMENT:  82yF w/ PMHx of HTN, RA, OA, colitis,  Gout, HLD, PAD on ASA/Prasugrel, revascularization of RLE, liver cirrhosis, with recent hospitalization for GI bleed/hematemesis s/p variceal banding seen as Trauma Consult s/p ground level fall. -HT, -LOC, +ASA/Prasugrel. SICU consult called for assessment for stepdown status due to extensive medical history; patient has recent hospitalization for GI bleed/hematemesis s/p variceal banding,  hx liver cirrhosis, and currently on ASA/prasugrel    PLAN:   Neuro: Hx of depression/anxiety   #acute pain  -acetaminophen     Tablet .. 650 milliGRAM(s) Oral every 6 hours PRN Mild Pain (1 - 3)  DULoxetine 60 milliGRAM(s) Oral daily  ketorolac   Injectable 30 milliGRAM(s) IV Push every 8 hours PRN Moderate Pain (4 - 6)        Resp: No chronic dx  on RA, sating well  encourage IS     MSK:   #L distal femur fx  -Ortho consulted. Placed in bulky tello with knee immobilizer. Surgical fixation of left distal femur fracture with ORIF will be needed; possible OR tomorrow. Hold DVT PPX after MN  -bedrest       Cardiovascular:   #HTN   Consult placed for pre-surgical clearance. Moderate risk patient for a Moderate risk surgery/procedure. No further cardiac work-up required.   -Amlodipine 5mg PO  -Carvedilol 6.25 mg PO q 12  -EKG ordered      Gastrointestinal/Nutrition:   -NPO after midnight.  -Protonix 40mg for ppx   #liver cirrhosis  -LFTs  Alb: 3.0 g/dL / Pro: 6.0 g/dL / ALK PHOS: 112 U/L / ALT: 11 U/L / AST: 22 U/L / GGT: x          f/u 16:00  -f/u BMP 16:00      :   -Continue monitoring urine output    Hematologic:     DVT prophylaxis-ASA 81mg PO daily, heparin 5000 U subq q 8   Ferrous sulfate 325 mg PO daily     Labs: Hb/Hct:  7.4/24.2  -->,  8.0/26.1  -->                      Plts:  136  -->,  185  -->                 PTT/INR:  32.4/1.16  --->       Home Rx: Plavix 75 mg oral tablet: 1 tab(s) orally once a day   -F/U 16:00 CBC and PT/PTT/INR  T&S Expires:       Lines/Tubes: PIV   Disposition: Stepdown. D/w Dr. Izquierdo  ASSESSMENT:  82yF w/ PMHx of HTN, RA, OA, colitis,  Gout, HLD, PAD on ASA/Prasugrel, revascularization of RLE, liver cirrhosis, with recent hospitalization for GI bleed/hematemesis s/p variceal banding seen as Trauma Consult s/p ground level fall. -HT, -LOC, +ASA/Prasugrel. SICU consult called for assessment for stepdown status due to extensive medical history; patient has recent hospitalization for GI bleed/hematemesis s/p variceal banding,  hx liver cirrhosis, and currently on ASA/prasugrel    PLAN:   Neuro: Hx of depression/anxiety   #acute pain  -acetaminophen     Tablet .. 650 milliGRAM(s) Oral every 6 hours PRN Mild Pain (1 - 3)  DULoxetine 60 milliGRAM(s) Oral daily  ketorolac   Injectable 30 milliGRAM(s) IV Push every 8 hours PRN Moderate Pain (4 - 6)        Resp: No chronic dx  on RA, sating well  encourage IS     MSK:   #L distal femur fx  -Ortho consulted. Placed in bulky tello with knee immobilizer. Surgical fixation of left distal femur fracture with ORIF will be needed; possible OR tomorrow. Hold DVT PPX after MN  -bedrest       Cardiovascular:   #HTN   Consult placed for pre-surgical clearance. Moderate risk patient for a Moderate risk surgery/procedure. No further cardiac work-up required.   -Amlodipine 5mg PO  -Carvedilol 6.25 mg PO q 12  -EKG ordered      Gastrointestinal/Nutrition:   -NPO after midnight.  -Protonix 40mg for ppx   #liver cirrhosis  -LFTs  Alb: 3.0 g/dL / Pro: 6.0 g/dL / ALK PHOS: 112 U/L / ALT: 11 U/L / AST: 22 U/L / GGT: x          f/u 16:00  -f/u BMP 16:00      :   -Continue monitoring urine output    Hematologic:     DVT prophylaxis, heparin 5000 U subq q 8   Ferrous sulfate 325 mg PO daily     Labs: Hb/Hct:  7.4/24.2  -->,  8.0/26.1  -->                      Plts:  136  -->,  185  -->                 PTT/INR:  32.4/1.16  --->       Home Rx: Plavix 75 mg oral tablet: 1 tab(s) orally once a day; also on Prasugrel, last dose before operation 9/9  D/c ASA   -F/U 16:00 CBC and PT/PTT/INR  T&S Expires:       Lines/Tubes: PIV   Disposition: Stepdown. D/w Dr. Izquierdo  ASSESSMENT:  82yF w/ PMHx of HTN, RA, OA, colitis,  Gout, HLD, PAD on ASA/Prasugrel, revascularization of RLE, liver cirrhosis, with recent hospitalization for GI bleed/hematemesis s/p variceal banding seen as Trauma Consult s/p ground level fall. -HT, -LOC, +ASA/Prasugrel. SICU consult called for assessment for stepdown status due to extensive medical history; patient has recent hospitalization for GI bleed/hematemesis s/p variceal banding,  hx liver cirrhosis, and currently on ASA/prasugrel    PLAN:   Neuro: Hx of depression/anxiety   #acute pain  -acetaminophen     Tablet .. 650 milliGRAM(s) Oral every 6 hours PRN Mild Pain (1 - 3)  DULoxetine 60 milliGRAM(s) Oral daily  ketorolac   Injectable 30 milliGRAM(s) IV Push every 8 hours PRN Moderate Pain (4 - 6)        Resp: No chronic dx  on RA, sating well  encourage IS     MSK:   #L distal femur fx  -Ortho consulted. Placed in bulky tello with knee immobilizer. Surgical fixation of left distal femur fracture with ORIF will be needed; possible OR tomorrow. Hold DVT PPX after MN  -bedrest       Cardiovascular:   #HTN   Consult placed for pre-surgical clearance. Moderate risk patient for a Moderate risk surgery/procedure. No further cardiac work-up required.   -Amlodipine 5mg PO  -Carvedilol 6.25 mg PO q 12  -EKG ordered      Gastrointestinal/Nutrition:   -NPO after midnight.  -Protonix 40mg for ppx   #liver cirrhosis  -LFTs  Alb: 3.0 g/dL / Pro: 6.0 g/dL / ALK PHOS: 112 U/L / ALT: 11 U/L / AST: 22 U/L / GGT: x          f/u 16:00  -f/u BMP 16:00      :   -Continue monitoring urine output    Hematologic:     DVT prophylaxis, heparin 5000 U subq q 8   Ferrous sulfate 325 mg PO daily     Labs: Hb/Hct:  7.4/24.2  -->,  8.0/26.1  -->                      Plts:  136  -->,  185  -->                 PTT/INR:  32.4/1.16  --->       Home Rx: Plavix 75 mg oral tablet: 1 tab(s) orally once a day; also on Prasugrel, last dose before operation 9/9  D/c ASA due to bleeding risk  -F/U 16:00 CBC and PT/PTT/INR  T&S Expires:       Lines/Tubes: PIV   Disposition: Stepdown. D/w Dr. Izquierdo and Dr. Soares from trauma team

## 2022-09-14 NOTE — H&P ADULT - ATTENDING COMMENTS
pt with hx cirrhosis presents with femur fx  admit, analgesia for pain from trauma, ortho for repair  medical risk strat and optimization in the setting of significant chronic disease femur fx  admit, analgesia for pain from trauma, ortho for repair  medical risk strat and optimization in the setting of significant chronic disease

## 2022-09-14 NOTE — ED PROVIDER NOTE - NS ED ATTENDING STATEMENT MOD
This was a shared visit with the [facility]. I reviewed and verified the documentation and independently performed the documented:

## 2022-09-14 NOTE — H&P ADULT - NSHPLABSRESULTS_GEN_ALL_CORE
Labs:                     8.0    6.50  )-----------( 185      ( 14 Sep 2022 06:00 )             26.1       Auto Neutrophil %: 81.9 % (09-14-22 @ 06:00)  Auto Immature Granulocyte %: 0.5 % (09-14-22 @ 06:00)    09-14  136  |  102  |  15  ----------------------------<  122<H>  4.8   |  24  |  0.8    Calcium, Total Serum: 8.6 mg/dL (09-14-22 @ 06:00)    LFTs:  6.0  | 0.4  | 22       ------------------[112     ( 14 Sep 2022 06:00 )  3.0  | x    | 11           Coags:   13.30  ----< 1.16    ( 14 Sep 2022 06:00 )     32.4            RADIOLOGY & ADDITIONAL STUDIES:  --------------------------------------------------------------------------------------- Labs:                     8.0    6.50  )-----------( 185      ( 14 Sep 2022 06:00 )             26.1       Auto Neutrophil %: 81.9 % (09-14-22 @ 06:00)  Auto Immature Granulocyte %: 0.5 % (09-14-22 @ 06:00)    09-14  136  |  102  |  15  ----------------------------<  122<H>  4.8   |  24  |  0.8    Calcium, Total Serum: 8.6 mg/dL (09-14-22 @ 06:00)    LFTs:  6.0  | 0.4  | 22       ------------------[112     ( 14 Sep 2022 06:00 )  3.0  | x    | 11           Coags:   13.30  ----< 1.16    ( 14 Sep 2022 06:00 )     32.4      RADIOLOGY & ADDITIONAL STUDIES:  < from: Xray Chest 1 View AP/PA (09.14.22 @ 06:07) >  Impression:  No radiographic evidence of acute cardiopulmonary disease.  --- End of Report ---  < end of copied text >    < from: Xray Pelvis AP only (09.14.22 @ 06:07) >  Impression:  No evidence of acute fracture.  < end of copied text >    < from: Xray Femur 2 Views, Left (09.14.22 @ 06:07) >  impression:  There is a complex impacted displaced fracture distal femoral shaft. Bones are osteopenic. Left femoral gamma nail fixation. Degenerative changes of the left knee. Surgical screws are noted within the proximal tibia.  --- End of Report ---  < end of copied text >    < from: CT Head No Cont (09.14.22 @ 06:39) >  Impression:  CT head: No evidence of acute intracranial abnormality.  CT cervical spine: No evidence of acute fracture of the cervical spine.  < end of copied text >    < from: CT Abdomen and Pelvis w/ IV Cont (09.14.22 @ 06:39) >  IMPRESSION:  1. No evidence of acute traumatic intrathoracic or intra-abdominal pathology.  2. Left femoral shaft fracture on  film.  3. Liver cirrhosis with portal hypertension.  4. Stable small bilateral pleural effusions.  --- End of Report ---  < end of copied text >    < from: Xray Tibia + Fibula 2 Views, Left (09.14.22 @ 07:21) >  Impression: There is a complex impacted displaced fracture distal femoral shaft. Bones are osteopenic. Left femoral gamma nail fixation. Degenerative changes of the left knee. Surgical screws are noted within the proximal tibia.  < end of copied text >  ---------------------------------------------------------------------------------------

## 2022-09-14 NOTE — ED PROVIDER NOTE - NS ED ROS FT
Constitutional: (-) fever  Eyes/ENT: (-) blurry vision, (-) epistaxis  Cardiovascular: (-) chest pain, (-) syncope  Respiratory: (-) cough, (-) shortness of breath  Gastrointestinal: (-) vomiting, (-) diarrhea  Musculoskeletal: (-) neck pain, (-) back pain, + joint pain  Integumentary: (-) rash, (-) edema  Neurological: (-) headache, (-) altered mental status  Psychiatric: (-) hallucinations  Allergic/Immunologic: (-) pruritus

## 2022-09-15 NOTE — ED ADULT NURSE REASSESSMENT NOTE - NS ED NURSE REASSESS COMMENT FT1
Notified MD [phone] regarding pts NPO status - MD states pt should be NPO except for meds - advised MD pt did not receive am meds and bp is elevated - MD will change order and bp meds to be ordered.  Will continue to assess and monitor no further interventions at this time.

## 2022-09-15 NOTE — PRE PROCEDURE NOTE - PRE PROCEDURE EVALUATION
ORTHOPEDIC SURGERY PRE OP NOTE      Diagnosis:left distal femur fx    Planned Procedure: orif     Consent: TO BE OBTAINED BY ATTENDING                   Risks/benefits/alternatives were discussed with the patient/family and they wish to proceed with surgery.     pt has capacity to sign her own consent   ANTICIPATED DATE OF PROCEDURE : 9/15  SCHEDULED CASE OR ADD-ON CASE: addon       Consent: pt has capacity     Clearances:   [***] cards cleared   [***] Other:    T(C): 36.6 (09-15-22 @ 10:30), Max: 36.7 (09-14-22 @ 13:00)  HR: 68 (09-15-22 @ 10:30) (61 - 69)  BP: 153/66 (09-15-22 @ 10:30) (122/59 - 189/77)  RR: 16 (09-15-22 @ 10:30) (14 - 18)  SpO2: 100% (09-15-22 @ 10:30) (95% - 100%)    Labs:                        6.8    5.84  )-----------( 178      ( 15 Sep 2022 00:15 )             22.2     09-14    135  |  103  |  14  ----------------------------<  108<H>  4.8   |  25  |  0.8    Ca    8.0<L>      14 Sep 2022 17:40    TPro  5.4<L>  /  Alb  2.4<L>  /  TBili  0.3  /  DBili  <0.2  /  AST  20  /  ALT  11  /  AlkPhos  99  09-14    PT/INR - ( 15 Sep 2022 07:29 )   PT: 12.90 sec;   INR: 1.12 ratio         PTT - ( 15 Sep 2022 07:29 )  PTT:32.5 sec  Type and Screen X 2:    COVID-19 PCR: NotDetec (09 Sep 2022 11:30)    [ ]Pregnancy test ( if female of childbearing age) : ***  [ ]EKG: on chart   [ ]CXR: done       DIET: NPO after midnight  IVF: per primary team      ANTICOAGULATION STATUS ( include name of anticoagulant) :  [***] CONTINUE (**name of anticoagulant) sq hep   [***] DISCONTINUE(** name of anticoagulant)                                           A/P: Patient is a 82y y/o Female Pending ****** tomorrow  [x] 1 prbc 9/14 8am  [x ] -NPO and IVF 11am had breakfast at 9am   [ ]pain control/analgesia prn per primary team   [ ]Incentive Spirometry   [ ]F/U Clearance cards cleared   [ ]F/U Pending Labs post transfusion labs 9/14  [ ]Notify Ortho with any questions- spectra [id number]    [ ]DISCUSSED WITH PRIMARY TEAM MEMBER (name of team member): ****[id number]   [ ]Date and Time DISCUSSED WITH PRIMARY TEAM MEMBER: **** trauma team

## 2022-09-16 NOTE — BRIEF OPERATIVE NOTE - SPECIMENS
Subjective:   CHIEF COMPLAINT / HPI:  78year old male admitted with covid pneumonia. His condition has worsened since then . He is requiring more and more o2 and currently on bith airvo and 100% bnrb mask.       Past Medical History:  Past Medical History:   Diagnosis Date    Hyperlipidemia     Hypertension        Past Surgical History:        Procedure Laterality Date    APPENDECTOMY      COLONOSCOPY  3-25-13    polyp,s x 5    HERNIA REPAIR         Current Medications:    Current Facility-Administered Medications: LORazepam (ATIVAN) injection 1 mg, 1 mg, IntraVENous, Q4H PRN  piperacillin-tazobactam (ZOSYN) 3,375 mg in dextrose 5 % 50 mL IVPB extended infusion (mini-bag), 3,375 mg, IntraVENous, Q8H  albuterol sulfate  (90 Base) MCG/ACT inhaler 2 puff, 2 puff, Inhalation, BID **AND** ipratropium (ATROVENT HFA) 17 MCG/ACT inhaler 2 puff, 2 puff, Inhalation, BID  lubrifresh P.M. (artificial tears) ophthalmic ointment, , Both Eyes, Q2H PRN  ibuprofen (ADVIL;MOTRIN) tablet 600 mg, 600 mg, Oral, Q8H PRN  insulin glargine (LANTUS) injection vial 10 Units, 10 Units, SubCUTAneous, Nightly  zinc sulfate (ZINCATE) capsule 50 mg, 50 mg, Oral, Daily  melatonin tablet 3 mg, 3 mg, Oral, Nightly  ascorbic acid (VITAMIN C) tablet 1,000 mg, 1,000 mg, Oral, Daily  baricitinib (OLUMIANT) tablet 4 mg, 4 mg, Oral, Daily  dexamethasone (PF) (DECADRON) injection 10 mg, 10 mg, IntraVENous, Q24H  insulin lispro (HUMALOG) injection vial 0-12 Units, 0-12 Units, SubCUTAneous, TID WC  insulin lispro (HUMALOG) injection vial 0-6 Units, 0-6 Units, SubCUTAneous, Nightly  glucose (GLUTOSE) 40 % oral gel 15 g, 15 g, Oral, PRN  dextrose 50 % IV solution, 12.5 g, IntraVENous, PRN  glucagon (rDNA) injection 1 mg, 1 mg, IntraMUSCular, PRN  dextrose 5 % solution, 100 mL/hr, IntraVENous, PRN  sodium chloride flush 0.9 % injection 5-40 mL, 5-40 mL, IntraVENous, 2 times per day  sodium chloride flush 0.9 % injection 5-40 mL, 5-40 mL, IntraVENous, PRN  0.9 % sodium chloride infusion, 25 mL, IntraVENous, PRN  enoxaparin (LOVENOX) injection 30 mg, 30 mg, SubCUTAneous, BID  ondansetron (ZOFRAN-ODT) disintegrating tablet 4 mg, 4 mg, Oral, Q8H PRN **OR** ondansetron (ZOFRAN) injection 4 mg, 4 mg, IntraVENous, Q6H PRN  polyethylene glycol (GLYCOLAX) packet 17 g, 17 g, Oral, Daily PRN  acetaminophen (TYLENOL) tablet 650 mg, 650 mg, Oral, Q6H PRN **OR** acetaminophen (TYLENOL) suppository 650 mg, 650 mg, Rectal, Q6H PRN  guaiFENesin-dextromethorphan (ROBITUSSIN DM) 100-10 MG/5ML syrup 5 mL, 5 mL, Oral, Q4H PRN  potassium chloride (KLOR-CON M) extended release tablet 40 mEq, 40 mEq, Oral, PRN **OR** potassium bicarb-citric acid (EFFER-K) effervescent tablet 40 mEq, 40 mEq, Oral, PRN **OR** potassium chloride 10 mEq/100 mL IVPB (Peripheral Line), 10 mEq, IntraVENous, PRN  famotidine (PEPCID) tablet 20 mg, 20 mg, Oral, BID    No Known Allergies    Social History:    Social History     Socioeconomic History    Marital status:      Spouse name: None    Number of children: None    Years of education: None    Highest education level: None   Occupational History    None   Tobacco Use    Smoking status: Former Smoker     Packs/day: 1.00     Years: 30.00     Pack years: 30.00     Start date: 1991    Smokeless tobacco: Never Used   Substance and Sexual Activity    Alcohol use: Yes     Comment: occasional drinker    Drug use: No    Sexual activity: None   Other Topics Concern    None   Social History Narrative    None     Social Determinants of Health     Financial Resource Strain:     Difficulty of Paying Living Expenses:    Food Insecurity:     Worried About Running Out of Food in the Last Year:     Ran Out of Food in the Last Year:    Transportation Needs:     Lack of Transportation (Medical):      Lack of Transportation (Non-Medical):    Physical Activity:     Days of Exercise per Week:     Minutes of Exercise per Session:    Stress: None  Feeling of Stress :    Social Connections:     Frequency of Communication with Friends and Family:     Frequency of Social Gatherings with Friends and Family:     Attends Mosque Services:     Active Member of Clubs or Organizations:     Attends Club or Organization Meetings:     Marital Status:    Intimate Partner Violence:     Fear of Current or Ex-Partner:     Emotionally Abused:     Physically Abused:     Sexually Abused:        Family History:   History reviewed. No pertinent family history. Immunization:    There is no immunization history on file for this patient. REVIEW OF SYSTEMS:    CONSTITUTIONAL:  negative for fevers, chills, diaphoresis, activity change, appetite change, fatigue, night sweats and unexpected weight change. EYES:  negative for blurred vision, eye discharge, visual disturbance and icterus  HEENT:  negative for hearing loss, tinnitus, ear drainage, sinus pressure, nasal congestion, epistaxis and snoring  RESPIRATORY:  See HPI  CARDIOVASCULAR:  negative for chest pain, palpitations, exertional chest pressure/discomfort, edema, syncope  GASTROINTESTINAL:  negative for nausea, vomiting, diarrhea, constipation, blood in stool and abdominal pain  GENITOURINARY:  negative for frequency, dysuria and hematuria  HEMATOLOGIC/LYMPHATIC:  negative for easy bruising, bleeding and lymphadenopathy  ALLERGIC/IMMUNOLOGIC:  negative for recurrent infections, angioedema, anaphylaxis and drug reactions  ENDOCRINE:  negative for weight changes and diabetic symptoms including polyuria, polydipsia and polyphagia    MUSCULOSKELETAL:  negative for  pain, joint swelling, decreased range of motion and muscle weakness  NEUROLOGICAL:  negative for headaches, slurred speech, unilateral weakness  PSYCHIATRIC/BEHAVIORAL: negative for hallucinations, behavioral problems, confusion and agitation.      Objective:   PHYSICAL EXAM:      VITALS:    Vitals:    10/18/21 0820 10/18/21 0826 10/18/21 1125 10/18/21 1131   BP:  (!) 155/89     Pulse:  81 64 67   Resp: 23 20 29 26   Temp:  98.2 °F (36.8 °C) 98.1 °F (36.7 °C)    TempSrc:   Axillary    SpO2: 95% 93% (!) 87% 93%   Weight:       Height:             CONSTITUTIONAL:  awake, alert, cooperative, no apparent distress, and appears stated age  NECK:  Supple, symmetrical, trachea midline, no adenopathy, thyroid symmetric, not enlarged and no tenderness  CHEST: Chest expansion equal and symmetrical, no intercostal retraction. LUNGS:  no increased work of breathing, has expiratory wheezes both lungs, no crackles. CARDIOVASCULAR: S1 and S2, no edema and no JVD  ABDOMEN:  normal bowel sounds, non-distended and no masses palpated, and no tenderness to palpation. No hepatospleenomegaly  LYMPHADENOPATHY:  no axillary or supraclavicular adenopathy. No cervical adnenopathy  PSYCHIATRIC: Oriented to person place and time. No obvious depression or anxiety. MUSCULOSKELETAL: No obvious misalignment or effusion of the joints. No clubbing, cyanosis of the digits. RIGHT AND LEFT LOWER EXTREMITIES: No edema, no inflammation, no tenderness. SKIN:  normal skin color, texture, turgor and no redness, warmth, or swelling.  No palpable nodules    DATA:                 EXAMINATION:   ONE XRAY VIEW OF THE CHEST       10/14/2021 6:06 am       COMPARISON:   Chest radiograph dated October 10, 2021       HISTORY:   ORDERING SYSTEM PROVIDED HISTORY: chest pain   TECHNOLOGIST PROVIDED HISTORY:   Reason for exam:->chest pain   Reason for Exam: chest pain   Acuity: Acute   Type of Exam: Initial   Mechanism of Injury: chest pain   Relevant Medical/Surgical History: chest pain       FINDINGS:   Cardiomediastinal silhouette is stable.  Bilateral airspace opacities are   seen with interval worsening the left base.  There is no pneumothorax or   pleural effusion.           Impression   Bilateral airspace opacities are seen with slight interval worsening.           Order History    Open Order Details PACS Images     Show images for XR CHEST PORTABLE         abg 7.45/27/68      Assessment:     1. Ac hypoxemic resp failure  2. covid pneumonia  3. Bact pneumonia  4. Ac bronchospasm          Plan:     1. Adequate o2 adm  2. Cont FM and airvo together. 3. bipap if needed  4. Observe closely as may need vent support  5. On decadron and baricitinab  6. On rocephin and doxycycline  7. Thanks will follow  8.  Check cxr

## 2022-09-16 NOTE — BRIEF OPERATIVE NOTE - NSICDXBRIEFPROCEDURE_GEN_ALL_CORE_FT
PROCEDURES:  Open treatment, fracture, femur, supracondylar, with internal fixation 16-Sep-2022 13:36:41 LEFT LEG Mark Bradford   PROCEDURES:  Open treatment, fracture, femur, supracondylar, with internal fixation 16-Sep-2022 13:36:41 Left distal femur below retained short cephalomedulary nail, CPT code 22637 Mark Bradford  Removal of deeply implanted hardware 16-Sep-2022 16:14:15 CPT code 85363 Chirag Sam

## 2022-09-16 NOTE — BRIEF OPERATIVE NOTE - NSICDXBRIEFPOSTOP_GEN_ALL_CORE_FT
POST-OP DIAGNOSIS:  Supracondylar fracture of left femur 16-Sep-2022 13:37:32  Mark Bradford   POST-OP DIAGNOSIS:  Supracondylar fracture of left femur 16-Sep-2022 13:37:32 below retained short cephalomedullary implant Mark Bradford

## 2022-09-16 NOTE — BRIEF OPERATIVE NOTE - NSICDXBRIEFPREOP_GEN_ALL_CORE_FT
PRE-OP DIAGNOSIS:  Supracondylar fracture of left femur 16-Sep-2022 13:37:09  Mark Bradford   PRE-OP DIAGNOSIS:  Supracondylar fracture of left femur 16-Sep-2022 13:37:09 below retained short cephalomedullary implant Mark Bradford

## 2022-09-16 NOTE — CHART NOTE - NSCHARTNOTEFT_GEN_A_CORE
May proceed with the planned hip fracture surgery even if the echocardiogram could not be done prior to the surgery. Please see the consult note for risk stratification and lilly-operative risk assessment.

## 2022-09-16 NOTE — BRIEF OPERATIVE NOTE - OPERATION/FINDINGS
Left distal third femur fracture, comminuted, spiral pattern. Patient had prior Intertan short hip nail, the distal interlocking screw was removed intra-op. Synthes lateral femoral plate with locking and non-locking screws. Left distal third femur fracture, comminuted, spiral pattern. Patient had prior Intertan short hip nail, the distal interlocking screw was removed intra-op. Synthes lateral femoral plate with locking and non-locking screws.    WBAT for TRANSFERS only. NWB LLE during ambulation. Left distal third femur fracture, comminuted, spiral pattern below prior Intertan short cephalomedullary nail; Post op ABx and DVT ppx per protocol; WBAT for transfers x 4-6 weeks  Dict:  Implants: Synthes 16 hole VA condylar buttress plate with 6 x 5.0 cannulated screws; 5 x 5.0 solid locking screws, 3 5.0mm periprostetic screws; circlage wire    WBAT for TRANSFERS only. NWB LLE during ambulation. Left distal third femur fracture, comminuted, spiral pattern below prior [place] Intertan short cephalomedullary nail; Post op ABx and DVT ppx per protocol; WBAT for transfers x 4-6 weeks  Dict:[id number]  Implants: Synthes 16 hole VA condylar buttress plate with 6 x 5.0 cannulated screws; 5 x 5.0 solid locking screws, 3 5.0mm periprostetic screws; Synthes circlage cable  Explanted: [place] [employer] Intertan interlocking screw    WBAT for TRANSFERS only. NWB LLE during ambulation.

## 2022-09-17 NOTE — PATIENT PROFILE ADULT - INTERNATIONAL TRAVEL
Problem: Occupational Therapy Goal  Goal: Occupational Therapy Goal  Description: Goals to be met by: 01-19-22     Patient will increase functional independence with ADLs by performing:    UE Dressing with Set-up Assistance.=MET  LE Dressing with Stand-by Assistance.  Grooming while standing at sink with Stand-by Assistance. Met  Grooming while standing at sink with modified independence.   Toileting from bedside commode with Stand-by Assistance for hygiene and clothing management.   Supine to sit with [place].  Stand pivot transfers with Stand-by Assistance.  Toilet transfer to bedside commode with Stand-by Assistance. Met to toilet.   Toilet transfer to toilet with modified independence.   Pt. To be I with HEP to BUE to improve level of endurance  Pt. To be able to state 3 energy conservation techniques to incorporate with ADL/IADl task performance..    Outcome: Ongoing, Progressing  Patient's goals are appropriate.     No

## 2022-09-17 NOTE — CONSULT NOTE ADULT - ASSESSMENT
IMPRESSION: Rehab of left distal femur fx, s/p orif     PRECAUTIONS: [   ] Cardiac  [   ] Respiratory  [   ] Seizures [   ] Contact Isolation  [   ] Droplet Isolation  [   ] Other    Weight Bearing Status: NWB LLE    RECOMMENDATION:    Out of Bed to Chair     DVT/Decubiti Prophylaxis    REHAB PLAN:     [  x  ] Bedside P/T 3-5 times a week   [ x ]   Bedside O/T  2-3 times a week             [    ] Speech Therapy               [    ]  No Rehab Therapy Indicated   Conditioning/ROM                                    ADL  Bed Mobility                                               Conditioning/ROM  Transfers                                                     Bed Mobility  Sitting /Standing Balance                         Transfers                                        Gait Training                                               Sitting/Standing Balance  Stair Training [   ]Applicable                    Home equipment Eval                                                                        Splinting  [   ] Only      GOALS:   ADL   [    ]   Independent                    Transfers  [  x  ] Independent                          Ambulation  [  x  ] Independent     [ x    ] With device                            [ x   ]  CG                                                         [    ]  CG                                                                  [    ] CG                            [    ] Min A                                                   [    ] Min A                                                              [    ] Min  A          DISCHARGE PLAN:   [    ]  Good candidate for Intensive Rehabilitation/Hospital based                                             Will tolerate 3hrs Intensive Rehab Daily                                       [  x   ]  Short Term Rehab in Skilled Nursing Facility                                       [     ]  Home with Outpatient or  services                                         [     ]  Possible Candidate for Intensive Hospital based Rehab

## 2022-09-17 NOTE — PATIENT PROFILE ADULT - FALL HARM RISK - HARM RISK INTERVENTIONS
Assistance with ambulation/Assistance OOB with selected safe patient handling equipment/Communicate Risk of Fall with Harm to all staff/Discuss with provider need for PT consult/Monitor gait and stability/Provide patient with walking aids - walker, cane, crutches/Reinforce activity limits and safety measures with patient and family/Sit up slowly, dangle for a short time, stand at bedside before walking/Tailored Fall Risk Interventions/Use of alarms - bed, chair and/or voice tab/Visual Cue: Yellow wristband and red socks/Bed in lowest position, wheels locked, appropriate side rails in place/Call bell, personal items and telephone in reach/Instruct patient to call for assistance before getting out of bed or chair/Non-slip footwear when patient is out of bed/[place] to call system/Physically safe environment - no spills, clutter or unnecessary equipment/Purposeful Proactive Rounding/Room/bathroom lighting operational, light cord in reach

## 2022-09-17 NOTE — CONSULT NOTE ADULT - CONSULT REASON
Assessment for stepdown status due to extensive medical history; patient has recent hospitalization for GI bleed/hematemesis s/p variceal banding,  hx liver cirrhosis, and currently on ASA/prasugrel
Preoperative risk assessment
left femur fx
fall

## 2022-09-17 NOTE — CONSULT NOTE ADULT - SUBJECTIVE AND OBJECTIVE BOX
HPI:  TRAUMA ACTIVATION LEVEL: CONSULT  ACTIVATED BY: ED  INTUBATED: NO    MECHANISM OF INJURY:   [x] Fall	    GCS: 15 	E: 4	V: 5	M: 6    HPI:  82yF w/ PMHx of HTN, RA, Gout, HLD, PAD on ASA/Prasugrel seen as Trauma Consult s/p ground level fall. -HT, -LOC, +ASA/Prasugrel. Patient had recent hospitalization for GI bleed/hematemesis s/p variceal banding. Also PSHx or hip replacement on left side years ago.    Patient states that she was walking with out walker, her daughter called her name and she turned around and lost balance and fell on left side. Patient states that she has pain in her left thigh and cannot stand and walk. patient denies head injury and loss of consciousness. Trauma assessment in ED: ABCs intact , GCS 15 , AAOx3.     PAST MEDICAL & SURGICAL HISTORY:  Hypothyroid  Hypertension  Cirrhosis  RA (rheumatoid arthritis)  High cholesterol  Poor circulation  Incontinence  History of hip surgery, L  H/O: hysterectomy    Allergies  Keflex (Unknown)  losartan (Unknown)  morphine (Unknown)  Rocephin (Unknown)  sulfamethoxazole (Hives)    Home Medications:  allopurinol 100 mg oral tablet: 1 tab(s) orally 2 times a day (09 Sep 2022 14:49)  amLODIPine 5 mg oral tablet: 1 tab(s) orally 2 times a day (09 Sep 2022 14:49)  aspirin 81 mg oral delayed release tablet: 1 tab(s) orally once a day (09 Sep 2022 14:49)  DULoxetine 60 mg oral delayed release capsule: 1 cap(s) orally once a day (09 Sep 2022 14:49)  ferrous sulfate 75 mg (15 mg elemental iron) oral tablet: 1 tab(s) orally once a day (13 Sep 2022 15:33)  folic acid 1 mg oral tablet: 1 tab(s) orally once a day (09 Sep 2022 14:49)  levothyroxine 50 mcg (0.05 mg) oral tablet: 1 tab(s) orally once a day (09 Sep 2022 14:49)  oxybutynin 10 mg/24 hr oral tablet, extended release: 1 tab(s) orally 2 times a day (09 Sep 2022 14:49)    ROS: 10-system review is otherwise negative except HPI above.      Primary Survey:    A - airway intact  B - bilateral breath sounds and good chest rise  C - palpable pulses in all extremities  D - GCS 15 on arrival, [place]  Exposure obtained     S/p left distal femur ORIF for distal femur fx on 9/16/22, NWB LLE as per ortho      PAST MEDICAL & SURGICAL HISTORY:  Hypothyroid      Hypertension      Cirrhosis      RA (rheumatoid arthritis)      High cholesterol      Poor circulation      Incontinence      History of hip surgery      H/O: hysterectomy          Hospital Course:    TODAY'S SUBJECTIVE & REVIEW OF SYMPTOMS:     Constitutional WNL   Cardio WNL   Resp WNL   GI WNL  Heme WNL  Endo WNL  Skin WNL  MSK left leg pain  Neuro WNL  Cognitive WNL  Psych WNL      MEDICATIONS  (STANDING):  acetaminophen     Tablet .. 650 milliGRAM(s) Oral every 6 hours  albuterol/ipratropium for Nebulization 3 milliLiter(s) Nebulizer once  allopurinol 100 milliGRAM(s) Oral two times a day  amLODIPine   Tablet 5 milliGRAM(s) Oral <User Schedule>  carvedilol 6.25 milliGRAM(s) Oral every 12 hours  DULoxetine 60 milliGRAM(s) Oral daily  ferrous    sulfate 325 milliGRAM(s) Oral daily  folic acid 1 milliGRAM(s) Oral daily  heparin   Injectable 5000 Unit(s) SubCutaneous every 8 hours  ketorolac   Injectable 15 milliGRAM(s) IV Push once  lactated ringers Bolus 500 milliLiter(s) IV Bolus once  levothyroxine 50 MICROGram(s) Oral daily  oxybutynin 10 milliGRAM(s) Oral two times a day  pantoprazole    Tablet 40 milliGRAM(s) Oral before breakfast    MEDICATIONS  (PRN):  acetaminophen     Tablet .. 650 milliGRAM(s) Oral every 6 hours PRN Mild Pain (1 - 3)  ketorolac   Injectable 30 milliGRAM(s) IV Push every 8 hours PRN Moderate Pain (4 - 6)  oxyCODONE    IR 5 milliGRAM(s) Oral every 6 hours PRN Moderate Pain (4 - 6)      FAMILY HISTORY:      Allergies    Keflex (Unknown)  losartan (Unknown)  morphine (Unknown)  Rocephin (Unknown)  sulfamethoxazole (Hives)    Intolerances        SOCIAL HISTORY:    [  ] Etoh  [  ] Smoking  [  ] Substance abuse     Home Environment:  [   ] Home Alone  [   ] Lives with Family  [ x  ] Home Health Aid - 24hr/day    Dwelling:  [   ] Apartment  [ x  ] Private House  [   ] Adult Home  [   ] Skilled Nursing Facility      [   ] Short Term  [   ] Long Term  [   ] Stairs       Elevator [   ]    FUNCTIONAL STATUS PTA: (Check all that apply)  Ambulation: [    ]Independent    [ x  ] Dependent     [   ] Non-Ambulatory  Assistive Device: [   ] SA Cane  [   ]  Q Cane  [ x  ] Walker  [   ]  Wheelchair  ADL : [   ] Independent  [ x   ]  Dependent       Vital Signs Last 24 Hrs  T(C): 36.5 (17 Sep 2022 08:00), Max: 36.5 (16 Sep 2022 19:00)  T(F): 97.7 (17 Sep 2022 08:00), Max: 97.7 (16 Sep 2022 19:00)  HR: 65 (17 Sep 2022 12:00) (54 - 92)  BP: 161/88 (17 Sep 2022 12:00) (132/61 - 161/88)  BP(mean): 98 (17 Sep 2022 12:00) (87 - 101)  RR: 19 (17 Sep 2022 12:00) (14 - 37)  SpO2: 99% (17 Sep 2022 12:00) (83% - 100%)    Parameters below as of 17 Sep 2022 12:00  Patient On (Oxygen Delivery Method): nasal cannula  O2 Flow (L/min): 3        PHYSICAL EXAM: Awake & Alert  GENERAL: NAD  HEAD:  Normocephalic  CHEST/LUNG: Clear   HEART: S1S2+  ABDOMEN: Soft, Nontender  EXTREMITIES:  no calf tenderness    NERVOUS SYSTEM:  Cranial Nerves 2-12 intact [   ] Abnormal  [   ]  ROM: WFL all extremities [   ]  Abnormal [ x  ]limited LLE  Motor Strength: WFL all extremities  [   ]  Abnormal [x   ]limited LLE  Sensation: intact to light touch [  x ] Abnormal [   ]    FUNCTIONAL STATUS:  Bed Mobility: Independent [   ]  Supervision [   ]  Needs Assistance [  x ]  N/A [   ]  Transfers: Independent [   ]  Supervision [   ]  Needs Assistance [   ]  N/A [   ]   Ambulation: Independent [   ]  Supervision [   ]  Needs Assistance [   ]  N/A [   ]  ADL: Independent [   ] Requires Assistance [   ] N/A [   ]      LABS:                        12.1   13.24 )-----------( 264      ( 16 Sep 2022 18:48 )             36.4     09-16    131<L>  |  102  |  14  ----------------------------<  125<H>  4.3   |  21  |  0.7    Ca    8.1<L>      16 Sep 2022 18:48  Phos  3.5     09-16  Mg     2.6     09-16      PT/INR - ( 15 Sep 2022 20:28 )   PT: 12.50 sec;   INR: 1.09 ratio         PTT - ( 15 Sep 2022 20:28 )  PTT:31.9 sec      RADIOLOGY & ADDITIONAL STUDIES:

## 2022-09-17 NOTE — PATIENT PROFILE ADULT - NSFALLSECTIONLABEL_GEN_A_CORE
Anesthesia Evaluation     Patient summary reviewed and Nursing notes reviewed   no history of anesthetic complications:  NPO Solid Status: > 8 hours  NPO Liquid Status: > 2 hours           Airway   Mallampati: III  TM distance: >3 FB  Neck ROM: full  No difficulty expected  Dental - normal exam         Pulmonary     breath sounds clear to auscultation  (+) a smoker Current Abstained day of surgery,   (-) asthma, shortness of breath, recent URI  Cardiovascular   Exercise tolerance: good (4-7 METS)    Rhythm: regular  Rate: normal    (-) past MI, CAD, angina, orthopnea, RIVAS, cardiac stents      Neuro/Psych  (+) psychiatric history Depression and Anxiety,     (-) seizures, neuromuscular disease, TIA, CVA  GI/Hepatic/Renal/Endo    (+) obesity, morbid obesity, GERD (only with spicy food) well controlled,    (-) no renal disease, diabetes    Musculoskeletal     Abdominal   (+) obese,    Substance History      OB/GYN          Other   arthritis,      ROS/Med Hx Other: Previous grade IIa view                    Anesthesia Plan    ASA 2     general   (I have reviewed the patient's history with the patient and the chart, including all pertinent laboratory results and imaging. I have explained the risks of anesthesia including but not limited to dental damage, corneal abrasion, nerve injury, MI, stroke, and death.  )  intravenous induction     Anesthetic plan, all risks, benefits, and alternatives have been provided, discussed and informed consent has been obtained with: patient.  Use of blood products discussed with patient .   Plan discussed with CRNA.      
.

## 2022-09-18 NOTE — PHYSICAL THERAPY INITIAL EVALUATION ADULT - ADDITIONAL COMMENTS
Pt. lives in private home alone with 12hr aide M-F. At baseline uses rollator for ambulation. 1 step to enter home.

## 2022-09-19 NOTE — OCCUPATIONAL THERAPY INITIAL EVALUATION ADULT - PERTINENT HX OF CURRENT PROBLEM, REHAB EVAL
82yF w/ PMHx of HTN, RA, Gout, HLD, PAD on ASA/Prasugrel seen as Trauma Consult s/p ground level fall. -HT, -LOC, +ASA/Prasugrel. Patient had recent hospitalization for GI bleed/hematemesis s/p variceal banding. Also PSHx or hip replacement on left side years ago.    Patient states that she was walking with out walker, her daughter called her name and she turned around and lost balance and fell on left side. Patient states that she has pain in her left thigh and cannot stand and walk. patient denies head injury and loss of consciousness. Trauma assessment in ED: ABCs intact , GCS 15 , AAOx3.

## 2022-09-19 NOTE — OCCUPATIONAL THERAPY INITIAL EVALUATION ADULT - NSOTDISCHREC_GEN_A_CORE
Patient requires assistance with activities of daily living. OT recommends D/C to home with assistance vs rehabilitation facility when medically appropriate. Refer to evaluation/treatment for details./Sub-acute Rehab

## 2022-09-21 NOTE — DISCHARGE NOTE PROVIDER - NSDCMRMEDTOKEN_GEN_ALL_CORE_FT
Adult Aspirin 325 mg oral tablet: 1 tab(s) orally once a day MDD:1 tablet  allopurinol 100 mg oral tablet: 1 tab(s) orally 2 times a day  amLODIPine 5 mg oral tablet: 1 tab(s) orally 2 times a day  ciprofloxacin 500 mg oral tablet: 1 tab(s) orally 2 times a day   Coreg 6.25 mg oral tablet: 1 tab(s) orally 2 times a day   DULoxetine 60 mg oral delayed release capsule: 1 cap(s) orally once a day  ferrous sulfate 75 mg (15 mg elemental iron) oral tablet: 1 tab(s) orally once a day  folic acid 1 mg oral tablet: 1 tab(s) orally once a day  levothyroxine 50 mcg (0.05 mg) oral tablet: 1 tab(s) orally once a day  oxybutynin 10 mg/24 hr oral tablet, extended release: 1 tab(s) orally 2 times a day  pantoprazole 40 mg oral delayed release tablet: 1 tab(s) orally 2 times a day   Plavix 75 mg oral tablet: 1 tab(s) orally once a day

## 2022-09-21 NOTE — PROGRESS NOTE ADULT - PROVIDER SPECIALTY LIST ADULT
Orthopedics
Surgery
Surgery
Physiatry
Surgery
Trauma Surgery

## 2022-09-21 NOTE — DISCHARGE NOTE PROVIDER - CARE PROVIDER_API CALL
Chirag Sam)  Orthopaedic Surgery  [address] [place] [address]  Phone: [phone]  Fax: [phone]  Follow Up Time: 2 weeks

## 2022-09-21 NOTE — DISCHARGE NOTE NURSING/CASE MANAGEMENT/SOCIAL WORK - PATIENT PORTAL LINK FT
You can access the FollowMyHealth Patient Portal offered by [facility] by registering at the following website: [email or web address]. By joining [facility]’s FollowMyHealth portal, you will also be able to view your health information using other applications (apps) compatible with our system.

## 2022-09-21 NOTE — PROGRESS NOTE ADULT - ASSESSMENT
82yF w/ PMHx of HTN, RA, Gout, HLD, PAD on ASA/Prasugrel seen as Trauma Consult s/p ground level fall. -HT, -LOC, +ASA/Prasugrel.  Trauma assessment in ED: ABCs intact , GCS 15 , AAOx3. No external signs of trauma, however patient will not straighten LLEx d/t pain.    PLAN:   -PT/Rehab  -Zhang- will remove  -Monitor CXR  -DVT ppx  -Home medications
82yF w/ PMHx of HTN, RA, Gout, HLD, PAD on ASA/Prasugrel seen as Trauma Consult s/p ground level fall. -HT, -LOC, +ASA/Prasugrel.  Trauma assessment in ED: ABCs intact , GCS 15 , AAOx3. No external signs of trauma, however patient will not straighten LLEx d/t pain.    PLAN:   -s/p ORIF   -post operatively hypercapnic, resolved with lassix and bipap  -to stay in pacu overnight and dg to floors in AM  -continue O2 via nasal canula  -monitor vitals closely  -f/u UOP  -AM CXR  -AM ABG  -DVT ppx  -Home medications    
Imp: rehab of left distal femur fx, s/p ORIF, NWB LLE  Plan: continue bedside therapy as tolerated           d/c to STR when medically stable 
82yF w/ PMHx of HTN, RA, Gout, HLD, PAD on ASA/Prasugrel seen as Trauma Consult s/p ground level fall. -HT, -LOC, +ASA/Prasugrel.  Trauma assessment in ED: ABCs intact , GCS 15 , AAOx3. No external signs of trauma, however patient will not straighten LLEx d/t pain.    Injuries identified:   - L femoral shaft fracture    PLAN:   - OR today with orthopedics  -Echocardiogram pending (cards cleared without echocardiogram)  -Cards clearance obtained  -Strict Bedrest  -DVT ppx  -Home medications  -Cleared from trauma perspective for OR with orthopedics    - Code: full   8254
82yF w/ PMHx of HTN, RA, Gout, HLD, PAD on ASA/Prasugrel seen as Trauma Consult s/p ground level fall. -HT, -LOC, +ASA/Prasugrel.  Trauma assessment in ED: ABCs intact , GCS 15 , AAOx3. No external signs of trauma, however patient will not straighten LLEx d/t pain.    Injuries identified:   - L femoral shaft fracture    PLAN:   - Transfuse 1U PRBC, consent for transfusion in chart  -Echocardiogram pending  -Cards clearance obtained  -Planned for OR with orthopedics 9/16  -Strict Bedrest  -DVT ppx  -Home medications    - Code: full   [id number]
ASSESSMENT:  82yF w/ PMHx of HTN, RA, Gout, HLD, PAD on ASA/Prasugrel seen as Trauma Consult s/p ground level fall. -HT, -LOC, +ASA/Prasugrel.  Trauma assessment in ED: ABCs intact , GCS 15 , AAOx3. No external signs of trauma, however patient will not straighten LLEx d/t pain.    PLAN:  - Pain control   - Encourage ambulation   - Dispo planning     [id number]
ASSESSMENT:  82yF w/ PMHx of HTN, RA, Gout, HLD, PAD on ASA/Prasugrel seen as Trauma Consult s/p ground level fall. -HT, -LOC, +ASA/Prasugrel.  Trauma assessment in ED: ABCs intact , GCS 15 , AAOx3. No external signs of trauma, however patient will not straighten LLEx d/t pain.    PLAN:  - Monitor labs   - Monitor vitals   - PT/Rehab  - Appreciate ortho recs   - Dispo planning   [id number]    
· Assessment	  82yF w/ PMHx of HTN, RA, Gout, HLD, PAD on ASA/Prasugrel seen as Trauma Consult s/p ground level fall. -HT, -LOC, +ASA/Prasugrel.  Trauma assessment in ED: ABCs intact , GCS 15 , AAOx3. No external signs of trauma, however patient will not straighten LLEx d/t pain.    PLAN:   -post operatively hypercapnic, resolved with lassix and bipap  -monitor vitals closely  -f/u UOP  -Monitor CXR  -DVT ppx  -Home medications

## 2022-09-21 NOTE — DISCHARGE NOTE PROVIDER - HOSPITAL COURSE
82yF w/ PMHx of HTN, RA, Gout, HLD, PAD on ASA/Prasugrel seen as Trauma Consult s/p ground level fall. -HT, -LOC, +ASA/Prasugrel. Patient had recent hospitalization for GI bleed/hematemesis s/p variceal banding. Also PSHx or hip replacement on left side years ago. Patient states that she was walking with out walker, her daughter called her name and she turned around and lost balance and fell on left side. Patient states that she has pain in her left thigh and cannot stand and walk. patient denies head injury and loss of consciousness. Trauma assessment in ED: ABCs intact , GCS 15 , AAOx3. Patient is s/p LT femur ORIF and is NWB on LLE per orthopedic surgery. Postoperatively, patient developed flash pulmonary edema which was treated with BiPAP, lassix, hydralazine, and labetalol. Patient improved overnight. Repeat chest xrays showed improving opacities. Patient hemodynamically stable. On discharge patient to receive DVT PPx we recommend  x6 weeks and F/u in 2 weeks after discharge with Dr. Sam (Call [phone] to schedule appointment) as well as the trauma clinic in 2 weeks. Patient is cleared for discharge to [facility].

## 2022-09-21 NOTE — DISCHARGE NOTE PROVIDER - NSDCFUSCHEDAPPT_GEN_ALL_CORE_FT
Cullen Hairston  [facility]  [address]  Scheduled Appointment: 10/10/2022    [facility]  CARDIOLOGY [address]  Scheduled Appointment: 10/24/2022

## 2022-09-21 NOTE — DISCHARGE NOTE PROVIDER - NSFOLLOWUPCLINICS_GEN_ALL_ED_FT
[facility]  Trauma Surgery  [address] [place] [address]  Phone: [phone]  Fax:   Follow Up Time: 2 weeks

## 2022-09-21 NOTE — DISCHARGE NOTE PROVIDER - NSDCCPCAREPLAN_GEN_ALL_CORE_FT
PRINCIPAL DISCHARGE DIAGNOSIS  Diagnosis: Closed fracture of left distal femur  Assessment and Plan of Treatment:

## 2022-09-21 NOTE — DISCHARGE NOTE PROVIDER - NSDCFUADDINST_GEN_ALL_CORE_FT
SURGERY DISCHARGE INSTRUCTIONS    FOLLOW-UP - with Dr. Sam in 2 weeks. Call the office to make an appointment or if you have any questions/concerns.  FOLLOW-UP - with Trauma Clinic in 2 weeks. Call the office to make an appointment or if you have any questions/concerns.    DIET - DASH diet.     ACTIVITY- NON Weight [place] LLE (only WBAT for transfers)    PAIN MEDS - Take prescription pain meds as instructed but only if needed as they can be addicting. Take over the counter extra strength tylenol 650mg and/or ibuprofen 400mg with food every 6 hours for pain instead of prescription pain meds if you do not need stronger pain control. Do not take tylenol in addition to your prescription pain med if your prescription pain med already has tylenol in it. No more than 4g of tylenol in 24hrs or 1g in 4 hrs. No mixing alcohol with prescription pain meds. No driving or operating machinery while taking prescription pain meds. Drink plenty of water and increase your fiber intake (unless your diet restricts fiber) while taking prescription pain meds as these can cause constipation and abdominal straining. If you do not have a bowel movement in 3 days take an over the counter stool softener of your choice daily. If you still do not have a bowel movement the following 2 days call your primary care physician.    OTHER MEDS - Patient to take  OD x 6 weeks. If you have any questions about your other regular home medications please call your primary care physician or the physician who prescribed those medications to you.     If you develop fever, dizziness, chest pain, trouble breathing, nausea, vomiting, increasing abdominal pain, inability to pass bowel movements, redness/pain/discharge from incisions. Please call the office or go to the emergency room immediately.

## 2022-09-21 NOTE — DISCHARGE NOTE NURSING/CASE MANAGEMENT/SOCIAL WORK - NSDCPEFALRISK_GEN_ALL_CORE
For information on Fall & Injury Prevention, visit: [email or web address] OR  [email or web address] OR  https://www.cdc.gov/steadi/patient.html

## 2022-09-30 NOTE — HISTORY OF PRESENT ILLNESS
[de-identified] :  82-year-old woman returns for 1st postop visit status post open reduction internal fixation left distal femur fracture below a previously placed short cephalomedullary nail fixation of a hip fracture and above screw fixation of a tibial tubercle fracture on September 16, 2022.  Patient is abide by nonweightbearing precautions.  No fevers or drainage.  Pain well controlled with Tylenol.  Patient resides at a skilled nursing facility.  Returns today accompanied by her daughter.

## 2022-09-30 NOTE — IMAGING
[de-identified] : Pleasant older woman sits comfortably in a wheelchair.  We are able get her up onto and exam table.  She has a tendency to flex her knee.  \par \par Physical examination:\par Left knee and thigh:  Surgical incisions are clean dry and intact.  Mild staple erythema.  No induration or fluctuance.  Small effusion about the patient's knee not unexpected.  Knee motion 0-90 degrees which she reports close to her baseline.  Fires quadriceps.  Calf soft no cords.  No geniculate lymph nodes or masses.\par \par Radiographs:\par Left femur (AP, lateral):  Retained screw and cephalomedullary nail fixation noted respectively and proximal tibia and proximal femur.  Plate fixation of fracture remains in place with no evidence of loosening.  Fracture alignment acceptably reduced and maintained as was found in the operating room.

## 2022-09-30 NOTE — ASSESSMENT
[FreeTextEntry1] :   82-year-old woman 2 weeks status post open reduction internal fixation of a left distal femur fracture healing relatively unremarkably.  Wounds have healed sufficiently for staple removal.  May shower but should avoid baths or soaks.  May begin to weight bear as tolerated for transfers.  In 4 weeks can advance to weight-bearing with gait training.  Modalities can be utilized adjunct therapy is needed.  Physical therapy also work on isometric terminal extension quadriceps strengthening exercises.  Will have the patient follow up in my office in 4-6 weeks time for repeat evaluation radiographs of her left femur.  Any problems I am happy to see her back sooner.  All questions were answered to her and her daughter's satisfaction.  She should remain on DVT prophylaxis until follow-up.

## 2022-10-10 PROBLEM — Z86.79 HISTORY OF PERIPHERAL VASCULAR DISEASE: Status: RESOLVED | Noted: 2022-01-01 | Resolved: 2022-01-01

## 2022-10-10 PROBLEM — S72.92XA FEMUR FRACTURE, LEFT: Status: RESOLVED | Noted: 2022-01-01 | Resolved: 2022-01-01

## 2022-10-10 PROBLEM — Z87.39 HISTORY OF GOUT: Status: RESOLVED | Noted: 2022-01-01 | Resolved: 2022-01-01

## 2022-10-10 PROBLEM — G62.9 POLYNEUROPATHY: Status: ACTIVE | Noted: 2022-01-01

## 2022-10-10 PROBLEM — Z86.39 HISTORY OF HYPERLIPIDEMIA: Status: RESOLVED | Noted: 2022-01-01 | Resolved: 2022-01-01

## 2022-10-10 PROBLEM — Z86.79 HISTORY OF CARDIAC DISORDER: Status: RESOLVED | Noted: 2022-01-01 | Resolved: 2022-01-01

## 2022-10-10 NOTE — ASSESSMENT
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 I ordered MRI of the brain and EMG LE for further evaluation. We will see patient back in follow up when testing is complete. \par \par Total clinician time spent today on the patient is 60 minutes including preparing to see the patient, obtaining and/or reviewing and confirming history, performing medically necessary and appropriate examination, counseling and educating the patient and/or family, documenting clinical information in the EHR and communicating and/or referring to other healthcare professionals.\par \par Entered by Gaviota Mcmullen acting as scribe for Dr. Hairston.\par \par \par The documentation recorded by the scribe, in my presence, accurately reflects the service I personally performed, and the decisions made by me with my edits as appropriate. \par Cullen Hairston MD, FAAN, FACP\par Diplomate American Board of Psychiatry & Neurology\par \par

## 2022-10-10 NOTE — PHYSICAL EXAM
[FreeTextEntry1] : NEUROLOGICAL EXAMINATION: \par \par Mental Status: Patient scored 23/30 on the MMSE which is in the mild dementia category.  \par \par Cranial Nerves Cranial Nerves: \par \par II, III, IV, VI: Pupils are equal, round, and reactive to light and accommodation. No evidence of afferent pupillary defect. Visual fields are full to confrontation. Eye movements are full without evidence of nystagmus or internuclear ophthalmoplegia. Funduscopic examination reveals sharp disc margins. \par \par V: Normal jaw movements. Normal facial sensation. \par \par VII: Normal facial motor testing. \par \par VIII: Grossly normal hearing bilaterally. \par \par IX, X: Palate moves symmetrically. No dysarthria. \par \par XI: Normal shoulder shrug and sternocleidomastoid power. \par \par XII: Tongue appears normal and protrudes in the midline. \par \par Motor: Moved UE normally against gravity. She could not stand without assistance. Gait was not testable. She was able to move LE in the wheelchair without full range of motion. \par \par Muscle Stretch Reflexes (right/left): Not tested in the wheelchair. \par \par  Statement Selected

## 2022-10-10 NOTE — REVIEW OF SYSTEMS
[Confused or Disoriented] : confusion [Memory Lapses or Loss] : memory loss [Repeating Questions] : repeated questioning about recent events [Inability to Walk] : inability to walk [Frequent Falls] : frequent falls [Negative] : Psychiatric [Seizures] : no convulsions [Dizziness] : no dizziness [Fainting] : no fainting [Lightheadedness] : no lightheadedness [Vertigo] : no vertigo [Tension Headache] : no tension-type headache

## 2022-10-10 NOTE — HISTORY OF PRESENT ILLNESS
[FreeTextEntry1] : Ms. Thacker is an 82-year-old woman who presents today in neurologic consultation accompanied by her daughter for memory loss, forgetfulness, and falling. According to her daughter, forgetfulness has been present for several years. Patient acknowledges that she will forget why she is going to the refrigerator in her apt. She forgets people’s names and what she’s been recently told. Daughter has to repeat herself to her mother multiple times. Balance has also deteriorated over a period of time. She actually fell recently and fractured her left femur. She has been in a wheelchair since that time. Patient has been at [facility] for rehabilitation, but she is not able to ambulate, even with assistance. She can stand, but doesn’t weight bear on the leg. \par \par Patient has also been seen by orthopedics and has had surgery on her left hip and femur.

## 2022-10-10 NOTE — REASON FOR VISIT
[Initial Evaluation] : an initial evaluation [FreeTextEntry1] : PATIENT PRESENTS WITH C/O FALLS & MEMORY LOSS

## 2022-11-10 PROBLEM — S72.452D: Status: ACTIVE | Noted: 2022-01-01

## 2022-11-10 NOTE — IMAGING
[de-identified] :   Claude 82-year-old woman sits reasonably comfortably in a wheelchair in no distress.  She is accompanied by her daughter in the exam room.\par \par Physical examination:\par Left hip thigh and knee:  Surgical incisions have healed and remodeled.  She has no tenderness palpation about the surgical sites.  Able range her knee 0-110 degrees without undue discomfort.  No tenderness palpation about her inguinal crease.  No lymph nodes inguinal crease.  Minimal tenderness over the lateral aspect of her hip which she had prior to recent injury per the patient.\par \par Radiographs:\par Left femur (AP, lateral):  Acceptably aligned spiral comminuted distal femoral shaft fracture.  Plate fixation is noted in place with no evidence of loosening of the hardware.  No callus formation is appreciated.

## 2022-11-10 NOTE — ASSESSMENT
[FreeTextEntry1] :   82-year-old woman known history of osteoporosis on Prolia status post ground level fall resulting in a lilly implant distal femur fracture treated with plate and screw fixation 6 weeks ago.  Radiographs demonstrate no change in alignment with weight-bearing for transfers.  I think she has healed sufficiently that she can weight bear as tolerated in physical therapy.  Physical therapy will focus on hip strengthening knee strengthening gait training balance and generalized conditioning with modalities utilized adjunct therapy.  I will have her follow up in my office in 2 months time for repeat evaluation radiographs of her left femur.  If she has any pain or problems she is welcome to the office sooner.  All questions answered to her and her daughter's satisfaction.

## 2022-11-10 NOTE — HISTORY OF PRESENT ILLNESS
[de-identified] :  82-year-old woman returns for interval follow-up status post open reduction internal fixation of a lilly implant left distal femur fracture below a previously placed cephalomedullary nail on September 14, 2022. Patient has been weight-bearing as tolerated for transfers without significant complaints of pain about her knee.  She has extensive history of fragility fractures and Is currently on Prolia.  Last DEXA scan suggested osteoporosis.  Last x-rays can reportedly 6 months ago.  Walks with walker baseline.

## 2022-11-22 PROBLEM — I67.9 CEREBROVASCULAR DISEASE: Status: ACTIVE | Noted: 2022-01-01

## 2022-11-22 PROBLEM — G31.84 MCI (MILD COGNITIVE IMPAIRMENT): Status: ACTIVE | Noted: 2022-01-01

## 2022-11-22 NOTE — HISTORY OF PRESENT ILLNESS
[FreeTextEntry1] : ORIGINAL PRESENTATION:  Ms. Thacker is an 82-year-old woman who presents today in neurologic consultation accompanied by her daughter for memory loss, forgetfulness, and falling. According to her daughter, forgetfulness has been present for several years. Patient acknowledges that she will forget why she is going to the refrigerator in her apt. She forgets people’s names and what she’s been recently told. Daughter has to repeat herself to her mother multiple times. Balance has also deteriorated over a period of time. She actually fell recently and fractured her left femur. She has been in a wheelchair since that time. Patient has been at [facility] for rehabilitation, but she is not able to ambulate, even with assistance. She can stand, but doesn’t weight bear on the leg. \par \par Patient has also been seen by orthopedics and has had surgery on her left hip and femur. \par \par TODAY:  I had the pleasure of speaking with Ms. Thacker and her daughter today over the phone.  Her previous history and physical findings have been reviewed.\par \par Due to pandemic covid 19 patient's visit was conducted over the phone after informed consent was obtained beginning at 11:25 am ending at 11:37 am. She is under our care for memory and balance issues which she is receiving continuing active treatment for.  She underwent testing including MRI brain as well as EMG of the lower extremities which we will review today.  EMG of the lower extremities revealed neuropathy and MRI brain showed a small chronic right inferior cerebellar infarct. Ventricular sulcal and cisternal dilatation of a mild-to-moderate degree more than expected for the patients age. Numerous periventricular and subcortical T2 hyperintensities are noted indicative of ischemic microvascular disease.  Her daughter states she has become more forgetful and agitated at times as she cannot get the correct words out or forgets names of things or where she left things.  She is unable to get out as she recently had surgery for a  femur fracture but is interested in discussing treatment options at this time.

## 2022-11-22 NOTE — REASON FOR VISIT
[Follow-Up: _____] : a [unfilled] follow-up visit [Initial Evaluation] : an initial evaluation [FreeTextEntry1] : PATIENT PRESENTS WITH C/O FALLS & MEMORY LOSS

## 2022-11-22 NOTE — ASSESSMENT
[FreeTextEntry1] : 82 year old female with vascular dementia.  We will trial Aricept 5mg qd x 14 days then 10 mg daily thereafter.  She will f/u in 6 weeks to see how she is progressing and if there is any issue she will contact the office.\par \par I personally reviewed with the PA, this patient's history and physical exam findings, as documented above. I have discussed the relevant areas of concern, having direct implications to the presenting problems and illnesses, and I have personally examined all pertinent and positive and negative findings, which impact on the prior neurological treatment. \par \par \par Birdie Spear, MS, PA-C\par Cullen Hairston MD\par

## 2022-12-02 NOTE — H&P ADULT - NSICDXPASTSURGICALHX_GEN_ALL_CORE_FT
PAST SURGICAL HISTORY:  H/O: hysterectomy     History of hip surgery Left ORIF and Left ORIF femur

## 2022-12-02 NOTE — ED PROVIDER NOTE - OBJECTIVE STATEMENT
Patient c/o SOB, past 24 hours, slight non productive cough, no fever, no chest pain, Non ambulatory due to femur fx 9/22 ,

## 2022-12-02 NOTE — H&P ADULT - NSICDXPASTMEDICALHX_GEN_ALL_CORE_FT
PAST MEDICAL HISTORY:  Bilateral cataracts     Cirrhosis     Dementia     Depression with anxiety     High cholesterol     Hypertension     Hypothyroid     Incontinence     Melanoma of skin     Osteopenia     Peripheral arterial disease (s/p B/L leg stents & on Plavix)    Poor circulation PAD    RA (rheumatoid arthritis)     Rheumatoid arthritis

## 2022-12-02 NOTE — H&P ADULT - NSHPPHYSICALEXAM_GEN_ALL_CORE
PHYSICAL EXAM:    General:  WD, elderly, cachetic female in NAD.    Skin:   + Small stage I sacral pressure ulcer.   + Multiple small open lesions on RLE and LLE and Lt second toe.  Skin Warm and dry.      Eyes:  PERRLA, EOM intact.    Neck:  No tenderness.    Back:  No spinal tenderness.    Respiratory:  Good air entry, mild crackles at base, No wheezes or rhonchi.    Cardiovascular:  S1 & S2, RRR, no murmurs, rubs or gallops.     Gastrointestinal:   Soft, No distention, Non-tender, No rebound, guarding or Peritoneal signs.    Genitourinary:  No suprapubic tenderness,  No CVA tenderness.    Extremities:   + Multiple small open lesions on RLE and LLE and Lt second toe.  Chronic venous stasis changes B/L.  Normal rom, no edema.      Vascular:  No cyanosis, + Palpable distal pulses, No calf tenderness.     Lymph Nodes:  No lymphadenopathy.    Musculoskeletal:  No joint swelling, Free ROM.    Neurological:   A&Ox2, No focal deficits.

## 2022-12-02 NOTE — ED PROVIDER NOTE - ATTENDING APP SHARED VISIT CONTRIBUTION OF CARE
82-year-old female with a past medical history significant for hypertension, RA, gout, hyperlipidemia, PAD who presents with shortness of breath.  Patient is on last 2 days she has been having worsening shortness of breath.  Patient denies any chest pain numbness tingling fevers chills or any other medical complaints.  Patient denies any swelling in her lower extremities.  Of note as per EMS patient was satting in the low 90s in the field.    VITAL SIGNS: I have reviewed nursing notes and confirm.  CONSTITUTIONAL: non-toxic, well appearing  SKIN: no rash, no petechiae.  EYES: EOMI, pink conjunctiva, anicteric  ENT: tongue midline, no exudates, MMM  NECK: Supple; no meningismus, no JVD  CARD: RRR, no murmurs, equal radial pulses bilaterally 2+  RESP: rhonchi noted at the bases bilaterally, tachypneic   ABD: Soft, non-tender, non-distended, no peritoneal signs, no HSM, no CVA tenderness  EXT: Normal ROM x4. No edema. No calves tenderness  NEURO: Alert, oriented x3.     a/p  82 yr old f that presents with sob  -labs  -ekg  -imaging  -reassess  -dispo pending

## 2022-12-02 NOTE — H&P ADULT - ASSESSMENT
.  Impression:  Patient is an 83 y/o female with past medical history of Dementia, HTN, PAD (on Plavix), Liver Cirrhosis, Hx of Variceal bleed (s/p Variceal banding) Chronic DVT, Hypothyroidism, GERD, RA, Gout, Depression and overactive bladder now with complaint of shortness of breath that started about 6am that woke her up.  Patient now with new onset of CHF.          Plan:  # New onset of CHF:    - Telemetry monitoring under Dr. Rodriguez's service.  - Lasix 40 mg IVP BID.  - [place] in am.  - Strict I&O's.  - Daily weights.  - Cardiology consult.  - Troponin x 2  (First in ER normal).   - Elevate HOB @ 45 degrees.  - Patient seen, examined and discussed with Dr. Rodriguez.         # Hypertension    - Monitor vital signs per telemetry protocol.  - Continue Metoprolol succinate 50 mg po daily.  - Continue Coreg 6.25 mg po BID.  - Continue Amlodipine 5 mg PO daily.  - DASH diet.      # Peripheral artery disease:   - Continue Plavix 75 mg daily and Asa 81 mg daily.  - Daily Bacitracin and dry dressings to open sores on lower extremities.        # Hypothyroidism:   - Continue Synthroid 50 mg PO before breakfast.        # GERD:  - Continue Protonix 40 mg po daily.        # Gout:  - Continue Allopurinol 100 mg po daily.        # Dementia:  - Continue Donepezil 10 mg daily.        # Depression:  - Continue Duloxetine 60 mg daily.          # Anemia:  - Continue Ferrous sulfate 325 mg daily.  - Continue Folic acid 1 mg daily        #Osteoporosis:  - Continue Vitamin and calcium.        # Overactive bladder:  - Continue Oxybutynin 10 mg daily.                   .  Impression:  Patient is an 83 y/o female with past medical history of Dementia, HTN, PAD (on Plavix), Liver Cirrhosis, Hx of Variceal bleed (s/p Variceal banding) Chronic DVT, Hypothyroidism, GERD, RA, Gout, Depression and overactive bladder now with complaint of shortness of breath that started about 6am that woke her up.  Patient now with new onset of CHF.          Plan:  # New onset of CHF:    - Telemetry monitoring  - Lasix 40 mg IVP BID.  - TTE in am.  - Strict I&O's.  - Daily weights.  - Cardiology consult.  - Troponin x 2  (First in ER normal).   - Elevate HOB @ 45 degrees.        # Hypertension    - Monitor vital signs per telemetry protocol.  - Continue Metoprolol succinate 50 mg po daily.  - Continue Coreg 6.25 mg po BID.  - Continue Amlodipine 5 mg PO daily.  - DASH diet.      # Peripheral artery disease:   - Continue Plavix 75 mg daily and Asa 81 mg daily.  - Daily Bacitracin and dry dressings to open sores on lower extremities.        # Hypothyroidism:   - Continue Synthroid 50 mg PO before breakfast.        # GERD:  - Continue Protonix 40 mg po daily.        # Gout:  - Continue Allopurinol 100 mg po daily.        # Dementia:  - Continue Donepezil 10 mg daily.        # Depression:  - Continue Duloxetine 60 mg daily.          # Anemia:  - Continue Ferrous sulfate 325 mg daily.  - Continue Folic acid 1 mg daily        #Osteoporosis:  - Continue Vitamin and calcium.        # Overactive bladder:  - Continue Oxybutynin 10 mg daily.

## 2022-12-02 NOTE — ED ADULT NURSE REASSESSMENT NOTE - NS ED NURSE REASSESS COMMENT FT1
transport arrived to take patient to ct scan  pt and family refused to have patient go to ct scan  transport cancelled job to go to ct scan   education provided on the need for ct scan   pt agreed to go to ct scan   awaiting  to go to ct scan

## 2022-12-02 NOTE — H&P ADULT - NSHPREVIEWOFSYSTEMS_GEN_ALL_CORE
REVIEW OF SYSTEMS:    CONSTITUTIONAL:  No weakness, fevers or chills  EYES/ENT:  No visual changes;  No vertigo or throat pain   NECK:  No pain or stiffness  RESPIRATORY:  No cough, wheezing, hemoptysis;   (+) shortness of breath  CARDIOVASCULAR:  No chest pain or palpitations  GASTROINTESTINAL:  + History of Liver cirrhosis and Ascites.  (+) history of esophageal varices.  No abdominal or epigastric pain. No nausea, vomiting, or hematemesis; No diarrhea or constipation. No melena or hematochezia.  GENITOURINARY:  (+) Incontinence.  No dysuria, frequency or hematuria  MUSCULOSKELETAL:  FROM all extremities,  (+) generalized deconditioning strength,  (+) PAD.  No calf tenderness  NEUROLOGICAL:  No numbness or weakness  SKIN:  (+) Open chronic venous stasis sores bilateral legs.  (+) Small stage 1 pressure ulcer on Sacral area.  No itching.

## 2022-12-02 NOTE — ED PROVIDER NOTE - CARE PLAN
1 Principal Discharge DX:	SOB (shortness of breath)  Secondary Diagnosis:	Congestive heart failure

## 2022-12-02 NOTE — H&P ADULT - NSHPLABSRESULTS_GEN_ALL_CORE
.                        10.4   5.62  )-----------( 186      ( 02 Dec 2022 14:00 )             34.4       12-02    138  |  101  |  18  ----------------------------<  62<L>  3.9   |  28  |  0.7    Ca    8.6      02 Dec 2022 14:00  Mg     1.9     12-02    TPro  7.3  /  Alb  2.9<L>  /  TBili  0.5  /  DBili  x   /  AST  27  /  ALT  14  /  AlkPhos  139<H>  12-02              CARDIAC MARKERS ( 02 Dec 2022 14:00 )  x     / <0.01 ng/mL / x     / x     / x          POCT Blood Glucose.: 72 mg/dL (02 Dec 2022 15:39)            Xray Chest 1 View- PORTABLE-Urgent (12.02.22 @ 14:08)     Impression:    Lowlung volume.    Bilateral opacities.            CT Angio Chest PE Protocol w/ IV Cont (12.02.22 @ 16:02)     IMPRESSION:    1. Increased moderate right, unchanged small left bilateral pleural   effusions. Interstitial edema. Scattered patchy groundglass opacities,   which may represent pulmonary edema in the appropriate clinical setting.   Findings may be attributed to CHF.    2. No evidence of acute pulmonary embolism.    3. Partially imaged cirrhotic liver, splenomegaly, abdominal ascites.    --- End of Report ---

## 2022-12-02 NOTE — ED PROVIDER NOTE - CLINICAL SUMMARY MEDICAL DECISION MAKING FREE TEXT BOX
82 yr old f that presents with sob  -labs  -ekg  -imaging  -admit to medicine for acs/chf evaluation

## 2022-12-02 NOTE — PATIENT PROFILE ADULT - FALL HARM RISK - HARM RISK INTERVENTIONS
Assistance with ambulation/Assistance OOB with selected safe patient handling equipment/Communicate Risk of Fall with Harm to all staff/Discuss with provider need for PT consult/Monitor gait and stability/Provide patient with walking aids - walker, cane, crutches/Reinforce activity limits and safety measures with patient and family/Sit up slowly, dangle for a short time, stand at bedside before walking/Tailored Fall Risk Interventions/Use of alarms - bed, chair and/or voice tab/Visual Cue: Yellow wristband and red socks/Bed in lowest position, wheels locked, appropriate side rails in place/Call bell, personal items and telephone in reach/Instruct patient to call for assistance before getting out of bed or chair/Non-slip footwear when patient is out of bed/[place] to call system/Physically safe environment - no spills, clutter or unnecessary equipment/Purposeful Proactive Rounding/Room/bathroom lighting operational, light cord in reach

## 2022-12-02 NOTE — ED PROVIDER NOTE - NSICDXPASTMEDICALHX_GEN_ALL_CORE_FT
PAST MEDICAL HISTORY:  Cirrhosis     Dementia     High cholesterol     Hypertension     Hypothyroid     Incontinence     Poor circulation     RA (rheumatoid arthritis)

## 2022-12-02 NOTE — ED ADULT NURSE NOTE - NSIMPLEMENTINTERV_GEN_ALL_ED
Implemented All Fall with Harm Risk Interventions:  [place] to call system. Call bell, personal items and telephone within reach. Instruct patient to call for assistance. Room bathroom lighting operational. Non-slip footwear when patient is off stretcher. Physically safe environment: no spills, clutter or unnecessary equipment. Stretcher in lowest position, wheels locked, appropriate side rails in place. Provide visual cue, wrist band, yellow gown, etc. Monitor gait and stability. Monitor for mental status changes and reorient to person, place, and time. Review medications for side effects contributing to fall risk. Reinforce activity limits and safety measures with patient and family. Provide visual clues: red socks.

## 2022-12-02 NOTE — H&P ADULT - HISTORY OF PRESENT ILLNESS
Patient with dementia and was able to provide some history but her daughter who was at bedside also provided history and gave a typed list of her medical problems, medications and allergies.      Patient is an 83 y/o female with past medical history of Dementia, HTN, PAD (on Plavix), Liver Cirrhosis, Hx of Variceal bleed (s/p Variceal banding) Chronic DVT, Hypothyroidism, GERD, RA, Gout, Depression and overactive bladder now with complaint of shortness of breath that started about 6am that woke her up.  Patient has home health aide that helped her to her wheelchair to sit up and the SOB improved slightly.  Patient's daughter did not like how she was breathing so they called EMS and brought her to the hospital.    Patient reports SOB improved slightly since in ER earlier but still with SOB.    Patient and daughter denies fever, chills, tremors, cough, hemoptysis, N/V/D, urinary complaints (other than incontinence) or chest pain.      Patient with recent hospital stay on 9/21/22 after a fall and was discharged to [facility] and eventually discharged home with her home health aide.  Patient also with a prior hospital stay for upper GI bleeding and found to have liver cirrhosis and variceal bleeding.  Patient had variceal banding.

## 2022-12-03 NOTE — PROGRESS NOTE ADULT - SUBJECTIVE AND OBJECTIVE BOX
81 y/o female with past medical history of Dementia, HTN, PAD (on Plavix), Liver Cirrhosis, Hx of Variceal bleed (s/p Variceal banding) Chronic DVT, Hypothyroidism, GERD, RA, Gout, Depression and overactive bladder now with complaint of shortness of breath that started about 6am that woke her up.    Today:  Seen at bedside, no overnight events.          REVIEW OF SYSTEMS:  Not a reliable historian        MEDICATIONS  (STANDING):  allopurinol 100 milliGRAM(s) Oral daily  amLODIPine   Tablet 5 milliGRAM(s) Oral daily  aspirin enteric coated 81 milliGRAM(s) Oral daily  carvedilol 6.25 milliGRAM(s) Oral every 12 hours  chlorhexidine 2% Cloths 1 Application(s) Topical once  clopidogrel Tablet 75 milliGRAM(s) Oral daily  donepezil 10 milliGRAM(s) Oral at bedtime  DULoxetine 60 milliGRAM(s) Oral daily  ferrous    sulfate 325 milliGRAM(s) Oral daily  folic acid 1 milliGRAM(s) Oral daily  furosemide   Injectable 40 milliGRAM(s) IV Push daily  levothyroxine 50 MICROGram(s) Oral daily  magnesium sulfate  IVPB 2 Gram(s) IV Intermittent once  metoprolol succinate ER 50 milliGRAM(s) Oral daily  oxybutynin 5 milliGRAM(s) Oral two times a day  pantoprazole    Tablet 40 milliGRAM(s) Oral before breakfast    MEDICATIONS  (PRN):  aluminum hydroxide/magnesium hydroxide/simethicone Suspension 30 milliLiter(s) Oral every 4 hours PRN Dyspepsia  melatonin 3 milliGRAM(s) Oral at bedtime PRN Insomnia  ondansetron Injectable 4 milliGRAM(s) IV Push every 8 hours PRN Nausea and/or Vomiting      Allergies  Keflex (Unknown)  losartan (Unknown)  morphine (Unknown)  Rocephin (Unknown)  sulfamethoxazole (Hives)      FAMILY HISTORY:  No pertinent family history in first degree relatives        Vital Signs Last 24 Hrs  T(C): 35.9 (03 Dec 2022 05:18), Max: 36.4 (02 Dec 2022 13:40)  T(F): 96.6 (03 Dec 2022 05:18), Max: 97.6 (02 Dec 2022 13:40)  HR: 85 (03 Dec 2022 05:18) (62 - 85)  BP: 171/93 (03 Dec 2022 05:18) (155/69 - 174/75)  RR: 18 (03 Dec 2022 05:18) (18 - 18)  SpO2: 96% (02 Dec 2022 13:40) (96% - 96%)    Parameters below as of 02 Dec 2022 13:40  Patient On (Oxygen Delivery Method): room air        PHYSICAL EXAM:  GENERAL: NAD, well-groomed, well-developed  HEAD:  Atraumatic, Normocephalic  EYES: EOMI, PERRLA, conjunctiva and sclera clear  NECK: Supple, No JVD, Normal thyroid  NERVOUS SYSTEM:  Alert & Oriented x1, poor concentration  CHEST/LUNG: CTA bilaterally; No rales, rhonchi, wheezing, or rubs  HEART: Regular rate and rhythm; No murmurs, rubs, or gallops  ABDOMEN: Soft, Nontender, Nondistended; Bowel sounds present  EXTREMITIES:  2+ Peripheral Pulses, No clubbing, cyanosis, or edema      LABS:                        11.6   5.78  )-----------( 195      ( 03 Dec 2022 08:02 )             37.3     12-03    136  |  100  |  13  ----------------------------<  94  4.6   |  25  |  0.5<L>    Ca    8.7      03 Dec 2022 08:02  Mg     1.7     12-03    TPro  7.6  /  Alb  2.9<L>  /  TBili  0.9  /  DBili  x   /  AST  27  /  ALT  14  /  AlkPhos  148<H>  12-03

## 2022-12-03 NOTE — CHART NOTE - NSCHARTNOTEFT_GEN_A_CORE
Called because patient had 12 beat run of NSVT. Happened while she was sleeping and she remains asymptomatic. Labs from admission reviewed and are unremarkable (including electrolytes). Stat EKG ordered and cardiology consult was already requested

## 2022-12-03 NOTE — PROGRESS NOTE ADULT - ASSESSMENT
83 y/o female with past medical history of Dementia, HTN, PAD (on Plavix), Liver Cirrhosis, Hx of Variceal bleed (s/p Variceal banding) Chronic DVT, Hypothyroidism, GERD, RA, Gout, Depression and overactive bladder now with complaint of shortness of breath that started about 6am that woke her up.      # New onset of CHF:  - Telemetry monitoring  - Lasix 40 mg IVP daily  - TTE  - Strict I&O's.  - Daily weights.  - Cardiology consult appreciated  - Troponin x 2  neg  - Elevate HOB @ 45 degrees.    # Hypertension  - Monitor vital signs per telemetry protocol.  - Continue Metoprolol succinate 50 mg po daily.  - Continue Coreg 6.25 mg po BID.  - Continue Amlodipine 5 mg PO daily.  - DASH diet.    # Peripheral artery disease:   - Continue Plavix 75 mg daily and Asa 81 mg daily.  - Daily Bacitracin and dry dressings to open sores on lower extremities.    # Hypothyroidism:   - Continue Synthroid 50 mg PO before breakfast.    # GERD:  - Continue Protonix 40 mg po daily.    # Gout:  - Continue Allopurinol 100 mg po daily.    # Dementia:  - Continue Donepezil 10 mg daily.    # Depression:  - Continue Duloxetine 60 mg daily.      # Anemia:  - Continue Ferrous sulfate 325 mg daily.  - Continue Folic acid 1 mg daily    #Osteoporosis:  - Continue Vitamin and calcium.    # Overactive bladder:  - Continue Oxybutynin 10 mg daily. 81 y/o female with past medical history of Dementia, HTN, PAD (on Plavix), Liver Cirrhosis, Hx of Variceal bleed (s/p Variceal banding) Chronic DVT, Hypothyroidism, GERD, RA, Gout, Depression and overactive bladder now with complaint of shortness of breath that started about 6am that woke her up.      # New onset of CHF:  - Telemetry monitoring  - Lasix 40 mg IVP BID  - TTE  - Strict I&O's.  - Daily weights.  - Cardiology consult appreciated  - Troponin x 2  neg  - Elevate HOB @ 45 degrees.    # Hypertension  - Monitor vital signs per telemetry protocol.  - Metoprolol 100 mg PO BID  - Continue Amlodipine 5 mg PO daily.  - DASH diet.    # Peripheral artery disease:   - Continue Plavix 75 mg daily and Asa 81 mg daily.  - Daily Bacitracin and dry dressings to open sores on lower extremities.    # Hypothyroidism:   - Continue Synthroid 50 mg PO before breakfast.    # GERD:  - Continue Protonix 40 mg po daily.    # Gout:  - Continue Allopurinol 100 mg po daily.    # Dementia:  - Continue Donepezil 10 mg daily.    # Depression:  - Continue Duloxetine 60 mg daily.      # Anemia:  - Continue Ferrous sulfate 325 mg daily.  - Continue Folic acid 1 mg daily    #Osteoporosis:  - Continue Vitamin and calcium.    # Overactive bladder:  - Continue Oxybutynin 10 mg daily.

## 2022-12-03 NOTE — CONSULT NOTE ADULT - NS ATTEND AMEND GEN_ALL_CORE FT
Agree h/p, A/P with ACP Alex.     HFPEF in the setting of severe MR    Bilateral pleural effusions on CXR and CT R>L.     Will require diuresis and reassessment of MR.    Known PAD s/p PTA in [place]. Continue Antiplatelet therapy.    NSVT: Increase Metoprolol to 100 mg po bid. Discontinue Coreg. Of note the patient did not receive beta blockers last night.     Maintain K> 4.0, Mg > 2.0.    Will require lizeth clip evaluation with Dr. Lilly. D/W patient and daughter who are in understanding of the plan.

## 2022-12-03 NOTE — CONSULT NOTE ADULT - SUBJECTIVE AND OBJECTIVE BOX
HPI:  Patient with dementia and was able to provide some history but her daughter who was at bedside also provided history and gave a typed list of her medical problems, medications and allergies.      Patient is an 81 y/o female with past medical history of Dementia, HTN, PAD (on Plavix), Liver Cirrhosis, Hx of Variceal bleed (s/p Variceal banding) Chronic DVT, Hypothyroidism, GERD, RA, Gout, Depression and overactive bladder now with complaint of shortness of breath that started about 6am that woke her up.  Patient has home health aide that helped her to her wheelchair to sit up and the SOB improved slightly.  Patient's daughter did not like how she was breathing so they called EMS and brought her to the hospital.    Patient reports SOB improved slightly since in ER earlier but still with SOB.    Patient and daughter denies fever, chills, tremors, cough, hemoptysis, N/V/D, urinary complaints (other than incontinence) or chest pain.      Patient with recent hospital stay on 9/21/22 after a fall and was discharged to [facility] and eventually discharged home with her home health aide.  Patient also with a prior hospital stay for upper GI bleeding and found to have liver cirrhosis and variceal bleeding.  Patient had variceal banding.     (02 Dec 2022 17:41)        HPI-Cardiology   Pt with the above Hx, evaluated at bedside with Dr Craig. Currently admitted in telemetry for eval of possible new onset CHF. Pt states that she felt short of breath yesterday, when she woke up. Pt denies any similar episodes in the past. No aggravating or alleviating factors. Currently denies any SOB, CP, palpitations, dizziness/lightheadedness, or nausea/vomiting. Radiology tests and hospital records, were reviewed, as well as previous notes on this patient.         PAST MEDICAL & SURGICAL HISTORY  Hypothyroid    Hypertension    Cirrhosis    RA (rheumatoid arthritis)    High cholesterol    Poor circulation  PAD    Incontinence    Dementia    Rheumatoid arthritis    Osteopenia    Melanoma of skin    Depression with anxiety    Bilateral cataracts    Peripheral arterial disease  (s/p B/L leg stents &amp; on Plavix)    History of hip surgery  Left ORIF and Left ORIF femur    H/O: hysterectomy        FAMILY HISTORY:  FAMILY HISTORY:  No pertinent family history in first degree relatives        ALLERGIES:  Keflex (Unknown)  losartan (Unknown)  morphine (Unknown)  Rocephin (Unknown)  sulfamethoxazole (Hives)      MEDICATIONS:  MEDICATIONS  (STANDING):  allopurinol 100 milliGRAM(s) Oral daily  amLODIPine   Tablet 5 milliGRAM(s) Oral daily  aspirin enteric coated 81 milliGRAM(s) Oral daily  carvedilol 6.25 milliGRAM(s) Oral every 12 hours  chlorhexidine 2% Cloths 1 Application(s) Topical once  clopidogrel Tablet 75 milliGRAM(s) Oral daily  donepezil 10 milliGRAM(s) Oral at bedtime  DULoxetine 60 milliGRAM(s) Oral daily  ferrous    sulfate 325 milliGRAM(s) Oral daily  folic acid 1 milliGRAM(s) Oral daily  furosemide   Injectable 40 milliGRAM(s) IV Push two times a day  levothyroxine 50 MICROGram(s) Oral daily  magnesium sulfate  IVPB 2 Gram(s) IV Intermittent once  metoprolol succinate ER 50 milliGRAM(s) Oral daily  oxybutynin 5 milliGRAM(s) Oral two times a day  pantoprazole    Tablet 40 milliGRAM(s) Oral before breakfast    MEDICATIONS  (PRN):  aluminum hydroxide/magnesium hydroxide/simethicone Suspension 30 milliLiter(s) Oral every 4 hours PRN Dyspepsia  melatonin 3 milliGRAM(s) Oral at bedtime PRN Insomnia  ondansetron Injectable 4 milliGRAM(s) IV Push every 8 hours PRN Nausea and/or Vomiting      HOME MEDICATIONS:  Home Medications:  allopurinol 100 mg oral tablet: 1 tab(s) orally 2 times a day (02 Dec 2022 15:37)  Aspir 81 oral delayed release tablet: 1 tab(s) orally once a day (02 Dec 2022 15:37)  calcium (as carbonate and lactate)-vitamin D 200 mg-6.25 mcg oral tablet: 2 tab(s) orally 2 times a day (02 Dec 2022 18:14)  donepezil 10 mg oral tablet: 1 tab(s) orally once a day (at bedtime) (02 Dec 2022 15:37)  DULoxetine 60 mg oral delayed release capsule: 1 cap(s) orally once a day (02 Dec 2022 15:37)  ferrous sulfate 75 mg (15 mg elemental iron) oral tablet: 1 tab(s) orally once a day (02 Dec 2022 15:37)  folic acid 1 mg oral tablet: 1 tab(s) orally once a day (02 Dec 2022 15:37)  levothyroxine 50 mcg (0.05 mg) oral tablet: 1 tab(s) orally once a day (02 Dec 2022 15:37)  metoprolol succinate 50 mg oral tablet, extended release: 1 tab(s) orally once a day (02 Dec 2022 15:37)  Norvasc: 50  orally (02 Dec 2022 15:37)  Orencia 125 mg/mL subcutaneous solution: 1 dose(s) subcutaneous once a week on Tuesdays (02 Dec 2022 18:15)  oxybutynin 10 mg/24 hr oral tablet, extended release: 1 tab(s) orally 2 times a day (02 Dec 2022 15:37)      VITALS:   T(F): 96.6 (12-03 @ 05:18), Max: 97.6 (12-02 @ 13:40)  HR: 85 (12-03 @ 05:18) (62 - 85)  BP: 171/93 (12-03 @ 05:18) (155/69 - 174/75)  BP(mean): --  RR: 18 (12-03 @ 05:18) (18 - 18)  SpO2: 96% (12-02 @ 13:40) (96% - 96%)    I&O's Summary    02 Dec 2022 07:01  -  03 Dec 2022 07:00  --------------------------------------------------------  IN: 0 mL / OUT: 400 mL / NET: -400 mL        REVIEW OF SYSTEMS:  See HPI      PHYSICAL EXAM:  NEURO: patient is awake , alert and oriented  GEN: Not in acute distress  NECK: no thyroid enlargement, no JVD  LUNGS: Clear to auscultation bilaterally   CARDIOVASCULAR: S1/S2 present, RRR , no murmurs or rubs, no carotid bruits,  + PP bilaterally  ABD: Soft, non-tender, non-distended, +BS  EXT: No KIMBERLY  SKIN: Intact        LABS:                        11.6   5.78  )-----------( 195      ( 03 Dec 2022 08:02 )             37.3     12-03    136  |  100  |  13  ----------------------------<  94  4.6   |  25  |  0.5<L>    Ca    8.7      03 Dec 2022 08:02  Mg     1.7     12-03    TPro  7.6  /  Alb  2.9<L>  /  TBili  0.9  /  DBili  x   /  AST  27  /  ALT  14  /  AlkPhos  148<H>  12-03      Troponin T, Serum: <0.01 ng/mL (12-03-22 @ 08:02)  Troponin T, Serum: <0.01 ng/mL (12-02-22 @ 14:00)    CARDIAC MARKERS ( 03 Dec 2022 08:02 )  x     / <0.01 ng/mL / x     / x     / x      CARDIAC MARKERS ( 02 Dec 2022 14:00 )  x     / <0.01 ng/mL / x     / x     / x              Serum Pro-Brain Natriuretic Peptide: 99643 pg/mL (12-02-22 @ 14:00)          RADIOLOGY:  -CXR:  < from: Xray Chest 1 View- PORTABLE-Urgent (12.02.22 @ 14:08) >  Impression:    Lowlung volume.    Bilateral opacities.    --- End of Report ---      < end of copied text >          < from: CT Angio Chest PE Protocol w/ IV Cont (12.02.22 @ 16:02) >    IMPRESSION:    1. Increased moderate right, unchanged small left bilateral pleural   effusions. Interstitial edema. Scattered patchy groundglass opacities,   which may represent pulmonary edema in the appropriate clinical setting.   Findings may be attributed to CHF.    2. No evidence of acute pulmonary embolism.    3. Partially imaged cirrhotic liver, splenomegaly, abdominal ascites.    --- End of Report ---    < end of copied text >          < from: TTE Echo Complete w/o Contrast w/ Doppler (09.16.22 @ 15:04) >      Summary:   1. Normal global left ventricular systolic function and wall motion.   2. LV Ejection Fraction by Rice's Method with a biplane EF of 55 %.   3. Normal right ventricular size and function.   4. Moderately enlarged left atrium.   5. Severe mitral valve regurgitation.   6. Moderate aortic regurgitation.    < end of copied text >

## 2022-12-03 NOTE — CONSULT NOTE ADULT - ASSESSMENT
81 y/o female with past medical history of Dementia, HTN, PAD (on Plavix), Liver Cirrhosis, Hx of Variceal bleed (s/p Variceal banding) Chronic DVT, Hypothyroidism, GERD, RA, Gout, Depression and overactive bladder now with complaint of shortness of breath that started when she woke up    Impression:  #SOB: New onset HFpEF?  #Episode of NSVT?      Cxr: Low lung volume. Bilateral opacities.  CTA chest: Interstitial edema, possible CHF  On exam appears euvolemic, no overt signs of volume overload  pBNP 36936. No baseline to compare to  ECHO (9/2022): EF 54%, Normal global LVSF, Severe MR, Mod AR, grade 3 diastolic dysfunction   Upon reviewing ECG strips, Pt had episode of 12beat run of NSVT. Pt asymptomatic and no electrolyte imbalance on labwork      Plan:  Consider decreasing Lasix 40mg IVP daily. Change to PO when stable  Daily Cxr  Strict I&O's, maintain euvolemic  Repeat pBNP closer to discharge  ECG shows AFib. Upon reviewing ECG, appears sinus rhythm. Hold Carvedilol, and Continue Toprol  Repeat ECG  Cont, ASA, Plavix, Amlodipine  Monitor lytes    Discussed with Dr Craig             81 y/o female with past medical history of Dementia, HTN, PAD (on Plavix), Liver Cirrhosis, Hx of Variceal bleed (s/p Variceal banding) Chronic DVT, Hypothyroidism, GERD, RA, Gout, Depression and overactive bladder now with complaint of shortness of breath that started when she woke up    Impression:  #SOB: New onset HFpEF?  #Episode of NSVT?      Cxr: Low lung volume. Bilateral opacities.  CTA chest: Interstitial edema, possible CHF  On exam appears euvolemic, no overt signs of volume overload  pBNP 83882. No baseline to compare to  ECHO (9/2022): EF 54%, Normal global LVSF, Severe MR, Mod AR, grade 3 diastolic dysfunction   Upon reviewing ECG strips, Pt had episode of 12beat run of NSVT. Pt asymptomatic and no electrolyte imbalance on labwork      Plan: Lasix 40mg IVP BID.  Pulmonary eval for pleural effusion   Strict I&O's, maintain negative balance  Repeat pBNP closer to discharge  Upon reviewing ECG, appears sinus rhythm, with 12 beat run of NSVT. Discontinue Carvedilol.  Staring tomorrow 100mg PO Metoprolol BID  If more episodes of NSVT ad low dose Cardizem PO  Workup for lizeth clip when stable  Repeat ECG  Repeat TTE  Cont, ASA, Plavix, Amlodipine  Monitor lytes, K>4, Mg>2    Discussed with Dr Craig             81 y/o female with past medical history of Dementia, HTN, PAD (on Plavix), Liver Cirrhosis, Hx of Variceal bleed (s/p Variceal banding) Chronic DVT, Hypothyroidism, GERD, RA, Gout, Depression and overactive bladder now with complaint of shortness of breath that started when she woke up    Impression:  #SOB: Decompensated HFpef in the setting of severe MR.   #NSVT      Cxr: Low lung volume. Bilateral opacities.  CTA chest: Interstitial edema bilateral pleural effusions r>L, possible CHF  On exam appears euvolemic, no overt signs of volume overload  pBNP 78538. No baseline to compare to  ECHO (9/2022): EF 54%, Normal global LVSF, Severe MR, Mod AR, grade 3 diastolic dysfunction   Upon reviewing ECG strips, Pt had episode of 12beat run of NSVT. Pt asymptomatic and no electrolyte imbalance on labwork      Plan: Lasix 40mg IVP BID.  Pulmonary eval for pleural effusion   Strict I&O's, maintain negative balance  Repeat pBNP closer to discharge  Upon reviewing ECG, appears sinus rhythm, with 12 beat run of NSVT. Discontinue Carvedilol.  Staring tomorrow 100mg PO Metoprolol BID  If more episodes of NSVT ad low dose Cardizem PO  Workup for lizeth clip when stable  Repeat ECG  Repeat TTE  Cont, ASA, Plavix, Amlodipine  Monitor lytes, K>4, Mg>2    Discussed with Dr Craig

## 2022-12-04 NOTE — PROGRESS NOTE ADULT - ASSESSMENT
83 y/o female with past medical history of Dementia, HTN, PAD (on Plavix), Liver Cirrhosis, Hx of Variceal bleed (s/p Variceal banding) Chronic DVT, Hypothyroidism, GERD, RA, Gout, Depression and overactive bladder now with complaint of shortness of breath that started when she woke up    Impression:  #SOB: Decompensated HFpEF in the setting of severe MR.   #NSVT      Cxr: Low lung volume. Bilateral opacities.  CTA chest: Interstitial edema bilateral pleural effusions r>L, possible CHF  On exam appears euvolemic, no overt signs of volume overload  pBNP 37634. No baseline to compare to  ECHO (9/2022): EF 54%, Normal global LVSF, Severe MR, Mod AR, grade 3 diastolic dysfunction   Upon reviewing ECG strips, Pt had episode of 12beat run of NSVT. Pt asymptomatic and no electrolyte imbalance on labwork      Plan:   Change Lasix 40mg IVP daily.  Strict I&O's, maintain negative balance  Repeat pBNP closer to discharge  Upon reviewing ECG, appears sinus rhythm, with 12 beat run of NSVT. Discontinue Carvedilol.    Cont 100mg PO Metoprolol BID  If more episodes of NSVT ad low dose Cardizem PO  Workup for lizeth clip when stable. Evaluation by structural cardiology   Repeat ECG  Repeat TTE  Cont, ASA, Plavix, Amlodipine  Monitor lytes, K>4, Mg>2

## 2022-12-04 NOTE — PROGRESS NOTE ADULT - SUBJECTIVE AND OBJECTIVE BOX
81 y/o female with past medical history of Dementia, HTN, PAD (on Plavix), Liver Cirrhosis, Hx of Variceal bleed (s/p Variceal banding) Chronic DVT, Hypothyroidism, GERD, RA, Gout, Depression and overactive bladder now with complaint of shortness of breath that started about 6am that woke her up.    Today:  Seen at bedside, no overnight events.        REVIEW OF SYSTEMS:  Not a reliable historian      MEDICATIONS  (STANDING):  allopurinol 100 milliGRAM(s) Oral daily  amLODIPine   Tablet 5 milliGRAM(s) Oral daily  aspirin enteric coated 81 milliGRAM(s) Oral daily  chlorhexidine 2% Cloths 1 Application(s) Topical once  clopidogrel Tablet 75 milliGRAM(s) Oral daily  donepezil 10 milliGRAM(s) Oral at bedtime  DULoxetine 60 milliGRAM(s) Oral daily  ferrous    sulfate 325 milliGRAM(s) Oral daily  folic acid 1 milliGRAM(s) Oral daily  furosemide   Injectable 40 milliGRAM(s) IV Push every 12 hours  levothyroxine 50 MICROGram(s) Oral daily  metoprolol tartrate 100 milliGRAM(s) Oral every 12 hours  oxybutynin 5 milliGRAM(s) Oral two times a day  pantoprazole    Tablet 40 milliGRAM(s) Oral before breakfast    MEDICATIONS  (PRN):  aluminum hydroxide/magnesium hydroxide/simethicone Suspension 30 milliLiter(s) Oral every 4 hours PRN Dyspepsia  melatonin 3 milliGRAM(s) Oral at bedtime PRN Insomnia  ondansetron Injectable 4 milliGRAM(s) IV Push every 8 hours PRN Nausea and/or Vomiting      Allergies  Keflex (Unknown)  losartan (Unknown)  morphine (Unknown)  Rocephin (Unknown)  sulfamethoxazole (Hives)        FAMILY HISTORY:  No pertinent family history in first degree relatives        Vital Signs Last 24 Hrs  T(C): 36.5 (04 Dec 2022 05:04), Max: 36.6 (03 Dec 2022 14:00)  T(F): 97.7 (04 Dec 2022 05:04), Max: 97.9 (03 Dec 2022 14:00)  HR: 80 (04 Dec 2022 05:04) (80 - 93)  BP: 151/68 (04 Dec 2022 05:04) (124/61 - 159/70)  RR: 20 (04 Dec 2022 05:04) (18 - 20)          PHYSICAL EXAM:  GENERAL: NAD, well-groomed, well-developed  HEAD:  Atraumatic, Normocephalic  EYES: EOMI, PERRLA, conjunctiva and sclera clear  NECK: Supple, No JVD, Normal thyroid  NERVOUS SYSTEM:  Alert & Oriented x1, poor concentration  CHEST/LUNG: CTA bilaterally; No rales, rhonchi, wheezing, or rubs  HEART: Regular rate and rhythm; No murmurs, rubs, or gallops  ABDOMEN: Soft, Nontender, Nondistended; Bowel sounds present  EXTREMITIES:  2+ Peripheral Pulses, No clubbing, cyanosis, or edema      LABS:                        11.6   5.78  )-----------( 195      ( 03 Dec 2022 08:02 )             37.3     12-03    136  |  100  |  13  ----------------------------<  94  4.6   |  25  |  0.5<L>    Ca    8.7      03 Dec 2022 08:02  Mg     1.7     12-03    TPro  7.6  /  Alb  2.9<L>  /  TBili  0.9  /  DBili  x   /  AST  27  /  ALT  14  /  AlkPhos  148<H>  12-03

## 2022-12-04 NOTE — PROGRESS NOTE ADULT - SUBJECTIVE AND OBJECTIVE BOX
Subjective/Objective:     HPI-Cardiology/Events/Updates  No overnight events. Pt states that her breathing is better, and denies any other complaints. Radiology tests and hospital records, were reviewed, as well as previous notes on this patient.       MEDICATIONS  (STANDING):  allopurinol 100 milliGRAM(s) Oral daily  amLODIPine   Tablet 5 milliGRAM(s) Oral daily  aspirin enteric coated 81 milliGRAM(s) Oral daily  chlorhexidine 2% Cloths 1 Application(s) Topical once  clopidogrel Tablet 75 milliGRAM(s) Oral daily  donepezil 10 milliGRAM(s) Oral at bedtime  DULoxetine 60 milliGRAM(s) Oral daily  ferrous    sulfate 325 milliGRAM(s) Oral daily  folic acid 1 milliGRAM(s) Oral daily  furosemide   Injectable 40 milliGRAM(s) IV Push every 12 hours  levothyroxine 50 MICROGram(s) Oral daily  metoprolol tartrate 100 milliGRAM(s) Oral every 12 hours  oxybutynin 5 milliGRAM(s) Oral two times a day  pantoprazole    Tablet 40 milliGRAM(s) Oral before breakfast    MEDICATIONS  (PRN):  aluminum hydroxide/magnesium hydroxide/simethicone Suspension 30 milliLiter(s) Oral every 4 hours PRN Dyspepsia  melatonin 3 milliGRAM(s) Oral at bedtime PRN Insomnia  ondansetron Injectable 4 milliGRAM(s) IV Push every 8 hours PRN Nausea and/or Vomiting          Vital Signs Last 24 Hrs  T(C): 36.2 (04 Dec 2022 14:10), Max: 36.5 (04 Dec 2022 05:04)  T(F): 97.1 (04 Dec 2022 14:10), Max: 97.7 (04 Dec 2022 05:04)  HR: 73 (04 Dec 2022 14:10) (73 - 93)  BP: 164/72 (04 Dec 2022 14:10) (124/61 - 164/72)  BP(mean): --  RR: 18 (04 Dec 2022 14:10) (18 - 20)  SpO2: --      I&O's Detail    03 Dec 2022 07:01  -  04 Dec 2022 07:00  --------------------------------------------------------  IN:  Total IN: 0 mL    OUT:    Voided (mL): 1100 mL  Total OUT: 1100 mL    Total NET: -1100 mL      04 Dec 2022 07:01  -  04 Dec 2022 15:15  --------------------------------------------------------  IN:  Total IN: 0 mL    OUT:    Voided (mL): 600 mL  Total OUT: 600 mL    Total NET: -600 mL      PHYSICAL EXAM:  NEURO: patient is awake , alert and oriented  GEN: Not in acute distress  NECK: no thyroid enlargement, no JVD  LUNGS: mild rales noted more on left than right   CARDIOVASCULAR: S1/S2 present, RRR , no murmurs or rubs, no carotid bruits,  + PP bilaterally  ABD: Soft, non-tender, non-distended, +BS  EXT: No KIMBERLY  SKIN: Intact        EKG/ TELEM:  < from: 12 Lead ECG (12.03.22 @ 04:17) >  Sinus rhythm with occasional Premature ventricular complexes  Left ventricular hypertrophy with repolarization abnormality  Abnormal ECG    Confirmed by SHILPI CERVANTES MD ([id number]) on 12/4/2022 11:38:26 AM    < end of copied text >        LABS:                          11.4   5.13  )-----------( 215      ( 04 Dec 2022 08:34 )             36.9       04 Dec 2022 08:34    137    |  97<L>  |  18     ----------------------------<  109<H>  3.7     |  29     |  0.7    03 Dec 2022 08:02    136    |  100    |  13     ----------------------------<  94     4.6     |  25     |  0.5<L>    Ca    8.9        04 Dec 2022 08:34  Ca    8.7        03 Dec 2022 08:02  Phos  3.6       04 Dec 2022 08:34  Mg     1.8       04 Dec 2022 08:34  Mg     1.7<L>     03 Dec 2022 08:02    TPro  7.6    /  Alb  2.9<L>  /  TBili  0.8    /  DBili  x      /  AST  27     /  ALT  14     /  AlkPhos  149<H>  04 Dec 2022 08:34  TPro  7.6    /  Alb  2.9<L>  /  TBili  0.9    /  DBili  x      /  AST  27     /  ALT  14     /  AlkPhos  148<H>  03 Dec 2022 08:02    CARDIAC MARKERS ( 03 Dec 2022 15:27 )  x     / <0.01 ng/mL / x     / x     / x      CARDIAC MARKERS ( 03 Dec 2022 08:02 )  x     / <0.01 ng/mL / x     / x     / x                Diagnostic testing:  < from: Xray Chest 1 View- PORTABLE-Urgent (12.02.22 @ 14:08) >  Impression:    Lowlung volume.    Bilateral opacities.    --- End of Report ---    < end of copied text >              < from: TTE Echo Complete w/o Contrast w/ Doppler (09.16.22 @ 15:04) >      Summary:   1. Normal global left ventricular systolic function and wall motion.   2. LV Ejection Fraction by Rice's Method with a biplane EF of 55 %.   3. Normal right ventricular size and function.   4. Moderately enlarged left atrium.   5. Severe mitral valve regurgitation.   6. Moderate aortic regurgitation.    < end of copied text >          < from: CT Angio Chest PE Protocol w/ IV Cont (12.02.22 @ 16:02) >    IMPRESSION:    1. Increased moderate right, unchanged small left bilateral pleural   effusions. Interstitial edema. Scattered patchy groundglass opacities,   which may represent pulmonary edema in the appropriate clinical setting.   Findings may be attributed to CHF.    2. No evidence of acute pulmonary embolism.    3. Partially imaged cirrhotic liver, splenomegaly, abdominal ascites.    --- End of Report ---    < end of copied text >

## 2022-12-04 NOTE — PROGRESS NOTE ADULT - ASSESSMENT
83 y/o female with past medical history of Dementia, HTN, PAD (on Plavix), Liver Cirrhosis, Hx of Variceal bleed (s/p Variceal banding) Chronic DVT, Hypothyroidism, GERD, RA, Gout, Depression and overactive bladder now with complaint of shortness of breath that started about 6am that woke her up.      # New onset of CHF-severe mitral regurgitation:  - Telemetry monitoring  - Lasix 40 mg IVP BID  - last TTE noted Sept 2022  - TTE  - Strict I&O's.  - Daily weights.  - Cardiology consult appreciated-Will require lizeth clip evaluation with Dr. Lilly  - Troponin x 2  neg  - Elevate HOB @ 45 degrees.    # Hypertension  - Monitor vital signs per telemetry protocol.  - Metoprolol 100 mg PO BID  - Continue Amlodipine 5 mg PO daily.  - DASH diet.    # Peripheral artery disease:   - Continue Plavix 75 mg daily and Asa 81 mg daily.  - Daily Bacitracin and dry dressings to open sores on lower extremities.    # Hypothyroidism:   - Continue Synthroid 50 mg PO before breakfast.    # GERD:  - Continue Protonix 40 mg po daily.    # Gout:  - Continue Allopurinol 100 mg po daily.    # Dementia:  - Continue Donepezil 10 mg daily.    # Depression:  - Continue Duloxetine 60 mg daily.      # Anemia:  - Continue Ferrous sulfate 325 mg daily.  - Continue Folic acid 1 mg daily    #Osteoporosis:  - Continue Vitamin and calcium.    # Overactive bladder:  - Continue Oxybutynin 10 mg daily.

## 2022-12-05 NOTE — PHYSICAL THERAPY INITIAL EVALUATION ADULT - GAIT DEVIATIONS NOTED, PT EVAL
stooped posture, dec heel strike/pushoff /stance, incomplete foot clearance/decreased jaelyn/decreased step length/decreased weight-shifting ability

## 2022-12-05 NOTE — PHYSICAL THERAPY INITIAL EVALUATION ADULT - GENERAL OBSERVATIONS, REHAB EVAL
10:15-10:45 Chart reviewed. Pt encountered semireclined in bed,  may be seen by Physical Therapist as confirmed with Nurse. Patient denied pain at rest and ready to get up now; +tele; +heplock; +Primafit

## 2022-12-05 NOTE — PROGRESS NOTE ADULT - SUBJECTIVE AND OBJECTIVE BOX
83 y/o female with past medical history of Dementia, HTN, PAD (on Plavix), Liver Cirrhosis, Hx of Variceal bleed (s/p Variceal banding) Chronic DVT, Hypothyroidism, GERD, RA, Gout, Depression and overactive bladder now with complaint of shortness of breath that started about 6am that woke her up.    Today:  Seen at bedside, no overnight events.        REVIEW OF SYSTEMS:  Not a reliable historian    MEDICATIONS  (STANDING):  allopurinol 100 milliGRAM(s) Oral daily  amLODIPine   Tablet 5 milliGRAM(s) Oral daily  aspirin enteric coated 81 milliGRAM(s) Oral daily  chlorhexidine 2% Cloths 1 Application(s) Topical once  clopidogrel Tablet 75 milliGRAM(s) Oral daily  donepezil 10 milliGRAM(s) Oral at bedtime  DULoxetine 60 milliGRAM(s) Oral daily  ferrous    sulfate 325 milliGRAM(s) Oral daily  folic acid 1 milliGRAM(s) Oral daily  furosemide   Injectable 40 milliGRAM(s) IV Push daily  levothyroxine 50 MICROGram(s) Oral daily  metoprolol tartrate 100 milliGRAM(s) Oral every 12 hours  oxybutynin 5 milliGRAM(s) Oral two times a day  pantoprazole    Tablet 40 milliGRAM(s) Oral before breakfast    MEDICATIONS  (PRN):  aluminum hydroxide/magnesium hydroxide/simethicone Suspension 30 milliLiter(s) Oral every 4 hours PRN Dyspepsia  melatonin 3 milliGRAM(s) Oral at bedtime PRN Insomnia  ondansetron Injectable 4 milliGRAM(s) IV Push every 8 hours PRN Nausea and/or Vomiting      Allergies  Keflex (Unknown)  losartan (Unknown)  morphine (Unknown)  Rocephin (Unknown)  sulfamethoxazole (Hives)      FAMILY HISTORY:  No pertinent family history in first degree relatives        Vital Signs Last 24 Hrs  T(C): 36.6 (05 Dec 2022 04:24), Max: 36.6 (05 Dec 2022 04:24)  T(F): 97.9 (05 Dec 2022 04:24), Max: 97.9 (05 Dec 2022 04:24)  HR: 74 (05 Dec 2022 04:24) (65 - 80)  BP: 162/74 (05 Dec 2022 04:24) (132/59 - 169/71)  RR: 18 (05 Dec 2022 04:24) (18 - 18)  SpO2: 96% (05 Dec 2022 06:41) (94% - 96%)    Parameters below as of 05 Dec 2022 06:41  Patient On (Oxygen Delivery Method): room air        PHYSICAL EXAM:  GENERAL: NAD, well-groomed, well-developed  HEAD:  Atraumatic, Normocephalic  EYES: EOMI, PERRLA, conjunctiva and sclera clear  NECK: Supple, No JVD, Normal thyroid  NERVOUS SYSTEM:  Alert & Oriented x1, poor concentration  CHEST/LUNG: CTA bilaterally; No rales, rhonchi, wheezing, or rubs  HEART: Regular rate and rhythm; No murmurs, rubs, or gallops  ABDOMEN: Soft, Nontender, Nondistended; Bowel sounds present  EXTREMITIES:  2+ Peripheral Pulses, No clubbing, cyanosis, or edema      LABS:                        12.1   7.42  )-----------( 212      ( 05 Dec 2022 07:45 )             38.9     12-05    136  |  97<L>  |  22<H>  ----------------------------<  98  4.2   |  29  |  0.8    Ca    9.1      05 Dec 2022 07:45  Phos  3.6     12-05  Mg     1.9     12-05    TPro  8.0  /  Alb  3.2<L>  /  TBili  0.9  /  DBili  x   /  AST  37  /  ALT  16  /  AlkPhos  173<H>  12-05

## 2022-12-05 NOTE — PROGRESS NOTE ADULT - ASSESSMENT
83 y/o female with past medical history of Dementia, HTN, PAD (on Plavix), Liver Cirrhosis, Hx of Variceal bleed (s/p Variceal banding) Chronic DVT, Hypothyroidism, GERD, RA, Gout, Depression and overactive bladder now with complaint of shortness of breath that started about 6am that woke her up.      # New onset of CHF-severe mitral regurgitation:  - Telemetry monitoring  - Lasix 40 mg IVP daily  - last TTE noted Sept 2022  - TTE  - Strict I&O's.  - Daily weights.  - Cardiology consult appreciated-Will require lizeth clip evaluation with Dr. Lilly  - Troponin  neg  - Elevate HOB @ 45 degrees.    # Hypertension  - Monitor vital signs per telemetry protocol.  - Metoprolol 100 mg PO BID  - Continue Amlodipine 5 mg PO daily.  - DASH diet.    # Peripheral artery disease:   - Continue Plavix 75 mg daily and Asa 81 mg daily.  - Daily Bacitracin and dry dressings to open sores on lower extremities.    # Hypothyroidism:   - Continue Synthroid 50 mg PO before breakfast.    # GERD:  - Continue Protonix 40 mg po daily.    # Gout:  - Continue Allopurinol 100 mg po daily.    # Dementia:  - Continue Donepezil 10 mg daily.    # Depression:  - Continue Duloxetine 60 mg daily.      # Anemia:  - Continue Ferrous sulfate 325 mg daily.  - Continue Folic acid 1 mg daily    #Osteoporosis:  - Continue Vitamin and calcium.    # Overactive bladder:  - Continue Oxybutynin 10 mg daily.

## 2022-12-05 NOTE — PHYSICAL THERAPY INITIAL EVALUATION ADULT - ADDITIONAL COMMENTS
Per daughter Ela over the phone, patient lives in a condo with 2 wide steps to enter building with no rail, then no further steps inside her unit; has 24 hour HHA; able to walk with rolling walker and aide inside her  condo

## 2022-12-05 NOTE — PHYSICAL THERAPY INITIAL EVALUATION ADULT - PATIENT/FAMILY/SIGNIFICANT OTHER GOALS STATEMENT, PT EVAL
Patient's daughter would like for patient to still be able to get up and walk within her condo with rolling walker and aide like she did

## 2022-12-06 NOTE — PROGRESS NOTE ADULT - ASSESSMENT
81 y/o female with past medical history of Dementia, HTN, PAD (on Plavix), Liver Cirrhosis, Hx of Variceal bleed (s/p Variceal banding) Chronic DVT, Hypothyroidism, GERD, RA, Gout, Depression and overactive bladder now with complaint of shortness of breath that started when she woke up    Impression:  #SOB: Decompensated HFpEF in the setting of severe MR.   #NSVT      Cxr: Low lung volume. Bilateral opacities.  CTA chest: Interstitial edema bilateral pleural effusions r>L, possible CHF  On exam appears euvolemic, no overt signs of volume overload  pBNP 47768. No baseline to compare to  ECHO: EF 35%, Moderately decreased global LVSF, Severe MR, Mod AR, Mod IA  Upon reviewing ECG strips, Pt had episode of 12beat run of NSVT. Pt asymptomatic and no electrolyte imbalance on labwork      Plan:   Consider switching to PO Lasix 40mg daily  Strict I&O's, maintain euvolemic   Repeat pBNP closer to discharge  Upon reviewing ECG, appears sinus rhythm, with 12 beat run of NSVT. Discontinue Carvedilol.    Cont 100mg PO Metoprolol BID  If more episodes of NSVT ad low dose Cardizem PO  Evaluation for lizeth clip as outpatient with Dr Lilly. Pt aware and agreeable   Cont, ASA, Plavix, Amlodipine  Would start low dose Entresto 24/26          81 y/o female with past medical history of Dementia, HTN, PAD (on Plavix), Liver Cirrhosis, Hx of Variceal bleed (s/p Variceal banding) Chronic DVT, Hypothyroidism, GERD, RA, Gout, Depression and overactive bladder now with complaint of shortness of breath that started when she woke up    Impression:  #SOB: Decompensated HFpEF in the setting of severe MR.   #NSVT      Cxr: Low lung volume. Bilateral opacities.  CTA chest: Interstitial edema bilateral pleural effusions r>L, possible CHF  On exam appears euvolemic, no overt signs of volume overload  pBNP 33102. No baseline to compare to  ECHO: EF 35%, Moderately decreased global LVSF, Severe MR, Mod AR, Mod RI. Compared to previous TTE on 9/2022, the EF is reduced  Upon reviewing ECG strips, Pt had episode of 12beat run of NSVT. Pt asymptomatic and no electrolyte imbalance on labwork      Plan:   Consider switching to PO Lasix 40mg daily  Strict I&O's, maintain euvolemic   Repeat pBNP closer to discharge  Upon reviewing ECG, appears sinus rhythm, with 12 beat run of NSVT. Discontinue Carvedilol.    Cont 100mg PO Metoprolol BID  If more episodes of NSVT ad low dose Cardizem PO  Evaluation for lizeth clip as outpatient with Dr Lilly. Pt aware and agreeable   Cont, ASA, Plavix, Amlodipine  Recommend low dose Valsartan 40mg daily. Will eventually need Entresto. Outpatient f/u 1 week post discharge to check BMP for renal function

## 2022-12-06 NOTE — PROGRESS NOTE ADULT - SUBJECTIVE AND OBJECTIVE BOX
81 y/o female with past medical history of Dementia, HTN, PAD (on Plavix), Liver Cirrhosis, Hx of Variceal bleed (s/p Variceal banding) Chronic DVT, Hypothyroidism, GERD, RA, Gout, Depression and overactive bladder now with complaint of shortness of breath that started about 6am that woke her up.    Today:  Seen at bedside, no new complaints.      REVIEW OF SYSTEMS:  Not a reliable historian    MEDICATIONS  (STANDING):  allopurinol 100 milliGRAM(s) Oral daily  amLODIPine   Tablet 5 milliGRAM(s) Oral daily  aspirin enteric coated 81 milliGRAM(s) Oral daily  chlorhexidine 2% Cloths 1 Application(s) Topical once  clopidogrel Tablet 75 milliGRAM(s) Oral daily  donepezil 10 milliGRAM(s) Oral at bedtime  DULoxetine 60 milliGRAM(s) Oral daily  ferrous    sulfate 325 milliGRAM(s) Oral daily  folic acid 1 milliGRAM(s) Oral daily  furosemide   Injectable 40 milliGRAM(s) IV Push daily  levothyroxine 50 MICROGram(s) Oral daily  metoprolol tartrate 100 milliGRAM(s) Oral every 12 hours  oxybutynin 5 milliGRAM(s) Oral two times a day  pantoprazole    Tablet 40 milliGRAM(s) Oral before breakfast    MEDICATIONS  (PRN):  aluminum hydroxide/magnesium hydroxide/simethicone Suspension 30 milliLiter(s) Oral every 4 hours PRN Dyspepsia  melatonin 3 milliGRAM(s) Oral at bedtime PRN Insomnia  ondansetron Injectable 4 milliGRAM(s) IV Push every 8 hours PRN Nausea and/or Vomiting      Allergies  Keflex (Unknown)  losartan (Unknown)  morphine (Unknown)  Rocephin (Unknown)  sulfamethoxazole (Hives)        FAMILY HISTORY:  No pertinent family history in first degree relatives        Vital Signs Last 24 Hrs  T(C): 36.9 (06 Dec 2022 05:00), Max: 37.8 (05 Dec 2022 13:51)  T(F): 98.5 (06 Dec 2022 05:00), Max: 100.1 (05 Dec 2022 13:51)  HR: 73 (06 Dec 2022 05:00) (66 - 79)  BP: 142/65 (06 Dec 2022 05:00) (132/64 - 145/64)  RR: 16 (06 Dec 2022 05:00) (16 - 18)  SpO2: 93% (05 Dec 2022 20:09) (93% - 93%)    Parameters below as of 05 Dec 2022 20:09  Patient On (Oxygen Delivery Method): room air        PHYSICAL EXAM:  GENERAL: NAD, well-groomed, well-developed  HEAD:  Atraumatic, Normocephalic  EYES: EOMI, PERRLA, conjunctiva and sclera clear  NECK: Supple, No JVD, Normal thyroid  NERVOUS SYSTEM:  Alert & Oriented x1, poor concentration  CHEST/LUNG: CTA bilaterally; No rales, rhonchi, wheezing, or rubs  HEART: Regular rate and rhythm; No murmurs, rubs, or gallops  ABDOMEN: Soft, Nontender, Nondistended; Bowel sounds present  EXTREMITIES:  2+ Peripheral Pulses, No clubbing, cyanosis, or edema      LABS:                        10.2   9.60  )-----------( 150      ( 06 Dec 2022 07:06 )             33.1     12-06    135  |  96<L>  |  29<H>  ----------------------------<  97  3.5   |  30  |  0.8    Ca    8.4      06 Dec 2022 07:06  Phos  3.6     12-05  Mg     1.9     12-05    TPro  6.9  /  Alb  2.7<L>  /  TBili  0.8  /  DBili  x   /  AST  42<H>  /  ALT  19  /  AlkPhos  166<H>  12-06

## 2022-12-06 NOTE — PROGRESS NOTE ADULT - ASSESSMENT
81 y/o female with past medical history of Dementia, HTN, PAD (on Plavix), Liver Cirrhosis, Hx of Variceal bleed (s/p Variceal banding) Chronic DVT, Hypothyroidism, GERD, RA, Gout, Depression and overactive bladder now with complaint of shortness of breath that started about 6am that woke her up.      # New onset of CHF-severe mitral regurgitation:  - Telemetry monitoring  - Lasix 40 mg IVP daily  - last TTE noted Sept 2022  - TTE- EF 35% with severe MR  - Strict I&O's.  - Daily weights.  - Cardiology consult appreciated-Will require mitral clip evaluation with Dr. Lilly  - Troponin  neg  - Elevate HOB @ 45 degrees.    # Hypertension  - Monitor vital signs per telemetry protocol.  - Metoprolol 100 mg PO BID  - Continue Amlodipine 5 mg PO daily.  - DASH diet.    # Peripheral artery disease:   - Continue Plavix 75 mg daily and Asa 81 mg daily.  - Daily Bacitracin and dry dressings to open sores on lower extremities.    # Hypothyroidism:   - Continue Synthroid 50 mg PO before breakfast.    # GERD:  - Continue Protonix 40 mg po daily.    # Gout:  - Continue Allopurinol 100 mg po daily.    # Dementia:  - Continue Donepezil 10 mg daily.    # Depression:  - Continue Duloxetine 60 mg daily.      # Anemia:  - Continue Ferrous sulfate 325 mg daily.  - Continue Folic acid 1 mg daily    #Osteoporosis:  - Continue Vitamin and calcium.    # Overactive bladder:  - Continue Oxybutynin 10 mg daily.

## 2022-12-06 NOTE — PROGRESS NOTE ADULT - SUBJECTIVE AND OBJECTIVE BOX
Subjective/Objective:     HPI-Cardiology/Events/Updates  Pt evaluated at bedside. No overnight events. Pt states that her breathing is better. Denies any complaints. Discussed with Pt risks/benefits about outpatient eval for lizeth clip. Pt aware and agreeable. Radiology tests and hospital records, were reviewed, as well as previous notes on this patient.         MEDICATIONS  (STANDING):  allopurinol 100 milliGRAM(s) Oral daily  amLODIPine   Tablet 5 milliGRAM(s) Oral daily  aspirin enteric coated 81 milliGRAM(s) Oral daily  chlorhexidine 2% Cloths 1 Application(s) Topical once  clopidogrel Tablet 75 milliGRAM(s) Oral daily  donepezil 10 milliGRAM(s) Oral at bedtime  DULoxetine 60 milliGRAM(s) Oral daily  ferrous    sulfate 325 milliGRAM(s) Oral daily  folic acid 1 milliGRAM(s) Oral daily  furosemide   Injectable 40 milliGRAM(s) IV Push daily  levothyroxine 50 MICROGram(s) Oral daily  metoprolol tartrate 100 milliGRAM(s) Oral every 12 hours  oxybutynin 5 milliGRAM(s) Oral two times a day  pantoprazole    Tablet 40 milliGRAM(s) Oral before breakfast    MEDICATIONS  (PRN):  aluminum hydroxide/magnesium hydroxide/simethicone Suspension 30 milliLiter(s) Oral every 4 hours PRN Dyspepsia  melatonin 3 milliGRAM(s) Oral at bedtime PRN Insomnia  ondansetron Injectable 4 milliGRAM(s) IV Push every 8 hours PRN Nausea and/or Vomiting          Vital Signs Last 24 Hrs  T(C): 36.9 (06 Dec 2022 05:00), Max: 37.8 (05 Dec 2022 13:51)  T(F): 98.5 (06 Dec 2022 05:00), Max: 100.1 (05 Dec 2022 13:51)  HR: 73 (06 Dec 2022 05:00) (66 - 79)  BP: 142/65 (06 Dec 2022 05:00) (132/64 - 145/64)  BP(mean): --  RR: 16 (06 Dec 2022 05:00) (16 - 18)  SpO2: 93% (05 Dec 2022 20:09) (93% - 93%)    Parameters below as of 05 Dec 2022 20:09  Patient On (Oxygen Delivery Method): room air      I&O's Detail    05 Dec 2022 07:01  -  06 Dec 2022 07:00  --------------------------------------------------------  IN:    Oral Fluid: 100 mL  Total IN: 100 mL    OUT:    Voided (mL): 750 mL  Total OUT: 750 mL    Total NET: -650 mL                EKG/ TELEM:    LABS:                          10.2   9.60  )-----------( 150      ( 06 Dec 2022 07:06 )             33.1       06 Dec 2022 07:06    135    |  96<L>  |  29<H>  ----------------------------<  97     3.5     |  30     |  0.8    05 Dec 2022 07:45    136    |  97<L>  |  22<H>  ----------------------------<  98     4.2     |  29     |  0.8      Ca    8.4        06 Dec 2022 07:06  Ca    9.1        05 Dec 2022 07:45  Phos  3.6       05 Dec 2022 07:45  Mg     1.9       05 Dec 2022 07:45    TPro  6.9    /  Alb  2.7<L>  /  TBili  0.8    /  DBili  x      /  AST  42<H>  /  ALT  19     /  AlkPhos  166<H>  06 Dec 2022 07:06  TPro  8.0    /  Alb  3.2<L>  /  TBili  0.9    /  DBili  x      /  AST  37     /  ALT  16     /  AlkPhos  173<H>  05 Dec 2022 07:45          Diagnostic testing:  < from: Xray Chest 1 View- PORTABLE-Urgent (12.02.22 @ 14:08) >  Impression:    Lowlung volume.    Bilateral opacities.    --- End of Report ---        < end of copied text >        < from: TTE Echo Complete w/o Contrast w/ Doppler (12.05.22 @ 11:08) >  Summary:   1. Moderately decreased global left ventricular systolic function.   2. LV Ejection Fraction by Rice's Method with a biplane EF of 35 %.   3. The left ventricular diastolic function could not be assessed in this   study.   4. Mild to moderately increased left ventricular internal cavity size.   5. Mildlyenlarged left atrium. LA volume Index is 35.2 ml/m².   6. Normal right atrial size.   7. Sclerotic aortic valve with decreased opening.   8. Moderate aortic regurgitation.   9. Moderate mitral annular calcification.  10. Moderate thickening and calcification of the anterior and posterior   mitral valve leaflets.  11. Severe mitral valve regurgitation.  12. Moderate pulmonic valve regurgitation.  13. Adequate TR velocity was not obtained to accurately assess RVSP.  14. There is no evidence of pericardial effusion.  15. Compared to previous TTE in 09/2022, EF is reduced.    < end of copied text >     Subjective/Objective:     HPI-Cardiology/Events/Updates  Pt evaluated at bedside. No overnight events. Pt states that her breathing is better. Denies any complaints. Discussed with Pt risks/benefits about outpatient eval for lizeth clip. Pt aware and agreeable. Radiology tests and hospital records, were reviewed, as well as previous notes on this patient.         MEDICATIONS  (STANDING):  allopurinol 100 milliGRAM(s) Oral daily  amLODIPine   Tablet 5 milliGRAM(s) Oral daily  aspirin enteric coated 81 milliGRAM(s) Oral daily  chlorhexidine 2% Cloths 1 Application(s) Topical once  clopidogrel Tablet 75 milliGRAM(s) Oral daily  donepezil 10 milliGRAM(s) Oral at bedtime  DULoxetine 60 milliGRAM(s) Oral daily  ferrous    sulfate 325 milliGRAM(s) Oral daily  folic acid 1 milliGRAM(s) Oral daily  furosemide   Injectable 40 milliGRAM(s) IV Push daily  levothyroxine 50 MICROGram(s) Oral daily  metoprolol tartrate 100 milliGRAM(s) Oral every 12 hours  oxybutynin 5 milliGRAM(s) Oral two times a day  pantoprazole    Tablet 40 milliGRAM(s) Oral before breakfast    MEDICATIONS  (PRN):  aluminum hydroxide/magnesium hydroxide/simethicone Suspension 30 milliLiter(s) Oral every 4 hours PRN Dyspepsia  melatonin 3 milliGRAM(s) Oral at bedtime PRN Insomnia  ondansetron Injectable 4 milliGRAM(s) IV Push every 8 hours PRN Nausea and/or Vomiting          Vital Signs Last 24 Hrs  T(C): 36.9 (06 Dec 2022 05:00), Max: 37.8 (05 Dec 2022 13:51)  T(F): 98.5 (06 Dec 2022 05:00), Max: 100.1 (05 Dec 2022 13:51)  HR: 73 (06 Dec 2022 05:00) (66 - 79)  BP: 142/65 (06 Dec 2022 05:00) (132/64 - 145/64)  BP(mean): --  RR: 16 (06 Dec 2022 05:00) (16 - 18)  SpO2: 93% (05 Dec 2022 20:09) (93% - 93%)    Parameters below as of 05 Dec 2022 20:09  Patient On (Oxygen Delivery Method): room air      I&O's Detail    05 Dec 2022 07:01  -  06 Dec 2022 07:00  --------------------------------------------------------  IN:    Oral Fluid: 100 mL  Total IN: 100 mL    OUT:    Voided (mL): 750 mL  Total OUT: 750 mL    Total NET: -650 mL          PHYSICAL EXAM:  NEURO: patient is awake , alert and oriented  GEN: Not in acute distress  NECK: no thyroid enlargement, no JVD  LUNGS: Clear to ascultation B/L  CARDIOVASCULAR: S1/S2 present, RRR , no murmurs or rubs, no carotid bruits,  + PP bilaterally  ABD: Soft, non-tender, non-distended, +BS  EXT: No KIMBERLY  SKIN: Intact        EKG/ TELEM:  < from: 12 Lead ECG (12.03.22 @ 04:17) >  Sinus rhythm with occasional Premature ventricular complexes  Left ventricular hypertrophy with repolarization abnormality  Abnormal ECG    Confirmed by SHILPI CERVANTES MD ([id number]) on 12/4/2022 11:38:26 AM    < end of copied text >          LABS:                          10.2   9.60  )-----------( 150      ( 06 Dec 2022 07:06 )             33.1       06 Dec 2022 07:06    135    |  96<L>  |  29<H>  ----------------------------<  97     3.5     |  30     |  0.8    05 Dec 2022 07:45    136    |  97<L>  |  22<H>  ----------------------------<  98     4.2     |  29     |  0.8      Ca    8.4        06 Dec 2022 07:06  Ca    9.1        05 Dec 2022 07:45  Phos  3.6       05 Dec 2022 07:45  Mg     1.9       05 Dec 2022 07:45    TPro  6.9    /  Alb  2.7<L>  /  TBili  0.8    /  DBili  x      /  AST  42<H>  /  ALT  19     /  AlkPhos  166<H>  06 Dec 2022 07:06  TPro  8.0    /  Alb  3.2<L>  /  TBili  0.9    /  DBili  x      /  AST  37     /  ALT  16     /  AlkPhos  173<H>  05 Dec 2022 07:45          Diagnostic testing:  < from: Xray Chest 1 View- PORTABLE-Urgent (12.02.22 @ 14:08) >  Impression:    Lowlung volume.    Bilateral opacities.    --- End of Report ---        < end of copied text >        < from: TTE Echo Complete w/o Contrast w/ Doppler (12.05.22 @ 11:08) >  Summary:   1. Moderately decreased global left ventricular systolic function.   2. LV Ejection Fraction by Rice's Method with a biplane EF of 35 %.   3. The left ventricular diastolic function could not be assessed in this   study.   4. Mild to moderately increased left ventricular internal cavity size.   5. Mildlyenlarged left atrium. LA volume Index is 35.2 ml/m².   6. Normal right atrial size.   7. Sclerotic aortic valve with decreased opening.   8. Moderate aortic regurgitation.   9. Moderate mitral annular calcification.  10. Moderate thickening and calcification of the anterior and posterior   mitral valve leaflets.  11. Severe mitral valve regurgitation.  12. Moderate pulmonic valve regurgitation.  13. Adequate TR velocity was not obtained to accurately assess RVSP.  14. There is no evidence of pericardial effusion.  15. Compared to previous TTE in 09/2022, EF is reduced.    < end of copied text >

## 2022-12-07 NOTE — DISCHARGE NOTE NURSING/CASE MANAGEMENT/SOCIAL WORK - PATIENT PORTAL LINK FT
You can access the FollowMyHealth Patient Portal offered by [facility] by registering at the following website: [email or web address]. By joining [employer]’s FollowMyHealth portal, you will also be able to view your health information using other applications (apps) compatible with our system.

## 2022-12-07 NOTE — DISCHARGE NOTE NURSING/CASE MANAGEMENT/SOCIAL WORK - NSDCPEFALRISK_GEN_ALL_CORE
For information on Fall & Injury Prevention, visit: [email or web address] OR  [email or web address] OR  https://www.cdc.gov/steadi/patient.html

## 2022-12-07 NOTE — DISCHARGE NOTE PROVIDER - NSDCMRMEDTOKEN_GEN_ALL_CORE_FT
allopurinol 100 mg oral tablet: 1 tab(s) orally 2 times a day  Aspir 81 oral delayed release tablet: 1 tab(s) orally once a day  calcium (as carbonate and lactate)-vitamin D 200 mg-6.25 mcg oral tablet: 2 tab(s) orally 2 times a day  donepezil 10 mg oral tablet: 1 tab(s) orally once a day (at bedtime)  DULoxetine 60 mg oral delayed release capsule: 1 cap(s) orally once a day  ferrous sulfate 75 mg (15 mg elemental iron) oral tablet: 1 tab(s) orally once a day  folic acid 1 mg oral tablet: 1 tab(s) orally once a day  furosemide 40 mg oral tablet: 1 tab(s) orally once a day  levothyroxine 50 mcg (0.05 mg) oral tablet: 1 tab(s) orally once a day  metoprolol succinate 50 mg oral tablet, extended release: 1 tab(s) orally once a day  Norvasc 5 mg oral tablet: 1 tab(s) orally once a day  Orencia 125 mg/mL subcutaneous solution: 1 dose(s) subcutaneous once a week on Tuesdays  oxybutynin 10 mg/24 hr oral tablet, extended release: 1 tab(s) orally 2 times a day  pantoprazole 40 mg oral delayed release tablet: 1 tab(s) orally 2 times a day   Plavix 75 mg oral tablet: 1 tab(s) orally once a day

## 2022-12-07 NOTE — DISCHARGE NOTE PROVIDER - NSDCCPCAREPLAN_GEN_ALL_CORE_FT
PRINCIPAL DISCHARGE DIAGNOSIS  Diagnosis: Acute on chronic systolic congestive heart failure  Assessment and Plan of Treatment: Please take new medication (Lasix) and follow up with your cardiologist.  Your cardiologist will make an appointment with Dr. Lilly to have your mitral valve repaired.

## 2022-12-07 NOTE — PROGRESS NOTE ADULT - REASON FOR ADMISSION
New onset of CHF

## 2022-12-07 NOTE — DISCHARGE NOTE PROVIDER - HOSPITAL COURSE
83 y/o female with past medical history of Dementia, HTN, PAD (on Plavix), Liver Cirrhosis, Hx of Variceal bleed (s/p Variceal banding) Chronic DVT, Hypothyroidism, GERD, RA, Gout, Depression and overactive bladder now with complaint of shortness of breath that started about 6am that woke her up.      # New onset of CHF-severe mitral regurgitation:  - Telemetry monitoring  - Lasix 40 mg PO daily  - last TTE noted Sept 2022  - TTE- EF 35% with severe MR  - Cardiology consult appreciated-Will require outpatient mitral clip evaluation with Dr. Lilly  - Troponin neg    # Hypertension  - Metoprolol 100 mg PO BID  - Continue Amlodipine 5 mg PO daily.  - DASH diet.    # Peripheral artery disease:   - Continue Plavix 75 mg daily and Asa 81 mg daily.    # Hypothyroidism:   - Continue Synthroid 50 mg PO before breakfast.    # GERD:  - Continue Protonix 40 mg po daily.    # Gout:  - Continue Allopurinol 100 mg po daily.    # Dementia:  - Continue Donepezil 10 mg daily.    # Depression:  - Continue Duloxetine 60 mg daily.      # Anemia:  - Continue Ferrous sulfate 325 mg daily.  - Continue Folic acid 1 mg daily    #Osteoporosis:  - Continue Vitamin and calcium.    # Overactive bladder:  - Continue Oxybutynin 10 mg daily.

## 2022-12-07 NOTE — DISCHARGE NOTE PROVIDER - CARE PROVIDER_API CALL
Paco Childs)  Cardiovascular Disease; Internal Medicine  [address] [place] [address]  Phone: [phone]  Fax: [phone]  Follow Up Time:

## 2022-12-07 NOTE — DISCHARGE NOTE PROVIDER - NSDCFUSCHEDAPPT_GEN_ALL_CORE_FT
Caron Montes  [facility]  VASCULAR [address]  Scheduled Appointment: 12/12/2022    Birdie Spear  [facility]  ON[address]UR[address]O [address]  Scheduled Appointment: 12/27/2022    Paco Childs  [facility]  CARDIOLOGY [address]  Scheduled Appointment: 01/06/2023    Chirag Sam  [facility]  [address]  Scheduled Appointment: 01/13/2023

## 2022-12-07 NOTE — PROGRESS NOTE ADULT - SUBJECTIVE AND OBJECTIVE BOX
Patient is a 82y old  Female who presents with a chief complaint of New onset of CHF (06 Dec 2022 12:55)      T(F): 97.6 (12-07-22 @ 05:04), Max: 98.7 (12-06-22 @ 13:53)  HR: 62 (12-07-22 @ 05:35)  BP: 134/63 (12-07-22 @ 05:04)  RR: 18 (12-07-22 @ 08:29)  SpO2: 95% (12-07-22 @ 08:29) (93% - 95%)    PHYSICAL EXAM:  GENERAL: NAD, well-groomed, well-developed  HEAD:  Atraumatic, Normocephalic  EYES: EOMI, PERRLA, conjunctiva and sclera clear  ENMT: No tonsillar erythema, exudates, or enlargement; Moist mucous membranes, Good dentition, No lesions  NECK: Supple, No JVD, Normal thyroid  NERVOUS SYSTEM:  Alert & Oriented X3,  Motor Strength 5/5 B/L upper and lower extremities  CHEST/LUNG: Clear to percussion bilaterally; No rales, rhonchi, wheezing, or rubs  HEART: Regular rate and rhythm; I/VI aleksandr lsb rubs, or gallops  ABDOMEN: Soft, Nontender, Nondistended; Bowel sounds present  EXTREMITIES:   No clubbing, cyanosis, or edema  LYMPH: No lymphadenopathy noted  SKIN: No rashes or lesions    labs  12-06    135  |  96<L>  |  29<H>  ----------------------------<  97  3.5   |  30  |  0.8    Ca    8.4      06 Dec 2022 07:06    TPro  6.9  /  Alb  2.7<L>  /  TBili  0.8  /  DBili  x   /  AST  42<H>  /  ALT  19  /  AlkPhos  166<H>  12-06                          10.2   9.60  )-----------( 150      ( 06 Dec 2022 07:06 )             33.1               allopurinol 100 milliGRAM(s) Oral daily  aluminum hydroxide/magnesium hydroxide/simethicone Suspension 30 milliLiter(s) Oral every 4 hours PRN  amLODIPine   Tablet 5 milliGRAM(s) Oral daily  aspirin enteric coated 81 milliGRAM(s) Oral daily  chlorhexidine 2% Cloths 1 Application(s) Topical once  clopidogrel Tablet 75 milliGRAM(s) Oral daily  donepezil 10 milliGRAM(s) Oral at bedtime  DULoxetine 60 milliGRAM(s) Oral daily  ferrous    sulfate 325 milliGRAM(s) Oral daily  folic acid 1 milliGRAM(s) Oral daily  furosemide    Tablet 40 milliGRAM(s) Oral daily  levothyroxine 50 MICROGram(s) Oral daily  melatonin 3 milliGRAM(s) Oral at bedtime PRN  metoprolol tartrate 100 milliGRAM(s) Oral every 12 hours  ondansetron Injectable 4 milliGRAM(s) IV Push every 8 hours PRN  oxybutynin 5 milliGRAM(s) Oral two times a day  pantoprazole    Tablet 40 milliGRAM(s) Oral before breakfast

## 2022-12-07 NOTE — PROGRESS NOTE ADULT - ASSESSMENT
Patient with no complaints. Try ambulate with assistance. PO lasix. Check lytes. Correct k if needed. Consider out patient referral to [facility] Lilly. Possible mitral valve clip. Patient with no complaints. Try ambulate with assistance. PO lasix. Check lytes. Correct k if needed. Consider out patient referral to [facility]. Possible mitral valve clip. Follow cbc

## 2022-12-14 PROBLEM — I73.9 PERIPHERAL VASCULAR DISEASE, UNSPECIFIED: Chronic | Status: ACTIVE | Noted: 2022-01-01

## 2022-12-14 PROBLEM — H26.9 UNSPECIFIED CATARACT: Chronic | Status: ACTIVE | Noted: 2022-01-01

## 2022-12-14 PROBLEM — M06.9 RHEUMATOID ARTHRITIS, UNSPECIFIED: Chronic | Status: ACTIVE | Noted: 2022-01-01

## 2022-12-14 PROBLEM — C43.9 MALIGNANT MELANOMA OF SKIN, UNSPECIFIED: Chronic | Status: ACTIVE | Noted: 2022-01-01

## 2022-12-14 PROBLEM — F41.8 OTHER SPECIFIED ANXIETY DISORDERS: Chronic | Status: ACTIVE | Noted: 2022-01-01

## 2022-12-14 PROBLEM — M06.9 RHEUMATOID ARTHRITIS: Status: ACTIVE | Noted: 2022-01-01

## 2022-12-14 PROBLEM — I34.0 MITRAL REGURGITATION: Status: ACTIVE | Noted: 2022-01-01

## 2022-12-14 PROBLEM — F03.90 UNSPECIFIED DEMENTIA WITHOUT BEHAVIORAL DISTURBANCE: Chronic | Status: ACTIVE | Noted: 2022-01-01

## 2022-12-14 PROBLEM — K74.60 CIRRHOSIS: Status: ACTIVE | Noted: 2022-01-01

## 2022-12-14 PROBLEM — M85.80 OTHER SPECIFIED DISORDERS OF BONE DENSITY AND STRUCTURE, UNSPECIFIED SITE: Chronic | Status: ACTIVE | Noted: 2022-01-01

## 2022-12-14 NOTE — PHYSICAL EXAM
[Well Developed] : well developed [Well Nourished] : well nourished [Normal Conjunctiva] : normal conjunctiva [Normal Venous Pressure] : normal venous pressure [No Carotid Bruit] : no carotid bruit [Rhythm Regular] : regular [Normal S1] : normal S1 [Normal S2] : normal S2 [I] : a grade 1 [Soft] : abdomen soft [No Edema] : no edema [No Rash] : no rash [Moves all extremities] : moves all extremities [Alert and Oriented] : alert and oriented [Normal Rate] : the respiratory rate was normal [Rate ___] : at [unfilled] breaths per minute [Decreased Breath Sounds] : breath sounds were decreased diffusely [Bibasilar Rales/Crackles] : bibasilar rales/crackles were heard [S3] : no S3 [S4] : no S4

## 2022-12-14 NOTE — REVIEW OF SYSTEMS
[Hearing Loss] : hearing loss [Dyspnea on exertion] : dyspnea during exertion [Joint Pain] : joint pain [Finger Pain] : pain in the finger [Weight Loss (___ Lbs)] : [unfilled] ~Ulb weight loss [Fever] : no fever [Chills] : no chills [Blurry Vision] : no blurred vision [Cough] : no cough [Abdominal Pain] : no abdominal pain [Dizziness] : no dizziness [Confusion] : no confusion was observed [Easy Bleeding] : no tendency for easy bleeding

## 2022-12-14 NOTE — DISCUSSION/SUMMARY
[FreeTextEntry1] : 81 y/o lived in [facility]  has PMH of PVD stents legs, HTN,. RA cirrhosis variceal  bleed , Chronic DVT, hypothy, Gout, Depression and new dx vascular  dementia. secondary methotrexate.,MR,  10/4/22 ORIF femur fx without complication. Uses walker at home with mild MORRIS.  She has home care 8 hours. 5 days.\par Pt had several days of increased SOB no chest pain and was admitted 12/2 and Disch 12/7 with Pul Edema  and CHF. BNP 13,605. ECHO 12/5/22: EF 35%LAE, mod AI, Severe MR,Mod thickening and calcification of ant and post mitral valve leaflets.12/6  BUN/cr 29/0.8 K 3.5   H/H 10.2/33. Pt here w daughter Ela . Pt clinically without SOB and stable VS. Will start low dose entresto and ck CMP, Lipid TSH BNP in 2 wk  f/u visit in3 wk .All questions answered risk/benefit of med explained to pt and daughter

## 2022-12-14 NOTE — REASON FOR VISIT
[Symptom and Test Evaluation] : symptom and test evaluation [Cardiac Failure] : cardiac failure [Structural Heart and Valve Disease] : structural heart and valve disease [Hypertension] : hypertension [FreeTextEntry1] : Pt here for hosp f/u

## 2022-12-14 NOTE — HISTORY OF PRESENT ILLNESS
[FreeTextEntry1] : \par 83 y/o lived in [place]  has PMH of PVD stents legs, HTN,. RA cirrhosis variceal  bleed , Chronic DVT, hypothy, Gout, Depression and new dx vascular  dementia. secondary methotrexate.,MR,  10/4/22 ORIF femur fx without complication. Uses walker at home with mild MORRIS.  She has home care 8 hours. 5 days.\par Pt had several days of increased SOB no chest pain and was admitted 12/2 and Disch 12/7 with Pul Edema  and CHF. BNP 13,605. ECHO 12/5/22: EF 35%LAE, mod AI, Severe MR,Mod thickening and calcification of ant and post mitral valve leaflets.12/6  BUN/cr 29/0.8 K 3.5   H/H 10.2/33. Pt here w daughter Ela .

## 2022-12-20 PROBLEM — R09.89 OTHER SPECIFIED SYMPTOMS AND SIGNS INVOLVING THE CIRCULATORY AND RESPIRATORY SYSTEMS: Chronic | Status: ACTIVE | Noted: 2022-01-01

## 2023-01-01 ENCOUNTER — OUTPATIENT (OUTPATIENT)
Dept: OUTPATIENT SERVICES | Facility: HOSPITAL | Age: 83
LOS: 1 days | End: 2023-01-01
Payer: MEDICARE

## 2023-01-01 ENCOUNTER — APPOINTMENT (OUTPATIENT)
Dept: ORTHOPEDIC SURGERY | Facility: CLINIC | Age: 83
End: 2023-01-01

## 2023-01-01 ENCOUNTER — TRANSCRIPTION ENCOUNTER (OUTPATIENT)
Age: 83
End: 2023-01-01

## 2023-01-01 ENCOUNTER — OUTPATIENT (OUTPATIENT)
Dept: OUTPATIENT SERVICES | Facility: HOSPITAL | Age: 83
LOS: 1 days | Discharge: HOME | End: 2023-01-01

## 2023-01-01 ENCOUNTER — APPOINTMENT (OUTPATIENT)
Dept: VASCULAR SURGERY | Facility: CLINIC | Age: 83
End: 2023-01-01

## 2023-01-01 ENCOUNTER — APPOINTMENT (OUTPATIENT)
Dept: NEUROLOGY | Facility: CLINIC | Age: 83
End: 2023-01-01

## 2023-01-01 ENCOUNTER — APPOINTMENT (OUTPATIENT)
Dept: VASCULAR SURGERY | Facility: CLINIC | Age: 83
End: 2023-01-01
Payer: MEDICARE

## 2023-01-01 ENCOUNTER — APPOINTMENT (OUTPATIENT)
Dept: PODIATRY | Facility: CLINIC | Age: 83
End: 2023-01-01

## 2023-01-01 ENCOUNTER — INPATIENT (INPATIENT)
Facility: HOSPITAL | Age: 83
LOS: 5 days | Discharge: HOME HEALTH | DRG: 853 | End: 2023-04-08
Attending: INTERNAL MEDICINE | Admitting: INTERNAL MEDICINE
Payer: MEDICARE

## 2023-01-01 ENCOUNTER — APPOINTMENT (OUTPATIENT)
Dept: PODIATRY | Facility: CLINIC | Age: 83
End: 2023-01-01
Payer: MEDICARE

## 2023-01-01 ENCOUNTER — INPATIENT (INPATIENT)
Facility: HOSPITAL | Age: 83
LOS: 18 days | Discharge: NURSING, REHAB OR LONG-TERM CARE | End: 2023-01-21
Attending: INTERNAL MEDICINE | Admitting: INTERNAL MEDICINE
Payer: MEDICARE

## 2023-01-01 ENCOUNTER — RESULT REVIEW (OUTPATIENT)
Age: 83
End: 2023-01-01

## 2023-01-01 ENCOUNTER — EMERGENCY (EMERGENCY)
Facility: HOSPITAL | Age: 83
LOS: 0 days | Discharge: HOME | End: 2023-04-20
Attending: EMERGENCY MEDICINE
Payer: MEDICARE

## 2023-01-01 ENCOUNTER — APPOINTMENT (OUTPATIENT)
Dept: CARDIOLOGY | Facility: CLINIC | Age: 83
End: 2023-01-01
Payer: MEDICARE

## 2023-01-01 ENCOUNTER — APPOINTMENT (OUTPATIENT)
Dept: CARDIOLOGY | Facility: CLINIC | Age: 83
End: 2023-01-01

## 2023-01-01 VITALS
BODY MASS INDEX: 13.66 KG/M2 | WEIGHT: 80 LBS | HEIGHT: 64 IN | DIASTOLIC BLOOD PRESSURE: 62 MMHG | SYSTOLIC BLOOD PRESSURE: 124 MMHG

## 2023-01-01 VITALS
WEIGHT: 100.09 LBS | SYSTOLIC BLOOD PRESSURE: 149 MMHG | DIASTOLIC BLOOD PRESSURE: 65 MMHG | HEIGHT: 63 IN | RESPIRATION RATE: 18 BRPM | OXYGEN SATURATION: 92 % | HEART RATE: 61 BPM

## 2023-01-01 VITALS
TEMPERATURE: 96 F | OXYGEN SATURATION: 95 % | DIASTOLIC BLOOD PRESSURE: 53 MMHG | HEART RATE: 61 BPM | RESPIRATION RATE: 16 BRPM | SYSTOLIC BLOOD PRESSURE: 144 MMHG

## 2023-01-01 VITALS
TEMPERATURE: 96 F | RESPIRATION RATE: 18 BRPM | SYSTOLIC BLOOD PRESSURE: 163 MMHG | OXYGEN SATURATION: 98 % | HEART RATE: 52 BPM | DIASTOLIC BLOOD PRESSURE: 64 MMHG

## 2023-01-01 VITALS — SYSTOLIC BLOOD PRESSURE: 121 MMHG | DIASTOLIC BLOOD PRESSURE: 59 MMHG | HEART RATE: 71 BPM

## 2023-01-01 VITALS
OXYGEN SATURATION: 86 % | SYSTOLIC BLOOD PRESSURE: 127 MMHG | HEART RATE: 72 BPM | DIASTOLIC BLOOD PRESSURE: 57 MMHG | RESPIRATION RATE: 20 BRPM | TEMPERATURE: 96 F

## 2023-01-01 VITALS — WEIGHT: 100 LBS | HEIGHT: 64 IN | BODY MASS INDEX: 17.07 KG/M2

## 2023-01-01 VITALS
SYSTOLIC BLOOD PRESSURE: 150 MMHG | HEART RATE: 67 BPM | DIASTOLIC BLOOD PRESSURE: 81 MMHG | WEIGHT: 85.1 LBS | TEMPERATURE: 98 F | RESPIRATION RATE: 16 BRPM | OXYGEN SATURATION: 100 % | HEIGHT: 64 IN

## 2023-01-01 DIAGNOSIS — L98.8 OTHER SPECIFIED DISORDERS OF THE SKIN AND SUBCUTANEOUS TISSUE: ICD-10-CM
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
## 2023-01-01 DIAGNOSIS — M86.9 OSTEOMYELITIS, UNSPECIFIED: ICD-10-CM

## 2023-01-01 DIAGNOSIS — F41.8 OTHER SPECIFIED ANXIETY DISORDERS: ICD-10-CM

## 2023-01-01 DIAGNOSIS — Z90.710 ACQUIRED ABSENCE OF BOTH CERVIX AND UTERUS: Chronic | ICD-10-CM

## 2023-01-01 DIAGNOSIS — E03.9 HYPOTHYROIDISM, UNSPECIFIED: ICD-10-CM

## 2023-01-01 DIAGNOSIS — M86.172 OTHER ACUTE OSTEOMYELITIS, LEFT ANKLE AND FOOT: ICD-10-CM

## 2023-01-01 DIAGNOSIS — I50.9 HEART FAILURE, UNSPECIFIED: ICD-10-CM

## 2023-01-01 DIAGNOSIS — Z88.2 ALLERGY STATUS TO SULFONAMIDES: ICD-10-CM

## 2023-01-01 DIAGNOSIS — I73.9 PERIPHERAL VASCULAR DISEASE, UNSPECIFIED: ICD-10-CM

## 2023-01-01 DIAGNOSIS — F32.A DEPRESSION, UNSPECIFIED: ICD-10-CM

## 2023-01-01 DIAGNOSIS — Z88.6 ALLERGY STATUS TO ANALGESIC AGENT: ICD-10-CM

## 2023-01-01 DIAGNOSIS — L03.032 CELLULITIS OF LEFT TOE: ICD-10-CM

## 2023-01-01 DIAGNOSIS — Z90.710 ACQUIRED ABSENCE OF BOTH CERVIX AND UTERUS: ICD-10-CM

## 2023-01-01 DIAGNOSIS — Z89.422 ACQUIRED ABSENCE OF OTHER LEFT TOE(S): ICD-10-CM

## 2023-01-01 DIAGNOSIS — I50.22 CHRONIC SYSTOLIC (CONGESTIVE) HEART FAILURE: ICD-10-CM

## 2023-01-01 DIAGNOSIS — Z98.890 OTHER SPECIFIED POSTPROCEDURAL STATES: Chronic | ICD-10-CM

## 2023-01-01 DIAGNOSIS — R62.7 ADULT FAILURE TO THRIVE: ICD-10-CM

## 2023-01-01 DIAGNOSIS — I11.0 HYPERTENSIVE HEART DISEASE WITH HEART FAILURE: ICD-10-CM

## 2023-01-01 DIAGNOSIS — N39.0 URINARY TRACT INFECTION, SITE NOT SPECIFIED: ICD-10-CM

## 2023-01-01 DIAGNOSIS — G93.41 METABOLIC ENCEPHALOPATHY: ICD-10-CM

## 2023-01-01 DIAGNOSIS — I70.245 ATHEROSCLEROSIS OF NATIVE ARTERIES OF LEFT LEG WITH ULCERATION OF OTHER PART OF FOOT: ICD-10-CM

## 2023-01-01 DIAGNOSIS — I96 GANGRENE, NOT ELSEWHERE CLASSIFIED: ICD-10-CM

## 2023-01-01 DIAGNOSIS — Z00.00 ENCOUNTER FOR GENERAL ADULT MEDICAL EXAMINATION WITHOUT ABNORMAL FINDINGS: ICD-10-CM

## 2023-01-01 DIAGNOSIS — D50.9 IRON DEFICIENCY ANEMIA, UNSPECIFIED: ICD-10-CM

## 2023-01-01 DIAGNOSIS — E43 UNSPECIFIED SEVERE PROTEIN-CALORIE MALNUTRITION: ICD-10-CM

## 2023-01-01 DIAGNOSIS — T36.8X5A ADVERSE EFFECT OF OTHER SYSTEMIC ANTIBIOTICS, INITIAL ENCOUNTER: ICD-10-CM

## 2023-01-01 DIAGNOSIS — M10.9 GOUT, UNSPECIFIED: ICD-10-CM

## 2023-01-01 DIAGNOSIS — A41.9 SEPSIS, UNSPECIFIED ORGANISM: ICD-10-CM

## 2023-01-01 DIAGNOSIS — L97.529 NON-PRESSURE CHRONIC ULCER OF OTHER PART OF LEFT FOOT WITH UNSPECIFIED SEVERITY: ICD-10-CM

## 2023-01-01 DIAGNOSIS — Z66 DO NOT RESUSCITATE: ICD-10-CM

## 2023-01-01 DIAGNOSIS — Z98.890 OTHER SPECIFIED POSTPROCEDURAL STATES: ICD-10-CM

## 2023-01-01 DIAGNOSIS — F03.90 UNSPECIFIED DEMENTIA, UNSPECIFIED SEVERITY, WITHOUT BEHAVIORAL DISTURBANCE, PSYCHOTIC DISTURBANCE, MOOD DISTURBANCE, AND ANXIETY: ICD-10-CM

## 2023-01-01 DIAGNOSIS — I34.0 NONRHEUMATIC MITRAL (VALVE) INSUFFICIENCY: ICD-10-CM

## 2023-01-01 DIAGNOSIS — Z88.5 ALLERGY STATUS TO NARCOTIC AGENT: ICD-10-CM
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
## 2023-01-01 DIAGNOSIS — E87.5 HYPERKALEMIA: ICD-10-CM

## 2023-01-01 DIAGNOSIS — F41.9 ANXIETY DISORDER, UNSPECIFIED: ICD-10-CM

## 2023-01-01 DIAGNOSIS — I70.212 ATHEROSCLEROSIS OF NATIVE ARTERIES OF EXTREMITIES WITH INTERMITTENT CLAUDICATION, LEFT LEG: ICD-10-CM

## 2023-01-01 DIAGNOSIS — M06.9 RHEUMATOID ARTHRITIS, UNSPECIFIED: ICD-10-CM

## 2023-01-01 DIAGNOSIS — Z87.09 PERSONAL HISTORY OF OTHER DISEASES OF THE RESPIRATORY SYSTEM: ICD-10-CM

## 2023-01-01 DIAGNOSIS — Z88.8 ALLERGY STATUS TO OTHER DRUGS, MEDICAMENTS AND BIOLOGICAL SUBSTANCES STATUS: ICD-10-CM

## 2023-01-01 DIAGNOSIS — Z87.39 PERSONAL HISTORY OF OTHER DISEASES OF THE MUSCULOSKELETAL SYSTEM AND CONNECTIVE TISSUE: ICD-10-CM

## 2023-01-01 DIAGNOSIS — Z88.8 ALLERGY STATUS TO OTHER DRUGS, MEDICAMENTS AND BIOLOGICAL SUBSTANCES: ICD-10-CM

## 2023-01-01 DIAGNOSIS — D64.9 ANEMIA, UNSPECIFIED: ICD-10-CM

## 2023-01-01 DIAGNOSIS — F03.918 UNSPECIFIED DEMENTIA, UNSPECIFIED SEVERITY, WITH OTHER BEHAVIORAL DISTURBANCE: ICD-10-CM

## 2023-01-01 DIAGNOSIS — I50.20 UNSPECIFIED SYSTOLIC (CONGESTIVE) HEART FAILURE: ICD-10-CM

## 2023-01-01 DIAGNOSIS — G62.9 POLYNEUROPATHY, UNSPECIFIED: ICD-10-CM

## 2023-01-01 DIAGNOSIS — Z86.69 PERSONAL HISTORY OF OTHER DISEASES OF THE NERVOUS SYSTEM AND SENSE ORGANS: ICD-10-CM

## 2023-01-01 DIAGNOSIS — K52.9 NONINFECTIVE GASTROENTERITIS AND COLITIS, UNSPECIFIED: ICD-10-CM

## 2023-01-01 DIAGNOSIS — I10 ESSENTIAL (PRIMARY) HYPERTENSION: ICD-10-CM

## 2023-01-01 DIAGNOSIS — Z85.820 PERSONAL HISTORY OF MALIGNANT MELANOMA OF SKIN: ICD-10-CM

## 2023-01-01 DIAGNOSIS — L97.509 NON-PRESSURE CHRONIC ULCER OF OTHER PART OF UNSPECIFIED FOOT WITH UNSPECIFIED SEVERITY: ICD-10-CM

## 2023-01-01 DIAGNOSIS — Z86.79 PERSONAL HISTORY OF OTHER DISEASES OF THE CIRCULATORY SYSTEM: ICD-10-CM

## 2023-01-01 DIAGNOSIS — M85.80 OTHER SPECIFIED DISORDERS OF BONE DENSITY AND STRUCTURE, UNSPECIFIED SITE: ICD-10-CM

## 2023-01-01 DIAGNOSIS — Z79.01 LONG TERM (CURRENT) USE OF ANTICOAGULANTS: ICD-10-CM

## 2023-01-01 DIAGNOSIS — B95.61 METHICILLIN SUSCEPTIBLE STAPHYLOCOCCUS AUREUS INFECTION AS THE CAUSE OF DISEASES CLASSIFIED ELSEWHERE: ICD-10-CM

## 2023-01-01 DIAGNOSIS — D61.811 OTHER DRUG-INDUCED PANCYTOPENIA: ICD-10-CM

## 2023-01-01 DIAGNOSIS — Z79.890 HORMONE REPLACEMENT THERAPY: ICD-10-CM

## 2023-01-01 DIAGNOSIS — Z88.1 ALLERGY STATUS TO OTHER ANTIBIOTIC AGENTS STATUS: ICD-10-CM

## 2023-01-01 DIAGNOSIS — Z79.02 LONG TERM (CURRENT) USE OF ANTITHROMBOTICS/ANTIPLATELETS: ICD-10-CM

## 2023-01-01 DIAGNOSIS — D62 ACUTE POSTHEMORRHAGIC ANEMIA: ICD-10-CM

## 2023-01-01 DIAGNOSIS — K04.7 PERIAPICAL ABSCESS W/OUT SINUS: ICD-10-CM

## 2023-01-01 DIAGNOSIS — D73.5 INFARCTION OF SPLEEN: ICD-10-CM

## 2023-01-01 DIAGNOSIS — Z86.2 PERSONAL HISTORY OF DISEASES OF THE BLOOD AND BLOOD-FORMING ORGANS AND CERTAIN DISORDERS INVOLVING THE IMMUNE MECHANISM: ICD-10-CM

## 2023-01-01 DIAGNOSIS — E78.00 PURE HYPERCHOLESTEROLEMIA, UNSPECIFIED: ICD-10-CM

## 2023-01-01 DIAGNOSIS — Z79.82 LONG TERM (CURRENT) USE OF ASPIRIN: ICD-10-CM

## 2023-01-01 DIAGNOSIS — T78.3XXA ANGIONEUROTIC EDEMA, INITIAL ENCOUNTER: ICD-10-CM

## 2023-01-01 DIAGNOSIS — R73.03 PREDIABETES: ICD-10-CM

## 2023-01-01 DIAGNOSIS — R32 UNSPECIFIED URINARY INCONTINENCE: ICD-10-CM

## 2023-01-01 LAB
-  AMPICILLIN/SULBACTAM: SIGNIFICANT CHANGE UP
-  AMPICILLIN/SULBACTAM: SIGNIFICANT CHANGE UP
-  CEFAZOLIN: SIGNIFICANT CHANGE UP
-  CEFAZOLIN: SIGNIFICANT CHANGE UP
-  CLINDAMYCIN: SIGNIFICANT CHANGE UP
-  CLINDAMYCIN: SIGNIFICANT CHANGE UP
-  DAPTOMYCIN: SIGNIFICANT CHANGE UP
-  ERYTHROMYCIN: SIGNIFICANT CHANGE UP
-  ERYTHROMYCIN: SIGNIFICANT CHANGE UP
-  GENTAMICIN: SIGNIFICANT CHANGE UP
-  GENTAMICIN: SIGNIFICANT CHANGE UP
-  LINEZOLID: SIGNIFICANT CHANGE UP
-  OXACILLIN: SIGNIFICANT CHANGE UP
-  OXACILLIN: SIGNIFICANT CHANGE UP
-  PENICILLIN: SIGNIFICANT CHANGE UP
-  PENICILLIN: SIGNIFICANT CHANGE UP
-  RIFAMPIN: SIGNIFICANT CHANGE UP
-  RIFAMPIN: SIGNIFICANT CHANGE UP
-  TETRACYCLINE: SIGNIFICANT CHANGE UP
-  TETRACYCLINE: SIGNIFICANT CHANGE UP
-  TRIMETHOPRIM/SULFAMETHOXAZOLE: SIGNIFICANT CHANGE UP
-  TRIMETHOPRIM/SULFAMETHOXAZOLE: SIGNIFICANT CHANGE UP
-  VANCOMYCIN: SIGNIFICANT CHANGE UP
-  VANCOMYCIN: SIGNIFICANT CHANGE UP
A1C WITH ESTIMATED AVERAGE GLUCOSE RESULT: 4.4 % — SIGNIFICANT CHANGE UP (ref 4–5.6)
ALBUMIN SERPL ELPH-MCNC: 2.3 G/DL — LOW (ref 3.5–5.2)
ALBUMIN SERPL ELPH-MCNC: 2.5 G/DL — LOW (ref 3.5–5.2)
ALBUMIN SERPL ELPH-MCNC: 2.6 G/DL — LOW (ref 3.5–5.2)
ALBUMIN SERPL ELPH-MCNC: 2.8 G/DL — LOW (ref 3.5–5.2)
ALBUMIN SERPL ELPH-MCNC: 2.9 G/DL — LOW (ref 3.5–5.2)
ALBUMIN SERPL ELPH-MCNC: 2.9 G/DL — LOW (ref 3.5–5.2)
ALBUMIN SERPL ELPH-MCNC: 3.2 G/DL — LOW (ref 3.5–5.2)
ALP SERPL-CCNC: 100 U/L — SIGNIFICANT CHANGE UP (ref 30–115)
ALP SERPL-CCNC: 106 U/L — SIGNIFICANT CHANGE UP (ref 30–115)
ALP SERPL-CCNC: 106 U/L — SIGNIFICANT CHANGE UP (ref 30–115)
ALP SERPL-CCNC: 114 U/L — SIGNIFICANT CHANGE UP (ref 30–115)
ALP SERPL-CCNC: 122 U/L — HIGH (ref 30–115)
ALP SERPL-CCNC: 123 U/L — HIGH (ref 30–115)
ALP SERPL-CCNC: 161 U/L — HIGH (ref 30–115)
ALP SERPL-CCNC: 184 U/L — HIGH (ref 30–115)
ALP SERPL-CCNC: 99 U/L — SIGNIFICANT CHANGE UP (ref 30–115)
ALT FLD-CCNC: 11 U/L — SIGNIFICANT CHANGE UP (ref 0–41)
ALT FLD-CCNC: 11 U/L — SIGNIFICANT CHANGE UP (ref 0–41)
ALT FLD-CCNC: 12 U/L — SIGNIFICANT CHANGE UP (ref 0–41)
ALT FLD-CCNC: 14 U/L — SIGNIFICANT CHANGE UP (ref 0–41)
ALT FLD-CCNC: 18 U/L — SIGNIFICANT CHANGE UP (ref 0–41)
ALT FLD-CCNC: 29 U/L — SIGNIFICANT CHANGE UP (ref 0–41)
ALT FLD-CCNC: 9 U/L — SIGNIFICANT CHANGE UP (ref 0–41)
ANION GAP SERPL CALC-SCNC: 10 MMOL/L — SIGNIFICANT CHANGE UP (ref 7–14)
ANION GAP SERPL CALC-SCNC: 11 MMOL/L — SIGNIFICANT CHANGE UP (ref 7–14)
ANION GAP SERPL CALC-SCNC: 12 MMOL/L — SIGNIFICANT CHANGE UP (ref 7–14)
ANION GAP SERPL CALC-SCNC: 12 MMOL/L — SIGNIFICANT CHANGE UP (ref 7–14)
ANION GAP SERPL CALC-SCNC: 15 MMOL/L — HIGH (ref 7–14)
ANION GAP SERPL CALC-SCNC: 15 MMOL/L — HIGH (ref 7–14)
ANION GAP SERPL CALC-SCNC: 7 MMOL/L — SIGNIFICANT CHANGE UP (ref 7–14)
ANION GAP SERPL CALC-SCNC: 8 MMOL/L — SIGNIFICANT CHANGE UP (ref 7–14)
ANION GAP SERPL CALC-SCNC: 8 MMOL/L — SIGNIFICANT CHANGE UP (ref 7–14)
ANION GAP SERPL CALC-SCNC: 9 MMOL/L — SIGNIFICANT CHANGE UP (ref 7–14)
ANISOCYTOSIS BLD QL: SIGNIFICANT CHANGE UP
ANISOCYTOSIS BLD QL: SLIGHT — SIGNIFICANT CHANGE UP
ANISOCYTOSIS BLD QL: SLIGHT — SIGNIFICANT CHANGE UP
APPEARANCE UR: ABNORMAL
APTT BLD: 33.1 SEC — SIGNIFICANT CHANGE UP (ref 27–39.2)
APTT BLD: 34.3 SEC — SIGNIFICANT CHANGE UP (ref 27–39.2)
APTT BLD: 35.4 SEC — SIGNIFICANT CHANGE UP (ref 27–39.2)
APTT BLD: 36.4 SEC — SIGNIFICANT CHANGE UP (ref 27–39.2)
APTT BLD: 37.7 SEC — SIGNIFICANT CHANGE UP (ref 27–39.2)
APTT BLD: 43.8 SEC — HIGH (ref 27–39.2)
AST SERPL-CCNC: 20 U/L — SIGNIFICANT CHANGE UP (ref 0–41)
AST SERPL-CCNC: 27 U/L — SIGNIFICANT CHANGE UP (ref 0–41)
AST SERPL-CCNC: 29 U/L — SIGNIFICANT CHANGE UP (ref 0–41)
AST SERPL-CCNC: 31 U/L — SIGNIFICANT CHANGE UP (ref 0–41)
AST SERPL-CCNC: 37 U/L — SIGNIFICANT CHANGE UP (ref 0–41)
AST SERPL-CCNC: 39 U/L — SIGNIFICANT CHANGE UP (ref 0–41)
AST SERPL-CCNC: 46 U/L — HIGH (ref 0–41)
AST SERPL-CCNC: 50 U/L — HIGH (ref 0–41)
AST SERPL-CCNC: 53 U/L — HIGH (ref 0–41)
B2 GLYCOPROT1 AB SER QL: NEGATIVE — SIGNIFICANT CHANGE UP
BACTERIA # UR AUTO: ABNORMAL
BASE EXCESS BLDV CALC-SCNC: 6.9 MMOL/L — HIGH (ref -2–3)
BASOPHILS # BLD AUTO: 0.01 K/UL — SIGNIFICANT CHANGE UP (ref 0–0.2)
BASOPHILS # BLD AUTO: 0.01 K/UL — SIGNIFICANT CHANGE UP (ref 0–0.2)
BASOPHILS # BLD AUTO: 0.02 K/UL — SIGNIFICANT CHANGE UP (ref 0–0.2)
BASOPHILS # BLD AUTO: 0.05 K/UL — SIGNIFICANT CHANGE UP (ref 0–0.2)
BASOPHILS # BLD AUTO: 0.1 K/UL — SIGNIFICANT CHANGE UP (ref 0–0.2)
BASOPHILS NFR BLD AUTO: 0.2 % — SIGNIFICANT CHANGE UP (ref 0–1)
BASOPHILS NFR BLD AUTO: 0.3 % — SIGNIFICANT CHANGE UP (ref 0–1)
BASOPHILS NFR BLD AUTO: 0.3 % — SIGNIFICANT CHANGE UP (ref 0–1)
BASOPHILS NFR BLD AUTO: 0.4 % — SIGNIFICANT CHANGE UP (ref 0–1)
BASOPHILS NFR BLD AUTO: 0.4 % — SIGNIFICANT CHANGE UP (ref 0–1)
BASOPHILS NFR BLD AUTO: 0.5 % — SIGNIFICANT CHANGE UP (ref 0–1)
BASOPHILS NFR BLD AUTO: 0.6 % — SIGNIFICANT CHANGE UP (ref 0–1)
BILIRUB SERPL-MCNC: 0.3 MG/DL — SIGNIFICANT CHANGE UP (ref 0.2–1.2)
BILIRUB SERPL-MCNC: 0.4 MG/DL — SIGNIFICANT CHANGE UP (ref 0.2–1.2)
BILIRUB SERPL-MCNC: 0.5 MG/DL — SIGNIFICANT CHANGE UP (ref 0.2–1.2)
BILIRUB SERPL-MCNC: 0.6 MG/DL — SIGNIFICANT CHANGE UP (ref 0.2–1.2)
BILIRUB SERPL-MCNC: <0.2 MG/DL — SIGNIFICANT CHANGE UP (ref 0.2–1.2)
BILIRUB UR-MCNC: ABNORMAL
BLD GP AB SCN SERPL QL: SIGNIFICANT CHANGE UP
BUN SERPL-MCNC: 23 MG/DL — HIGH (ref 10–20)
BUN SERPL-MCNC: 24 MG/DL — HIGH (ref 10–20)
BUN SERPL-MCNC: 25 MG/DL — HIGH (ref 10–20)
BUN SERPL-MCNC: 26 MG/DL — HIGH (ref 10–20)
BUN SERPL-MCNC: 28 MG/DL — HIGH (ref 10–20)
BUN SERPL-MCNC: 29 MG/DL — HIGH (ref 10–20)
BUN SERPL-MCNC: 31 MG/DL — HIGH (ref 10–20)
BUN SERPL-MCNC: 32 MG/DL — HIGH (ref 10–20)
BUN SERPL-MCNC: 32 MG/DL — HIGH (ref 10–20)
BUN SERPL-MCNC: 34 MG/DL — HIGH (ref 10–20)
BUN SERPL-MCNC: 35 MG/DL — HIGH (ref 10–20)
BUN SERPL-MCNC: 35 MG/DL — HIGH (ref 10–20)
BUN SERPL-MCNC: 39 MG/DL — HIGH (ref 10–20)
BUN SERPL-MCNC: 41 MG/DL — HIGH (ref 10–20)
BUN SERPL-MCNC: 42 MG/DL — HIGH (ref 10–20)
BUN SERPL-MCNC: 42 MG/DL — HIGH (ref 10–20)
BUN SERPL-MCNC: 45 MG/DL — HIGH (ref 10–20)
BUN SERPL-MCNC: 46 MG/DL — HIGH (ref 10–20)
BUN SERPL-MCNC: 47 MG/DL — HIGH (ref 10–20)
BUN SERPL-MCNC: 48 MG/DL — HIGH (ref 10–20)
BUN SERPL-MCNC: 49 MG/DL — HIGH (ref 10–20)
BUN SERPL-MCNC: 51 MG/DL — HIGH (ref 10–20)
BUN SERPL-MCNC: 53 MG/DL — HIGH (ref 10–20)
BUN SERPL-MCNC: 54 MG/DL — HIGH (ref 10–20)
BUN SERPL-MCNC: 54 MG/DL — HIGH (ref 10–20)
C DIFF BY PCR RESULT: SIGNIFICANT CHANGE UP
C1INH FUNCTIONAL/C1INH TOTAL MFR SERPL: 63 MG/DL — HIGH (ref 21–39)
CA-I SERPL-SCNC: 1.19 MMOL/L — SIGNIFICANT CHANGE UP (ref 1.15–1.33)
CALCIUM SERPL-MCNC: 8.4 MG/DL — SIGNIFICANT CHANGE UP (ref 8.4–10.5)
CALCIUM SERPL-MCNC: 8.4 MG/DL — SIGNIFICANT CHANGE UP (ref 8.4–10.5)
CALCIUM SERPL-MCNC: 8.6 MG/DL — SIGNIFICANT CHANGE UP (ref 8.4–10.5)
CALCIUM SERPL-MCNC: 8.7 MG/DL — SIGNIFICANT CHANGE UP (ref 8.4–10.5)
CALCIUM SERPL-MCNC: 8.8 MG/DL — SIGNIFICANT CHANGE UP (ref 8.4–10.5)
CALCIUM SERPL-MCNC: 8.8 MG/DL — SIGNIFICANT CHANGE UP (ref 8.4–10.5)
CALCIUM SERPL-MCNC: 8.9 MG/DL — SIGNIFICANT CHANGE UP (ref 8.4–10.5)
CALCIUM SERPL-MCNC: 9 MG/DL — SIGNIFICANT CHANGE UP (ref 8.4–10.5)
CALCIUM SERPL-MCNC: 9.1 MG/DL — SIGNIFICANT CHANGE UP (ref 8.4–10.5)
CALCIUM SERPL-MCNC: 9.2 MG/DL — SIGNIFICANT CHANGE UP (ref 8.4–10.5)
CALCIUM SERPL-MCNC: 9.3 MG/DL — SIGNIFICANT CHANGE UP (ref 8.4–10.5)
CALCIUM SERPL-MCNC: 9.3 MG/DL — SIGNIFICANT CHANGE UP (ref 8.4–10.5)
CALCIUM SERPL-MCNC: 9.4 MG/DL — SIGNIFICANT CHANGE UP (ref 8.4–10.5)
CALCIUM SERPL-MCNC: 9.5 MG/DL — SIGNIFICANT CHANGE UP (ref 8.4–10.5)
CALCIUM SERPL-MCNC: 9.6 MG/DL — SIGNIFICANT CHANGE UP (ref 8.4–10.5)
CARDIOLIPIN AB SER-ACNC: POSITIVE
CARDIOLIPIN IGM SER-MCNC: 8.4 GPL — SIGNIFICANT CHANGE UP (ref 0–12.5)
CARDIOLIPIN IGM SER-MCNC: >150 MPL — HIGH (ref 0–12.5)
CHLORIDE SERPL-SCNC: 100 MMOL/L — SIGNIFICANT CHANGE UP (ref 98–110)
CHLORIDE SERPL-SCNC: 101 MMOL/L — SIGNIFICANT CHANGE UP (ref 98–110)
CHLORIDE SERPL-SCNC: 102 MMOL/L — SIGNIFICANT CHANGE UP (ref 98–110)
CHLORIDE SERPL-SCNC: 93 MMOL/L — LOW (ref 98–110)
CHLORIDE SERPL-SCNC: 94 MMOL/L — LOW (ref 98–110)
CHLORIDE SERPL-SCNC: 95 MMOL/L — LOW (ref 98–110)
CHLORIDE SERPL-SCNC: 95 MMOL/L — LOW (ref 98–110)
CHLORIDE SERPL-SCNC: 96 MMOL/L — LOW (ref 98–110)
CHLORIDE SERPL-SCNC: 97 MMOL/L — LOW (ref 98–110)
CHLORIDE SERPL-SCNC: 98 MMOL/L — SIGNIFICANT CHANGE UP (ref 98–110)
CHLORIDE SERPL-SCNC: 98 MMOL/L — SIGNIFICANT CHANGE UP (ref 98–110)
CHLORIDE SERPL-SCNC: 99 MMOL/L — SIGNIFICANT CHANGE UP (ref 98–110)
CK SERPL-CCNC: 179 U/L — SIGNIFICANT CHANGE UP (ref 0–225)
CK SERPL-CCNC: 27 U/L — SIGNIFICANT CHANGE UP (ref 0–225)
CO2 SERPL-SCNC: 21 MMOL/L — SIGNIFICANT CHANGE UP (ref 17–32)
CO2 SERPL-SCNC: 23 MMOL/L — SIGNIFICANT CHANGE UP (ref 17–32)
CO2 SERPL-SCNC: 24 MMOL/L — SIGNIFICANT CHANGE UP (ref 17–32)
CO2 SERPL-SCNC: 25 MMOL/L — SIGNIFICANT CHANGE UP (ref 17–32)
CO2 SERPL-SCNC: 27 MMOL/L — SIGNIFICANT CHANGE UP (ref 17–32)
CO2 SERPL-SCNC: 28 MMOL/L — SIGNIFICANT CHANGE UP (ref 17–32)
CO2 SERPL-SCNC: 29 MMOL/L — SIGNIFICANT CHANGE UP (ref 17–32)
CO2 SERPL-SCNC: 30 MMOL/L — SIGNIFICANT CHANGE UP (ref 17–32)
CO2 SERPL-SCNC: 31 MMOL/L — SIGNIFICANT CHANGE UP (ref 17–32)
CO2 SERPL-SCNC: 32 MMOL/L — SIGNIFICANT CHANGE UP (ref 17–32)
CO2 SERPL-SCNC: 33 MMOL/L — HIGH (ref 17–32)
CO2 SERPL-SCNC: 33 MMOL/L — HIGH (ref 17–32)
COLOR SPEC: ABNORMAL
CREAT SERPL-MCNC: 0.8 MG/DL — SIGNIFICANT CHANGE UP (ref 0.7–1.5)
CREAT SERPL-MCNC: 0.8 MG/DL — SIGNIFICANT CHANGE UP (ref 0.7–1.5)
CREAT SERPL-MCNC: 0.9 MG/DL — SIGNIFICANT CHANGE UP (ref 0.7–1.5)
CREAT SERPL-MCNC: 1 MG/DL — SIGNIFICANT CHANGE UP (ref 0.7–1.5)
CREAT SERPL-MCNC: 1.1 MG/DL — SIGNIFICANT CHANGE UP (ref 0.7–1.5)
CREAT SERPL-MCNC: 1.2 MG/DL — SIGNIFICANT CHANGE UP (ref 0.7–1.5)
CREAT SERPL-MCNC: 1.3 MG/DL — SIGNIFICANT CHANGE UP (ref 0.7–1.5)
CREAT SERPL-MCNC: 1.3 MG/DL — SIGNIFICANT CHANGE UP (ref 0.7–1.5)
CREAT SERPL-MCNC: 1.4 MG/DL — SIGNIFICANT CHANGE UP (ref 0.7–1.5)
CRP SERPL-MCNC: 105 MG/L — HIGH
CRP SERPL-MCNC: 127 MG/L — HIGH
CRP SERPL-MCNC: 163.5 MG/L — HIGH
CRP SERPL-MCNC: 67 MG/L — HIGH
CRP SERPL-MCNC: 70 MG/L — HIGH
CRP SERPL-MCNC: 81 MG/L — HIGH
CRP SERPL-MCNC: 90 MG/L — HIGH
CULTURE RESULTS: SIGNIFICANT CHANGE UP
D DIMER BLD IA.RAPID-MCNC: 2091 NG/ML DDU — HIGH
DACRYOCYTES BLD QL SMEAR: SLIGHT — SIGNIFICANT CHANGE UP
DACRYOCYTES BLD QL SMEAR: SLIGHT — SIGNIFICANT CHANGE UP
DEPRECATED CARDIOLIPIN IGA SER: 12 APL — SIGNIFICANT CHANGE UP (ref 0–12.5)
DIFF PNL FLD: ABNORMAL
DRVVT RATIO: 0.89 RATIO — SIGNIFICANT CHANGE UP (ref 0–1.21)
DRVVT SCREEN TO CONFIRM RATIO: SIGNIFICANT CHANGE UP
EGFR: 38 ML/MIN/1.73M2 — LOW
EGFR: 41 ML/MIN/1.73M2 — LOW
EGFR: 41 ML/MIN/1.73M2 — LOW
EGFR: 45 ML/MIN/1.73M2 — LOW
EGFR: 50 ML/MIN/1.73M2 — LOW
EGFR: 57 ML/MIN/1.73M2 — LOW
EGFR: 64 ML/MIN/1.73M2 — SIGNIFICANT CHANGE UP
EGFR: 74 ML/MIN/1.73M2 — SIGNIFICANT CHANGE UP
EGFR: 74 ML/MIN/1.73M2 — SIGNIFICANT CHANGE UP
EOSINOPHIL # BLD AUTO: 0.02 K/UL — SIGNIFICANT CHANGE UP (ref 0–0.7)
EOSINOPHIL # BLD AUTO: 0.03 K/UL — SIGNIFICANT CHANGE UP (ref 0–0.7)
EOSINOPHIL # BLD AUTO: 0.07 K/UL — SIGNIFICANT CHANGE UP (ref 0–0.7)
EOSINOPHIL # BLD AUTO: 0.07 K/UL — SIGNIFICANT CHANGE UP (ref 0–0.7)
EOSINOPHIL # BLD AUTO: 0.08 K/UL — SIGNIFICANT CHANGE UP (ref 0–0.7)
EOSINOPHIL # BLD AUTO: 0.09 K/UL — SIGNIFICANT CHANGE UP (ref 0–0.7)
EOSINOPHIL # BLD AUTO: 0.13 K/UL — SIGNIFICANT CHANGE UP (ref 0–0.7)
EOSINOPHIL NFR BLD AUTO: 0.1 % — SIGNIFICANT CHANGE UP (ref 0–8)
EOSINOPHIL NFR BLD AUTO: 0.4 % — SIGNIFICANT CHANGE UP (ref 0–8)
EOSINOPHIL NFR BLD AUTO: 0.9 % — SIGNIFICANT CHANGE UP (ref 0–8)
EOSINOPHIL NFR BLD AUTO: 1.2 % — SIGNIFICANT CHANGE UP (ref 0–8)
EOSINOPHIL NFR BLD AUTO: 2.1 % — SIGNIFICANT CHANGE UP (ref 0–8)
EOSINOPHIL NFR BLD AUTO: 2.4 % — SIGNIFICANT CHANGE UP (ref 0–8)
EOSINOPHIL NFR BLD AUTO: 2.7 % — SIGNIFICANT CHANGE UP (ref 0–8)
EPI CELLS # UR: ABNORMAL /HPF
ERYTHROCYTE [SEDIMENTATION RATE] IN BLOOD: 126 MM/HR — HIGH (ref 0–20)
ERYTHROCYTE [SEDIMENTATION RATE] IN BLOOD: 130 MM/HR — HIGH (ref 0–20)
ERYTHROCYTE [SEDIMENTATION RATE] IN BLOOD: 140 MM/HR — HIGH (ref 0–20)
ESTIMATED AVERAGE GLUCOSE: 80 MG/DL — SIGNIFICANT CHANGE UP (ref 68–114)
FIBRINOGEN PPP-MCNC: >700 MG/DL — HIGH (ref 204.4–570.6)
GAS PNL BLDV: 135 MMOL/L — LOW (ref 136–145)
GAS PNL BLDV: SIGNIFICANT CHANGE UP
GI PCR PANEL: SIGNIFICANT CHANGE UP
GIANT PLATELETS BLD QL SMEAR: SIGNIFICANT CHANGE UP
GLUCOSE BLDC GLUCOMTR-MCNC: 78 MG/DL — SIGNIFICANT CHANGE UP (ref 70–99)
GLUCOSE BLDC GLUCOMTR-MCNC: 95 MG/DL — SIGNIFICANT CHANGE UP (ref 70–99)
GLUCOSE SERPL-MCNC: 101 MG/DL — HIGH (ref 70–99)
GLUCOSE SERPL-MCNC: 101 MG/DL — HIGH (ref 70–99)
GLUCOSE SERPL-MCNC: 103 MG/DL — HIGH (ref 70–99)
GLUCOSE SERPL-MCNC: 105 MG/DL — HIGH (ref 70–99)
GLUCOSE SERPL-MCNC: 108 MG/DL — HIGH (ref 70–99)
GLUCOSE SERPL-MCNC: 112 MG/DL — HIGH (ref 70–99)
GLUCOSE SERPL-MCNC: 118 MG/DL — HIGH (ref 70–99)
GLUCOSE SERPL-MCNC: 119 MG/DL — HIGH (ref 70–99)
GLUCOSE SERPL-MCNC: 123 MG/DL — HIGH (ref 70–99)
GLUCOSE SERPL-MCNC: 134 MG/DL — HIGH (ref 70–99)
GLUCOSE SERPL-MCNC: 135 MG/DL — HIGH (ref 70–99)
GLUCOSE SERPL-MCNC: 137 MG/DL — HIGH (ref 70–99)
GLUCOSE SERPL-MCNC: 139 MG/DL — HIGH (ref 70–99)
GLUCOSE SERPL-MCNC: 147 MG/DL — HIGH (ref 70–99)
GLUCOSE SERPL-MCNC: 184 MG/DL — HIGH (ref 70–99)
GLUCOSE SERPL-MCNC: 77 MG/DL — SIGNIFICANT CHANGE UP (ref 70–99)
GLUCOSE SERPL-MCNC: 85 MG/DL — SIGNIFICANT CHANGE UP (ref 70–99)
GLUCOSE SERPL-MCNC: 89 MG/DL — SIGNIFICANT CHANGE UP (ref 70–99)
GLUCOSE SERPL-MCNC: 91 MG/DL — SIGNIFICANT CHANGE UP (ref 70–99)
GLUCOSE SERPL-MCNC: 91 MG/DL — SIGNIFICANT CHANGE UP (ref 70–99)
GLUCOSE SERPL-MCNC: 93 MG/DL — SIGNIFICANT CHANGE UP (ref 70–99)
GLUCOSE UR QL: 100 MG/DL
HCO3 BLDV-SCNC: 31 MMOL/L — HIGH (ref 22–29)
HCT VFR BLD CALC: 23.2 % — LOW (ref 37–47)
HCT VFR BLD CALC: 24.2 % — LOW (ref 37–47)
HCT VFR BLD CALC: 25.4 % — LOW (ref 37–47)
HCT VFR BLD CALC: 25.8 % — LOW (ref 37–47)
HCT VFR BLD CALC: 26.8 % — LOW (ref 37–47)
HCT VFR BLD CALC: 27.5 % — LOW (ref 37–47)
HCT VFR BLD CALC: 27.7 % — LOW (ref 37–47)
HCT VFR BLD CALC: 27.9 % — LOW (ref 37–47)
HCT VFR BLD CALC: 28.2 % — LOW (ref 37–47)
HCT VFR BLD CALC: 28.4 % — LOW (ref 37–47)
HCT VFR BLD CALC: 29.4 % — LOW (ref 37–47)
HCT VFR BLD CALC: 29.8 % — LOW (ref 37–47)
HCT VFR BLD CALC: 29.8 % — LOW (ref 37–47)
HCT VFR BLD CALC: 29.9 % — LOW (ref 37–47)
HCT VFR BLD CALC: 30 % — LOW (ref 37–47)
HCT VFR BLD CALC: 30 % — LOW (ref 37–47)
HCT VFR BLD CALC: 31.4 % — LOW (ref 37–47)
HCT VFR BLD CALC: 31.4 % — LOW (ref 37–47)
HCT VFR BLD CALC: 31.6 % — LOW (ref 37–47)
HCT VFR BLD CALC: 31.9 % — LOW (ref 37–47)
HCT VFR BLD CALC: 32.6 % — LOW (ref 37–47)
HCT VFR BLD CALC: 33.2 % — LOW (ref 37–47)
HCT VFR BLD CALC: 33.7 % — LOW (ref 37–47)
HCT VFR BLD CALC: 34 % — LOW (ref 37–47)
HCT VFR BLDA CALC: 35 % — LOW (ref 39–51)
HGB BLD CALC-MCNC: 11.5 G/DL — LOW (ref 12.6–17.4)
HGB BLD-MCNC: 10 G/DL — LOW (ref 12–16)
HGB BLD-MCNC: 10.2 G/DL — LOW (ref 12–16)
HGB BLD-MCNC: 10.3 G/DL — LOW (ref 12–16)
HGB BLD-MCNC: 10.4 G/DL — LOW (ref 12–16)
HGB BLD-MCNC: 7.1 G/DL — LOW (ref 12–16)
HGB BLD-MCNC: 7.2 G/DL — LOW (ref 12–16)
HGB BLD-MCNC: 7.7 G/DL — LOW (ref 12–16)
HGB BLD-MCNC: 7.8 G/DL — LOW (ref 12–16)
HGB BLD-MCNC: 8.4 G/DL — LOW (ref 12–16)
HGB BLD-MCNC: 8.5 G/DL — LOW (ref 12–16)
HGB BLD-MCNC: 8.6 G/DL — LOW (ref 12–16)
HGB BLD-MCNC: 9 G/DL — LOW (ref 12–16)
HGB BLD-MCNC: 9.1 G/DL — LOW (ref 12–16)
HGB BLD-MCNC: 9.2 G/DL — LOW (ref 12–16)
HGB BLD-MCNC: 9.2 G/DL — LOW (ref 12–16)
HGB BLD-MCNC: 9.4 G/DL — LOW (ref 12–16)
HGB BLD-MCNC: 9.5 G/DL — LOW (ref 12–16)
HGB BLD-MCNC: 9.5 G/DL — LOW (ref 12–16)
HGB BLD-MCNC: 9.7 G/DL — LOW (ref 12–16)
HGB BLD-MCNC: 9.8 G/DL — LOW (ref 12–16)
HYPOCHROMIA BLD QL: SLIGHT — SIGNIFICANT CHANGE UP
HYPOCHROMIA BLD QL: SLIGHT — SIGNIFICANT CHANGE UP
IGE SERPL-ACNC: 166 KU/L — HIGH
IMM GRANULOCYTES NFR BLD AUTO: 0.3 % — SIGNIFICANT CHANGE UP (ref 0.1–0.3)
IMM GRANULOCYTES NFR BLD AUTO: 0.5 % — HIGH (ref 0.1–0.3)
IMM GRANULOCYTES NFR BLD AUTO: 0.6 % — HIGH (ref 0.1–0.3)
IMM GRANULOCYTES NFR BLD AUTO: 0.8 % — HIGH (ref 0.1–0.3)
IMM GRANULOCYTES NFR BLD AUTO: 1.1 % — HIGH (ref 0.1–0.3)
INR BLD: 0.99 RATIO — SIGNIFICANT CHANGE UP (ref 0.65–1.3)
INR BLD: 1.05 RATIO — SIGNIFICANT CHANGE UP (ref 0.65–1.3)
INR BLD: 1.05 RATIO — SIGNIFICANT CHANGE UP (ref 0.65–1.3)
INR BLD: 1.07 RATIO — SIGNIFICANT CHANGE UP (ref 0.65–1.3)
INR BLD: 1.08 RATIO — SIGNIFICANT CHANGE UP (ref 0.65–1.3)
INR BLD: 1.19 RATIO — SIGNIFICANT CHANGE UP (ref 0.65–1.3)
INR BLD: 1.58 RATIO — HIGH (ref 0.65–1.3)
KETONES UR-MCNC: 15
LACTATE BLDV-MCNC: 5 MMOL/L — CRITICAL HIGH (ref 0.5–2)
LACTATE SERPL-SCNC: 1.7 MMOL/L — SIGNIFICANT CHANGE UP (ref 0.7–2)
LACTATE SERPL-SCNC: 2.2 MMOL/L — HIGH (ref 0.7–2)
LACTATE SERPL-SCNC: 2.9 MMOL/L — HIGH (ref 0.7–2)
LACTATE SERPL-SCNC: 3.3 MMOL/L — HIGH (ref 0.7–2)
LACTATE SERPL-SCNC: 4 MMOL/L — CRITICAL HIGH (ref 0.7–2)
LEUKOCYTE ESTERASE UR-ACNC: ABNORMAL
LYMPHOCYTES # BLD AUTO: 0.35 K/UL — LOW (ref 1.2–3.4)
LYMPHOCYTES # BLD AUTO: 0.37 K/UL — LOW (ref 1.2–3.4)
LYMPHOCYTES # BLD AUTO: 0.43 K/UL — LOW (ref 1.2–3.4)
LYMPHOCYTES # BLD AUTO: 0.47 K/UL — LOW (ref 1.2–3.4)
LYMPHOCYTES # BLD AUTO: 0.73 K/UL — LOW (ref 1.2–3.4)
LYMPHOCYTES # BLD AUTO: 0.77 K/UL — LOW (ref 1.2–3.4)
LYMPHOCYTES # BLD AUTO: 1.26 K/UL — SIGNIFICANT CHANGE UP (ref 1.2–3.4)
LYMPHOCYTES # BLD AUTO: 11.2 % — LOW (ref 20.5–51.1)
LYMPHOCYTES # BLD AUTO: 11.4 % — LOW (ref 20.5–51.1)
LYMPHOCYTES # BLD AUTO: 14.2 % — LOW (ref 20.5–51.1)
LYMPHOCYTES # BLD AUTO: 16.3 % — LOW (ref 20.5–51.1)
LYMPHOCYTES # BLD AUTO: 2.6 % — LOW (ref 20.5–51.1)
LYMPHOCYTES # BLD AUTO: 6.5 % — LOW (ref 20.5–51.1)
LYMPHOCYTES # BLD AUTO: 7.6 % — LOW (ref 20.5–51.1)
MACROCYTES BLD QL: SIGNIFICANT CHANGE UP
MACROCYTES BLD QL: SLIGHT — SIGNIFICANT CHANGE UP
MAGNESIUM SERPL-MCNC: 1.8 MG/DL — SIGNIFICANT CHANGE UP (ref 1.8–2.4)
MAGNESIUM SERPL-MCNC: 1.9 MG/DL — SIGNIFICANT CHANGE UP (ref 1.8–2.4)
MAGNESIUM SERPL-MCNC: 1.9 MG/DL — SIGNIFICANT CHANGE UP (ref 1.8–2.4)
MAGNESIUM SERPL-MCNC: 2 MG/DL — SIGNIFICANT CHANGE UP (ref 1.8–2.4)
MAGNESIUM SERPL-MCNC: 2.3 MG/DL — SIGNIFICANT CHANGE UP (ref 1.8–2.4)
MAGNESIUM SERPL-MCNC: 2.3 MG/DL — SIGNIFICANT CHANGE UP (ref 1.8–2.4)
MANUAL SMEAR VERIFICATION: SIGNIFICANT CHANGE UP
MANUAL SMEAR VERIFICATION: YES — SIGNIFICANT CHANGE UP
MCHC RBC-ENTMCNC: 26.6 PG — LOW (ref 27–31)
MCHC RBC-ENTMCNC: 26.7 PG — LOW (ref 27–31)
MCHC RBC-ENTMCNC: 26.9 PG — LOW (ref 27–31)
MCHC RBC-ENTMCNC: 27.1 PG — SIGNIFICANT CHANGE UP (ref 27–31)
MCHC RBC-ENTMCNC: 27.1 PG — SIGNIFICANT CHANGE UP (ref 27–31)
MCHC RBC-ENTMCNC: 27.2 PG — SIGNIFICANT CHANGE UP (ref 27–31)
MCHC RBC-ENTMCNC: 27.2 PG — SIGNIFICANT CHANGE UP (ref 27–31)
MCHC RBC-ENTMCNC: 27.3 PG — SIGNIFICANT CHANGE UP (ref 27–31)
MCHC RBC-ENTMCNC: 27.5 PG — SIGNIFICANT CHANGE UP (ref 27–31)
MCHC RBC-ENTMCNC: 27.6 PG — SIGNIFICANT CHANGE UP (ref 27–31)
MCHC RBC-ENTMCNC: 27.7 PG — SIGNIFICANT CHANGE UP (ref 27–31)
MCHC RBC-ENTMCNC: 27.7 PG — SIGNIFICANT CHANGE UP (ref 27–31)
MCHC RBC-ENTMCNC: 27.9 PG — SIGNIFICANT CHANGE UP (ref 27–31)
MCHC RBC-ENTMCNC: 28.2 PG — SIGNIFICANT CHANGE UP (ref 27–31)
MCHC RBC-ENTMCNC: 28.2 PG — SIGNIFICANT CHANGE UP (ref 27–31)
MCHC RBC-ENTMCNC: 29.3 G/DL — LOW (ref 32–37)
MCHC RBC-ENTMCNC: 29.5 PG — SIGNIFICANT CHANGE UP (ref 27–31)
MCHC RBC-ENTMCNC: 29.7 G/DL — LOW (ref 32–37)
MCHC RBC-ENTMCNC: 29.8 G/DL — LOW (ref 32–37)
MCHC RBC-ENTMCNC: 30.1 G/DL — LOW (ref 32–37)
MCHC RBC-ENTMCNC: 30.3 G/DL — LOW (ref 32–37)
MCHC RBC-ENTMCNC: 30.3 PG — SIGNIFICANT CHANGE UP (ref 27–31)
MCHC RBC-ENTMCNC: 30.5 G/DL — LOW (ref 32–37)
MCHC RBC-ENTMCNC: 30.5 G/DL — LOW (ref 32–37)
MCHC RBC-ENTMCNC: 30.5 PG — SIGNIFICANT CHANGE UP (ref 27–31)
MCHC RBC-ENTMCNC: 30.5 PG — SIGNIFICANT CHANGE UP (ref 27–31)
MCHC RBC-ENTMCNC: 30.6 G/DL — LOW (ref 32–37)
MCHC RBC-ENTMCNC: 30.6 G/DL — LOW (ref 32–37)
MCHC RBC-ENTMCNC: 30.7 G/DL — LOW (ref 32–37)
MCHC RBC-ENTMCNC: 30.7 PG — SIGNIFICANT CHANGE UP (ref 27–31)
MCHC RBC-ENTMCNC: 30.9 G/DL — LOW (ref 32–37)
MCHC RBC-ENTMCNC: 30.9 G/DL — LOW (ref 32–37)
MCHC RBC-ENTMCNC: 31 G/DL — LOW (ref 32–37)
MCHC RBC-ENTMCNC: 31 G/DL — LOW (ref 32–37)
MCHC RBC-ENTMCNC: 31.3 G/DL — LOW (ref 32–37)
MCHC RBC-ENTMCNC: 31.6 PG — HIGH (ref 27–31)
MCHC RBC-ENTMCNC: 31.7 G/DL — LOW (ref 32–37)
MCHC RBC-ENTMCNC: 31.7 G/DL — LOW (ref 32–37)
MCHC RBC-ENTMCNC: 32 G/DL — SIGNIFICANT CHANGE UP (ref 32–37)
MCV RBC AUTO: 100 FL — HIGH (ref 81–99)
MCV RBC AUTO: 100.4 FL — HIGH (ref 81–99)
MCV RBC AUTO: 101.9 FL — HIGH (ref 81–99)
MCV RBC AUTO: 86.7 FL — SIGNIFICANT CHANGE UP (ref 81–99)
MCV RBC AUTO: 87 FL — SIGNIFICANT CHANGE UP (ref 81–99)
MCV RBC AUTO: 87.3 FL — SIGNIFICANT CHANGE UP (ref 81–99)
MCV RBC AUTO: 87.6 FL — SIGNIFICANT CHANGE UP (ref 81–99)
MCV RBC AUTO: 87.6 FL — SIGNIFICANT CHANGE UP (ref 81–99)
MCV RBC AUTO: 88 FL — SIGNIFICANT CHANGE UP (ref 81–99)
MCV RBC AUTO: 88.1 FL — SIGNIFICANT CHANGE UP (ref 81–99)
MCV RBC AUTO: 88.7 FL — SIGNIFICANT CHANGE UP (ref 81–99)
MCV RBC AUTO: 89.5 FL — SIGNIFICANT CHANGE UP (ref 81–99)
MCV RBC AUTO: 89.6 FL — SIGNIFICANT CHANGE UP (ref 81–99)
MCV RBC AUTO: 89.9 FL — SIGNIFICANT CHANGE UP (ref 81–99)
MCV RBC AUTO: 90.1 FL — SIGNIFICANT CHANGE UP (ref 81–99)
MCV RBC AUTO: 90.2 FL — SIGNIFICANT CHANGE UP (ref 81–99)
MCV RBC AUTO: 90.3 FL — SIGNIFICANT CHANGE UP (ref 81–99)
MCV RBC AUTO: 90.4 FL — SIGNIFICANT CHANGE UP (ref 81–99)
MCV RBC AUTO: 90.6 FL — SIGNIFICANT CHANGE UP (ref 81–99)
MCV RBC AUTO: 91 FL — SIGNIFICANT CHANGE UP (ref 81–99)
MCV RBC AUTO: 91.5 FL — SIGNIFICANT CHANGE UP (ref 81–99)
MCV RBC AUTO: 95.3 FL — SIGNIFICANT CHANGE UP (ref 81–99)
MCV RBC AUTO: 99.6 FL — HIGH (ref 81–99)
MCV RBC AUTO: 99.7 FL — HIGH (ref 81–99)
METHOD TYPE: SIGNIFICANT CHANGE UP
METHOD TYPE: SIGNIFICANT CHANGE UP
MICROCYTES BLD QL: SLIGHT — SIGNIFICANT CHANGE UP
MONOCYTES # BLD AUTO: 0.1 K/UL — SIGNIFICANT CHANGE UP (ref 0.1–0.6)
MONOCYTES # BLD AUTO: 0.13 K/UL — SIGNIFICANT CHANGE UP (ref 0.1–0.6)
MONOCYTES # BLD AUTO: 0.13 K/UL — SIGNIFICANT CHANGE UP (ref 0.1–0.6)
MONOCYTES # BLD AUTO: 0.18 K/UL — SIGNIFICANT CHANGE UP (ref 0.1–0.6)
MONOCYTES # BLD AUTO: 0.31 K/UL — SIGNIFICANT CHANGE UP (ref 0.1–0.6)
MONOCYTES # BLD AUTO: 0.51 K/UL — SIGNIFICANT CHANGE UP (ref 0.1–0.6)
MONOCYTES # BLD AUTO: 0.79 K/UL — HIGH (ref 0.1–0.6)
MONOCYTES NFR BLD AUTO: 2.7 % — SIGNIFICANT CHANGE UP (ref 1.7–9.3)
MONOCYTES NFR BLD AUTO: 3 % — SIGNIFICANT CHANGE UP (ref 1.7–9.3)
MONOCYTES NFR BLD AUTO: 3.2 % — SIGNIFICANT CHANGE UP (ref 1.7–9.3)
MONOCYTES NFR BLD AUTO: 3.4 % — SIGNIFICANT CHANGE UP (ref 1.7–9.3)
MONOCYTES NFR BLD AUTO: 3.9 % — SIGNIFICANT CHANGE UP (ref 1.7–9.3)
MONOCYTES NFR BLD AUTO: 4.6 % — SIGNIFICANT CHANGE UP (ref 1.7–9.3)
MONOCYTES NFR BLD AUTO: 4.9 % — SIGNIFICANT CHANGE UP (ref 1.7–9.3)
NEUTROPHILS # BLD AUTO: 1.98 K/UL — SIGNIFICANT CHANGE UP (ref 1.4–6.5)
NEUTROPHILS # BLD AUTO: 2.59 K/UL — SIGNIFICANT CHANGE UP (ref 1.4–6.5)
NEUTROPHILS # BLD AUTO: 2.72 K/UL — SIGNIFICANT CHANGE UP (ref 1.4–6.5)
NEUTROPHILS # BLD AUTO: 27.3 K/UL — HIGH (ref 1.4–6.5)
NEUTROPHILS # BLD AUTO: 4.76 K/UL — SIGNIFICANT CHANGE UP (ref 1.4–6.5)
NEUTROPHILS # BLD AUTO: 8.34 K/UL — HIGH (ref 1.4–6.5)
NEUTROPHILS # BLD AUTO: 9.01 K/UL — HIGH (ref 1.4–6.5)
NEUTROPHILS NFR BLD AUTO: 74.9 % — SIGNIFICANT CHANGE UP (ref 42.2–75.2)
NEUTROPHILS NFR BLD AUTO: 78.6 % — HIGH (ref 42.2–75.2)
NEUTROPHILS NFR BLD AUTO: 81.8 % — HIGH (ref 42.2–75.2)
NEUTROPHILS NFR BLD AUTO: 82.8 % — HIGH (ref 42.2–75.2)
NEUTROPHILS NFR BLD AUTO: 87.5 % — HIGH (ref 42.2–75.2)
NEUTROPHILS NFR BLD AUTO: 88.7 % — HIGH (ref 42.2–75.2)
NEUTROPHILS NFR BLD AUTO: 93.2 % — HIGH (ref 42.2–75.2)
NEUTS BAND # BLD: 2 % — SIGNIFICANT CHANGE UP (ref 0–6)
NITRITE UR-MCNC: NEGATIVE — SIGNIFICANT CHANGE UP
NORMALIZED SCT PPP-RTO: 0.67 RATIO — SIGNIFICANT CHANGE UP (ref 0–1.16)
NORMALIZED SCT PPP-RTO: SIGNIFICANT CHANGE UP
NRBC # BLD: 0 /100 WBCS — SIGNIFICANT CHANGE UP (ref 0–0)
NRBC # BLD: 0 /100 — SIGNIFICANT CHANGE UP (ref 0–0)
ORGANISM # SPEC MICROSCOPIC CNT: SIGNIFICANT CHANGE UP
OVALOCYTES BLD QL SMEAR: SLIGHT — SIGNIFICANT CHANGE UP
OVALOCYTES BLD QL SMEAR: SLIGHT — SIGNIFICANT CHANGE UP
PCO2 BLDV: 43 MMHG — HIGH (ref 39–42)
PH BLDV: 7.47 — HIGH (ref 7.32–7.43)
PH UR: 6.5 — SIGNIFICANT CHANGE UP (ref 5–8)
PHOSPHATE SERPL-MCNC: 3.6 MG/DL — SIGNIFICANT CHANGE UP (ref 2.1–4.9)
PHOSPHATE SERPL-MCNC: 4.1 MG/DL — SIGNIFICANT CHANGE UP (ref 2.1–4.9)
PLAT MORPH BLD: NORMAL — SIGNIFICANT CHANGE UP
PLATELET # BLD AUTO: 109 K/UL — LOW (ref 130–400)
PLATELET # BLD AUTO: 124 K/UL — LOW (ref 130–400)
PLATELET # BLD AUTO: 127 K/UL — LOW (ref 130–400)
PLATELET # BLD AUTO: 127 K/UL — LOW (ref 130–400)
PLATELET # BLD AUTO: 131 K/UL — SIGNIFICANT CHANGE UP (ref 130–400)
PLATELET # BLD AUTO: 139 K/UL — SIGNIFICANT CHANGE UP (ref 130–400)
PLATELET # BLD AUTO: 139 K/UL — SIGNIFICANT CHANGE UP (ref 130–400)
PLATELET # BLD AUTO: 141 K/UL — SIGNIFICANT CHANGE UP (ref 130–400)
PLATELET # BLD AUTO: 144 K/UL — SIGNIFICANT CHANGE UP (ref 130–400)
PLATELET # BLD AUTO: 148 K/UL — SIGNIFICANT CHANGE UP (ref 130–400)
PLATELET # BLD AUTO: 159 K/UL — SIGNIFICANT CHANGE UP (ref 130–400)
PLATELET # BLD AUTO: 171 K/UL — SIGNIFICANT CHANGE UP (ref 130–400)
PLATELET # BLD AUTO: 172 K/UL — SIGNIFICANT CHANGE UP (ref 130–400)
PLATELET # BLD AUTO: 175 K/UL — SIGNIFICANT CHANGE UP (ref 130–400)
PLATELET # BLD AUTO: 187 K/UL — SIGNIFICANT CHANGE UP (ref 130–400)
PLATELET # BLD AUTO: 202 K/UL — SIGNIFICANT CHANGE UP (ref 130–400)
PLATELET # BLD AUTO: 207 K/UL — SIGNIFICANT CHANGE UP (ref 130–400)
PLATELET # BLD AUTO: 212 K/UL — SIGNIFICANT CHANGE UP (ref 130–400)
PLATELET # BLD AUTO: 218 K/UL — SIGNIFICANT CHANGE UP (ref 130–400)
PLATELET # BLD AUTO: 220 K/UL — SIGNIFICANT CHANGE UP (ref 130–400)
PLATELET # BLD AUTO: 223 K/UL — SIGNIFICANT CHANGE UP (ref 130–400)
PLATELET # BLD AUTO: 94 K/UL — LOW (ref 130–400)
PLATELET # BLD AUTO: 97 K/UL — LOW (ref 130–400)
PLATELET # BLD AUTO: 99 K/UL — LOW (ref 130–400)
PLATELET CLUMP BLD QL SMEAR: SIGNIFICANT CHANGE UP
PLATELET COUNT - ESTIMATE: NORMAL — SIGNIFICANT CHANGE UP
PMV BLD: 10 FL — SIGNIFICANT CHANGE UP (ref 7.4–10.4)
PO2 BLDV: 26 MMHG — SIGNIFICANT CHANGE UP
POIKILOCYTOSIS BLD QL AUTO: SLIGHT — SIGNIFICANT CHANGE UP
POTASSIUM BLDV-SCNC: 5.1 MMOL/L — SIGNIFICANT CHANGE UP (ref 3.5–5.1)
POTASSIUM SERPL-MCNC: 3.6 MMOL/L — SIGNIFICANT CHANGE UP (ref 3.5–5)
POTASSIUM SERPL-MCNC: 3.6 MMOL/L — SIGNIFICANT CHANGE UP (ref 3.5–5)
POTASSIUM SERPL-MCNC: 3.7 MMOL/L — SIGNIFICANT CHANGE UP (ref 3.5–5)
POTASSIUM SERPL-MCNC: 3.8 MMOL/L — SIGNIFICANT CHANGE UP (ref 3.5–5)
POTASSIUM SERPL-MCNC: 4 MMOL/L — SIGNIFICANT CHANGE UP (ref 3.5–5)
POTASSIUM SERPL-MCNC: 4 MMOL/L — SIGNIFICANT CHANGE UP (ref 3.5–5)
POTASSIUM SERPL-MCNC: 4.1 MMOL/L — SIGNIFICANT CHANGE UP (ref 3.5–5)
POTASSIUM SERPL-MCNC: 4.2 MMOL/L — SIGNIFICANT CHANGE UP (ref 3.5–5)
POTASSIUM SERPL-MCNC: 4.3 MMOL/L — SIGNIFICANT CHANGE UP (ref 3.5–5)
POTASSIUM SERPL-MCNC: 4.4 MMOL/L — SIGNIFICANT CHANGE UP (ref 3.5–5)
POTASSIUM SERPL-MCNC: 4.4 MMOL/L — SIGNIFICANT CHANGE UP (ref 3.5–5)
POTASSIUM SERPL-MCNC: 4.6 MMOL/L — SIGNIFICANT CHANGE UP (ref 3.5–5)
POTASSIUM SERPL-MCNC: 4.8 MMOL/L — SIGNIFICANT CHANGE UP (ref 3.5–5)
POTASSIUM SERPL-MCNC: 4.9 MMOL/L — SIGNIFICANT CHANGE UP (ref 3.5–5)
POTASSIUM SERPL-MCNC: 5 MMOL/L — SIGNIFICANT CHANGE UP (ref 3.5–5)
POTASSIUM SERPL-MCNC: 5 MMOL/L — SIGNIFICANT CHANGE UP (ref 3.5–5)
POTASSIUM SERPL-MCNC: 5.1 MMOL/L — HIGH (ref 3.5–5)
POTASSIUM SERPL-MCNC: 5.2 MMOL/L — HIGH (ref 3.5–5)
POTASSIUM SERPL-MCNC: 5.4 MMOL/L — HIGH (ref 3.5–5)
POTASSIUM SERPL-MCNC: 5.5 MMOL/L — HIGH (ref 3.5–5)
POTASSIUM SERPL-MCNC: 5.7 MMOL/L — HIGH (ref 3.5–5)
POTASSIUM SERPL-MCNC: SIGNIFICANT CHANGE UP MMOL/L (ref 3.5–5)
POTASSIUM SERPL-SCNC: 3.6 MMOL/L — SIGNIFICANT CHANGE UP (ref 3.5–5)
POTASSIUM SERPL-SCNC: 3.6 MMOL/L — SIGNIFICANT CHANGE UP (ref 3.5–5)
POTASSIUM SERPL-SCNC: 3.7 MMOL/L — SIGNIFICANT CHANGE UP (ref 3.5–5)
POTASSIUM SERPL-SCNC: 3.8 MMOL/L — SIGNIFICANT CHANGE UP (ref 3.5–5)
POTASSIUM SERPL-SCNC: 4 MMOL/L — SIGNIFICANT CHANGE UP (ref 3.5–5)
POTASSIUM SERPL-SCNC: 4 MMOL/L — SIGNIFICANT CHANGE UP (ref 3.5–5)
POTASSIUM SERPL-SCNC: 4.1 MMOL/L — SIGNIFICANT CHANGE UP (ref 3.5–5)
POTASSIUM SERPL-SCNC: 4.2 MMOL/L — SIGNIFICANT CHANGE UP (ref 3.5–5)
POTASSIUM SERPL-SCNC: 4.3 MMOL/L — SIGNIFICANT CHANGE UP (ref 3.5–5)
POTASSIUM SERPL-SCNC: 4.4 MMOL/L — SIGNIFICANT CHANGE UP (ref 3.5–5)
POTASSIUM SERPL-SCNC: 4.4 MMOL/L — SIGNIFICANT CHANGE UP (ref 3.5–5)
POTASSIUM SERPL-SCNC: 4.6 MMOL/L — SIGNIFICANT CHANGE UP (ref 3.5–5)
POTASSIUM SERPL-SCNC: 4.8 MMOL/L — SIGNIFICANT CHANGE UP (ref 3.5–5)
POTASSIUM SERPL-SCNC: 4.9 MMOL/L — SIGNIFICANT CHANGE UP (ref 3.5–5)
POTASSIUM SERPL-SCNC: 5 MMOL/L — SIGNIFICANT CHANGE UP (ref 3.5–5)
POTASSIUM SERPL-SCNC: 5 MMOL/L — SIGNIFICANT CHANGE UP (ref 3.5–5)
POTASSIUM SERPL-SCNC: 5.1 MMOL/L — HIGH (ref 3.5–5)
POTASSIUM SERPL-SCNC: 5.2 MMOL/L — HIGH (ref 3.5–5)
POTASSIUM SERPL-SCNC: 5.4 MMOL/L — HIGH (ref 3.5–5)
POTASSIUM SERPL-SCNC: 5.5 MMOL/L — HIGH (ref 3.5–5)
POTASSIUM SERPL-SCNC: 5.7 MMOL/L — HIGH (ref 3.5–5)
POTASSIUM SERPL-SCNC: SIGNIFICANT CHANGE UP MMOL/L (ref 3.5–5)
PROT SERPL-MCNC: 6.2 G/DL — SIGNIFICANT CHANGE UP (ref 6–8)
PROT SERPL-MCNC: 6.5 G/DL — SIGNIFICANT CHANGE UP (ref 6–8)
PROT SERPL-MCNC: 6.9 G/DL — SIGNIFICANT CHANGE UP (ref 6–8)
PROT SERPL-MCNC: 6.9 G/DL — SIGNIFICANT CHANGE UP (ref 6–8)
PROT SERPL-MCNC: 7.2 G/DL — SIGNIFICANT CHANGE UP (ref 6–8)
PROT SERPL-MCNC: 7.2 G/DL — SIGNIFICANT CHANGE UP (ref 6–8)
PROT SERPL-MCNC: 7.4 G/DL — SIGNIFICANT CHANGE UP (ref 6–8)
PROT SERPL-MCNC: 7.5 G/DL — SIGNIFICANT CHANGE UP (ref 6–8)
PROT SERPL-MCNC: 7.9 G/DL — SIGNIFICANT CHANGE UP (ref 6–8)
PROT UR-MCNC: >=300 MG/DL
PROTHROM AB SERPL-ACNC: 11.3 SEC — SIGNIFICANT CHANGE UP (ref 9.95–12.87)
PROTHROM AB SERPL-ACNC: 12 SEC — SIGNIFICANT CHANGE UP (ref 9.95–12.87)
PROTHROM AB SERPL-ACNC: 12 SEC — SIGNIFICANT CHANGE UP (ref 9.95–12.87)
PROTHROM AB SERPL-ACNC: 12.2 SEC — SIGNIFICANT CHANGE UP (ref 9.95–12.87)
PROTHROM AB SERPL-ACNC: 12.3 SEC — SIGNIFICANT CHANGE UP (ref 9.95–12.87)
PROTHROM AB SERPL-ACNC: 13.6 SEC — HIGH (ref 9.95–12.87)
PROTHROM AB SERPL-ACNC: 18.3 SEC — HIGH (ref 9.95–12.87)
RBC # BLD: 2.41 M/UL — LOW (ref 4.2–5.4)
RBC # BLD: 2.54 M/UL — LOW (ref 4.2–5.4)
RBC # BLD: 2.55 M/UL — LOW (ref 4.2–5.4)
RBC # BLD: 2.69 M/UL — LOW (ref 4.2–5.4)
RBC # BLD: 2.82 M/UL — LOW (ref 4.2–5.4)
RBC # BLD: 2.98 M/UL — LOW (ref 4.2–5.4)
RBC # BLD: 3.07 M/UL — LOW (ref 4.2–5.4)
RBC # BLD: 3.08 M/UL — LOW (ref 4.2–5.4)
RBC # BLD: 3.09 M/UL — LOW (ref 4.2–5.4)
RBC # BLD: 3.1 M/UL — LOW (ref 4.2–5.4)
RBC # BLD: 3.12 M/UL — LOW (ref 4.2–5.4)
RBC # BLD: 3.26 M/UL — LOW (ref 4.2–5.4)
RBC # BLD: 3.29 M/UL — LOW (ref 4.2–5.4)
RBC # BLD: 3.33 M/UL — LOW (ref 4.2–5.4)
RBC # BLD: 3.34 M/UL — LOW (ref 4.2–5.4)
RBC # BLD: 3.36 M/UL — LOW (ref 4.2–5.4)
RBC # BLD: 3.41 M/UL — LOW (ref 4.2–5.4)
RBC # BLD: 3.46 M/UL — LOW (ref 4.2–5.4)
RBC # BLD: 3.57 M/UL — LOW (ref 4.2–5.4)
RBC # BLD: 3.61 M/UL — LOW (ref 4.2–5.4)
RBC # BLD: 3.62 M/UL — LOW (ref 4.2–5.4)
RBC # BLD: 3.72 M/UL — LOW (ref 4.2–5.4)
RBC # BLD: 3.79 M/UL — LOW (ref 4.2–5.4)
RBC # BLD: 3.86 M/UL — LOW (ref 4.2–5.4)
RBC # FLD: 17.2 % — HIGH (ref 11.5–14.5)
RBC # FLD: 18 % — HIGH (ref 11.5–14.5)
RBC # FLD: 18.9 % — HIGH (ref 11.5–14.5)
RBC # FLD: 19.1 % — HIGH (ref 11.5–14.5)
RBC # FLD: 19.2 % — HIGH (ref 11.5–14.5)
RBC # FLD: 19.2 % — HIGH (ref 11.5–14.5)
RBC # FLD: 19.3 % — HIGH (ref 11.5–14.5)
RBC # FLD: 19.8 % — HIGH (ref 11.5–14.5)
RBC # FLD: 19.8 % — HIGH (ref 11.5–14.5)
RBC # FLD: 19.9 % — HIGH (ref 11.5–14.5)
RBC # FLD: 20.2 % — HIGH (ref 11.5–14.5)
RBC # FLD: 20.3 % — HIGH (ref 11.5–14.5)
RBC # FLD: 20.5 % — HIGH (ref 11.5–14.5)
RBC # FLD: 20.6 % — HIGH (ref 11.5–14.5)
RBC # FLD: 20.7 % — HIGH (ref 11.5–14.5)
RBC # FLD: 20.9 % — HIGH (ref 11.5–14.5)
RBC # FLD: 21.1 % — HIGH (ref 11.5–14.5)
RBC # FLD: 21.1 % — HIGH (ref 11.5–14.5)
RBC # FLD: 21.2 % — HIGH (ref 11.5–14.5)
RBC # FLD: 21.2 % — HIGH (ref 11.5–14.5)
RBC # FLD: 21.3 % — HIGH (ref 11.5–14.5)
RBC # FLD: 21.4 % — HIGH (ref 11.5–14.5)
RBC BLD AUTO: ABNORMAL
RBC CASTS # UR COMP ASSIST: ABNORMAL /HPF
SAO2 % BLDV: 49.4 % — SIGNIFICANT CHANGE UP
SARS-COV-2 RNA SPEC QL NAA+PROBE: SIGNIFICANT CHANGE UP
SMUDGE CELLS # BLD: PRESENT — SIGNIFICANT CHANGE UP
SODIUM SERPL-SCNC: 132 MMOL/L — LOW (ref 135–146)
SODIUM SERPL-SCNC: 132 MMOL/L — LOW (ref 135–146)
SODIUM SERPL-SCNC: 133 MMOL/L — LOW (ref 135–146)
SODIUM SERPL-SCNC: 134 MMOL/L — LOW (ref 135–146)
SODIUM SERPL-SCNC: 135 MMOL/L — SIGNIFICANT CHANGE UP (ref 135–146)
SODIUM SERPL-SCNC: 136 MMOL/L — SIGNIFICANT CHANGE UP (ref 135–146)
SODIUM SERPL-SCNC: 137 MMOL/L — SIGNIFICANT CHANGE UP (ref 135–146)
SODIUM SERPL-SCNC: 138 MMOL/L — SIGNIFICANT CHANGE UP (ref 135–146)
SODIUM SERPL-SCNC: 140 MMOL/L — SIGNIFICANT CHANGE UP (ref 135–146)
SODIUM SERPL-SCNC: 141 MMOL/L — SIGNIFICANT CHANGE UP (ref 135–146)
SP GR SPEC: 1.02 — SIGNIFICANT CHANGE UP (ref 1.01–1.03)
SPECIMEN SOURCE: SIGNIFICANT CHANGE UP
SURGICAL PATHOLOGY STUDY: SIGNIFICANT CHANGE UP
TARGETS BLD QL SMEAR: SIGNIFICANT CHANGE UP
TROPONIN T SERPL-MCNC: <0.01 NG/ML — SIGNIFICANT CHANGE UP
TRYPTASE SERPL-MCNC: 10 UG/L — HIGH
UROBILINOGEN FLD QL: 2 MG/DL
WBC # BLD: 10.47 K/UL — SIGNIFICANT CHANGE UP (ref 4.8–10.8)
WBC # BLD: 11.02 K/UL — HIGH (ref 4.8–10.8)
WBC # BLD: 11.17 K/UL — HIGH (ref 4.8–10.8)
WBC # BLD: 11.72 K/UL — HIGH (ref 4.8–10.8)
WBC # BLD: 18.45 K/UL — HIGH (ref 4.8–10.8)
WBC # BLD: 2.64 K/UL — LOW (ref 4.8–10.8)
WBC # BLD: 29.32 K/UL — HIGH (ref 4.8–10.8)
WBC # BLD: 3.09 K/UL — LOW (ref 4.8–10.8)
WBC # BLD: 3.29 K/UL — LOW (ref 4.8–10.8)
WBC # BLD: 3.3 K/UL — LOW (ref 4.8–10.8)
WBC # BLD: 3.39 K/UL — LOW (ref 4.8–10.8)
WBC # BLD: 3.43 K/UL — LOW (ref 4.8–10.8)
WBC # BLD: 3.52 K/UL — LOW (ref 4.8–10.8)
WBC # BLD: 3.59 K/UL — LOW (ref 4.8–10.8)
WBC # BLD: 5.01 K/UL — SIGNIFICANT CHANGE UP (ref 4.8–10.8)
WBC # BLD: 5.08 K/UL — SIGNIFICANT CHANGE UP (ref 4.8–10.8)
WBC # BLD: 5.36 K/UL — SIGNIFICANT CHANGE UP (ref 4.8–10.8)
WBC # BLD: 5.49 K/UL — SIGNIFICANT CHANGE UP (ref 4.8–10.8)
WBC # BLD: 6.22 K/UL — SIGNIFICANT CHANGE UP (ref 4.8–10.8)
WBC # BLD: 6.37 K/UL — SIGNIFICANT CHANGE UP (ref 4.8–10.8)
WBC # BLD: 6.48 K/UL — SIGNIFICANT CHANGE UP (ref 4.8–10.8)
WBC # BLD: 7.55 K/UL — SIGNIFICANT CHANGE UP (ref 4.8–10.8)
WBC # BLD: 8.6 K/UL — SIGNIFICANT CHANGE UP (ref 4.8–10.8)
WBC # BLD: 9.55 K/UL — SIGNIFICANT CHANGE UP (ref 4.8–10.8)
WBC # FLD AUTO: 10.47 K/UL — SIGNIFICANT CHANGE UP (ref 4.8–10.8)
WBC # FLD AUTO: 11.02 K/UL — HIGH (ref 4.8–10.8)
WBC # FLD AUTO: 11.17 K/UL — HIGH (ref 4.8–10.8)
WBC # FLD AUTO: 11.72 K/UL — HIGH (ref 4.8–10.8)
WBC # FLD AUTO: 18.45 K/UL — HIGH (ref 4.8–10.8)
WBC # FLD AUTO: 2.64 K/UL — LOW (ref 4.8–10.8)
WBC # FLD AUTO: 29.32 K/UL — HIGH (ref 4.8–10.8)
WBC # FLD AUTO: 3.09 K/UL — LOW (ref 4.8–10.8)
WBC # FLD AUTO: 3.29 K/UL — LOW (ref 4.8–10.8)
WBC # FLD AUTO: 3.3 K/UL — LOW (ref 4.8–10.8)
WBC # FLD AUTO: 3.39 K/UL — LOW (ref 4.8–10.8)
WBC # FLD AUTO: 3.43 K/UL — LOW (ref 4.8–10.8)
WBC # FLD AUTO: 3.52 K/UL — LOW (ref 4.8–10.8)
WBC # FLD AUTO: 3.59 K/UL — LOW (ref 4.8–10.8)
WBC # FLD AUTO: 5.01 K/UL — SIGNIFICANT CHANGE UP (ref 4.8–10.8)
WBC # FLD AUTO: 5.08 K/UL — SIGNIFICANT CHANGE UP (ref 4.8–10.8)
WBC # FLD AUTO: 5.36 K/UL — SIGNIFICANT CHANGE UP (ref 4.8–10.8)
WBC # FLD AUTO: 5.49 K/UL — SIGNIFICANT CHANGE UP (ref 4.8–10.8)
WBC # FLD AUTO: 6.22 K/UL — SIGNIFICANT CHANGE UP (ref 4.8–10.8)
WBC # FLD AUTO: 6.37 K/UL — SIGNIFICANT CHANGE UP (ref 4.8–10.8)
WBC # FLD AUTO: 6.48 K/UL — SIGNIFICANT CHANGE UP (ref 4.8–10.8)
WBC # FLD AUTO: 7.55 K/UL — SIGNIFICANT CHANGE UP (ref 4.8–10.8)
WBC # FLD AUTO: 8.6 K/UL — SIGNIFICANT CHANGE UP (ref 4.8–10.8)
WBC # FLD AUTO: 9.55 K/UL — SIGNIFICANT CHANGE UP (ref 4.8–10.8)
WBC UR QL: ABNORMAL /HPF

## 2023-01-01 PROCEDURE — 99212 OFFICE O/P EST SF 10 MIN: CPT

## 2023-01-01 PROCEDURE — 97597 DBRDMT OPN WND 1ST 20 CM/<: CPT

## 2023-01-01 PROCEDURE — 73630 X-RAY EXAM OF FOOT: CPT | Mod: 26,LT

## 2023-01-01 PROCEDURE — 88304 TISSUE EXAM BY PATHOLOGIST: CPT | Mod: 26

## 2023-01-01 PROCEDURE — 99232 SBSQ HOSP IP/OBS MODERATE 35: CPT

## 2023-01-01 PROCEDURE — 99284 EMERGENCY DEPT VISIT MOD MDM: CPT

## 2023-01-01 PROCEDURE — 97597 DBRDMT OPN WND 1ST 20 CM/<: CPT | Mod: GC,LT

## 2023-01-01 PROCEDURE — 85730 THROMBOPLASTIN TIME PARTIAL: CPT

## 2023-01-01 PROCEDURE — 80053 COMPREHEN METABOLIC PANEL: CPT

## 2023-01-01 PROCEDURE — 81001 URINALYSIS AUTO W/SCOPE: CPT

## 2023-01-01 PROCEDURE — 28820 AMPUTATION OF TOE: CPT | Mod: T4,79

## 2023-01-01 PROCEDURE — 93010 ELECTROCARDIOGRAM REPORT: CPT | Mod: 76

## 2023-01-01 PROCEDURE — 88311 DECALCIFY TISSUE: CPT | Mod: 26

## 2023-01-01 PROCEDURE — 99221 1ST HOSP IP/OBS SF/LOW 40: CPT | Mod: GC

## 2023-01-01 PROCEDURE — 87493 C DIFF AMPLIFIED PROBE: CPT

## 2023-01-01 PROCEDURE — 99285 EMERGENCY DEPT VISIT HI MDM: CPT

## 2023-01-01 PROCEDURE — 85652 RBC SED RATE AUTOMATED: CPT

## 2023-01-01 PROCEDURE — 99024 POSTOP FOLLOW-UP VISIT: CPT

## 2023-01-01 PROCEDURE — 83520 IMMUNOASSAY QUANT NOS NONAB: CPT

## 2023-01-01 PROCEDURE — 99214 OFFICE O/P EST MOD 30 MIN: CPT

## 2023-01-01 PROCEDURE — 99223 1ST HOSP IP/OBS HIGH 75: CPT

## 2023-01-01 PROCEDURE — 93925 LOWER EXTREMITY STUDY: CPT

## 2023-01-01 PROCEDURE — 37229: CPT | Mod: GC

## 2023-01-01 PROCEDURE — 74177 CT ABD & PELVIS W/CONTRAST: CPT

## 2023-01-01 PROCEDURE — 99213 OFFICE O/P EST LOW 20 MIN: CPT | Mod: GC,25

## 2023-01-01 PROCEDURE — 85384 FIBRINOGEN ACTIVITY: CPT

## 2023-01-01 PROCEDURE — 83605 ASSAY OF LACTIC ACID: CPT

## 2023-01-01 PROCEDURE — 76937 US GUIDE VASCULAR ACCESS: CPT | Mod: 26,59

## 2023-01-01 PROCEDURE — 93925 LOWER EXTREMITY STUDY: CPT | Mod: 26

## 2023-01-01 PROCEDURE — 86923 COMPATIBILITY TEST ELECTRIC: CPT

## 2023-01-01 PROCEDURE — 36569 INSJ PICC 5 YR+ W/O IMAGING: CPT

## 2023-01-01 PROCEDURE — 99213 OFFICE O/P EST LOW 20 MIN: CPT

## 2023-01-01 PROCEDURE — 80048 BASIC METABOLIC PNL TOTAL CA: CPT

## 2023-01-01 PROCEDURE — 93005 ELECTROCARDIOGRAM TRACING: CPT

## 2023-01-01 PROCEDURE — 93010 ELECTROCARDIOGRAM REPORT: CPT

## 2023-01-01 PROCEDURE — 28285 REPAIR OF HAMMERTOE: CPT | Mod: GC,LT,59

## 2023-01-01 PROCEDURE — 87507 IADNA-DNA/RNA PROBE TQ 12-25: CPT

## 2023-01-01 PROCEDURE — 71260 CT THORAX DX C+: CPT

## 2023-01-01 PROCEDURE — 71260 CT THORAX DX C+: CPT | Mod: 26

## 2023-01-01 PROCEDURE — 28820 AMPUTATION OF TOE: CPT | Mod: 58

## 2023-01-01 PROCEDURE — 74177 CT ABD & PELVIS W/CONTRAST: CPT | Mod: 26

## 2023-01-01 PROCEDURE — 99285 EMERGENCY DEPT VISIT HI MDM: CPT | Mod: 25

## 2023-01-01 PROCEDURE — 36415 COLL VENOUS BLD VENIPUNCTURE: CPT

## 2023-01-01 PROCEDURE — 99233 SBSQ HOSP IP/OBS HIGH 50: CPT

## 2023-01-01 PROCEDURE — 99222 1ST HOSP IP/OBS MODERATE 55: CPT

## 2023-01-01 PROCEDURE — 88305 TISSUE EXAM BY PATHOLOGIST: CPT | Mod: 26

## 2023-01-01 PROCEDURE — 99231 SBSQ HOSP IP/OBS SF/LOW 25: CPT | Mod: 24

## 2023-01-01 PROCEDURE — 71045 X-RAY EXAM CHEST 1 VIEW: CPT | Mod: 26

## 2023-01-01 PROCEDURE — 76937 US GUIDE VASCULAR ACCESS: CPT | Mod: 26,GC

## 2023-01-01 PROCEDURE — 83735 ASSAY OF MAGNESIUM: CPT

## 2023-01-01 PROCEDURE — 85025 COMPLETE CBC W/AUTO DIFF WBC: CPT

## 2023-01-01 PROCEDURE — 99213 OFFICE O/P EST LOW 20 MIN: CPT | Mod: LT

## 2023-01-01 PROCEDURE — 36430 TRANSFUSION BLD/BLD COMPNT: CPT

## 2023-01-01 PROCEDURE — 86160 COMPLEMENT ANTIGEN: CPT

## 2023-01-01 PROCEDURE — 99221 1ST HOSP IP/OBS SF/LOW 40: CPT

## 2023-01-01 PROCEDURE — 82962 GLUCOSE BLOOD TEST: CPT

## 2023-01-01 PROCEDURE — 75710 ARTERY X-RAYS ARM/LEG: CPT | Mod: 26,59,GC

## 2023-01-01 PROCEDURE — 86901 BLOOD TYPING SEROLOGIC RH(D): CPT

## 2023-01-01 PROCEDURE — P9040: CPT

## 2023-01-01 PROCEDURE — 86147 CARDIOLIPIN ANTIBODY EA IG: CPT

## 2023-01-01 PROCEDURE — 86140 C-REACTIVE PROTEIN: CPT

## 2023-01-01 PROCEDURE — 99213 OFFICE O/P EST LOW 20 MIN: CPT | Mod: GC,24

## 2023-01-01 PROCEDURE — 75625 CONTRAST EXAM ABDOMINL AORTA: CPT | Mod: 26,GC

## 2023-01-01 PROCEDURE — 85027 COMPLETE CBC AUTOMATED: CPT

## 2023-01-01 PROCEDURE — 99231 SBSQ HOSP IP/OBS SF/LOW 25: CPT | Mod: GC,25

## 2023-01-01 PROCEDURE — 88305 TISSUE EXAM BY PATHOLOGIST: CPT

## 2023-01-01 PROCEDURE — 83036 HEMOGLOBIN GLYCOSYLATED A1C: CPT

## 2023-01-01 PROCEDURE — 87086 URINE CULTURE/COLONY COUNT: CPT

## 2023-01-01 PROCEDURE — 85379 FIBRIN DEGRADATION QUANT: CPT

## 2023-01-01 PROCEDURE — 82785 ASSAY OF IGE: CPT

## 2023-01-01 PROCEDURE — 73719 MRI LOWER EXTREMITY W/DYE: CPT | Mod: 26,LT

## 2023-01-01 PROCEDURE — 93970 EXTREMITY STUDY: CPT | Mod: 26

## 2023-01-01 PROCEDURE — 20245 BONE BIOPSY OPEN DEEP: CPT | Mod: GC,LT,59

## 2023-01-01 PROCEDURE — 99232 SBSQ HOSP IP/OBS MODERATE 35: CPT | Mod: 24

## 2023-01-01 PROCEDURE — 85610 PROTHROMBIN TIME: CPT

## 2023-01-01 PROCEDURE — 93306 TTE W/DOPPLER COMPLETE: CPT | Mod: 26

## 2023-01-01 PROCEDURE — 86850 RBC ANTIBODY SCREEN: CPT

## 2023-01-01 PROCEDURE — 99239 HOSP IP/OBS DSCHRG MGMT >30: CPT

## 2023-01-01 PROCEDURE — 88311 DECALCIFY TISSUE: CPT

## 2023-01-01 PROCEDURE — 93306 TTE W/DOPPLER COMPLETE: CPT

## 2023-01-01 PROCEDURE — 99233 SBSQ HOSP IP/OBS HIGH 50: CPT | Mod: 57

## 2023-01-01 PROCEDURE — 73620 X-RAY EXAM OF FOOT: CPT | Mod: 26,LT

## 2023-01-01 PROCEDURE — 86900 BLOOD TYPING SEROLOGIC ABO: CPT

## 2023-01-01 PROCEDURE — 97162 PT EVAL MOD COMPLEX 30 MIN: CPT | Mod: GP

## 2023-01-01 PROCEDURE — 73630 X-RAY EXAM OF FOOT: CPT | Mod: LT

## 2023-01-01 PROCEDURE — 99497 ADVNCD CARE PLAN 30 MIN: CPT

## 2023-01-01 PROCEDURE — 86146 BETA-2 GLYCOPROTEIN ANTIBODY: CPT

## 2023-01-01 PROCEDURE — 94760 N-INVAS EAR/PLS OXIMETRY 1: CPT

## 2023-01-01 PROCEDURE — 99223 1ST HOSP IP/OBS HIGH 75: CPT | Mod: 24,25,57

## 2023-01-01 PROCEDURE — 11043 DBRDMT MUSC&/FSCA 1ST 20/<: CPT | Mod: GC

## 2023-01-01 RX ORDER — METOPROLOL TARTRATE 50 MG
1 TABLET ORAL
Qty: 0 | Refills: 0 | DISCHARGE

## 2023-01-01 RX ORDER — ALLOPURINOL 300 MG
100 TABLET ORAL
Refills: 0 | Status: DISCONTINUED | OUTPATIENT
Start: 2023-01-01 | End: 2023-01-01

## 2023-01-01 RX ORDER — LINEZOLID 600 MG/300ML
600 INJECTION, SOLUTION INTRAVENOUS EVERY 12 HOURS
Refills: 0 | Status: DISCONTINUED | OUTPATIENT
Start: 2023-01-01 | End: 2023-01-01

## 2023-01-01 RX ORDER — HYDROMORPHONE HYDROCHLORIDE 2 MG/ML
1 INJECTION INTRAMUSCULAR; INTRAVENOUS; SUBCUTANEOUS EVERY 6 HOURS
Refills: 0 | Status: DISCONTINUED | OUTPATIENT
Start: 2023-01-01 | End: 2023-01-01

## 2023-01-01 RX ORDER — DONEPEZIL HYDROCHLORIDE 10 MG/1
1 TABLET, FILM COATED ORAL
Qty: 0 | Refills: 0 | DISCHARGE

## 2023-01-01 RX ORDER — FERROUS SULFATE 325(65) MG
0 TABLET ORAL
Qty: 0 | Refills: 0 | DISCHARGE

## 2023-01-01 RX ORDER — HEPARIN SODIUM 5000 [USP'U]/ML
5000 INJECTION INTRAVENOUS; SUBCUTANEOUS EVERY 8 HOURS
Refills: 0 | Status: DISCONTINUED | OUTPATIENT
Start: 2023-01-01 | End: 2023-01-01

## 2023-01-01 RX ORDER — SODIUM CHLORIDE 9 MG/ML
1000 INJECTION INTRAMUSCULAR; INTRAVENOUS; SUBCUTANEOUS
Refills: 0 | Status: DISCONTINUED | OUTPATIENT
Start: 2023-01-01 | End: 2023-01-01

## 2023-01-01 RX ORDER — FUROSEMIDE 40 MG
40 TABLET ORAL DAILY
Refills: 0 | Status: DISCONTINUED | OUTPATIENT
Start: 2023-01-01 | End: 2023-01-01

## 2023-01-01 RX ORDER — ONDANSETRON 8 MG/1
4 TABLET, FILM COATED ORAL EVERY 8 HOURS
Refills: 0 | Status: DISCONTINUED | OUTPATIENT
Start: 2023-01-01 | End: 2023-01-01

## 2023-01-01 RX ORDER — METOCLOPRAMIDE HCL 10 MG
10 TABLET ORAL ONCE
Refills: 0 | Status: DISCONTINUED | OUTPATIENT
Start: 2023-01-01 | End: 2023-01-01

## 2023-01-01 RX ORDER — PANTOPRAZOLE SODIUM 20 MG/1
40 TABLET, DELAYED RELEASE ORAL
Refills: 0 | Status: DISCONTINUED | OUTPATIENT
Start: 2023-01-01 | End: 2023-01-01

## 2023-01-01 RX ORDER — ZINC SULFATE TAB 220 MG (50 MG ZINC EQUIVALENT) 220 (50 ZN) MG
220 TAB ORAL DAILY
Refills: 0 | Status: DISCONTINUED | OUTPATIENT
Start: 2023-01-01 | End: 2023-01-01

## 2023-01-01 RX ORDER — METOPROLOL TARTRATE 50 MG
50 TABLET ORAL DAILY
Refills: 0 | Status: DISCONTINUED | OUTPATIENT
Start: 2023-01-01 | End: 2023-01-01

## 2023-01-01 RX ORDER — ASPIRIN/CALCIUM CARB/MAGNESIUM 324 MG
81 TABLET ORAL DAILY
Refills: 0 | Status: DISCONTINUED | OUTPATIENT
Start: 2023-01-01 | End: 2023-01-01

## 2023-01-01 RX ORDER — SACUBITRIL AND VALSARTAN 24; 26 MG/1; MG/1
1 TABLET, FILM COATED ORAL
Refills: 0 | Status: DISCONTINUED | OUTPATIENT
Start: 2023-01-01 | End: 2023-01-01

## 2023-01-01 RX ORDER — FERROUS SULFATE 325(65) MG
325 TABLET ORAL DAILY
Refills: 0 | Status: DISCONTINUED | OUTPATIENT
Start: 2023-01-01 | End: 2023-01-01

## 2023-01-01 RX ORDER — ACETAMINOPHEN 500 MG
650 TABLET ORAL EVERY 6 HOURS
Refills: 0 | Status: DISCONTINUED | OUTPATIENT
Start: 2023-01-01 | End: 2023-01-01

## 2023-01-01 RX ORDER — DONEPEZIL HYDROCHLORIDE 10 MG/1
10 TABLET, FILM COATED ORAL AT BEDTIME
Refills: 0 | Status: DISCONTINUED | OUTPATIENT
Start: 2023-01-01 | End: 2023-01-01

## 2023-01-01 RX ORDER — ACETAMINOPHEN 500 MG
650 TABLET ORAL ONCE
Refills: 0 | Status: COMPLETED | OUTPATIENT
Start: 2023-01-01 | End: 2023-01-01

## 2023-01-01 RX ORDER — OXYBUTYNIN CHLORIDE 5 MG
1 TABLET ORAL
Qty: 0 | Refills: 0 | DISCHARGE

## 2023-01-01 RX ORDER — FOLIC ACID 0.8 MG
1 TABLET ORAL DAILY
Refills: 0 | Status: DISCONTINUED | OUTPATIENT
Start: 2023-01-01 | End: 2023-01-01

## 2023-01-01 RX ORDER — ALLOPURINOL 100 MG/1
100 TABLET ORAL
Refills: 0 | Status: ACTIVE | COMMUNITY

## 2023-01-01 RX ORDER — METOPROLOL TARTRATE 50 MG
50 TABLET ORAL EVERY 12 HOURS
Refills: 0 | Status: DISCONTINUED | OUTPATIENT
Start: 2023-01-01 | End: 2023-01-01

## 2023-01-01 RX ORDER — HYDROMORPHONE HYDROCHLORIDE 2 MG/ML
1 INJECTION INTRAMUSCULAR; INTRAVENOUS; SUBCUTANEOUS
Refills: 0 | Status: DISCONTINUED | OUTPATIENT
Start: 2023-01-01 | End: 2023-01-01

## 2023-01-01 RX ORDER — CALCIUM CARBONATE 500(1250)
1 TABLET ORAL DAILY
Refills: 0 | Status: DISCONTINUED | OUTPATIENT
Start: 2023-01-01 | End: 2023-01-01

## 2023-01-01 RX ORDER — SPIRONOLACTONE 25 MG/1
25 TABLET, FILM COATED ORAL
Refills: 0 | Status: DISCONTINUED | OUTPATIENT
Start: 2023-01-01 | End: 2023-01-01

## 2023-01-01 RX ORDER — OXYBUTYNIN CHLORIDE 5 MG
10 TABLET ORAL AT BEDTIME
Refills: 0 | Status: DISCONTINUED | OUTPATIENT
Start: 2023-01-01 | End: 2023-01-01

## 2023-01-01 RX ORDER — FERROUS SULFATE 325(65) MG
1 TABLET ORAL
Qty: 0 | Refills: 0 | DISCHARGE

## 2023-01-01 RX ORDER — ASCORBIC ACID 60 MG
500 TABLET,CHEWABLE ORAL DAILY
Refills: 0 | Status: DISCONTINUED | OUTPATIENT
Start: 2023-01-01 | End: 2023-01-01

## 2023-01-01 RX ORDER — OXYCODONE HYDROCHLORIDE 5 MG/1
5 TABLET ORAL THREE TIMES A DAY
Refills: 0 | Status: DISCONTINUED | OUTPATIENT
Start: 2023-01-01 | End: 2023-01-01

## 2023-01-01 RX ORDER — HYDROMORPHONE HYDROCHLORIDE 2 MG/ML
0.5 INJECTION INTRAMUSCULAR; INTRAVENOUS; SUBCUTANEOUS
Refills: 0 | Status: DISCONTINUED | OUTPATIENT
Start: 2023-01-01 | End: 2023-01-01

## 2023-01-01 RX ORDER — AMOXICILLIN AND CLAVULANATE POTASSIUM 875; 125 MG/1; MG/1
875-125 TABLET, COATED ORAL
Qty: 14 | Refills: 0 | Status: ACTIVE | COMMUNITY
Start: 2023-01-01 | End: 1900-01-01

## 2023-01-01 RX ORDER — DULOXETINE HYDROCHLORIDE 30 MG/1
60 CAPSULE, DELAYED RELEASE ORAL DAILY
Refills: 0 | Status: DISCONTINUED | OUTPATIENT
Start: 2023-01-01 | End: 2023-01-01

## 2023-01-01 RX ORDER — OXYCODONE HYDROCHLORIDE 5 MG/1
5 TABLET ORAL EVERY 4 HOURS
Refills: 0 | Status: DISCONTINUED | OUTPATIENT
Start: 2023-01-01 | End: 2023-01-01

## 2023-01-01 RX ORDER — FUROSEMIDE 40 MG
1 TABLET ORAL
Qty: 0 | Refills: 0 | DISCHARGE
Start: 2023-01-01

## 2023-01-01 RX ORDER — FUROSEMIDE 40 MG
20 TABLET ORAL DAILY
Refills: 0 | Status: DISCONTINUED | OUTPATIENT
Start: 2023-01-01 | End: 2023-01-01

## 2023-01-01 RX ORDER — LEVOTHYROXINE SODIUM 125 MCG
50 TABLET ORAL DAILY
Refills: 0 | Status: DISCONTINUED | OUTPATIENT
Start: 2023-01-01 | End: 2023-01-01

## 2023-01-01 RX ORDER — DIPHENHYDRAMINE HCL 50 MG
25 CAPSULE ORAL EVERY 4 HOURS
Refills: 0 | Status: DISCONTINUED | OUTPATIENT
Start: 2023-01-01 | End: 2023-01-01

## 2023-01-01 RX ORDER — OXYBUTYNIN CHLORIDE 5 MG
10 TABLET ORAL DAILY
Refills: 0 | Status: DISCONTINUED | OUTPATIENT
Start: 2023-01-01 | End: 2023-01-01

## 2023-01-01 RX ORDER — PIPERACILLIN AND TAZOBACTAM 4; .5 G/20ML; G/20ML
3.38 INJECTION, POWDER, LYOPHILIZED, FOR SOLUTION INTRAVENOUS ONCE
Refills: 0 | Status: COMPLETED | OUTPATIENT
Start: 2023-01-01 | End: 2023-01-01

## 2023-01-01 RX ORDER — ASPIRIN/CALCIUM CARB/MAGNESIUM 324 MG
1 TABLET ORAL
Qty: 0 | Refills: 0 | DISCHARGE

## 2023-01-01 RX ORDER — CLOPIDOGREL BISULFATE 75 MG/1
75 TABLET, FILM COATED ORAL DAILY
Refills: 0 | Status: DISCONTINUED | OUTPATIENT
Start: 2023-01-01 | End: 2023-01-01

## 2023-01-01 RX ORDER — CEFEPIME 1 G/1
INJECTION, POWDER, FOR SOLUTION INTRAMUSCULAR; INTRAVENOUS
Refills: 0 | Status: DISCONTINUED | OUTPATIENT
Start: 2023-01-01 | End: 2023-01-01

## 2023-01-01 RX ORDER — FOLIC ACID 0.8 MG
1 TABLET ORAL
Qty: 0 | Refills: 0 | DISCHARGE

## 2023-01-01 RX ORDER — APIXABAN 2.5 MG/1
1 TABLET, FILM COATED ORAL
Qty: 60 | Refills: 0
Start: 2023-01-01 | End: 2023-05-13

## 2023-01-01 RX ORDER — ONDANSETRON 8 MG/1
4 TABLET, FILM COATED ORAL ONCE
Refills: 0 | Status: DISCONTINUED | OUTPATIENT
Start: 2023-01-01 | End: 2023-01-01

## 2023-01-01 RX ORDER — SODIUM ZIRCONIUM CYCLOSILICATE 10 G/10G
10 POWDER, FOR SUSPENSION ORAL ONCE
Refills: 0 | Status: COMPLETED | OUTPATIENT
Start: 2023-01-01 | End: 2023-01-01

## 2023-01-01 RX ORDER — ABATACEPT 125 MG/ML
1 INJECTION, SOLUTION SUBCUTANEOUS
Qty: 0 | Refills: 0 | DISCHARGE

## 2023-01-01 RX ORDER — KETOROLAC TROMETHAMINE 30 MG/ML
15 SYRINGE (ML) INJECTION ONCE
Refills: 0 | Status: DISCONTINUED | OUTPATIENT
Start: 2023-01-01 | End: 2023-01-01

## 2023-01-01 RX ORDER — ALLOPURINOL 300 MG
1 TABLET ORAL
Qty: 0 | Refills: 0 | DISCHARGE

## 2023-01-01 RX ORDER — DULOXETINE HYDROCHLORIDE 30 MG/1
1 CAPSULE, DELAYED RELEASE ORAL
Qty: 0 | Refills: 0 | DISCHARGE

## 2023-01-01 RX ORDER — ASCORBIC ACID 60 MG
1 TABLET,CHEWABLE ORAL
Qty: 0 | Refills: 0 | DISCHARGE
Start: 2023-01-01

## 2023-01-01 RX ORDER — SODIUM CHLORIDE 9 MG/ML
1000 INJECTION, SOLUTION INTRAVENOUS
Refills: 0 | Status: DISCONTINUED | OUTPATIENT
Start: 2023-01-01 | End: 2023-01-01

## 2023-01-01 RX ORDER — CHLORHEXIDINE GLUCONATE 213 G/1000ML
1 SOLUTION TOPICAL
Refills: 0 | Status: DISCONTINUED | OUTPATIENT
Start: 2023-01-01 | End: 2023-01-01

## 2023-01-01 RX ORDER — LINEZOLID 600 MG/300ML
INJECTION, SOLUTION INTRAVENOUS
Refills: 0 | Status: DISCONTINUED | OUTPATIENT
Start: 2023-01-01 | End: 2023-01-01

## 2023-01-01 RX ORDER — ZINC SULFATE TAB 220 MG (50 MG ZINC EQUIVALENT) 220 (50 ZN) MG
1 TAB ORAL
Qty: 0 | Refills: 0 | DISCHARGE
Start: 2023-01-01

## 2023-01-01 RX ORDER — HYDROMORPHONE HYDROCHLORIDE 2 MG/ML
0.5 INJECTION INTRAMUSCULAR; INTRAVENOUS; SUBCUTANEOUS ONCE
Refills: 0 | Status: DISCONTINUED | OUTPATIENT
Start: 2023-01-01 | End: 2023-01-01

## 2023-01-01 RX ORDER — FAMOTIDINE 10 MG/ML
20 INJECTION INTRAVENOUS
Refills: 0 | Status: DISCONTINUED | OUTPATIENT
Start: 2023-01-01 | End: 2023-01-01

## 2023-01-01 RX ORDER — HYDROMORPHONE HYDROCHLORIDE 2 MG/ML
1 INJECTION INTRAMUSCULAR; INTRAVENOUS; SUBCUTANEOUS EVERY 8 HOURS
Refills: 0 | Status: DISCONTINUED | OUTPATIENT
Start: 2023-01-01 | End: 2023-01-01

## 2023-01-01 RX ORDER — APIXABAN 2.5 MG/1
2 TABLET, FILM COATED ORAL
Qty: 28 | Refills: 0
Start: 2023-01-01 | End: 2023-01-01

## 2023-01-01 RX ORDER — OXYCODONE AND ACETAMINOPHEN 5; 325 MG/1; MG/1
1 TABLET ORAL
Qty: 0 | Refills: 0 | DISCHARGE
Start: 2023-01-01

## 2023-01-01 RX ORDER — POTASSIUM CHLORIDE 20 MEQ
20 PACKET (EA) ORAL
Refills: 0 | Status: DISCONTINUED | OUTPATIENT
Start: 2023-01-01 | End: 2023-01-01

## 2023-01-01 RX ORDER — HEPARIN SODIUM 5000 [USP'U]/ML
500 INJECTION INTRAVENOUS; SUBCUTANEOUS
Qty: 25000 | Refills: 0 | Status: DISCONTINUED | OUTPATIENT
Start: 2023-01-01 | End: 2023-01-01

## 2023-01-01 RX ORDER — HYDROMORPHONE HYDROCHLORIDE 2 MG/ML
0.5 INJECTION INTRAMUSCULAR; INTRAVENOUS; SUBCUTANEOUS EVERY 6 HOURS
Refills: 0 | Status: DISCONTINUED | OUTPATIENT
Start: 2023-01-01 | End: 2023-01-01

## 2023-01-01 RX ORDER — OXYBUTYNIN CHLORIDE 2.5 MG/1
TABLET ORAL
Refills: 0 | Status: ACTIVE | COMMUNITY

## 2023-01-01 RX ORDER — METOPROLOL SUCCINATE 50 MG/1
50 TABLET, EXTENDED RELEASE ORAL
Refills: 0 | Status: ACTIVE | COMMUNITY

## 2023-01-01 RX ORDER — DAPTOMYCIN 500 MG/10ML
650 INJECTION, POWDER, LYOPHILIZED, FOR SOLUTION INTRAVENOUS EVERY 24 HOURS
Refills: 0 | Status: DISCONTINUED | OUTPATIENT
Start: 2023-01-01 | End: 2023-01-01

## 2023-01-01 RX ORDER — LINEZOLID 600 MG/300ML
1 INJECTION, SOLUTION INTRAVENOUS
Qty: 20 | Refills: 0
Start: 2023-01-01 | End: 2023-01-01

## 2023-01-01 RX ORDER — MORPHINE SULFATE 50 MG/1
2 CAPSULE, EXTENDED RELEASE ORAL ONCE
Refills: 0 | Status: DISCONTINUED | OUTPATIENT
Start: 2023-01-01 | End: 2023-01-01

## 2023-01-01 RX ORDER — DIPHENHYDRAMINE HCL 50 MG
25 CAPSULE ORAL EVERY 6 HOURS
Refills: 0 | Status: DISCONTINUED | OUTPATIENT
Start: 2023-01-01 | End: 2023-01-01

## 2023-01-01 RX ORDER — SODIUM CHLORIDE 9 MG/ML
2000 INJECTION INTRAMUSCULAR; INTRAVENOUS; SUBCUTANEOUS ONCE
Refills: 0 | Status: COMPLETED | OUTPATIENT
Start: 2023-01-01 | End: 2023-01-01

## 2023-01-01 RX ORDER — SODIUM CHLORIDE 9 MG/ML
1000 INJECTION INTRAMUSCULAR; INTRAVENOUS; SUBCUTANEOUS ONCE
Refills: 0 | Status: COMPLETED | OUTPATIENT
Start: 2023-01-01 | End: 2023-01-01

## 2023-01-01 RX ORDER — LANOLIN ALCOHOL/MO/W.PET/CERES
3 CREAM (GRAM) TOPICAL AT BEDTIME
Refills: 0 | Status: DISCONTINUED | OUTPATIENT
Start: 2023-01-01 | End: 2023-01-01

## 2023-01-01 RX ORDER — ENOXAPARIN SODIUM 100 MG/ML
40 INJECTION SUBCUTANEOUS EVERY 24 HOURS
Refills: 0 | Status: DISCONTINUED | OUTPATIENT
Start: 2023-01-01 | End: 2023-01-01

## 2023-01-01 RX ORDER — QUETIAPINE FUMARATE 200 MG/1
12.5 TABLET, FILM COATED ORAL AT BEDTIME
Refills: 0 | Status: DISCONTINUED | OUTPATIENT
Start: 2023-01-01 | End: 2023-01-01

## 2023-01-01 RX ORDER — APIXABAN 2.5 MG/1
10 TABLET, FILM COATED ORAL EVERY 12 HOURS
Refills: 0 | Status: DISCONTINUED | OUTPATIENT
Start: 2023-01-01 | End: 2023-01-01

## 2023-01-01 RX ORDER — CEFEPIME 1 G/1
1000 INJECTION, POWDER, FOR SOLUTION INTRAMUSCULAR; INTRAVENOUS ONCE
Refills: 0 | Status: DISCONTINUED | OUTPATIENT
Start: 2023-01-01 | End: 2023-01-01

## 2023-01-01 RX ORDER — FUROSEMIDE 40 MG/1
40 TABLET ORAL
Refills: 0 | Status: DISCONTINUED | COMMUNITY
End: 2023-01-01

## 2023-01-01 RX ORDER — SACUBITRIL AND VALSARTAN 24; 26 MG/1; MG/1
1 TABLET, FILM COATED ORAL
Qty: 0 | Refills: 0 | DISCHARGE

## 2023-01-01 RX ORDER — OXYCODONE AND ACETAMINOPHEN 5; 325 MG/1; MG/1
1 TABLET ORAL EVERY 6 HOURS
Refills: 0 | Status: DISCONTINUED | OUTPATIENT
Start: 2023-01-01 | End: 2023-01-01

## 2023-01-01 RX ORDER — FUROSEMIDE 20 MG/1
20 TABLET ORAL
Qty: 30 | Refills: 3 | Status: ACTIVE | COMMUNITY
Start: 2022-01-01 | End: 1900-01-01

## 2023-01-01 RX ORDER — FAMOTIDINE 10 MG/ML
1 INJECTION INTRAVENOUS
Qty: 2 | Refills: 0
Start: 2023-01-01 | End: 2023-01-01

## 2023-01-01 RX ORDER — LINEZOLID 600 MG/300ML
600 INJECTION, SOLUTION INTRAVENOUS ONCE
Refills: 0 | Status: COMPLETED | OUTPATIENT
Start: 2023-01-01 | End: 2023-01-01

## 2023-01-01 RX ORDER — PIPERACILLIN AND TAZOBACTAM 4; .5 G/20ML; G/20ML
3.38 INJECTION, POWDER, LYOPHILIZED, FOR SOLUTION INTRAVENOUS EVERY 8 HOURS
Refills: 0 | Status: DISCONTINUED | OUTPATIENT
Start: 2023-01-01 | End: 2023-01-01

## 2023-01-01 RX ORDER — APIXABAN 2.5 MG/1
1 TABLET, FILM COATED ORAL
Qty: 60 | Refills: 0
Start: 2023-01-01 | End: 2023-05-14

## 2023-01-01 RX ORDER — SPIRONOLACTONE 25 MG/1
1 TABLET, FILM COATED ORAL
Refills: 0 | DISCHARGE

## 2023-01-01 RX ORDER — SPIRONOLACTONE 25 MG/1
25 TABLET ORAL
Qty: 30 | Refills: 3 | Status: ACTIVE | COMMUNITY
Start: 2023-01-01 | End: 1900-01-01

## 2023-01-01 RX ORDER — DIPHENHYDRAMINE HCL 50 MG
1 CAPSULE ORAL
Qty: 3 | Refills: 0
Start: 2023-01-01 | End: 2023-01-01

## 2023-01-01 RX ORDER — HYDROMORPHONE HYDROCHLORIDE 2 MG/ML
1 INJECTION INTRAMUSCULAR; INTRAVENOUS; SUBCUTANEOUS ONCE
Refills: 0 | Status: DISCONTINUED | OUTPATIENT
Start: 2023-01-01 | End: 2023-01-01

## 2023-01-01 RX ORDER — LEVOTHYROXINE SODIUM 125 MCG
1 TABLET ORAL
Qty: 0 | Refills: 0 | DISCHARGE

## 2023-01-01 RX ORDER — LINEZOLID 600 MG/300ML
600 INJECTION, SOLUTION INTRAVENOUS ONCE
Refills: 0 | Status: DISCONTINUED | OUTPATIENT
Start: 2023-01-01 | End: 2023-01-01

## 2023-01-01 RX ORDER — SODIUM CHLORIDE 9 MG/ML
1000 INJECTION, SOLUTION INTRAVENOUS ONCE
Refills: 0 | Status: DISCONTINUED | OUTPATIENT
Start: 2023-01-01 | End: 2023-01-01

## 2023-01-01 RX ORDER — ASPIRIN 81 MG
81 TABLET, DELAYED RELEASE (ENTERIC COATED) ORAL
Refills: 0 | Status: ACTIVE | COMMUNITY

## 2023-01-01 RX ADMIN — Medication 50 MICROGRAM(S): at 05:15

## 2023-01-01 RX ADMIN — HEPARIN SODIUM 5000 UNIT(S): 5000 INJECTION INTRAVENOUS; SUBCUTANEOUS at 06:12

## 2023-01-01 RX ADMIN — HEPARIN SODIUM 5000 UNIT(S): 5000 INJECTION INTRAVENOUS; SUBCUTANEOUS at 21:29

## 2023-01-01 RX ADMIN — Medication 1 MILLIGRAM(S): at 11:34

## 2023-01-01 RX ADMIN — Medication 40 MILLIGRAM(S): at 05:11

## 2023-01-01 RX ADMIN — Medication 1 TABLET(S): at 11:15

## 2023-01-01 RX ADMIN — LINEZOLID 300 MILLIGRAM(S): 600 INJECTION, SOLUTION INTRAVENOUS at 17:06

## 2023-01-01 RX ADMIN — CLOPIDOGREL BISULFATE 75 MILLIGRAM(S): 75 TABLET, FILM COATED ORAL at 11:09

## 2023-01-01 RX ADMIN — DONEPEZIL HYDROCHLORIDE 10 MILLIGRAM(S): 10 TABLET, FILM COATED ORAL at 22:07

## 2023-01-01 RX ADMIN — SODIUM CHLORIDE 2000 MILLILITER(S): 9 INJECTION INTRAMUSCULAR; INTRAVENOUS; SUBCUTANEOUS at 22:09

## 2023-01-01 RX ADMIN — Medication 50 MILLIGRAM(S): at 05:26

## 2023-01-01 RX ADMIN — SODIUM CHLORIDE 100 MILLILITER(S): 9 INJECTION, SOLUTION INTRAVENOUS at 05:53

## 2023-01-01 RX ADMIN — Medication 500 MILLIGRAM(S): at 11:24

## 2023-01-01 RX ADMIN — OXYCODONE HYDROCHLORIDE 5 MILLIGRAM(S): 5 TABLET ORAL at 13:36

## 2023-01-01 RX ADMIN — DONEPEZIL HYDROCHLORIDE 10 MILLIGRAM(S): 10 TABLET, FILM COATED ORAL at 21:54

## 2023-01-01 RX ADMIN — Medication 500 MILLIGRAM(S): at 12:44

## 2023-01-01 RX ADMIN — PANTOPRAZOLE SODIUM 40 MILLIGRAM(S): 20 TABLET, DELAYED RELEASE ORAL at 06:31

## 2023-01-01 RX ADMIN — Medication 325 MILLIGRAM(S): at 11:27

## 2023-01-01 RX ADMIN — Medication 325 MILLIGRAM(S): at 14:32

## 2023-01-01 RX ADMIN — SACUBITRIL AND VALSARTAN 1 TABLET(S): 24; 26 TABLET, FILM COATED ORAL at 17:33

## 2023-01-01 RX ADMIN — Medication 600 MILLIGRAM(S): at 21:42

## 2023-01-01 RX ADMIN — Medication 1 TABLET(S): at 05:15

## 2023-01-01 RX ADMIN — ZINC SULFATE TAB 220 MG (50 MG ZINC EQUIVALENT) 220 MILLIGRAM(S): 220 (50 ZN) TAB at 11:46

## 2023-01-01 RX ADMIN — HEPARIN SODIUM 5000 UNIT(S): 5000 INJECTION INTRAVENOUS; SUBCUTANEOUS at 17:56

## 2023-01-01 RX ADMIN — LINEZOLID 600 MILLIGRAM(S): 600 INJECTION, SOLUTION INTRAVENOUS at 17:29

## 2023-01-01 RX ADMIN — DONEPEZIL HYDROCHLORIDE 10 MILLIGRAM(S): 10 TABLET, FILM COATED ORAL at 21:16

## 2023-01-01 RX ADMIN — SODIUM ZIRCONIUM CYCLOSILICATE 10 GRAM(S): 10 POWDER, FOR SUSPENSION ORAL at 14:34

## 2023-01-01 RX ADMIN — SACUBITRIL AND VALSARTAN 1 TABLET(S): 24; 26 TABLET, FILM COATED ORAL at 06:12

## 2023-01-01 RX ADMIN — HEPARIN SODIUM 5000 UNIT(S): 5000 INJECTION INTRAVENOUS; SUBCUTANEOUS at 05:43

## 2023-01-01 RX ADMIN — Medication 81 MILLIGRAM(S): at 11:33

## 2023-01-01 RX ADMIN — HEPARIN SODIUM 5000 UNIT(S): 5000 INJECTION INTRAVENOUS; SUBCUTANEOUS at 05:56

## 2023-01-01 RX ADMIN — Medication 325 MILLIGRAM(S): at 11:09

## 2023-01-01 RX ADMIN — Medication 50 MILLIGRAM(S): at 06:38

## 2023-01-01 RX ADMIN — DULOXETINE HYDROCHLORIDE 60 MILLIGRAM(S): 30 CAPSULE, DELAYED RELEASE ORAL at 11:33

## 2023-01-01 RX ADMIN — Medication 81 MILLIGRAM(S): at 12:33

## 2023-01-01 RX ADMIN — DULOXETINE HYDROCHLORIDE 60 MILLIGRAM(S): 30 CAPSULE, DELAYED RELEASE ORAL at 12:55

## 2023-01-01 RX ADMIN — HYDROMORPHONE HYDROCHLORIDE 0.5 MILLIGRAM(S): 2 INJECTION INTRAMUSCULAR; INTRAVENOUS; SUBCUTANEOUS at 23:45

## 2023-01-01 RX ADMIN — DULOXETINE HYDROCHLORIDE 60 MILLIGRAM(S): 30 CAPSULE, DELAYED RELEASE ORAL at 12:56

## 2023-01-01 RX ADMIN — ENOXAPARIN SODIUM 40 MILLIGRAM(S): 100 INJECTION SUBCUTANEOUS at 22:54

## 2023-01-01 RX ADMIN — Medication 1 MILLIGRAM(S): at 12:44

## 2023-01-01 RX ADMIN — Medication 100 MILLIGRAM(S): at 19:10

## 2023-01-01 RX ADMIN — Medication 1 TABLET(S): at 14:31

## 2023-01-01 RX ADMIN — Medication 1 TABLET(S): at 05:11

## 2023-01-01 RX ADMIN — DONEPEZIL HYDROCHLORIDE 10 MILLIGRAM(S): 10 TABLET, FILM COATED ORAL at 21:32

## 2023-01-01 RX ADMIN — SACUBITRIL AND VALSARTAN 1 TABLET(S): 24; 26 TABLET, FILM COATED ORAL at 05:15

## 2023-01-01 RX ADMIN — Medication 10 MILLIGRAM(S): at 21:48

## 2023-01-01 RX ADMIN — CLOPIDOGREL BISULFATE 75 MILLIGRAM(S): 75 TABLET, FILM COATED ORAL at 11:24

## 2023-01-01 RX ADMIN — Medication 325 MILLIGRAM(S): at 11:14

## 2023-01-01 RX ADMIN — Medication 1 TABLET(S): at 18:13

## 2023-01-01 RX ADMIN — DONEPEZIL HYDROCHLORIDE 10 MILLIGRAM(S): 10 TABLET, FILM COATED ORAL at 22:34

## 2023-01-01 RX ADMIN — Medication 50 MICROGRAM(S): at 05:55

## 2023-01-01 RX ADMIN — CHLORHEXIDINE GLUCONATE 1 APPLICATION(S): 213 SOLUTION TOPICAL at 06:48

## 2023-01-01 RX ADMIN — Medication 650 MILLIGRAM(S): at 03:03

## 2023-01-01 RX ADMIN — Medication 1 TABLET(S): at 18:18

## 2023-01-01 RX ADMIN — Medication 1 TABLET(S): at 11:07

## 2023-01-01 RX ADMIN — SODIUM CHLORIDE 80 MILLILITER(S): 9 INJECTION, SOLUTION INTRAVENOUS at 16:03

## 2023-01-01 RX ADMIN — Medication 325 MILLIGRAM(S): at 11:46

## 2023-01-01 RX ADMIN — Medication 1 TABLET(S): at 11:10

## 2023-01-01 RX ADMIN — SACUBITRIL AND VALSARTAN 1 TABLET(S): 24; 26 TABLET, FILM COATED ORAL at 17:28

## 2023-01-01 RX ADMIN — Medication 1 MILLIGRAM(S): at 12:21

## 2023-01-01 RX ADMIN — Medication 500 MILLIGRAM(S): at 12:05

## 2023-01-01 RX ADMIN — Medication 81 MILLIGRAM(S): at 12:46

## 2023-01-01 RX ADMIN — Medication 20 MILLIGRAM(S): at 05:14

## 2023-01-01 RX ADMIN — PANTOPRAZOLE SODIUM 40 MILLIGRAM(S): 20 TABLET, DELAYED RELEASE ORAL at 06:22

## 2023-01-01 RX ADMIN — QUETIAPINE FUMARATE 12.5 MILLIGRAM(S): 200 TABLET, FILM COATED ORAL at 21:59

## 2023-01-01 RX ADMIN — Medication 500 MILLIGRAM(S): at 11:34

## 2023-01-01 RX ADMIN — CHLORHEXIDINE GLUCONATE 1 APPLICATION(S): 213 SOLUTION TOPICAL at 06:05

## 2023-01-01 RX ADMIN — Medication 500 MILLIGRAM(S): at 13:36

## 2023-01-01 RX ADMIN — DONEPEZIL HYDROCHLORIDE 10 MILLIGRAM(S): 10 TABLET, FILM COATED ORAL at 21:41

## 2023-01-01 RX ADMIN — PIPERACILLIN AND TAZOBACTAM 25 GRAM(S): 4; .5 INJECTION, POWDER, LYOPHILIZED, FOR SOLUTION INTRAVENOUS at 14:17

## 2023-01-01 RX ADMIN — Medication 10 MILLIGRAM(S): at 21:42

## 2023-01-01 RX ADMIN — Medication 100 MILLIGRAM(S): at 07:54

## 2023-01-01 RX ADMIN — Medication 20 MILLIGRAM(S): at 06:32

## 2023-01-01 RX ADMIN — HEPARIN SODIUM 5000 UNIT(S): 5000 INJECTION INTRAVENOUS; SUBCUTANEOUS at 05:40

## 2023-01-01 RX ADMIN — Medication 40 MILLIGRAM(S): at 06:17

## 2023-01-01 RX ADMIN — SACUBITRIL AND VALSARTAN 1 TABLET(S): 24; 26 TABLET, FILM COATED ORAL at 18:57

## 2023-01-01 RX ADMIN — SACUBITRIL AND VALSARTAN 1 TABLET(S): 24; 26 TABLET, FILM COATED ORAL at 18:32

## 2023-01-01 RX ADMIN — Medication 50 MILLIGRAM(S): at 05:32

## 2023-01-01 RX ADMIN — SACUBITRIL AND VALSARTAN 1 TABLET(S): 24; 26 TABLET, FILM COATED ORAL at 05:44

## 2023-01-01 RX ADMIN — Medication 500 MILLIGRAM(S): at 11:07

## 2023-01-01 RX ADMIN — ZINC SULFATE TAB 220 MG (50 MG ZINC EQUIVALENT) 220 MILLIGRAM(S): 220 (50 ZN) TAB at 12:57

## 2023-01-01 RX ADMIN — DAPTOMYCIN 126 MILLIGRAM(S): 500 INJECTION, POWDER, LYOPHILIZED, FOR SOLUTION INTRAVENOUS at 05:10

## 2023-01-01 RX ADMIN — Medication 40 MILLIGRAM(S): at 05:38

## 2023-01-01 RX ADMIN — Medication 1 TABLET(S): at 17:27

## 2023-01-01 RX ADMIN — Medication 10 MILLIGRAM(S): at 22:14

## 2023-01-01 RX ADMIN — Medication 40 MILLIGRAM(S): at 05:12

## 2023-01-01 RX ADMIN — Medication 10 MILLIGRAM(S): at 22:50

## 2023-01-01 RX ADMIN — Medication 1 TABLET(S): at 17:32

## 2023-01-01 RX ADMIN — Medication 650 MILLIGRAM(S): at 07:00

## 2023-01-01 RX ADMIN — OXYCODONE HYDROCHLORIDE 5 MILLIGRAM(S): 5 TABLET ORAL at 19:04

## 2023-01-01 RX ADMIN — Medication 1 MILLIGRAM(S): at 11:51

## 2023-01-01 RX ADMIN — Medication 325 MILLIGRAM(S): at 13:02

## 2023-01-01 RX ADMIN — Medication 650 MILLIGRAM(S): at 16:12

## 2023-01-01 RX ADMIN — DONEPEZIL HYDROCHLORIDE 10 MILLIGRAM(S): 10 TABLET, FILM COATED ORAL at 22:50

## 2023-01-01 RX ADMIN — Medication 500 MILLIGRAM(S): at 11:45

## 2023-01-01 RX ADMIN — Medication 81 MILLIGRAM(S): at 14:32

## 2023-01-01 RX ADMIN — HEPARIN SODIUM 5000 UNIT(S): 5000 INJECTION INTRAVENOUS; SUBCUTANEOUS at 14:17

## 2023-01-01 RX ADMIN — SACUBITRIL AND VALSARTAN 1 TABLET(S): 24; 26 TABLET, FILM COATED ORAL at 17:32

## 2023-01-01 RX ADMIN — PIPERACILLIN AND TAZOBACTAM 25 GRAM(S): 4; .5 INJECTION, POWDER, LYOPHILIZED, FOR SOLUTION INTRAVENOUS at 06:12

## 2023-01-01 RX ADMIN — HEPARIN SODIUM 5000 UNIT(S): 5000 INJECTION INTRAVENOUS; SUBCUTANEOUS at 06:40

## 2023-01-01 RX ADMIN — CLOPIDOGREL BISULFATE 75 MILLIGRAM(S): 75 TABLET, FILM COATED ORAL at 11:14

## 2023-01-01 RX ADMIN — CHLORHEXIDINE GLUCONATE 1 APPLICATION(S): 213 SOLUTION TOPICAL at 06:00

## 2023-01-01 RX ADMIN — Medication 650 MILLIGRAM(S): at 06:46

## 2023-01-01 RX ADMIN — DAPTOMYCIN 126 MILLIGRAM(S): 500 INJECTION, POWDER, LYOPHILIZED, FOR SOLUTION INTRAVENOUS at 06:39

## 2023-01-01 RX ADMIN — ENOXAPARIN SODIUM 40 MILLIGRAM(S): 100 INJECTION SUBCUTANEOUS at 22:33

## 2023-01-01 RX ADMIN — HYDROMORPHONE HYDROCHLORIDE 1 MILLIGRAM(S): 2 INJECTION INTRAMUSCULAR; INTRAVENOUS; SUBCUTANEOUS at 05:35

## 2023-01-01 RX ADMIN — Medication 50 MILLIGRAM(S): at 06:39

## 2023-01-01 RX ADMIN — ZINC SULFATE TAB 220 MG (50 MG ZINC EQUIVALENT) 220 MILLIGRAM(S): 220 (50 ZN) TAB at 12:21

## 2023-01-01 RX ADMIN — CLOPIDOGREL BISULFATE 75 MILLIGRAM(S): 75 TABLET, FILM COATED ORAL at 11:46

## 2023-01-01 RX ADMIN — Medication 100 MILLIGRAM(S): at 17:34

## 2023-01-01 RX ADMIN — Medication 40 MILLIGRAM(S): at 05:55

## 2023-01-01 RX ADMIN — HEPARIN SODIUM 5000 UNIT(S): 5000 INJECTION INTRAVENOUS; SUBCUTANEOUS at 21:41

## 2023-01-01 RX ADMIN — ZINC SULFATE TAB 220 MG (50 MG ZINC EQUIVALENT) 220 MILLIGRAM(S): 220 (50 ZN) TAB at 13:34

## 2023-01-01 RX ADMIN — Medication 10 MILLIGRAM(S): at 22:07

## 2023-01-01 RX ADMIN — DAPTOMYCIN 126 MILLIGRAM(S): 500 INJECTION, POWDER, LYOPHILIZED, FOR SOLUTION INTRAVENOUS at 18:01

## 2023-01-01 RX ADMIN — Medication 40 MILLIGRAM(S): at 05:15

## 2023-01-01 RX ADMIN — SACUBITRIL AND VALSARTAN 1 TABLET(S): 24; 26 TABLET, FILM COATED ORAL at 06:01

## 2023-01-01 RX ADMIN — APIXABAN 10 MILLIGRAM(S): 2.5 TABLET, FILM COATED ORAL at 05:13

## 2023-01-01 RX ADMIN — SACUBITRIL AND VALSARTAN 1 TABLET(S): 24; 26 TABLET, FILM COATED ORAL at 17:40

## 2023-01-01 RX ADMIN — ZINC SULFATE TAB 220 MG (50 MG ZINC EQUIVALENT) 220 MILLIGRAM(S): 220 (50 ZN) TAB at 11:25

## 2023-01-01 RX ADMIN — SACUBITRIL AND VALSARTAN 1 TABLET(S): 24; 26 TABLET, FILM COATED ORAL at 05:55

## 2023-01-01 RX ADMIN — ENOXAPARIN SODIUM 40 MILLIGRAM(S): 100 INJECTION SUBCUTANEOUS at 22:28

## 2023-01-01 RX ADMIN — Medication 100 MILLIGRAM(S): at 05:32

## 2023-01-01 RX ADMIN — ZINC SULFATE TAB 220 MG (50 MG ZINC EQUIVALENT) 220 MILLIGRAM(S): 220 (50 ZN) TAB at 12:33

## 2023-01-01 RX ADMIN — DONEPEZIL HYDROCHLORIDE 10 MILLIGRAM(S): 10 TABLET, FILM COATED ORAL at 22:15

## 2023-01-01 RX ADMIN — Medication 10 MILLIGRAM(S): at 21:08

## 2023-01-01 RX ADMIN — HYDROMORPHONE HYDROCHLORIDE 0.5 MILLIGRAM(S): 2 INJECTION INTRAMUSCULAR; INTRAVENOUS; SUBCUTANEOUS at 13:34

## 2023-01-01 RX ADMIN — Medication 1 MILLIGRAM(S): at 14:33

## 2023-01-01 RX ADMIN — Medication 50 MICROGRAM(S): at 05:02

## 2023-01-01 RX ADMIN — SODIUM CHLORIDE 2000 MILLILITER(S): 9 INJECTION INTRAMUSCULAR; INTRAVENOUS; SUBCUTANEOUS at 17:54

## 2023-01-01 RX ADMIN — Medication 40 MILLIGRAM(S): at 06:01

## 2023-01-01 RX ADMIN — Medication 100 MILLIGRAM(S): at 06:35

## 2023-01-01 RX ADMIN — Medication 325 MILLIGRAM(S): at 12:46

## 2023-01-01 RX ADMIN — CHLORHEXIDINE GLUCONATE 1 APPLICATION(S): 213 SOLUTION TOPICAL at 05:59

## 2023-01-01 RX ADMIN — ZINC SULFATE TAB 220 MG (50 MG ZINC EQUIVALENT) 220 MILLIGRAM(S): 220 (50 ZN) TAB at 13:03

## 2023-01-01 RX ADMIN — Medication 500 MILLIGRAM(S): at 12:21

## 2023-01-01 RX ADMIN — Medication 1 TABLET(S): at 18:14

## 2023-01-01 RX ADMIN — Medication 1 MILLIGRAM(S): at 11:09

## 2023-01-01 RX ADMIN — Medication 1 MILLIGRAM(S): at 11:25

## 2023-01-01 RX ADMIN — Medication 1 TABLET(S): at 03:04

## 2023-01-01 RX ADMIN — Medication 1 TABLET(S): at 18:16

## 2023-01-01 RX ADMIN — Medication 40 MILLIGRAM(S): at 05:32

## 2023-01-01 RX ADMIN — PANTOPRAZOLE SODIUM 40 MILLIGRAM(S): 20 TABLET, DELAYED RELEASE ORAL at 06:02

## 2023-01-01 RX ADMIN — Medication 81 MILLIGRAM(S): at 11:50

## 2023-01-01 RX ADMIN — Medication 50 MILLIGRAM(S): at 06:12

## 2023-01-01 RX ADMIN — ZINC SULFATE TAB 220 MG (50 MG ZINC EQUIVALENT) 220 MILLIGRAM(S): 220 (50 ZN) TAB at 11:06

## 2023-01-01 RX ADMIN — SACUBITRIL AND VALSARTAN 1 TABLET(S): 24; 26 TABLET, FILM COATED ORAL at 05:32

## 2023-01-01 RX ADMIN — PIPERACILLIN AND TAZOBACTAM 25 GRAM(S): 4; .5 INJECTION, POWDER, LYOPHILIZED, FOR SOLUTION INTRAVENOUS at 21:53

## 2023-01-01 RX ADMIN — ZINC SULFATE TAB 220 MG (50 MG ZINC EQUIVALENT) 220 MILLIGRAM(S): 220 (50 ZN) TAB at 11:13

## 2023-01-01 RX ADMIN — Medication 81 MILLIGRAM(S): at 11:15

## 2023-01-01 RX ADMIN — Medication 50 MICROGRAM(S): at 06:18

## 2023-01-01 RX ADMIN — Medication 100 MILLIGRAM(S): at 03:06

## 2023-01-01 RX ADMIN — Medication 10 MILLIGRAM(S): at 21:07

## 2023-01-01 RX ADMIN — Medication 10 MILLIGRAM(S): at 23:54

## 2023-01-01 RX ADMIN — Medication 40 MILLIGRAM(S): at 05:01

## 2023-01-01 RX ADMIN — LINEZOLID 300 MILLIGRAM(S): 600 INJECTION, SOLUTION INTRAVENOUS at 06:23

## 2023-01-01 RX ADMIN — Medication 1 TABLET(S): at 05:42

## 2023-01-01 RX ADMIN — Medication 1 MILLIGRAM(S): at 12:34

## 2023-01-01 RX ADMIN — Medication 20 MILLIEQUIVALENT(S): at 18:29

## 2023-01-01 RX ADMIN — Medication 325 MILLIGRAM(S): at 11:07

## 2023-01-01 RX ADMIN — Medication 100 MILLIGRAM(S): at 05:12

## 2023-01-01 RX ADMIN — LINEZOLID 300 MILLIGRAM(S): 600 INJECTION, SOLUTION INTRAVENOUS at 17:47

## 2023-01-01 RX ADMIN — SACUBITRIL AND VALSARTAN 1 TABLET(S): 24; 26 TABLET, FILM COATED ORAL at 06:39

## 2023-01-01 RX ADMIN — Medication 100 MILLIGRAM(S): at 05:10

## 2023-01-01 RX ADMIN — PIPERACILLIN AND TAZOBACTAM 25 GRAM(S): 4; .5 INJECTION, POWDER, LYOPHILIZED, FOR SOLUTION INTRAVENOUS at 08:15

## 2023-01-01 RX ADMIN — Medication 50 MILLIGRAM(S): at 05:35

## 2023-01-01 RX ADMIN — Medication 50 MICROGRAM(S): at 06:36

## 2023-01-01 RX ADMIN — SODIUM CHLORIDE 100 MILLILITER(S): 9 INJECTION INTRAMUSCULAR; INTRAVENOUS; SUBCUTANEOUS at 21:56

## 2023-01-01 RX ADMIN — LINEZOLID 300 MILLIGRAM(S): 600 INJECTION, SOLUTION INTRAVENOUS at 06:28

## 2023-01-01 RX ADMIN — Medication 10 MILLIGRAM(S): at 12:44

## 2023-01-01 RX ADMIN — ENOXAPARIN SODIUM 40 MILLIGRAM(S): 100 INJECTION SUBCUTANEOUS at 21:56

## 2023-01-01 RX ADMIN — DONEPEZIL HYDROCHLORIDE 10 MILLIGRAM(S): 10 TABLET, FILM COATED ORAL at 22:35

## 2023-01-01 RX ADMIN — PANTOPRAZOLE SODIUM 40 MILLIGRAM(S): 20 TABLET, DELAYED RELEASE ORAL at 05:39

## 2023-01-01 RX ADMIN — HEPARIN SODIUM 5000 UNIT(S): 5000 INJECTION INTRAVENOUS; SUBCUTANEOUS at 22:38

## 2023-01-01 RX ADMIN — Medication 325 MILLIGRAM(S): at 13:35

## 2023-01-01 RX ADMIN — Medication 50 MILLIGRAM(S): at 17:03

## 2023-01-01 RX ADMIN — Medication 100 MILLIGRAM(S): at 18:14

## 2023-01-01 RX ADMIN — HEPARIN SODIUM 500 UNIT(S)/HR: 5000 INJECTION INTRAVENOUS; SUBCUTANEOUS at 20:31

## 2023-01-01 RX ADMIN — PIPERACILLIN AND TAZOBACTAM 25 GRAM(S): 4; .5 INJECTION, POWDER, LYOPHILIZED, FOR SOLUTION INTRAVENOUS at 13:56

## 2023-01-01 RX ADMIN — Medication 81 MILLIGRAM(S): at 11:27

## 2023-01-01 RX ADMIN — DULOXETINE HYDROCHLORIDE 60 MILLIGRAM(S): 30 CAPSULE, DELAYED RELEASE ORAL at 13:35

## 2023-01-01 RX ADMIN — SACUBITRIL AND VALSARTAN 1 TABLET(S): 24; 26 TABLET, FILM COATED ORAL at 06:35

## 2023-01-01 RX ADMIN — SPIRONOLACTONE 25 MILLIGRAM(S): 25 TABLET, FILM COATED ORAL at 06:12

## 2023-01-01 RX ADMIN — PIPERACILLIN AND TAZOBACTAM 25 GRAM(S): 4; .5 INJECTION, POWDER, LYOPHILIZED, FOR SOLUTION INTRAVENOUS at 23:22

## 2023-01-01 RX ADMIN — HYDROMORPHONE HYDROCHLORIDE 0.5 MILLIGRAM(S): 2 INJECTION INTRAMUSCULAR; INTRAVENOUS; SUBCUTANEOUS at 10:34

## 2023-01-01 RX ADMIN — Medication 100 MILLIGRAM(S): at 05:54

## 2023-01-01 RX ADMIN — PANTOPRAZOLE SODIUM 40 MILLIGRAM(S): 20 TABLET, DELAYED RELEASE ORAL at 06:37

## 2023-01-01 RX ADMIN — OXYCODONE HYDROCHLORIDE 5 MILLIGRAM(S): 5 TABLET ORAL at 16:10

## 2023-01-01 RX ADMIN — Medication 650 MILLIGRAM(S): at 05:00

## 2023-01-01 RX ADMIN — HEPARIN SODIUM 5000 UNIT(S): 5000 INJECTION INTRAVENOUS; SUBCUTANEOUS at 22:08

## 2023-01-01 RX ADMIN — Medication 50 MICROGRAM(S): at 05:25

## 2023-01-01 RX ADMIN — Medication 1 MILLIGRAM(S): at 13:01

## 2023-01-01 RX ADMIN — LINEZOLID 300 MILLIGRAM(S): 600 INJECTION, SOLUTION INTRAVENOUS at 05:27

## 2023-01-01 RX ADMIN — DULOXETINE HYDROCHLORIDE 60 MILLIGRAM(S): 30 CAPSULE, DELAYED RELEASE ORAL at 11:50

## 2023-01-01 RX ADMIN — Medication 50 MICROGRAM(S): at 05:45

## 2023-01-01 RX ADMIN — Medication 1 TABLET(S): at 18:54

## 2023-01-01 RX ADMIN — HEPARIN SODIUM 5000 UNIT(S): 5000 INJECTION INTRAVENOUS; SUBCUTANEOUS at 16:35

## 2023-01-01 RX ADMIN — PANTOPRAZOLE SODIUM 40 MILLIGRAM(S): 20 TABLET, DELAYED RELEASE ORAL at 06:41

## 2023-01-01 RX ADMIN — Medication 81 MILLIGRAM(S): at 13:02

## 2023-01-01 RX ADMIN — Medication 40 MILLIGRAM(S): at 06:29

## 2023-01-01 RX ADMIN — Medication 500 MILLIGRAM(S): at 13:01

## 2023-01-01 RX ADMIN — Medication 10 MILLIGRAM(S): at 21:56

## 2023-01-01 RX ADMIN — ZINC SULFATE TAB 220 MG (50 MG ZINC EQUIVALENT) 220 MILLIGRAM(S): 220 (50 ZN) TAB at 11:28

## 2023-01-01 RX ADMIN — DONEPEZIL HYDROCHLORIDE 10 MILLIGRAM(S): 10 TABLET, FILM COATED ORAL at 21:43

## 2023-01-01 RX ADMIN — Medication 1 TABLET(S): at 05:32

## 2023-01-01 RX ADMIN — HEPARIN SODIUM 5000 UNIT(S): 5000 INJECTION INTRAVENOUS; SUBCUTANEOUS at 06:33

## 2023-01-01 RX ADMIN — Medication 40 MILLIGRAM(S): at 05:54

## 2023-01-01 RX ADMIN — Medication 1 TABLET(S): at 18:57

## 2023-01-01 RX ADMIN — Medication 50 MILLIGRAM(S): at 18:21

## 2023-01-01 RX ADMIN — SACUBITRIL AND VALSARTAN 1 TABLET(S): 24; 26 TABLET, FILM COATED ORAL at 19:27

## 2023-01-01 RX ADMIN — Medication 1 TABLET(S): at 17:33

## 2023-01-01 RX ADMIN — Medication 1 TABLET(S): at 18:30

## 2023-01-01 RX ADMIN — Medication 1 TABLET(S): at 05:12

## 2023-01-01 RX ADMIN — SACUBITRIL AND VALSARTAN 1 TABLET(S): 24; 26 TABLET, FILM COATED ORAL at 18:17

## 2023-01-01 RX ADMIN — HEPARIN SODIUM 5000 UNIT(S): 5000 INJECTION INTRAVENOUS; SUBCUTANEOUS at 06:29

## 2023-01-01 RX ADMIN — Medication 100 MILLIGRAM(S): at 05:16

## 2023-01-01 RX ADMIN — Medication 1 TABLET(S): at 06:00

## 2023-01-01 RX ADMIN — ZINC SULFATE TAB 220 MG (50 MG ZINC EQUIVALENT) 220 MILLIGRAM(S): 220 (50 ZN) TAB at 13:02

## 2023-01-01 RX ADMIN — Medication 325 MILLIGRAM(S): at 11:25

## 2023-01-01 RX ADMIN — Medication 325 MILLIGRAM(S): at 12:56

## 2023-01-01 RX ADMIN — Medication 10 MILLIGRAM(S): at 21:16

## 2023-01-01 RX ADMIN — ZINC SULFATE TAB 220 MG (50 MG ZINC EQUIVALENT) 220 MILLIGRAM(S): 220 (50 ZN) TAB at 14:34

## 2023-01-01 RX ADMIN — Medication 100 MILLIGRAM(S): at 06:47

## 2023-01-01 RX ADMIN — HEPARIN SODIUM 5000 UNIT(S): 5000 INJECTION INTRAVENOUS; SUBCUTANEOUS at 18:16

## 2023-01-01 RX ADMIN — SACUBITRIL AND VALSARTAN 1 TABLET(S): 24; 26 TABLET, FILM COATED ORAL at 17:55

## 2023-01-01 RX ADMIN — Medication 50 MILLIGRAM(S): at 06:01

## 2023-01-01 RX ADMIN — Medication 1 MILLIGRAM(S): at 13:37

## 2023-01-01 RX ADMIN — Medication 1 MILLIGRAM(S): at 12:05

## 2023-01-01 RX ADMIN — DULOXETINE HYDROCHLORIDE 60 MILLIGRAM(S): 30 CAPSULE, DELAYED RELEASE ORAL at 12:46

## 2023-01-01 RX ADMIN — Medication 40 MILLIGRAM(S): at 06:40

## 2023-01-01 RX ADMIN — Medication 20 MILLIEQUIVALENT(S): at 22:37

## 2023-01-01 RX ADMIN — Medication 1 TABLET(S): at 05:37

## 2023-01-01 RX ADMIN — Medication 1 TABLET(S): at 05:54

## 2023-01-01 RX ADMIN — Medication 80 MILLIGRAM(S): at 16:22

## 2023-01-01 RX ADMIN — HEPARIN SODIUM 5000 UNIT(S): 5000 INJECTION INTRAVENOUS; SUBCUTANEOUS at 06:38

## 2023-01-01 RX ADMIN — Medication 50 MILLIGRAM(S): at 05:14

## 2023-01-01 RX ADMIN — CHLORHEXIDINE GLUCONATE 1 APPLICATION(S): 213 SOLUTION TOPICAL at 06:34

## 2023-01-01 RX ADMIN — Medication 50 MICROGRAM(S): at 05:12

## 2023-01-01 RX ADMIN — DULOXETINE HYDROCHLORIDE 60 MILLIGRAM(S): 30 CAPSULE, DELAYED RELEASE ORAL at 12:50

## 2023-01-01 RX ADMIN — Medication 100 MILLIGRAM(S): at 17:32

## 2023-01-01 RX ADMIN — DULOXETINE HYDROCHLORIDE 60 MILLIGRAM(S): 30 CAPSULE, DELAYED RELEASE ORAL at 12:05

## 2023-01-01 RX ADMIN — Medication 50 MILLIGRAM(S): at 19:27

## 2023-01-01 RX ADMIN — Medication 81 MILLIGRAM(S): at 12:05

## 2023-01-01 RX ADMIN — Medication 100 MILLIGRAM(S): at 17:40

## 2023-01-01 RX ADMIN — HYDROMORPHONE HYDROCHLORIDE 0.5 MILLIGRAM(S): 2 INJECTION INTRAMUSCULAR; INTRAVENOUS; SUBCUTANEOUS at 22:57

## 2023-01-01 RX ADMIN — CLOPIDOGREL BISULFATE 75 MILLIGRAM(S): 75 TABLET, FILM COATED ORAL at 11:36

## 2023-01-01 RX ADMIN — SACUBITRIL AND VALSARTAN 1 TABLET(S): 24; 26 TABLET, FILM COATED ORAL at 18:12

## 2023-01-01 RX ADMIN — Medication 1 TABLET(S): at 06:17

## 2023-01-01 RX ADMIN — Medication 100 MILLIGRAM(S): at 17:50

## 2023-01-01 RX ADMIN — Medication 10 MILLIGRAM(S): at 21:59

## 2023-01-01 RX ADMIN — SACUBITRIL AND VALSARTAN 1 TABLET(S): 24; 26 TABLET, FILM COATED ORAL at 06:29

## 2023-01-01 RX ADMIN — SACUBITRIL AND VALSARTAN 1 TABLET(S): 24; 26 TABLET, FILM COATED ORAL at 06:17

## 2023-01-01 RX ADMIN — HYDROMORPHONE HYDROCHLORIDE 0.5 MILLIGRAM(S): 2 INJECTION INTRAMUSCULAR; INTRAVENOUS; SUBCUTANEOUS at 10:10

## 2023-01-01 RX ADMIN — Medication 40 MILLIGRAM(S): at 06:14

## 2023-01-01 RX ADMIN — HEPARIN SODIUM 5000 UNIT(S): 5000 INJECTION INTRAVENOUS; SUBCUTANEOUS at 21:53

## 2023-01-01 RX ADMIN — HYDROMORPHONE HYDROCHLORIDE 0.5 MILLIGRAM(S): 2 INJECTION INTRAMUSCULAR; INTRAVENOUS; SUBCUTANEOUS at 09:10

## 2023-01-01 RX ADMIN — Medication 100 MILLIGRAM(S): at 19:26

## 2023-01-01 RX ADMIN — SACUBITRIL AND VALSARTAN 1 TABLET(S): 24; 26 TABLET, FILM COATED ORAL at 18:16

## 2023-01-01 RX ADMIN — DONEPEZIL HYDROCHLORIDE 10 MILLIGRAM(S): 10 TABLET, FILM COATED ORAL at 21:42

## 2023-01-01 RX ADMIN — Medication 100 MILLIGRAM(S): at 05:39

## 2023-01-01 RX ADMIN — SACUBITRIL AND VALSARTAN 1 TABLET(S): 24; 26 TABLET, FILM COATED ORAL at 05:25

## 2023-01-01 RX ADMIN — SACUBITRIL AND VALSARTAN 1 TABLET(S): 24; 26 TABLET, FILM COATED ORAL at 17:27

## 2023-01-01 RX ADMIN — HYDROMORPHONE HYDROCHLORIDE 1 MILLIGRAM(S): 2 INJECTION INTRAMUSCULAR; INTRAVENOUS; SUBCUTANEOUS at 13:08

## 2023-01-01 RX ADMIN — Medication 50 MICROGRAM(S): at 06:30

## 2023-01-01 RX ADMIN — ZINC SULFATE TAB 220 MG (50 MG ZINC EQUIVALENT) 220 MILLIGRAM(S): 220 (50 ZN) TAB at 13:36

## 2023-01-01 RX ADMIN — Medication 40 MILLIGRAM(S): at 06:37

## 2023-01-01 RX ADMIN — Medication 50 MILLIGRAM(S): at 05:03

## 2023-01-01 RX ADMIN — PANTOPRAZOLE SODIUM 40 MILLIGRAM(S): 20 TABLET, DELAYED RELEASE ORAL at 05:40

## 2023-01-01 RX ADMIN — Medication 10 MILLIGRAM(S): at 21:41

## 2023-01-01 RX ADMIN — SODIUM ZIRCONIUM CYCLOSILICATE 10 GRAM(S): 10 POWDER, FOR SUSPENSION ORAL at 15:24

## 2023-01-01 RX ADMIN — SACUBITRIL AND VALSARTAN 1 TABLET(S): 24; 26 TABLET, FILM COATED ORAL at 18:08

## 2023-01-01 RX ADMIN — DONEPEZIL HYDROCHLORIDE 10 MILLIGRAM(S): 10 TABLET, FILM COATED ORAL at 21:59

## 2023-01-01 RX ADMIN — ZINC SULFATE TAB 220 MG (50 MG ZINC EQUIVALENT) 220 MILLIGRAM(S): 220 (50 ZN) TAB at 11:09

## 2023-01-01 RX ADMIN — SODIUM CHLORIDE 100 MILLILITER(S): 9 INJECTION, SOLUTION INTRAVENOUS at 18:20

## 2023-01-01 RX ADMIN — Medication 500 MILLIGRAM(S): at 11:15

## 2023-01-01 RX ADMIN — HEPARIN SODIUM 5000 UNIT(S): 5000 INJECTION INTRAVENOUS; SUBCUTANEOUS at 14:31

## 2023-01-01 RX ADMIN — PANTOPRAZOLE SODIUM 40 MILLIGRAM(S): 20 TABLET, DELAYED RELEASE ORAL at 06:54

## 2023-01-01 RX ADMIN — Medication 81 MILLIGRAM(S): at 11:07

## 2023-01-01 RX ADMIN — Medication 650 MILLIGRAM(S): at 08:56

## 2023-01-01 RX ADMIN — Medication 50 MICROGRAM(S): at 06:12

## 2023-01-01 RX ADMIN — Medication 1 TABLET(S): at 06:47

## 2023-01-01 RX ADMIN — DULOXETINE HYDROCHLORIDE 60 MILLIGRAM(S): 30 CAPSULE, DELAYED RELEASE ORAL at 12:33

## 2023-01-01 RX ADMIN — HYDROMORPHONE HYDROCHLORIDE 1 MILLIGRAM(S): 2 INJECTION INTRAMUSCULAR; INTRAVENOUS; SUBCUTANEOUS at 12:07

## 2023-01-01 RX ADMIN — Medication 100 MILLIGRAM(S): at 06:37

## 2023-01-01 RX ADMIN — SACUBITRIL AND VALSARTAN 1 TABLET(S): 24; 26 TABLET, FILM COATED ORAL at 18:14

## 2023-01-01 RX ADMIN — Medication 1 MILLIGRAM(S): at 11:07

## 2023-01-01 RX ADMIN — Medication 325 MILLIGRAM(S): at 12:22

## 2023-01-01 RX ADMIN — Medication 50 MICROGRAM(S): at 05:10

## 2023-01-01 RX ADMIN — SACUBITRIL AND VALSARTAN 1 TABLET(S): 24; 26 TABLET, FILM COATED ORAL at 17:00

## 2023-01-01 RX ADMIN — Medication 50 MILLIGRAM(S): at 18:34

## 2023-01-01 RX ADMIN — PIPERACILLIN AND TAZOBACTAM 200 GRAM(S): 4; .5 INJECTION, POWDER, LYOPHILIZED, FOR SOLUTION INTRAVENOUS at 18:00

## 2023-01-01 RX ADMIN — DONEPEZIL HYDROCHLORIDE 10 MILLIGRAM(S): 10 TABLET, FILM COATED ORAL at 21:08

## 2023-01-01 RX ADMIN — DULOXETINE HYDROCHLORIDE 60 MILLIGRAM(S): 30 CAPSULE, DELAYED RELEASE ORAL at 13:37

## 2023-01-01 RX ADMIN — Medication 100 MILLIGRAM(S): at 06:31

## 2023-01-01 RX ADMIN — CLOPIDOGREL BISULFATE 75 MILLIGRAM(S): 75 TABLET, FILM COATED ORAL at 13:37

## 2023-01-01 RX ADMIN — HEPARIN SODIUM 5000 UNIT(S): 5000 INJECTION INTRAVENOUS; SUBCUTANEOUS at 21:06

## 2023-01-01 RX ADMIN — ZINC SULFATE TAB 220 MG (50 MG ZINC EQUIVALENT) 220 MILLIGRAM(S): 220 (50 ZN) TAB at 11:51

## 2023-01-01 RX ADMIN — SACUBITRIL AND VALSARTAN 1 TABLET(S): 24; 26 TABLET, FILM COATED ORAL at 05:11

## 2023-01-01 RX ADMIN — DULOXETINE HYDROCHLORIDE 60 MILLIGRAM(S): 30 CAPSULE, DELAYED RELEASE ORAL at 11:09

## 2023-01-01 RX ADMIN — Medication 81 MILLIGRAM(S): at 11:24

## 2023-01-01 RX ADMIN — HEPARIN SODIUM 5000 UNIT(S): 5000 INJECTION INTRAVENOUS; SUBCUTANEOUS at 13:57

## 2023-01-01 RX ADMIN — HEPARIN SODIUM 5000 UNIT(S): 5000 INJECTION INTRAVENOUS; SUBCUTANEOUS at 21:31

## 2023-01-01 RX ADMIN — Medication 81 MILLIGRAM(S): at 11:09

## 2023-01-01 RX ADMIN — DULOXETINE HYDROCHLORIDE 60 MILLIGRAM(S): 30 CAPSULE, DELAYED RELEASE ORAL at 13:02

## 2023-01-01 RX ADMIN — Medication 40 MILLIGRAM(S): at 05:17

## 2023-01-01 RX ADMIN — Medication 50 MICROGRAM(S): at 05:54

## 2023-01-01 RX ADMIN — ZINC SULFATE TAB 220 MG (50 MG ZINC EQUIVALENT) 220 MILLIGRAM(S): 220 (50 ZN) TAB at 11:10

## 2023-01-01 RX ADMIN — PANTOPRAZOLE SODIUM 40 MILLIGRAM(S): 20 TABLET, DELAYED RELEASE ORAL at 05:16

## 2023-01-01 RX ADMIN — DONEPEZIL HYDROCHLORIDE 10 MILLIGRAM(S): 10 TABLET, FILM COATED ORAL at 22:54

## 2023-01-01 RX ADMIN — PANTOPRAZOLE SODIUM 40 MILLIGRAM(S): 20 TABLET, DELAYED RELEASE ORAL at 05:25

## 2023-01-01 RX ADMIN — Medication 100 MILLIGRAM(S): at 05:02

## 2023-01-01 RX ADMIN — FAMOTIDINE 20 MILLIGRAM(S): 10 INJECTION INTRAVENOUS at 05:13

## 2023-01-01 RX ADMIN — HYDROMORPHONE HYDROCHLORIDE 0.5 MILLIGRAM(S): 2 INJECTION INTRAMUSCULAR; INTRAVENOUS; SUBCUTANEOUS at 10:03

## 2023-01-01 RX ADMIN — Medication 50 MICROGRAM(S): at 05:32

## 2023-01-01 RX ADMIN — HEPARIN SODIUM 5000 UNIT(S): 5000 INJECTION INTRAVENOUS; SUBCUTANEOUS at 05:26

## 2023-01-01 RX ADMIN — Medication 81 MILLIGRAM(S): at 12:21

## 2023-01-01 RX ADMIN — Medication 1 TABLET(S): at 12:45

## 2023-01-01 RX ADMIN — HEPARIN SODIUM 5000 UNIT(S): 5000 INJECTION INTRAVENOUS; SUBCUTANEOUS at 05:12

## 2023-01-01 RX ADMIN — PANTOPRAZOLE SODIUM 40 MILLIGRAM(S): 20 TABLET, DELAYED RELEASE ORAL at 06:06

## 2023-01-01 RX ADMIN — CLOPIDOGREL BISULFATE 75 MILLIGRAM(S): 75 TABLET, FILM COATED ORAL at 13:35

## 2023-01-01 RX ADMIN — Medication 325 MILLIGRAM(S): at 12:55

## 2023-01-01 RX ADMIN — Medication 81 MILLIGRAM(S): at 11:08

## 2023-01-01 RX ADMIN — DONEPEZIL HYDROCHLORIDE 10 MILLIGRAM(S): 10 TABLET, FILM COATED ORAL at 21:10

## 2023-01-01 RX ADMIN — Medication 10 MILLIGRAM(S): at 21:27

## 2023-01-01 RX ADMIN — SODIUM CHLORIDE 100 MILLILITER(S): 9 INJECTION, SOLUTION INTRAVENOUS at 06:00

## 2023-01-01 RX ADMIN — DAPTOMYCIN 126 MILLIGRAM(S): 500 INJECTION, POWDER, LYOPHILIZED, FOR SOLUTION INTRAVENOUS at 06:32

## 2023-01-01 RX ADMIN — Medication 100 MILLIGRAM(S): at 06:13

## 2023-01-01 RX ADMIN — Medication 40 MILLIGRAM(S): at 05:26

## 2023-01-01 RX ADMIN — Medication 100 MILLIGRAM(S): at 18:16

## 2023-01-01 RX ADMIN — ENOXAPARIN SODIUM 40 MILLIGRAM(S): 100 INJECTION SUBCUTANEOUS at 22:16

## 2023-01-01 RX ADMIN — HYDROMORPHONE HYDROCHLORIDE 1 MILLIGRAM(S): 2 INJECTION INTRAMUSCULAR; INTRAVENOUS; SUBCUTANEOUS at 15:20

## 2023-01-01 RX ADMIN — Medication 1 MILLIGRAM(S): at 11:27

## 2023-01-01 RX ADMIN — HEPARIN SODIUM 5000 UNIT(S): 5000 INJECTION INTRAVENOUS; SUBCUTANEOUS at 13:40

## 2023-01-01 RX ADMIN — ENOXAPARIN SODIUM 40 MILLIGRAM(S): 100 INJECTION SUBCUTANEOUS at 22:51

## 2023-01-01 RX ADMIN — Medication 500 MILLIGRAM(S): at 11:09

## 2023-01-01 RX ADMIN — QUETIAPINE FUMARATE 12.5 MILLIGRAM(S): 200 TABLET, FILM COATED ORAL at 21:41

## 2023-01-01 RX ADMIN — Medication 50 MILLIGRAM(S): at 05:13

## 2023-01-01 RX ADMIN — Medication 40 MILLIGRAM(S): at 06:36

## 2023-01-01 RX ADMIN — Medication 40 MILLIGRAM(S): at 06:47

## 2023-01-01 RX ADMIN — ZINC SULFATE TAB 220 MG (50 MG ZINC EQUIVALENT) 220 MILLIGRAM(S): 220 (50 ZN) TAB at 11:07

## 2023-01-01 RX ADMIN — Medication 1 TABLET(S): at 05:55

## 2023-01-01 RX ADMIN — Medication 325 MILLIGRAM(S): at 12:44

## 2023-01-01 RX ADMIN — Medication 325 MILLIGRAM(S): at 13:01

## 2023-01-01 RX ADMIN — HEPARIN SODIUM 5000 UNIT(S): 5000 INJECTION INTRAVENOUS; SUBCUTANEOUS at 23:54

## 2023-01-01 RX ADMIN — HEPARIN SODIUM 5000 UNIT(S): 5000 INJECTION INTRAVENOUS; SUBCUTANEOUS at 13:02

## 2023-01-01 RX ADMIN — Medication 325 MILLIGRAM(S): at 12:05

## 2023-01-01 RX ADMIN — CLOPIDOGREL BISULFATE 75 MILLIGRAM(S): 75 TABLET, FILM COATED ORAL at 11:07

## 2023-01-01 RX ADMIN — Medication 50 MILLIGRAM(S): at 05:55

## 2023-01-01 RX ADMIN — Medication 1 TABLET(S): at 17:00

## 2023-01-01 RX ADMIN — SACUBITRIL AND VALSARTAN 1 TABLET(S): 24; 26 TABLET, FILM COATED ORAL at 18:54

## 2023-01-01 RX ADMIN — DONEPEZIL HYDROCHLORIDE 10 MILLIGRAM(S): 10 TABLET, FILM COATED ORAL at 21:27

## 2023-01-01 RX ADMIN — SODIUM CHLORIDE 100 MILLILITER(S): 9 INJECTION, SOLUTION INTRAVENOUS at 18:02

## 2023-01-01 RX ADMIN — Medication 81 MILLIGRAM(S): at 12:57

## 2023-01-01 RX ADMIN — Medication 500 MILLIGRAM(S): at 11:08

## 2023-01-01 RX ADMIN — SACUBITRIL AND VALSARTAN 1 TABLET(S): 24; 26 TABLET, FILM COATED ORAL at 06:30

## 2023-01-01 RX ADMIN — Medication 50 MILLIGRAM(S): at 05:15

## 2023-01-01 RX ADMIN — Medication 50 MILLIGRAM(S): at 06:15

## 2023-01-01 RX ADMIN — PANTOPRAZOLE SODIUM 40 MILLIGRAM(S): 20 TABLET, DELAYED RELEASE ORAL at 06:14

## 2023-01-01 RX ADMIN — DAPTOMYCIN 126 MILLIGRAM(S): 500 INJECTION, POWDER, LYOPHILIZED, FOR SOLUTION INTRAVENOUS at 05:53

## 2023-01-01 RX ADMIN — Medication 500 MILLIGRAM(S): at 14:31

## 2023-01-01 RX ADMIN — LINEZOLID 300 MILLIGRAM(S): 600 INJECTION, SOLUTION INTRAVENOUS at 03:53

## 2023-01-01 RX ADMIN — SACUBITRIL AND VALSARTAN 1 TABLET(S): 24; 26 TABLET, FILM COATED ORAL at 05:04

## 2023-01-01 RX ADMIN — PANTOPRAZOLE SODIUM 40 MILLIGRAM(S): 20 TABLET, DELAYED RELEASE ORAL at 07:44

## 2023-01-01 RX ADMIN — ZINC SULFATE TAB 220 MG (50 MG ZINC EQUIVALENT) 220 MILLIGRAM(S): 220 (50 ZN) TAB at 12:05

## 2023-01-01 RX ADMIN — SODIUM CHLORIDE 80 MILLILITER(S): 9 INJECTION, SOLUTION INTRAVENOUS at 18:17

## 2023-01-01 RX ADMIN — HEPARIN SODIUM 5000 UNIT(S): 5000 INJECTION INTRAVENOUS; SUBCUTANEOUS at 21:54

## 2023-01-01 RX ADMIN — Medication 50 MILLIGRAM(S): at 06:30

## 2023-01-01 RX ADMIN — DAPTOMYCIN 126 MILLIGRAM(S): 500 INJECTION, POWDER, LYOPHILIZED, FOR SOLUTION INTRAVENOUS at 05:40

## 2023-01-01 RX ADMIN — LINEZOLID 300 MILLIGRAM(S): 600 INJECTION, SOLUTION INTRAVENOUS at 21:32

## 2023-01-01 RX ADMIN — Medication 650 MILLIGRAM(S): at 10:03

## 2023-01-01 RX ADMIN — Medication 1 MILLIGRAM(S): at 12:55

## 2023-01-01 RX ADMIN — Medication 50 MILLIGRAM(S): at 07:04

## 2023-01-01 RX ADMIN — Medication 100 MILLIGRAM(S): at 18:30

## 2023-01-01 RX ADMIN — DONEPEZIL HYDROCHLORIDE 10 MILLIGRAM(S): 10 TABLET, FILM COATED ORAL at 21:56

## 2023-01-01 RX ADMIN — HEPARIN SODIUM 5000 UNIT(S): 5000 INJECTION INTRAVENOUS; SUBCUTANEOUS at 21:43

## 2023-01-01 RX ADMIN — Medication 10 MILLIGRAM(S): at 22:55

## 2023-01-01 RX ADMIN — Medication 50 MICROGRAM(S): at 05:16

## 2023-01-01 RX ADMIN — Medication 50 MICROGRAM(S): at 06:53

## 2023-01-01 RX ADMIN — Medication 100 MILLIGRAM(S): at 18:54

## 2023-01-01 RX ADMIN — Medication 500 MILLIGRAM(S): at 12:54

## 2023-01-01 RX ADMIN — ZINC SULFATE TAB 220 MG (50 MG ZINC EQUIVALENT) 220 MILLIGRAM(S): 220 (50 ZN) TAB at 12:46

## 2023-01-01 RX ADMIN — HEPARIN SODIUM 5000 UNIT(S): 5000 INJECTION INTRAVENOUS; SUBCUTANEOUS at 22:00

## 2023-01-01 RX ADMIN — Medication 500 MILLIGRAM(S): at 12:57

## 2023-01-01 RX ADMIN — Medication 100 MILLIGRAM(S): at 18:12

## 2023-01-01 RX ADMIN — SPIRONOLACTONE 25 MILLIGRAM(S): 25 TABLET, FILM COATED ORAL at 06:16

## 2023-01-01 RX ADMIN — ENOXAPARIN SODIUM 40 MILLIGRAM(S): 100 INJECTION SUBCUTANEOUS at 21:42

## 2023-01-01 RX ADMIN — Medication 1 MILLIGRAM(S): at 11:10

## 2023-01-01 RX ADMIN — PIPERACILLIN AND TAZOBACTAM 25 GRAM(S): 4; .5 INJECTION, POWDER, LYOPHILIZED, FOR SOLUTION INTRAVENOUS at 21:43

## 2023-01-01 RX ADMIN — Medication 1 TABLET(S): at 05:25

## 2023-01-01 RX ADMIN — Medication 1 MILLIGRAM(S): at 11:13

## 2023-01-01 RX ADMIN — Medication 0.1 MILLIGRAM(S): at 21:50

## 2023-01-01 RX ADMIN — Medication 500 MILLIGRAM(S): at 12:34

## 2023-01-01 RX ADMIN — Medication 50 MILLIGRAM(S): at 05:16

## 2023-01-01 RX ADMIN — SACUBITRIL AND VALSARTAN 1 TABLET(S): 24; 26 TABLET, FILM COATED ORAL at 19:00

## 2023-01-01 RX ADMIN — LINEZOLID 300 MILLIGRAM(S): 600 INJECTION, SOLUTION INTRAVENOUS at 19:27

## 2023-01-01 RX ADMIN — HYDROMORPHONE HYDROCHLORIDE 0.5 MILLIGRAM(S): 2 INJECTION INTRAMUSCULAR; INTRAVENOUS; SUBCUTANEOUS at 21:59

## 2023-01-01 RX ADMIN — DULOXETINE HYDROCHLORIDE 60 MILLIGRAM(S): 30 CAPSULE, DELAYED RELEASE ORAL at 11:25

## 2023-01-01 RX ADMIN — Medication 1 MILLIGRAM(S): at 12:46

## 2023-01-01 RX ADMIN — SODIUM CHLORIDE 75 MILLILITER(S): 9 INJECTION, SOLUTION INTRAVENOUS at 16:24

## 2023-01-01 RX ADMIN — Medication 100 MILLIGRAM(S): at 17:28

## 2023-01-01 RX ADMIN — Medication 40 MILLIGRAM(S): at 05:42

## 2023-01-01 RX ADMIN — Medication 10 MILLIGRAM(S): at 14:33

## 2023-01-01 RX ADMIN — Medication 50 MICROGRAM(S): at 05:40

## 2023-01-01 RX ADMIN — Medication 50 MILLIGRAM(S): at 06:35

## 2023-01-01 RX ADMIN — DAPTOMYCIN 126 MILLIGRAM(S): 500 INJECTION, POWDER, LYOPHILIZED, FOR SOLUTION INTRAVENOUS at 18:32

## 2023-01-01 RX ADMIN — DONEPEZIL HYDROCHLORIDE 10 MILLIGRAM(S): 10 TABLET, FILM COATED ORAL at 23:54

## 2023-01-01 RX ADMIN — Medication 100 MILLIGRAM(S): at 06:53

## 2023-01-01 RX ADMIN — CLOPIDOGREL BISULFATE 75 MILLIGRAM(S): 75 TABLET, FILM COATED ORAL at 12:21

## 2023-01-01 RX ADMIN — Medication 1 MILLIGRAM(S): at 11:45

## 2023-01-01 RX ADMIN — Medication 20 MILLIGRAM(S): at 06:12

## 2023-01-01 RX ADMIN — Medication 25 MILLIGRAM(S): at 16:21

## 2023-01-01 RX ADMIN — Medication 50 MICROGRAM(S): at 06:39

## 2023-01-01 RX ADMIN — Medication 100 MILLIGRAM(S): at 05:55

## 2023-01-01 RX ADMIN — Medication 10 MILLIGRAM(S): at 22:34

## 2023-01-01 RX ADMIN — PANTOPRAZOLE SODIUM 40 MILLIGRAM(S): 20 TABLET, DELAYED RELEASE ORAL at 05:56

## 2023-01-01 RX ADMIN — HEPARIN SODIUM 5000 UNIT(S): 5000 INJECTION INTRAVENOUS; SUBCUTANEOUS at 15:48

## 2023-01-01 RX ADMIN — HEPARIN SODIUM 5000 UNIT(S): 5000 INJECTION INTRAVENOUS; SUBCUTANEOUS at 05:45

## 2023-01-01 RX ADMIN — DULOXETINE HYDROCHLORIDE 60 MILLIGRAM(S): 30 CAPSULE, DELAYED RELEASE ORAL at 11:27

## 2023-01-01 RX ADMIN — LINEZOLID 600 MILLIGRAM(S): 600 INJECTION, SOLUTION INTRAVENOUS at 18:31

## 2023-01-01 RX ADMIN — ZINC SULFATE TAB 220 MG (50 MG ZINC EQUIVALENT) 220 MILLIGRAM(S): 220 (50 ZN) TAB at 12:55

## 2023-01-01 RX ADMIN — Medication 50 MICROGRAM(S): at 06:00

## 2023-01-01 RX ADMIN — SACUBITRIL AND VALSARTAN 1 TABLET(S): 24; 26 TABLET, FILM COATED ORAL at 05:10

## 2023-01-01 RX ADMIN — Medication 50 MILLIGRAM(S): at 06:18

## 2023-01-01 RX ADMIN — Medication 81 MILLIGRAM(S): at 11:46

## 2023-01-01 RX ADMIN — SACUBITRIL AND VALSARTAN 1 TABLET(S): 24; 26 TABLET, FILM COATED ORAL at 05:16

## 2023-01-01 RX ADMIN — Medication 100 MILLIGRAM(S): at 17:29

## 2023-01-01 RX ADMIN — PIPERACILLIN AND TAZOBACTAM 25 GRAM(S): 4; .5 INJECTION, POWDER, LYOPHILIZED, FOR SOLUTION INTRAVENOUS at 23:23

## 2023-01-01 RX ADMIN — Medication 50 MICROGRAM(S): at 06:13

## 2023-01-01 RX ADMIN — Medication 100 MILLIGRAM(S): at 05:42

## 2023-01-01 RX ADMIN — Medication 325 MILLIGRAM(S): at 13:37

## 2023-01-01 RX ADMIN — HEPARIN SODIUM 5000 UNIT(S): 5000 INJECTION INTRAVENOUS; SUBCUTANEOUS at 14:16

## 2023-01-01 RX ADMIN — LINEZOLID 600 MILLIGRAM(S): 600 INJECTION, SOLUTION INTRAVENOUS at 05:15

## 2023-01-01 RX ADMIN — Medication 100 MILLIGRAM(S): at 18:57

## 2023-01-01 RX ADMIN — Medication 600 MILLIGRAM(S): at 14:15

## 2023-01-01 RX ADMIN — CHLORHEXIDINE GLUCONATE 1 APPLICATION(S): 213 SOLUTION TOPICAL at 05:56

## 2023-01-01 RX ADMIN — Medication 15 MILLIGRAM(S): at 18:28

## 2023-01-01 RX ADMIN — ZINC SULFATE TAB 220 MG (50 MG ZINC EQUIVALENT) 220 MILLIGRAM(S): 220 (50 ZN) TAB at 12:44

## 2023-01-01 RX ADMIN — Medication 100 MILLIGRAM(S): at 06:01

## 2023-01-01 RX ADMIN — Medication 650 MILLIGRAM(S): at 22:08

## 2023-01-01 RX ADMIN — Medication 325 MILLIGRAM(S): at 11:51

## 2023-01-01 RX ADMIN — Medication 1 MILLIGRAM(S): at 13:35

## 2023-01-01 RX ADMIN — ZINC SULFATE TAB 220 MG (50 MG ZINC EQUIVALENT) 220 MILLIGRAM(S): 220 (50 ZN) TAB at 11:33

## 2023-01-01 RX ADMIN — Medication 500 MILLIGRAM(S): at 13:37

## 2023-01-01 RX ADMIN — Medication 500 MILLIGRAM(S): at 13:02

## 2023-01-01 RX ADMIN — SACUBITRIL AND VALSARTAN 1 TABLET(S): 24; 26 TABLET, FILM COATED ORAL at 17:03

## 2023-01-01 RX ADMIN — CLOPIDOGREL BISULFATE 75 MILLIGRAM(S): 75 TABLET, FILM COATED ORAL at 14:31

## 2023-01-01 RX ADMIN — DONEPEZIL HYDROCHLORIDE 10 MILLIGRAM(S): 10 TABLET, FILM COATED ORAL at 21:49

## 2023-01-01 RX ADMIN — Medication 1 TABLET(S): at 17:40

## 2023-01-01 RX ADMIN — Medication 50 MICROGRAM(S): at 05:14

## 2023-01-01 RX ADMIN — Medication 50 MICROGRAM(S): at 05:38

## 2023-01-01 RX ADMIN — Medication 1 TABLET(S): at 17:29

## 2023-01-01 RX ADMIN — Medication 500 MILLIGRAM(S): at 11:27

## 2023-01-01 RX ADMIN — PIPERACILLIN AND TAZOBACTAM 25 GRAM(S): 4; .5 INJECTION, POWDER, LYOPHILIZED, FOR SOLUTION INTRAVENOUS at 14:04

## 2023-01-01 RX ADMIN — CHLORHEXIDINE GLUCONATE 1 APPLICATION(S): 213 SOLUTION TOPICAL at 06:36

## 2023-01-01 RX ADMIN — Medication 50 MILLIGRAM(S): at 06:06

## 2023-01-01 RX ADMIN — PANTOPRAZOLE SODIUM 40 MILLIGRAM(S): 20 TABLET, DELAYED RELEASE ORAL at 05:12

## 2023-01-01 RX ADMIN — Medication 500 MILLIGRAM(S): at 11:50

## 2023-01-01 RX ADMIN — Medication 500 MILLIGRAM(S): at 12:45

## 2023-01-01 RX ADMIN — Medication 100 MILLIGRAM(S): at 05:41

## 2023-01-01 RX ADMIN — HYDROMORPHONE HYDROCHLORIDE 1 MILLIGRAM(S): 2 INJECTION INTRAMUSCULAR; INTRAVENOUS; SUBCUTANEOUS at 17:07

## 2023-01-01 RX ADMIN — Medication 100 MILLIGRAM(S): at 17:00

## 2023-01-01 RX ADMIN — PIPERACILLIN AND TAZOBACTAM 25 GRAM(S): 4; .5 INJECTION, POWDER, LYOPHILIZED, FOR SOLUTION INTRAVENOUS at 05:39

## 2023-01-01 RX ADMIN — Medication 325 MILLIGRAM(S): at 12:34

## 2023-01-01 RX ADMIN — Medication 1 TABLET(S): at 18:08

## 2023-01-01 RX ADMIN — Medication 81 MILLIGRAM(S): at 12:54

## 2023-01-01 RX ADMIN — Medication 100 MILLIGRAM(S): at 05:25

## 2023-01-01 RX ADMIN — PIPERACILLIN AND TAZOBACTAM 25 GRAM(S): 4; .5 INJECTION, POWDER, LYOPHILIZED, FOR SOLUTION INTRAVENOUS at 16:35

## 2023-01-01 RX ADMIN — CHLORHEXIDINE GLUCONATE 1 APPLICATION(S): 213 SOLUTION TOPICAL at 06:59

## 2023-01-01 RX ADMIN — Medication 1 MILLIGRAM(S): at 13:02

## 2023-01-01 RX ADMIN — Medication 1 TABLET(S): at 06:36

## 2023-01-01 RX ADMIN — Medication 1 MILLIGRAM(S): at 12:57

## 2023-01-01 RX ADMIN — CHLORHEXIDINE GLUCONATE 1 APPLICATION(S): 213 SOLUTION TOPICAL at 05:46

## 2023-01-01 RX ADMIN — PANTOPRAZOLE SODIUM 40 MILLIGRAM(S): 20 TABLET, DELAYED RELEASE ORAL at 07:54

## 2023-01-01 RX ADMIN — ENOXAPARIN SODIUM 40 MILLIGRAM(S): 100 INJECTION SUBCUTANEOUS at 22:07

## 2023-01-01 RX ADMIN — Medication 50 MILLIGRAM(S): at 05:43

## 2023-01-01 RX ADMIN — Medication 10 MILLIGRAM(S): at 22:08

## 2023-01-01 RX ADMIN — Medication 1 TABLET(S): at 05:02

## 2023-01-01 RX ADMIN — Medication 325 MILLIGRAM(S): at 11:33

## 2023-01-01 RX ADMIN — Medication 81 MILLIGRAM(S): at 13:37

## 2023-01-01 RX ADMIN — Medication 81 MILLIGRAM(S): at 13:36

## 2023-01-01 RX ADMIN — LINEZOLID 300 MILLIGRAM(S): 600 INJECTION, SOLUTION INTRAVENOUS at 05:40

## 2023-01-01 RX ADMIN — SACUBITRIL AND VALSARTAN 1 TABLET(S): 24; 26 TABLET, FILM COATED ORAL at 05:38

## 2023-01-01 RX ADMIN — CLOPIDOGREL BISULFATE 75 MILLIGRAM(S): 75 TABLET, FILM COATED ORAL at 12:54

## 2023-01-01 RX ADMIN — HEPARIN SODIUM 5000 UNIT(S): 5000 INJECTION INTRAVENOUS; SUBCUTANEOUS at 21:07

## 2023-01-01 RX ADMIN — Medication 100 MILLIGRAM(S): at 18:13

## 2023-01-01 RX ADMIN — DULOXETINE HYDROCHLORIDE 60 MILLIGRAM(S): 30 CAPSULE, DELAYED RELEASE ORAL at 12:21

## 2023-01-01 RX ADMIN — PANTOPRAZOLE SODIUM 40 MILLIGRAM(S): 20 TABLET, DELAYED RELEASE ORAL at 06:12

## 2023-01-02 NOTE — ED PROVIDER NOTE - CLINICAL SUMMARY MEDICAL DECISION MAKING FREE TEXT BOX
Pt here with failure to thrive, L 2nd toe infection, R foot cool to touch. Has multiple comorbidities that contribute to delayed perfusion and healing of extremities, furthermore has not been eating at home. Pt says R foot is always cold but is not a good historian. Labs grossly wnl other than mild dehydration s/p IVF. No e/o nec fasc or osteomyelitis on xray. Received abx. Arterial and venous duplex reviewed by me, no obvious DVT or arterial insufficiency on my read. Requires admission for IVF and IV abx as pt refusing to eat at home, not caring for wounds, poor compliance and is at risk for deterioration/bacteremia if cellulitis not tx'ed given multiple comorbidities which put her at risk for delayed healing. Daughter does not feel comfortable taking pt home as she continues to deteriorate slowly at home.

## 2023-01-02 NOTE — ED PROVIDER NOTE - CONSIDERATION OF ADMISSION OBSERVATION
pt with failure to thrive, not eating or taking meds at home, picking at her wounds which worsens infection. Daughter is unable to have pt cared for fully despite 24hr HHA as pt refusing to eat. Consideration of Admission/Observation

## 2023-01-02 NOTE — ED PROVIDER NOTE - OBJECTIVE STATEMENT
Presents82-year-old female, past medical history of dementia, hypertension, hyperlipidemia, peripheral artery disease, presenting for failure to thrive.  Daughter states patient is eating and drinking less, patient has chronic wounds to lower feet has been picking at them more worried that they may be getting more infected.

## 2023-01-02 NOTE — H&P ADULT - NSHPLABSRESULTS_GEN_ALL_CORE
10.3   5.36  )-----------( 139      ( 02 Jan 2023 16:40 )             33.7     01-02    134<L>  |  95<L>  |  26<H>  ----------------------------<  101<H>  3.8   |  30  |  0.9    Ca    8.8      02 Jan 2023 16:40    TPro  7.9  /  Alb  2.9<L>  /  TBili  0.6  /  DBili  x   /  AST  31  /  ALT  11  /  AlkPhos  106  01-02      LIVER FUNCTIONS - ( 02 Jan 2023 16:40 )  Alb: 2.9 g/dL / Pro: 7.9 g/dL / ALK PHOS: 106 U/L / ALT: 11 U/L / AST: 31 U/L / GGT: x             PT/INR - ( 02 Jan 2023 16:40 )   PT: 13.60 sec;   INR: 1.19 ratio         PTT - ( 02 Jan 2023 16:40 )  PTT:37.7 sec

## 2023-01-02 NOTE — H&P ADULT - HISTORY OF PRESENT ILLNESS
82-year-old female, past medical history of Dementia, Hypertension, Hyperlipidemia, Peripheral artery disease: chronic lower extremity leg pains, CHF:  presenting to ER for failure to thrive.  Daughter reports poor PO intake for the past 2 days with worsening of baseline dementia:  " acting up today"  -  denies fever or chills.  - Admitted with Dx of CHF about 1.5 weeks ago and discharged home  - baseline cachectic state with scabs over her body which she continually scratches.    Cardiologist: Dr Childs

## 2023-01-02 NOTE — H&P ADULT - NSHPSOCIALHISTORY_GEN_ALL_CORE
Tobacco use: x 15 yrs, stopped 30 yrs ago  EtOH use: No  Illicit drug use: No  Ambulates: Walker or Wheelchair

## 2023-01-02 NOTE — ED PROVIDER NOTE - ATTENDING APP SHARED VISIT CONTRIBUTION OF CARE
83 yo F with hx of CHF, HTN, PAD on plavix, hypothyroidism, GERD, gout, dementia, depression, anemia, osteoporosis, overactive bladder, cirrhosis, variceal bleed s/p banding, DVT, RA who presents from home for failure to thrive and L 2nd toe infection. Daughter says pt lives at home with 24hr HHA but is mostly bed bound 90% of the time. Pt called daughter yesterday saying that she did not feel well and pt has not been eating. Has chronic R foot cool to touch and now with L 2nd toe infection. No fever. Has 1st appt with vascular next wk and follows with podiatry. Daughter is unable to convince pt to take po intake and concerned that pt is not caring for self. Says she keeps picking at her wounds and has been in/out of the hospital so unable to see dentist for a recurrent tooth infection.    PMD Dr. Leon    CONSTITUTIONAL: well developed, nontoxic appearing, in no acute distress, speaking in full sentences  SKIN: warm, dry, erythema/increased warmth to touch with open lesion to L 2nd toe  HEENT: normocephalic, atraumatic, no conjunctival erythema, moist mucous membranes, patent airway  NECK: supple  CV:  regular rate, regular rhythm, decreased DP pulses bilaterally  RESP: no wheezes, no rales, no rhonchi, normal work of breathing  ABD: soft, no tenderness, nondistended, no rebound, no guarding  MSK: normal ROM, no cyanosis, L foot erythematous/increased warmth to touch, R foot cool to touch  NEURO: alert, grossly unremarkable  PSYCH: cooperative, appropriate    Pt here with failure to thrive, L 2nd toe infection, R foot cool to touch. Has multiple comorbidities that contribute to delayed perfusion and healing of extremities, furthermore has not been eating at home. Pt says R foot is always cold but is not a good historian. Will check labs, duplex, xray r/o cellulitis, osteomyelitis, nec fasc, arterial occlusion, DVT. Anticipate abx and admission.

## 2023-01-02 NOTE — H&P ADULT - NSHPPHYSICALEXAM_GEN_ALL_CORE
Vital Signs Last 24 Hrs    HR: 61 (01-02-23 @ 14:54) (61 - 61)  BP: 149/65 (01-02-23 @ 14:54)  RR: 18 (01-02-23 @ 14:54) (18 - 18)  SpO2: 92% (01-02-23 @ 14:54) (92% - 92%)    PHYSICAL EXAM:      Constitutional: NAD, Awake,  ** responds to verbal questions    Eyes: PERRLA    Respiratory: +air entry, no rales, no rhonchi, no wheezes    Cardiovascular: +S1 and S2, regular rate and rhythm    Gastrointestinal: +BS, soft, non-tender, not distended    Extremities:  ** Cachectic overall,   ** Ulceration 2nd left toe, no edema, no calf tenderness    Vascular:  radial pulses, no extremity cyanosis    Neurological: sensation intact, ROM equal B/L, CN II-XII intact    Skin: ** Multiple skin abrasions,  *Small area redness coccyx region, ** Ecchymosis left side of mouth

## 2023-01-02 NOTE — H&P ADULT - NSICDXPASTMEDICALHX_GEN_ALL_CORE_FT
PAST MEDICAL HISTORY:  Bilateral cataracts     Dementia     Depression with anxiety     High cholesterol     History of cirrhosis of liver     Hypertension     Hypothyroid     Incontinence     Melanoma of skin     Osteopenia     Peripheral arterial disease (s/p B/L leg stents & on Plavix)    Poor circulation PAD    RA (rheumatoid arthritis)     Rheumatoid arthritis

## 2023-01-02 NOTE — H&P ADULT - PROBLEM SELECTOR PLAN 2
Podiatry consult to evaluate left  2nd toe ulcer Podiatry consult to evaluate left  2nd toe ulcer  follow X ray left foot Podiatry consult to evaluate left  2nd toe ulcer  follow X ray left foot  Does not appear infected

## 2023-01-02 NOTE — H&P ADULT - ASSESSMENT
81 y/o female admitted with failure to thrive, poor PO intake, and ulceration left 2nd toe                              83 y/o female admitted with failure to thrive, poor PO intake, and ulceration left 2nd toe    case discussed with Dr Vernon

## 2023-01-02 NOTE — ED PROVIDER NOTE - DIFFERENTIAL DIAGNOSIS
Differential Diagnosis cellulitis, osteomyelitis, nec fasc, arterial occlusion, DVT, failure to thrive, electrolyte derangement, dehydration

## 2023-01-02 NOTE — ED PROVIDER NOTE - PROGRESS NOTE DETAILS
TC: Daughter Ela Borrego [phone] TC: labs at baseline. Will start IVF and abx. Will sign out to day to team to f/u imaging and then admit. TC: Labs at baseline. Arterial and venous duplex unremarkable on my read.

## 2023-01-02 NOTE — H&P ADULT - PROBLEM SELECTOR PLAN 1
dietary consult dietary consult  Puree diet  Liquid supplement dietary consult  Puree diet  Liquid supplement  add Vit C and Zinc for wound healing

## 2023-01-02 NOTE — H&P ADULT - PROBLEM SELECTOR PLAN 4
continue Entresto, Metoprolol, Plavix, ASA, also on Lasix continue Entresto, Metoprolol, Hold Lasix due to poor PO intake   follow BP

## 2023-01-02 NOTE — ED PROVIDER NOTE - PHYSICAL EXAMINATION
Physical Exam    Vital Signs: I have reviewed the initial vital signs.  Constitutional: appears stated age, no acute distress  Eyes: Conjunctiva pink, Sclera clear,   Cardiovascular: S1 and S2, regular rate, regular rhythm, well-perfused extremities, radial pulses equal and 2+  Respiratory: unlabored respiratory effort, clear to auscultation bilaterally no wheezing, rales and rhonchi  Gastrointestinal: soft, non-tender abdomen, no pulsatile mass, normal bowl sounds  Musculoskeletal: Cool peripheral extremities nonhealing wound to left second digit left foot.  Integumentary: warm, dry, no rash  Neurologic: awake, alert,nvi

## 2023-01-02 NOTE — ED PROVIDER NOTE - CARE PLAN
1 Principal Discharge DX:	Adult failure to thrive  Secondary Diagnosis:	Peripheral vascular disease  Secondary Diagnosis:	Cellulitis

## 2023-01-03 NOTE — PHYSICAL THERAPY INITIAL EVALUATION ADULT - GENERAL OBSERVATIONS, REHAB EVAL
15;20-15;45 pt was seen for PT IE at bed side, pt is agreeable, chart thoroughly reviewed RN Tiffanie is aware. Pt received and left semi haynes in bed, in no apparent distress, +hep lock, +call bell within reach, bed side table at reach,.  Pt is cahectic, ulcer @

## 2023-01-03 NOTE — PATIENT PROFILE ADULT - HAS THE PATIENT RECEIVED THE INFLUENZA VACCINE THIS SEASON?
24 weeks [facility] after talking to the OB nurse who told her to come to the ED.  ED triage nurse called and sent her up with abdominal pain and 24 weeks pregnant.  Obtained a UA.  Placed pt on EFM.  FHT's are 140.  Normal baseline.  Pt reports that her pain had been sharp intermittent pain that started at her belly button and went down to her perineum and made her tummy get tight straight across.  Denies VB or LOF.  +FM and this RN heard it.  Pt denies pain right now.  Will monitor and send the UA.  Dr Tavarez will be notified. .     yes...

## 2023-01-03 NOTE — SWALLOW BEDSIDE ASSESSMENT ADULT - COMMENTS
Per pt and daughter at bedside, pt lives w/ 24 hour HHA at home and consumes diet of regular solids and thin liquids. They report PO intake has declined over past ~month; daughter reports pt coughing sometimes with meals but "not frequently"

## 2023-01-03 NOTE — PHYSICAL THERAPY INITIAL EVALUATION ADULT - ADDITIONAL COMMENTS
pt lives in a private home with a walk in entrance and one level inside, amb with RW inside the house, has HHA 24/7

## 2023-01-03 NOTE — CONSULT NOTE ADULT - SUBJECTIVE AND OBJECTIVE BOX
HPI: 82-year-old female, past medical history of Dementia, Hypertension, Hyperlipidemia, Peripheral artery disease: chronic lower extremity leg pains, CHF:  presenting to ER for failure to thrive.  Daughter reports poor PO intake for the past 2 days with worsening of baseline dementia:  " acting up today"  -  denies fever or chills.  - Admitted with Dx of CHF about 1.5 weeks ago and discharged home  - baseline cachectic state with scabs over her body which she continually scratches. as  per  ptn  has  24/7  home care     PTN  REFERRED TO ACUTE  REHAB  FOR  EVAL AND  TX   PAST MEDICAL & SURGICAL HISTORY:  Hypothyroid      Hypertension      RA (rheumatoid arthritis)      High cholesterol      Poor circulation  PAD      Incontinence      Dementia      Rheumatoid arthritis      Osteopenia      Melanoma of skin      Depression with anxiety      Bilateral cataracts      Peripheral arterial disease  (s/p B/L leg stents &amp; on Plavix)      History of cirrhosis of liver      History of chronic CHF      History of hip surgery  Left ORIF and Left ORIF femur      H/O: hysterectomy          Hospital Course:    TODAY'S SUBJECTIVE & REVIEW OF SYMPTOMS:     Constitutional WNL   Cardio WNL   Resp WNL   GI WNL  Heme WNL  Endo WNL  Skin WNL  MSK WNL  Neuro WNL  Cognitive WNL  Psych WNL      MEDICATIONS  (STANDING):  allopurinol 100 milliGRAM(s) Oral two times a day  ascorbic acid 500 milliGRAM(s) Oral daily  aspirin enteric coated 81 milliGRAM(s) Oral daily  calcium carbonate 1250 mG  + Vitamin D (OsCal 500 + D) 1 Tablet(s) Oral two times a day  chlorhexidine 2% Cloths 1 Application(s) Topical <User Schedule>  clopidogrel Tablet 75 milliGRAM(s) Oral daily  donepezil 10 milliGRAM(s) Oral at bedtime  DULoxetine 60 milliGRAM(s) Oral daily  enoxaparin Injectable 40 milliGRAM(s) SubCutaneous every 24 hours  ferrous    sulfate 325 milliGRAM(s) Oral daily  folic acid 1 milliGRAM(s) Oral daily  furosemide    Tablet 40 milliGRAM(s) Oral daily  lactated ringers Bolus 1000 milliLiter(s) IV Bolus once  levothyroxine 50 MICROGram(s) Oral daily  metoprolol succinate ER 50 milliGRAM(s) Oral daily  oxybutynin 10 milliGRAM(s) Oral at bedtime  pantoprazole    Tablet 40 milliGRAM(s) Oral before breakfast  sacubitril 24 mG/valsartan 26 mG 1 Tablet(s) Oral two times a day  zinc sulfate 220 milliGRAM(s) Oral daily    MEDICATIONS  (PRN):  acetaminophen     Tablet .. 650 milliGRAM(s) Oral every 6 hours PRN Temp greater or equal to 38C (100.4F), Mild Pain (1 - 3)  aluminum hydroxide/magnesium hydroxide/simethicone Suspension 30 milliLiter(s) Oral every 4 hours PRN Dyspepsia  melatonin 3 milliGRAM(s) Oral at bedtime PRN Insomnia  ondansetron Injectable 4 milliGRAM(s) IV Push every 8 hours PRN Nausea and/or Vomiting      FAMILY HISTORY:      Allergies    Keflex (Unknown)  losartan (Unknown)  morphine (Unknown)  Rocephin (Unknown)  sulfamethoxazole (Hives)    Intolerances        SOCIAL HISTORY:    [  ] Etoh  [  ] Smoking  [  ] Substance abuse     Home Environment:  [  ] Home Alone  [ x ] Lives with Family  [  ] Home Health Aid    Dwelling:  [  ] Apartment  [  x] Private House  [  ] Adult Home  [  ] Skilled Nursing Facility      [  ] Short Term  [  ] Long Term  [x  ] Stairs       Elevator [  ]    FUNCTIONAL STATUS PTA: (Check all that apply)  Ambulation: [ x  ]Independent    [  ] Dependent     [  ] Non-Ambulatory  Assistive Device: [  ] SA Cane  [  ]  Q Cane  [  x] Walker  [ x ]  Wheelchair  ADL : [ x ] Independent  [ x ]  Dependent       Vital Signs Last 24 Hrs  T(C): 36 (03 Jan 2023 05:37), Max: 36 (03 Jan 2023 05:37)  T(F): 96.8 (03 Jan 2023 05:37), Max: 96.8 (03 Jan 2023 05:37)  HR: 66 (03 Jan 2023 05:37) (61 - 66)  BP: 126/58 (03 Jan 2023 05:37) (126/58 - 149/65)  BP(mean): --  RR: 18 (03 Jan 2023 05:37) (18 - 18)  SpO2: 92% (02 Jan 2023 14:54) (92% - 92%)    Parameters below as of 02 Jan 2023 14:54  Patient On (Oxygen Delivery Method): room air          PHYSICAL EXAM: Alert & Oriented X3  GENERAL: NAD, well-groomed, well-developed  HEAD:  Atraumatic, Normocephalic  EYES: EOMI, PERRLA, conjunctiva and sclera clear  NECK: Supple, No JVD, Normal thyroid  CHEST/LUNG: Clear to percussion bilaterally; No rales, rhonchi, wheezing, or rubs  HEART: Regular rate and rhythm; No murmurs, rubs, or gallops  ABDOMEN: Soft, Nontender, Nondistended; Bowel sounds present  EXTREMITIES:  2+ Peripheral Pulses, No clubbing, cyanosis, or edema    NERVOUS SYSTEM:  Cranial Nerves 2-12 intact [ x ] Abnormal  [  ]  ROM: WFL all extremities [ p ]  Abnormal [  ]  Motor Strength: WFL all extremities  [  ]  Abnormal [3-4/5  ]  Sensation: intact to light touch [ x ] Abnormal [  ]  Reflexes: Symmetric [  ]  Abnormal [x  ]    FUNCTIONAL STATUS:  Bed Mobility: Independent [  ]  Supervision [  ]  Needs Assistance [  ]  N/A [x  ]  Transfers: Independent [  ]  Supervision [  ]  Needs Assistance [  ]  N/A [ x ]   Ambulation: Independent [  ]  Supervision [  ]  Needs Assistance [  ]  N/A [  x]  ADL: Independent [  ] Requires Assistance [  ] N/A [ x ]  SEE PT/OT IE NOTES    LABS:                        9.5    3.43  )-----------( 148      ( 03 Jan 2023 05:56 )             31.4     01-03    136  |  97<L>  |  25<H>  ----------------------------<  93  3.6   |  28  |  0.9    Ca    9.1      03 Jan 2023 05:56  Mg     1.8     01-03    TPro  7.2  /  Alb  2.5<L>  /  TBili  0.5  /  DBili  x   /  AST  20  /  ALT  9   /  AlkPhos  99  01-03    PT/INR - ( 02 Jan 2023 16:40 )   PT: 13.60 sec;   INR: 1.19 ratio         PTT - ( 02 Jan 2023 16:40 )  PTT:37.7 sec      RADIOLOGY & ADDITIONAL STUDIES:    Assesment:

## 2023-01-03 NOTE — PATIENT PROFILE ADULT - FALL HARM RISK - HARM RISK INTERVENTIONS

## 2023-01-03 NOTE — CONSULT NOTE ADULT - SUBJECTIVE AND OBJECTIVE BOX
Podiatry Consult Note    Subjective:  ARIEL INIGUEZ  Seen Bedside 82y Female  .   Patient is a 82y old  Female who presents with a chief complaint of Weakness with poor PO intake (03 Jan 2023 09:05)    HPI:               82-year-old female, past medical history of Dementia, Hypertension, Hyperlipidemia, Peripheral artery disease: chronic lower extremity leg pains, CHF:  presenting to ER for failure to thrive.  Daughter reports poor PO intake for the past 2 days with worsening of baseline dementia:  " acting up today"  -  denies fever or chills.  - Admitted with Dx of CHF about 1.5 weeks ago and discharged home  - baseline cachectic state with scabs over her body which she continually scratches.    Cardiologist: Dr Childs (02 Jan 2023 18:36)    Previously had 2nd toe arthroplasty performed at right foot for ulceration in [facility], [facility] w/o incident  Patient is unsure of how long her current ulcer at left second toe has been present. She complains of moderate pain with light touch to ulcer today. She has not received any treatments for the toe at this time.       Past Medical History and Surgical History  PAST MEDICAL & SURGICAL HISTORY:  Hypothyroid      Hypertension      RA (rheumatoid arthritis)      High cholesterol      Poor circulation  PAD      Incontinence      Dementia      Rheumatoid arthritis      Osteopenia      Melanoma of skin      Depression with anxiety      Bilateral cataracts      Peripheral arterial disease  (s/p B/L leg stents &amp; on Plavix)      History of cirrhosis of liver      History of chronic CHF      History of hip surgery  Left ORIF and Left ORIF femur      H/O: hysterectomy           Review of Systems:  [X] Ten point review of systems is otherwise negative except as noted     Objective:  Vital Signs Last 24 Hrs  T(C): 36 (03 Jan 2023 05:37), Max: 36 (03 Jan 2023 05:37)  T(F): 96.8 (03 Jan 2023 05:37), Max: 96.8 (03 Jan 2023 05:37)  HR: 66 (03 Jan 2023 05:37) (61 - 66)  BP: 126/58 (03 Jan 2023 05:37) (126/58 - 149/65)  BP(mean): --  RR: 18 (03 Jan 2023 05:37) (18 - 18)  SpO2: 92% (02 Jan 2023 14:54) (92% - 92%)    Parameters below as of 02 Jan 2023 14:54  Patient On (Oxygen Delivery Method): room air                            9.5    3.43  )-----------( 148      ( 03 Jan 2023 05:56 )             31.4                 01-03    136  |  97<L>  |  25<H>  ----------------------------<  93  3.6   |  28  |  0.9    Ca    9.1      03 Jan 2023 05:56  Mg     1.8     01-03    TPro  7.2  /  Alb  2.5<L>  /  TBili  0.5  /  DBili  x   /  AST  20  /  ALT  9   /  AlkPhos  99  01-03      Physical Exam - Left Lower Extremity Focused:   Derm: Dorsal left second toe ulcer at IPJ with overlying hyperkeratotic tissue, no drainage, no malodor, erythema noted to entire second toe  Vascular: DP and PT Pulses Diminished; Foot is Warm to cool to touch; slight mottled skin changes noted left foot    Neuro: Gross and light touch sensation intact  MSK: Pain On Palpation at Wound Site, hammertoe deformity of all left foot lesser digits    Assessment:  Left foot second digit with signs of infection  Cellulitic left second toe  Hammertoes digits 2-5, left foot    Plan:  Chart reviewed and Patient evaluated. All Questions and Concerns Addressed and Answered  XR Imaging Left foot reviewed, no evidence of osteomyelitis  Recommend: MRI left foot to r/o OM, recommend ESR/CRP  Lower extremity arterial duplex; results pending  Local Wound Care; Wound Flushed w/ NS; Wound Packed w/ Betadine Soaked Gauze / DSD / Kerlix   Cont w/LWC as stated above q24h  Weight Bearing Status; WBAT w/ Heel Touch w/ Surgical Shoe;   Request ID Consult;   Possible Surgical Debridement; MRI, ESR/CRP  If MRI is positive for OM, will plan for surgical intervention: amputation vs arthroplasty of left second toe  Discussed Plan w/ Dr. Winters    Podiatry  Podiatry Consult Note    Subjective:  ARIEL INIGUEZ  Seen Bedside 82y Female  .   Patient is a 82y old  Female who presents with a chief complaint of Weakness with poor PO intake (03 Jan 2023 09:05)    HPI:               82-year-old female, past medical history of Dementia, Hypertension, Hyperlipidemia, Peripheral artery disease: chronic lower extremity leg pains, CHF:  presenting to ER for failure to thrive.  Daughter reports poor PO intake for the past 2 days with worsening of baseline dementia:  " acting up today"  -  denies fever or chills.  - Admitted with Dx of CHF about 1.5 weeks ago and discharged home  - baseline cachectic state with scabs over her body which she continually scratches.    Cardiologist: Dr Childs (02 Jan 2023 18:36)    Previously had 2nd toe arthroplasty performed at right foot for ulceration in [facility], [facility] w/o incident  Patient is unsure of how long her current ulcer at left second toe has been present. She complains of moderate pain with light touch to ulcer today. She has not received any treatments for the toe at this time.       Past Medical History and Surgical History  PAST MEDICAL & SURGICAL HISTORY:  Hypothyroid      Hypertension      RA (rheumatoid arthritis)      High cholesterol      Poor circulation  PAD      Incontinence      Dementia      Rheumatoid arthritis      Osteopenia      Melanoma of skin      Depression with anxiety      Bilateral cataracts      Peripheral arterial disease  (s/p B/L leg stents &amp; on Plavix)      History of cirrhosis of liver      History of chronic CHF      History of hip surgery  Left ORIF and Left ORIF femur      H/O: hysterectomy           Review of Systems:  [X] Ten point review of systems is otherwise negative except as noted     Objective:  Vital Signs Last 24 Hrs  T(C): 36 (03 Jan 2023 05:37), Max: 36 (03 Jan 2023 05:37)  T(F): 96.8 (03 Jan 2023 05:37), Max: 96.8 (03 Jan 2023 05:37)  HR: 66 (03 Jan 2023 05:37) (61 - 66)  BP: 126/58 (03 Jan 2023 05:37) (126/58 - 149/65)  BP(mean): --  RR: 18 (03 Jan 2023 05:37) (18 - 18)  SpO2: 92% (02 Jan 2023 14:54) (92% - 92%)    Parameters below as of 02 Jan 2023 14:54  Patient On (Oxygen Delivery Method): room air                            9.5    3.43  )-----------( 148      ( 03 Jan 2023 05:56 )             31.4                 01-03    136  |  97<L>  |  25<H>  ----------------------------<  93  3.6   |  28  |  0.9    Ca    9.1      03 Jan 2023 05:56  Mg     1.8     01-03    TPro  7.2  /  Alb  2.5<L>  /  TBili  0.5  /  DBili  x   /  AST  20  /  ALT  9   /  AlkPhos  99  01-03      Physical Exam    Derm: Dorsal left second toe ulcer at IPJ with overlying hyperkeratotic tissue, no drainage, no malodor, erythema and swelling noted to entire second toe  Vascular: DP and PT Pulses non palpable B/L ; Foot is Warm to cool to touch; slight mottled skin changes noted left foot    Neuro: Gross and light touch sensation intact  MSK: Pain On Palpation at Wound Site, hammertoe deformity of all left foot lesser digits and toes 3-5 R foot.     Assessment:  Left foot second digit with signs of infection  Cellulitic left second toe  Hammertoes digits 2-5, left foot    Plan:  Chart reviewed and Patient evaluated. All Questions and Concerns Addressed and Answered  XR Imaging Left foot reviewed, no evidence of osteomyelitis  Recommend: MRI left foot to r/o OM, recommend ESR/CRP  Lower extremity arterial duplex; results pending - recommend Vascular consult   Local Wound Care; Wound Flushed w/ NS; Wound Packed w/ Betadine Soaked Gauze / DSD / Kerlix   Cont w/LWC as stated above q24h  Weight Bearing Status; WBAT w/ Surgical Shoe;   Request ID Consult;   Possible Surgical Debridement; pending MRI, ESR/CRP  If MRI is positive for OM, will plan for surgical intervention: amputation vs arthroplasty of left second toe  Discussed Plan w/ Dr. Winters    Podiatry

## 2023-01-03 NOTE — CONSULT NOTE ADULT - ATTENDING COMMENTS
Agree with above note  Cont with wound care  Request ID for abx recommendations  Recommend MRI w/wo contrast   Recommend Vascular consult   Pending MRI finding and vascular output  will determine the need for surgical intervention  Plan discussed in length with patient's daughter Ela

## 2023-01-03 NOTE — PROGRESS NOTE ADULT - SUBJECTIVE AND OBJECTIVE BOX
ARIEL INIGUEZ  82y, Female  Allergy: Keflex (Unknown)  losartan (Unknown)  morphine (Unknown)  Rocephin (Unknown)  sulfamethoxazole (Hives)    Hospital Day: 1d    Patient seen and examined earlier today.   podiatry consulted     PMH/PSH:  PAST MEDICAL & SURGICAL HISTORY:  Hypothyroid      Hypertension      RA (rheumatoid arthritis)      High cholesterol      Poor circulation  PAD      Incontinence      Dementia      Rheumatoid arthritis      Osteopenia      Melanoma of skin      Depression with anxiety      Bilateral cataracts      Peripheral arterial disease  (s/p B/L leg stents &amp; on Plavix)      History of cirrhosis of liver      History of chronic CHF      History of hip surgery  Left ORIF and Left ORIF femur      H/O: hysterectomy          LAST 24-Hr EVENTS:    VITALS:  T(F): 96.8 (01-03-23 @ 05:37), Max: 96.8 (01-03-23 @ 05:37)  HR: 66 (01-03-23 @ 05:37)  BP: 126/58 (01-03-23 @ 05:37) (126/58 - 149/65)  RR: 18 (01-03-23 @ 05:37)  SpO2: 92% (01-02-23 @ 14:54)          TESTS & MEASUREMENTS:  Weight/BMI  45.4 (01-02-23 @ 14:54)  17.7 (01-02-23 @ 14:54)                          9.5    3.43  )-----------( 148      ( 03 Jan 2023 05:56 )             31.4     PT/INR - ( 02 Jan 2023 16:40 )   PT: 13.60 sec;   INR: 1.19 ratio         PTT - ( 02 Jan 2023 16:40 )  PTT:37.7 sec  INR: 1.19 ratio (01-02-23 @ 16:40)    01-03    136  |  97<L>  |  25<H>  ----------------------------<  93  3.6   |  28  |  0.9    Ca    9.1      03 Jan 2023 05:56  Mg     1.8     01-03    TPro  7.2  /  Alb  2.5<L>  /  TBili  0.5  /  DBili  x   /  AST  20  /  ALT  9   /  AlkPhos  99  01-03    LIVER FUNCTIONS - ( 03 Jan 2023 05:56 )  Alb: 2.5 g/dL / Pro: 7.2 g/dL / ALK PHOS: 99 U/L / ALT: 9 U/L / AST: 20 U/L / GGT: x                           COVID-19 PCR: NotDetec (01-02-23 @ 15:30)                RADIOLOGY, ECG, & ADDITIONAL TESTS:  12 Lead ECG:   Ventricular Rate 86 BPM    Atrial Rate 86 BPM    P-R Interval 152 ms    QRS Duration 96 ms    Q-T Interval 342 ms    QTC Calculation(Bazett) 409 ms    P Axis 23 degrees    R Axis 23 degrees    T Axis -88 degrees    Diagnosis Line Sinus rhythm with occasional Premature ventricular complexes  Left ventricular hypertrophy with repolarization abnormality  Abnormal ECG    Confirmed by SHILPI CERVANTES MD ([id number]) on 12/4/2022 11:38:26 AM (12-03-22 @ 04:17)      RECENT DIAGNOSTIC ORDERS:  C-Reactive Protein, Serum: 15:00 (01-03-23 @ 11:36)  Sedimentation Rate, Erythrocyte: 15:00 (01-03-23 @ 11:36)  Diet, DASH/TLC:   Sodium & Cholesterol Restricted  Easy to Chew (EASYTOCHEW) (01-03-23 @ 11:27)  Wet Read: Routine  WET READ: no subq gas, no obvoius bony erosion (01-02-23 @ 20:04)  VA Duplex Lower Extrem Arterial, Bilat: Urgent   Indication: PAD  Transport: Stretcher-Crib  Exam Completed  Provider's Contact #: [phone] (01-02-23 @ 15:35)  VA Duplex Lower Ext Vein Scan, Bilat: Urgent   Indication: r/o dvt  Transport: Stretcher-Crib  Exam Completed  Provider's Contact #: [phone] (01-02-23 @ 15:35)      MEDICATIONS:  MEDICATIONS  (STANDING):  allopurinol 100 milliGRAM(s) Oral two times a day  ascorbic acid 500 milliGRAM(s) Oral daily  aspirin enteric coated 81 milliGRAM(s) Oral daily  calcium carbonate 1250 mG  + Vitamin D (OsCal 500 + D) 1 Tablet(s) Oral two times a day  chlorhexidine 2% Cloths 1 Application(s) Topical <User Schedule>  clopidogrel Tablet 75 milliGRAM(s) Oral daily  donepezil 10 milliGRAM(s) Oral at bedtime  DULoxetine 60 milliGRAM(s) Oral daily  enoxaparin Injectable 40 milliGRAM(s) SubCutaneous every 24 hours  ferrous    sulfate 325 milliGRAM(s) Oral daily  folic acid 1 milliGRAM(s) Oral daily  furosemide    Tablet 40 milliGRAM(s) Oral daily  lactated ringers Bolus 1000 milliLiter(s) IV Bolus once  levothyroxine 50 MICROGram(s) Oral daily  metoprolol succinate ER 50 milliGRAM(s) Oral daily  oxybutynin 10 milliGRAM(s) Oral at bedtime  pantoprazole    Tablet 40 milliGRAM(s) Oral before breakfast  sacubitril 24 mG/valsartan 26 mG 1 Tablet(s) Oral two times a day  zinc sulfate 220 milliGRAM(s) Oral daily    MEDICATIONS  (PRN):  acetaminophen     Tablet .. 650 milliGRAM(s) Oral every 6 hours PRN Temp greater or equal to 38C (100.4F), Mild Pain (1 - 3)  aluminum hydroxide/magnesium hydroxide/simethicone Suspension 30 milliLiter(s) Oral every 4 hours PRN Dyspepsia  melatonin 3 milliGRAM(s) Oral at bedtime PRN Insomnia  ondansetron Injectable 4 milliGRAM(s) IV Push every 8 hours PRN Nausea and/or Vomiting      HOME MEDICATIONS:  allopurinol 100 mg oral tablet (01-02)  Aspir 81 oral delayed release tablet (01-02)  calcium (as carbonate and lactate)-vitamin D 200 mg-6.25 mcg oral tablet (01-02)  donepezil 10 mg oral tablet (01-02)  DULoxetine 60 mg oral delayed release capsule (01-02)  Entresto 24 mg-26 mg oral tablet (01-02)  ferrous sulfate 325 mg (65 mg elemental iron) oral tablet (01-02)  folic acid 1 mg oral tablet (01-02)  furosemide 40 mg oral tablet (01-02)  levothyroxine 50 mcg (0.05 mg) oral tablet (01-02)  metoprolol succinate 50 mg oral tablet, extended release (01-02)  Orencia 125 mg/mL subcutaneous solution (01-02)  oxybutynin 10 mg/24 hr oral tablet, extended release (01-02)  pantoprazole 40 mg oral delayed release tablet (01-02)  Plavix 75 mg oral tablet (01-02)      PHYSICAL EXAM:      Constitutional: NAD, Awake,  ** responds to verbal questions    Eyes: PERRLA    Respiratory: +air entry, no rales, no rhonchi, no wheezes    Cardiovascular: +S1 and S2, regular rate and rhythm    Gastrointestinal: +BS, soft, non-tender, not distended    Extremities:  ** Cachectic overall,   ** Ulceration 2nd left toe, no edema, no calf tenderness

## 2023-01-03 NOTE — CONSULT NOTE ADULT - SUBJECTIVE AND OBJECTIVE BOX
ARIEL INIGUEZ  82y, Female  Allergy: Keflex (Unknown)  losartan (Unknown)  morphine (Unknown)  Rocephin (Unknown)  sulfamethoxazole (Hives)      CHIEF COMPLAINT: Weakness with poor PO intake (03 Jan 2023 11:59)      LOS  1d    HPI:   82-year-old female, past medical history of Dementia, Hypertension, Hyperlipidemia, Peripheral artery disease: chronic lower extremity leg pains, CHF:  presenting to ER for failure to thrive.  Daughter reports poor PO intake for the past 2 days with worsening of baseline dementia:  " acting up today"  -  denies fever or chills.  - Admitted with Dx of CHF about 1.5 weeks ago and discharged home  - baseline cachectic state with scabs over her body which she continually scratches.    Cardiologist: Dr Childs (02 Jan 2023 18:36)      INFECTIOUS DISEASE HISTORY:  History as above.    PAST MEDICAL & SURGICAL HISTORY:  Hypothyroid      Hypertension      RA (rheumatoid arthritis)      High cholesterol      Poor circulation  PAD      Incontinence      Dementia      Rheumatoid arthritis      Osteopenia      Melanoma of skin      Depression with anxiety      Bilateral cataracts      Peripheral arterial disease  (s/p B/L leg stents &amp; on Plavix)      History of cirrhosis of liver      History of chronic CHF      History of hip surgery  Left ORIF and Left ORIF femur      H/O: hysterectomy          FAMILY HISTORY  No pertinent family history in first degree relatives        SOCIAL HISTORY  Social History:  Tobacco use: x 15 yrs, stopped 30 yrs ago  EtOH use: No  Illicit drug use: No  Ambulates: Walker or Wheelchair (02 Jan 2023 18:36)        ROS  General: Denies rigors, nightsweats  HEENT: Denies headache, rhinorrhea, sore throat, eye pain  CV: Denies CP, palpitations  PULM: Denies wheezing, hemoptysis  GI: Denies hematemesis, hematochezia, melena  : Denies discharge, hematuria  MSK: Denies arthralgias, myalgias  SKIN: Denies rash, lesions  NEURO: Denies paresthesias, weakness  PSYCH: Denies depression, anxiety    VITALS:  T(F): 96, Max: 96.8 (01-03-23 @ 05:37)  HR: 68  BP: 140/63  RR: 18Vital Signs Last 24 Hrs  T(C): 35.6 (03 Jan 2023 16:16), Max: 36 (03 Jan 2023 05:37)  T(F): 96 (03 Jan 2023 16:16), Max: 96.8 (03 Jan 2023 05:37)  HR: 68 (03 Jan 2023 16:16) (66 - 68)  BP: 140/63 (03 Jan 2023 16:16) (105/52 - 140/63)  BP(mean): --  RR: 18 (03 Jan 2023 16:16) (18 - 18)  SpO2: 96% (03 Jan 2023 13:05) (96% - 96%)    Parameters below as of 03 Jan 2023 13:05  Patient On (Oxygen Delivery Method): room air        PHYSICAL EXAM:  Gen: NAD, resting in bed  HEENT: Normocephalic, atraumatic  Neck: supple, no lymphadenopathy  CV: Regular rate & regular rhythm  Lungs: decreased BS at bases, no fremitus  Abdomen: Soft, BS present  Ext: Warm, well perfused  Neuro: non focal, awake  Skin: no rash, no erythema  Lines: no phlebitis    TESTS & MEASUREMENTS:                        9.5    3.43  )-----------( 148      ( 03 Jan 2023 05:56 )             31.4     01-03    136  |  97<L>  |  25<H>  ----------------------------<  93  3.6   |  28  |  0.9    Ca    9.1      03 Jan 2023 05:56  Mg     1.8     01-03    TPro  7.2  /  Alb  2.5<L>  /  TBili  0.5  /  DBili  x   /  AST  20  /  ALT  9   /  AlkPhos  99  01-03      LIVER FUNCTIONS - ( 03 Jan 2023 05:56 )  Alb: 2.5 g/dL / Pro: 7.2 g/dL / ALK PHOS: 99 U/L / ALT: 9 U/L / AST: 20 U/L / GGT: x                     INFECTIOUS DISEASES TESTING  COVID-19 PCR: NotDetec (01-02-23 @ 15:30)  COVID-19 PCR: NotDetec (09-19-22 @ 14:50)  COVID-19 PCR: NotDetec (09-16-22 @ 01:05)  COVID-19 PCR: NotDetec (09-09-22 @ 11:30)      RADIOLOGY & ADDITIONAL TESTS:  I have personally reviewed the last Chest xray  CXR      CT      CARDIOLOGY TESTING      MEDICATIONS  allopurinol 100 Oral two times a day  ascorbic acid 500 Oral daily  aspirin enteric coated 81 Oral daily  calcium carbonate 1250 mG  + Vitamin D (OsCal 500 + D) 1 Oral two times a day  chlorhexidine 2% Cloths 1 Topical <User Schedule>  clopidogrel Tablet 75 Oral daily  donepezil 10 Oral at bedtime  DULoxetine 60 Oral daily  enoxaparin Injectable 40 SubCutaneous every 24 hours  ferrous    sulfate 325 Oral daily  folic acid 1 Oral daily  furosemide    Tablet 40 Oral daily  levothyroxine 50 Oral daily  metoprolol succinate ER 50 Oral daily  oxybutynin 10 Oral at bedtime  pantoprazole    Tablet 40 Oral before breakfast  sacubitril 24 mG/valsartan 26 mG 1 Oral two times a day  zinc sulfate 220 Oral daily      Weight  Weight (kg): 110 (01-03-23 @ 16:16)    ANTIBIOTICS:      ALLERGIES:  Keflex (Unknown)  losartan (Unknown)  morphine (Unknown)  Rocephin (Unknown)  sulfamethoxazole (Hives)

## 2023-01-03 NOTE — PHYSICAL THERAPY INITIAL EVALUATION ADULT - LEVEL OF INDEPENDENCE: SIT/STAND, REHAB EVAL
pt attempted to perform sit to stand was not able to complete due to weakness and pain in the L foot

## 2023-01-03 NOTE — PHYSICAL THERAPY INITIAL EVALUATION ADULT - PERTINENT HX OF CURRENT PROBLEM, REHAB EVAL
82-year-old female, past medical history of Dementia, Hypertension, Hyperlipidemia, Peripheral artery disease: chronic lower extremity leg pains, CHF:  presenting to ER for failure to thrive.  Daughter reports poor PO intake for the past 2 days with worsening of baseline dementia:  " acting up today"  -  denies fever or chills.  - Admitted with Dx of CHF about 1.5 weeks ago and discharged home  - baseline cachectic state with scabs over her body which she continually scratches.

## 2023-01-03 NOTE — PROGRESS NOTE ADULT - ASSESSMENT
81 y/o female admitted with failure to thrive, poor PO intake, and ulceration left 2nd toe       Problem/Plan - 1:  ·  Problem: Adult failure to thrive.   ·  Plan: dietary consult  started on easy to chew diet as per speech   Liquid supplement  add Vit C and Zinc for wound healing.     Problem/Plan - 2:  ·  Problem: Foot ulceration.   ·  Plan: Podiatry consult to evaluate left  2nd toe ulcer, following recommended MRI foot.   -X ray left foot reported,   No radiographic evidence of osteomyelitis.  2.  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 may represent   the sequela of inflammatory arthropathy.  as per podiatry Request ID Consult;   Possible Surgical Debridement; MRI, ESR/CRP  If MRI is positive for OM, will plan for surgical intervention: amputation vs arthroplasty of left second toe     Problem/Plan - 3:  ·  Problem: Hypothyroid.   ·  Plan: continue Synthroid.     Problem/Plan - 4:  ·  Problem: History of chronic CHF.   ·  Plan: continue Entresto, Metoprolol, Hold Lasix due to poor PO intake for now   follow BP.     Problem/Plan - 5:  ·  Problem: Dementia.   ·  Plan: continue Donepezil.     Problem/Plan - 6:  ·  Problem: History of peripheral vascular disease.   ·  Plan: - continue ASA/Plavix  - follow Arterial duplex. pending     follow up: follow podiatry and ID. needs an MRI. talked to daughter as well. Arterial duplex is pending. speech recommended easy to chew diet     81 y/o female admitted with failure to thrive, poor PO intake, and ulceration left 2nd toe       Problem/Plan - 1:  ·  Problem: Adult failure to thrive.   ·  Plan: dietary consult  started on easy to chew diet as per speech   Liquid supplement  add Vit C and Zinc for wound healing.     Problem/Plan - 2:  ·  Problem: Foot ulceration.   ·  Plan: Podiatry consult to evaluate left  2nd toe ulcer, following recommended MRI foot.   -X ray left foot reported,   No radiographic evidence of osteomyelitis.  2.  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 may represent   the sequela of inflammatory arthropathy.  as per podiatry Request ID Consult;   Possible Surgical Debridement; MRI, ESR/CRP  If MRI is positive for OM, will plan for surgical intervention: amputation vs arthroplasty of left second toe     Problem/Plan - 3:  ·  Problem: Hypothyroid.   ·  Plan: continue Synthroid.     Problem/Plan - 4:  ·  Problem: History of chronic CHF.   ·  Plan: continue Entresto, Metoprolol, on lasix  follow BP.     Problem/Plan - 5:  ·  Problem: Dementia.   ·  Plan: continue Donepezil.     Problem/Plan - 6:  ·  Problem: History of peripheral vascular disease.   ·  Plan: - continue ASA/Plavix  - follow Arterial duplex. pending     follow up: follow podiatry and ID. needs an MRI. talked to daughter as well. Arterial duplex is pending. speech recommended easy to chew diet

## 2023-01-03 NOTE — SWALLOW BEDSIDE ASSESSMENT ADULT - SLP PERTINENT HISTORY OF CURRENT PROBLEM
82-year-old female, past medical history of Dementia, Hypertension, Hyperlipidemia, Peripheral artery disease: chronic lower extremity leg pains, CHF:  presenting to ER for failure to thrive.  Daughter reports poor PO intake for the past 2 days with worsening of baseline dementia:  " acting up today"

## 2023-01-04 NOTE — PROGRESS NOTE ADULT - ASSESSMENT
A/P: 83 y/o female admitted with failure to thrive, poor PO intake, and ulceration left 2nd toe    #Adult failure to thrive in setting of Dementia  #Dementia  #Acute Change in Mental Status, r/o UTI  - Will send UA to UTI that may contribute to AMS  - c/w easy to chew diet; Liquid supplement  - c/w Vit C and Zinc for wound healing.  - continue Donepezil  - dietary consult  - PEG placement not indicated at this time    #Foot ulceration in setting of h/o PVD, r/o OM  #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  - O/E: Sharp bedside excisional debridement of fibrotic/ necrotic tissue  left second toe ulcer to layer of tendon- purulence noted with bone exposure   - There is no leukocytosis or left shift  - ESR:  130 > 130 > trend  - CRP: 127 > trend  - radiographic evidence of osteomyelitis.  - MRI pending  - Planning Surgery scheduled for Fri 1/6 with podiatry: amputation vs bone dbx w arthroplasty of left second toe  - Patient needs SHOAIB tomorrow; NPO MN 1/5  - ID on the case; recommend to watch off abx unless there is evidence of OM    #Multiple skin abrasions  #Small area redness coccyx region   #Ecchymosis left side of mouth  - Wound Care RN consult    #Hypothyroid.   - continue Synthroid.    #History of chronic CHF.   - continue Entresto, Metoprolol, on lasix  - follow BP.     #History of peripheral vascular disease.   - continue ASA/Plavix  - US Doppler: neg    #SUPPORTIVE MANAGEMENT/DISPO  - Dispo: until medically cleared  - DVT Ppx: Lovenox  - GI Ppx: not indicated  - Diet: easy chew    Spent over 35 min reviewing chart and on coordinating patient care during interdisciplinary rounds     Elton Howard M.D./[facility]  Attending Physician    A/P: 81 y/o female admitted with failure to thrive, poor PO intake, and ulceration left 2nd toe    #Adult failure to thrive in setting of Dementia  #Dementia  #Acute Change in Mental Status, r/o UTI  - Will send UA to UTI that may contribute to AMS  - c/w easy to chew diet; Liquid supplement  - c/w Vit C and Zinc for wound healing.  - continue Donepezil  - dietary consult  - PEG placement not indicated at this time    #Foot ulceration in setting of h/o PVD, r/o OM  #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  - O/E: Sharp bedside excisional debridement of fibrotic/ necrotic tissue  left second toe ulcer to layer of tendon- purulence noted with bone exposure   - There is no leukocytosis or left shift  - ESR:  130 > 130 > trend  - CRP: 127 > trend  - WCx: pending  - radiographic evidence of osteomyelitis.  - MRI pending  - Planning Surgery scheduled for Fri 1/6 with podiatry: amputation vs bone dbx w arthroplasty of left second toe  - Patient needs SHOAIB tomorrow; NPO MN 1/5  - ID on the case; recommend to watch off abx unless there is evidence of OM    #Multiple skin abrasions  #Small area redness coccyx region   #Ecchymosis left side of mouth  - Wound Care RN consult    #Hypothyroid.   - continue Synthroid.    #History of chronic CHF.   - continue Entresto, Metoprolol, on lasix  - follow BP.     #History of peripheral vascular disease.   - continue ASA/Plavix  - US Doppler: neg    #SUPPORTIVE MANAGEMENT/DISPO  - Dispo: until medically cleared  - DVT Ppx: Lovenox  - GI Ppx: not indicated  - Diet: easy chew    Spent over 35 min reviewing chart and on coordinating patient care during interdisciplinary rounds     Elton Howard M.D./[facility]  Attending Physician

## 2023-01-04 NOTE — DIETITIAN INITIAL EVALUATION ADULT - PERTINENT LABORATORY DATA
01-04    138  |  97<L>  |  32<H>  ----------------------------<  103<H>  3.8   |  31  |  0.9    Ca    9.3      04 Jan 2023 08:22  Mg     1.8     01-03    TPro  7.2  /  Alb  2.5<L>  /  TBili  0.5  /  DBili  x   /  AST  20  /  ALT  9   /  AlkPhos  99  01-03                          9.8    3.09  )-----------( 124      ( 04 Jan 2023 08:22 )             31.9

## 2023-01-04 NOTE — DIETITIAN INITIAL EVALUATION ADULT - PERTINENT MEDS FT
MEDICATIONS  (STANDING):  allopurinol 100 milliGRAM(s) Oral two times a day  ascorbic acid 500 milliGRAM(s) Oral daily  aspirin enteric coated 81 milliGRAM(s) Oral daily  calcium carbonate 1250 mG  + Vitamin D (OsCal 500 + D) 1 Tablet(s) Oral two times a day  chlorhexidine 2% Cloths 1 Application(s) Topical <User Schedule>  clopidogrel Tablet 75 milliGRAM(s) Oral daily  donepezil 10 milliGRAM(s) Oral at bedtime  DULoxetine 60 milliGRAM(s) Oral daily  enoxaparin Injectable 40 milliGRAM(s) SubCutaneous every 24 hours  ferrous    sulfate 325 milliGRAM(s) Oral daily  folic acid 1 milliGRAM(s) Oral daily  furosemide    Tablet 40 milliGRAM(s) Oral daily  levothyroxine 50 MICROGram(s) Oral daily  metoprolol succinate ER 50 milliGRAM(s) Oral daily  oxybutynin 10 milliGRAM(s) Oral at bedtime  sacubitril 24 mG/valsartan 26 mG 1 Tablet(s) Oral two times a day  zinc sulfate 220 milliGRAM(s) Oral daily    MEDICATIONS  (PRN):  acetaminophen     Tablet .. 650 milliGRAM(s) Oral every 6 hours PRN Temp greater or equal to 38C (100.4F), Mild Pain (1 - 3)  aluminum hydroxide/magnesium hydroxide/simethicone Suspension 30 milliLiter(s) Oral every 4 hours PRN Dyspepsia  melatonin 3 milliGRAM(s) Oral at bedtime PRN Insomnia  ondansetron Injectable 4 milliGRAM(s) IV Push every 8 hours PRN Nausea and/or Vomiting

## 2023-01-04 NOTE — PROGRESS NOTE ADULT - SUBJECTIVE AND OBJECTIVE BOX
MEDICINE ATTENDING PROGRESS NOTE  82 year-old female, past medical history of Dementia, Hypertension, Hyperlipidemia, Peripheral artery disease: chronic lower extremity leg pains, CHF p/w failure to thrive, worsening dementia at baseline, found to have left second toe ulcer. Patient is admitted for further management    Interval/Overnight events:  01/04/23: 01/04/23: Seen and examined patient at bedside. No acute complaints. Vitals stable. Exam unchanged. Labs significant for ESR:  130 > 130 and CRP: 127. There was no leukocytosis. WCx: pending. XR foot inconclusive for OM. US Doppler: neg for DVT. Pending MRI to rule out OM. Will defer abx for now. Will send UA to r/o UTI given acute change in baseline. ID and Podiatry on the case. Planning Surgery scheduled for Fri 1/6 with podiatry: amputation vs bone dbx w arthroplasty of left second toe. Patient needs SHOAIB tomorrow; NPO MN 1/5. Will get Wound Care RN for wound finding on exam.    ROS:   General: denies fever, chills, insomnia, depression, weight change  Skin: denies itch, jaundice   Resp: denies cough, pleuritic chest pain, wheezing   CV: denies PND  GI: denies N/V/D constipation   : denies d/c, itching, hematuria, dysuria   EXT: denies pain, swelling, erythema   Musculoskeletal: denies low back pain, joint pain, swelling   Neuro: denies headache, weakness, syncope    MEDICATIONS  (STANDING):  allopurinol 100 milliGRAM(s) Oral two times a day  ascorbic acid 500 milliGRAM(s) Oral daily  aspirin enteric coated 81 milliGRAM(s) Oral daily  calcium carbonate 1250 mG  + Vitamin D (OsCal 500 + D) 1 Tablet(s) Oral two times a day  chlorhexidine 2% Cloths 1 Application(s) Topical <User Schedule>  clopidogrel Tablet 75 milliGRAM(s) Oral daily  donepezil 10 milliGRAM(s) Oral at bedtime  DULoxetine 60 milliGRAM(s) Oral daily  enoxaparin Injectable 40 milliGRAM(s) SubCutaneous every 24 hours  ferrous    sulfate 325 milliGRAM(s) Oral daily  folic acid 1 milliGRAM(s) Oral daily  furosemide    Tablet 40 milliGRAM(s) Oral daily  levothyroxine 50 MICROGram(s) Oral daily  metoprolol succinate ER 50 milliGRAM(s) Oral daily  oxybutynin 10 milliGRAM(s) Oral at bedtime  pantoprazole    Tablet 40 milliGRAM(s) Oral before breakfast  sacubitril 24 mG/valsartan 26 mG 1 Tablet(s) Oral two times a day  zinc sulfate 220 milliGRAM(s) Oral daily    MEDICATIONS  (PRN):  acetaminophen     Tablet .. 650 milliGRAM(s) Oral every 6 hours PRN Temp greater or equal to 38C (100.4F), Mild Pain (1 - 3)  aluminum hydroxide/magnesium hydroxide/simethicone Suspension 30 milliLiter(s) Oral every 4 hours PRN Dyspepsia  melatonin 3 milliGRAM(s) Oral at bedtime PRN Insomnia  ondansetron Injectable 4 milliGRAM(s) IV Push every 8 hours PRN Nausea and/or Vomiting    VITALS  T(F): 95.8 (01-04-23 @ 16:54), Max: 96.8 (01-03-23 @ 20:53)  HR: 71 (01-04-23 @ 16:54) (65 - 84)  BP: 143/63 (01-04-23 @ 16:54) (143/63 - 160/71)  RR: 18 (01-04-23 @ 16:54) (18 - 18)  SpO2: --    PHYSICAL EXAM  Gen- NAD, AAOx1, Cachectic overall,   HEENT- no pallor, anicteric, moist mucous membranes  CVS- S1/S2, no m/r/g  Chest- CTA b/l no w/r/c, no use of accessory muscles, good inspiratory effort, speaking in full sentences  Abd- soft, nt, nd  Derm: Dorsal left second toe ulcer at IPJ with overlying hyperkeratotic tissue, Purulent drainage noted, erythema and swelling noted to entire second toe. probing to bone  Vascular: DP and PT Pulses non palpable B/L ; Foot is Warm to cool to touch; slight mottled skin changes noted left foot    Neuro: Gross and light touch sensation intact  MSK: Pain On Palpation at Wound Site, hammertoe deformity of all left foot lesser digits and toes 3-5 R foot  Skin: Multiple skin abrasions, Small area redness coccyx region, Ecchymosis left side of mouth  Neuro-CN II-XII intact;    LABS/IMAGING  138  |  97<L>  |  32<H>  ----------------------------<  103<H>  3.8   |  31  |  0.9    Ca    9.3      04 Jan 2023 08:22  Mg     1.8     01-03    TPro  7.2  /  Alb  2.5<L>  /  TBili  0.5  /  DBili  x   /  AST  20  /  ALT  9   /  AlkPhos  99  01-03    LIVER FUNCTIONS - ( 03 Jan 2023 05:56 )  Alb: 2.5 g/dL / Pro: 7.2 g/dL / ALK PHOS: 99 U/L / ALT: 9 U/L / AST: 20 U/L / GGT: x                               9.8    3.09  )-----------( 124      ( 04 Jan 2023 08:22 )             31.9   MICROBIOLOGY    RADIOLOGY  US Doppler: neg for DVT    XR foot  1. No radiographic evidence of osteomyelitis.  2.  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 may represent   the sequela of inflammatory arthropathy.     MEDICINE ATTENDING PROGRESS NOTE  82 year-old female, past medical history of Dementia, Hypertension, Hyperlipidemia, Peripheral artery disease: chronic lower extremity leg pains, CHF p/w failure to thrive, worsening dementia at baseline, found to have left second toe ulcer. Patient is admitted for further management    Interval/Overnight events:  01/04/23: 01/04/23: Seen and examined patient at bedside. No acute complaints. Vitals stable. Exam unchanged. Labs significant for ESR:  130 > 130 and CRP: 127. There was no leukocytosis. WCx: pending. XR foot inconclusive for OM. US Doppler: neg for DVT. Pending MRI to rule out OM. Will defer abx for now. Will send UA to r/o UTI given acute change in baseline. ID and Podiatry on the case. Planning Surgery scheduled for Fri 1/6 with podiatry: amputation vs bone dbx w arthroplasty of left second toe. Patient needs SHOAIB tomorrow; NPO MN 1/5. Will get Wound Care RN for wound finding on exam.    ROS:   General: denies fever, chills, insomnia, depression, weight change  Skin: denies itch, jaundice   Resp: denies cough, pleuritic chest pain, wheezing   CV: denies PND  GI: denies N/V/D constipation   : denies d/c, itching, hematuria, dysuria   EXT: denies pain, swelling, erythema   Musculoskeletal: denies low back pain, joint pain, swelling   Neuro: denies headache, weakness, syncope    MEDICATIONS  (STANDING):  allopurinol 100 milliGRAM(s) Oral two times a day  ascorbic acid 500 milliGRAM(s) Oral daily  aspirin enteric coated 81 milliGRAM(s) Oral daily  calcium carbonate 1250 mG  + Vitamin D (OsCal 500 + D) 1 Tablet(s) Oral two times a day  chlorhexidine 2% Cloths 1 Application(s) Topical <User Schedule>  clopidogrel Tablet 75 milliGRAM(s) Oral daily  donepezil 10 milliGRAM(s) Oral at bedtime  DULoxetine 60 milliGRAM(s) Oral daily  enoxaparin Injectable 40 milliGRAM(s) SubCutaneous every 24 hours  ferrous    sulfate 325 milliGRAM(s) Oral daily  folic acid 1 milliGRAM(s) Oral daily  furosemide    Tablet 40 milliGRAM(s) Oral daily  levothyroxine 50 MICROGram(s) Oral daily  metoprolol succinate ER 50 milliGRAM(s) Oral daily  oxybutynin 10 milliGRAM(s) Oral at bedtime  pantoprazole    Tablet 40 milliGRAM(s) Oral before breakfast  sacubitril 24 mG/valsartan 26 mG 1 Tablet(s) Oral two times a day  zinc sulfate 220 milliGRAM(s) Oral daily    MEDICATIONS  (PRN):  acetaminophen     Tablet .. 650 milliGRAM(s) Oral every 6 hours PRN Temp greater or equal to 38C (100.4F), Mild Pain (1 - 3)  aluminum hydroxide/magnesium hydroxide/simethicone Suspension 30 milliLiter(s) Oral every 4 hours PRN Dyspepsia  melatonin 3 milliGRAM(s) Oral at bedtime PRN Insomnia  ondansetron Injectable 4 milliGRAM(s) IV Push every 8 hours PRN Nausea and/or Vomiting    VITALS  T(F): 95.8 (01-04-23 @ 16:54), Max: 96.8 (01-03-23 @ 20:53)  HR: 71 (01-04-23 @ 16:54) (65 - 84)  BP: 143/63 (01-04-23 @ 16:54) (143/63 - 160/71)  RR: 18 (01-04-23 @ 16:54) (18 - 18)  SpO2: --    PHYSICAL EXAM  Gen- NAD, AAOx1, Cachectic overall,   HEENT- no pallor, anicteric, moist mucous membranes  CVS- S1/S2, no m/r/g  Chest- CTA b/l no w/r/c, no use of accessory muscles, good inspiratory effort, speaking in full sentences  Abd- soft, nt, nd  Derm: Dorsal left second toe ulcer at IPJ with overlying hyperkeratotic tissue, Purulent drainage noted, erythema and swelling noted to entire second toe. probing to bone  Vascular: DP and PT Pulses non palpable B/L ; Foot is Warm to cool to touch; slight mottled skin changes noted left foot    Neuro: Gross and light touch sensation intact  MSK: Pain On Palpation at Wound Site, hammertoe deformity of all left foot lesser digits and toes 3-5 R foot  Skin: Multiple skin abrasions, Small area redness coccyx region, Ecchymosis left side of mouth  Neuro-CN II-XII intact;    LABS/IMAGING  138  |  97<L>  |  32<H>  ----------------------------<  103<H>  3.8   |  31  |  0.9    Ca    9.3      04 Jan 2023 08:22  Mg     1.8     01-03    TPro  7.2  /  Alb  2.5<L>  /  TBili  0.5  /  DBili  x   /  AST  20  /  ALT  9   /  AlkPhos  99  01-03    LIVER FUNCTIONS - ( 03 Jan 2023 05:56 )  Alb: 2.5 g/dL / Pro: 7.2 g/dL / ALK PHOS: 99 U/L / ALT: 9 U/L / AST: 20 U/L / GGT: x                               9.8    3.09  )-----------( 124      ( 04 Jan 2023 08:22 )             31.9   MICROBIOLOGY  -  WCx: pending.  - UA: sent    RADIOLOGY  US Doppler: neg for DVT    XR foot  1. No radiographic evidence of osteomyelitis.  2.  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 may represent   the sequela of inflammatory arthropathy.

## 2023-01-04 NOTE — DIETITIAN INITIAL EVALUATION ADULT - OTHER INFO
pt is 82 year old female with hx of dementia, HTN, PAD, RA, chronic LE pain, CHF, presents with failure to thrive with poor po intake x 2 days and worsening baseline dementia. + ulcer to L 2nd toe. 1/4 s/p bedside debridement of necrotic tissue. Amputation planned for 1/6/22.

## 2023-01-04 NOTE — DIETITIAN INITIAL EVALUATION ADULT - WEIGHT IN KG
Letter generated as per request. PC to patient and LM will mail copy to his home to have for records. Will have letter available at [facility] tomorrow for pick-up from the check-in desk. Call back if questions. [phone]. COR[place],Rn    46.9

## 2023-01-04 NOTE — PROGRESS NOTE ADULT - SUBJECTIVE AND OBJECTIVE BOX
Podiatry Progress Note    Subjective:  ARIEL INIGUEZ is a  82y Female.   Seen bedside.   Patient is a 82y old  Female who presents with a chief complaint of Weakness with poor PO intake (03 Jan 2023 17:53)    Admits to continued pain at left second toe    Past Medical History and Surgical History  PAST MEDICAL & SURGICAL HISTORY:  Hypothyroid      Hypertension      RA (rheumatoid arthritis)      High cholesterol      Poor circulation  PAD      Incontinence      Dementia      Rheumatoid arthritis      Osteopenia      Melanoma of skin      Depression with anxiety      Bilateral cataracts      Peripheral arterial disease  (s/p B/L leg stents &amp; on Plavix)      History of cirrhosis of liver      History of chronic CHF      History of hip surgery  Left ORIF and Left ORIF femur      H/O: hysterectomy           Objective:  Vital Signs Last 24 Hrs  T(C): 35.6 (04 Jan 2023 05:02), Max: 36 (03 Jan 2023 20:53)  T(F): 96 (04 Jan 2023 05:02), Max: 96.8 (03 Jan 2023 20:53)  HR: 67 (04 Jan 2023 05:02) (65 - 68)  BP: 153/67 (04 Jan 2023 05:02) (105/52 - 158/69)  BP(mean): --  RR: 18 (04 Jan 2023 05:02) (18 - 18)  SpO2: 96% (03 Jan 2023 13:05) (96% - 96%)    Parameters below as of 03 Jan 2023 13:05  Patient On (Oxygen Delivery Method): room air                            9.8    3.09  )-----------( 124      ( 04 Jan 2023 08:22 )             31.9                 01-04    138  |  97<L>  |  32<H>  ----------------------------<  103<H>  3.8   |  31  |  0.9    Ca    9.3      04 Jan 2023 08:22  Mg     1.8     01-03    TPro  7.2  /  Alb  2.5<L>  /  TBili  0.5  /  DBili  x   /  AST  20  /  ALT  9   /  AlkPhos  99  01-03      Physical Exam    Derm: Dorsal left second toe ulcer at IPJ with overlying hyperkeratotic tissue, no drainage, no malodor, erythema and swelling noted to entire second toe  Vascular: DP and PT Pulses non palpable B/L ; Foot is Warm to cool to touch; slight mottled skin changes noted left foot    Neuro: Gross and light touch sensation intact  MSK: Pain On Palpation at Wound Site, hammertoe deformity of all left foot lesser digits and toes 3-5 R foot.     Assessment:  Left foot second digit with signs of infection- clinically positive for Osteomyelitis  Cellulitic left second toe  Hammertoes digits 2-5, left foot    Plan:  Chart reviewed and Patient evaluated. All Questions and Concerns Addressed and Answered  XR Imaging Left foot reviewed, no evidence of osteomyelitis  Pending MRI results, but indicated for surgical intervention regardless due to clinical findings  Lower extremity arterial duplex; results reviewed, findings normal  Sharp debridement of left second toe ulcer to layer of tendon- purulence noted with bone exposure  Local Wound Care; Wound Flushed w/ NS; Wound Packed w/ Betadine Soaked Gauze / DSD / Kerlix   Cont w/LWC as stated above q24h  Weight Bearing Status; WBAT w/ Surgical Shoe;   ID consult active; cont w/recs  Surgery scheduled for Fri 1/6: amputation vs arthroplasty of left second toe  Request: Medical clearance prior to OR  Please make pt NPO MN 1/5  Please optimize lab values prior to OR  Discussed Plan w/ Dr. Winters    Podiatry        Podiatry Progress Note    Subjective:  ARIEL INIGUEZ is a  82y Female.   Seen bedside.   Patient is a 82y old  Female who presents with a chief complaint of Weakness with poor PO intake (03 Jan 2023 17:53)    Admits to continued pain at left second toe    Past Medical History and Surgical History  PAST MEDICAL & SURGICAL HISTORY:  Hypothyroid      Hypertension      RA (rheumatoid arthritis)      High cholesterol      Poor circulation  PAD      Incontinence      Dementia      Rheumatoid arthritis      Osteopenia      Melanoma of skin      Depression with anxiety      Bilateral cataracts      Peripheral arterial disease  (s/p B/L leg stents &amp; on Plavix)      History of cirrhosis of liver      History of chronic CHF      History of hip surgery  Left ORIF and Left ORIF femur      H/O: hysterectomy           Objective:  Vital Signs Last 24 Hrs  T(C): 35.6 (04 Jan 2023 05:02), Max: 36 (03 Jan 2023 20:53)  T(F): 96 (04 Jan 2023 05:02), Max: 96.8 (03 Jan 2023 20:53)  HR: 67 (04 Jan 2023 05:02) (65 - 68)  BP: 153/67 (04 Jan 2023 05:02) (105/52 - 158/69)  BP(mean): --  RR: 18 (04 Jan 2023 05:02) (18 - 18)  SpO2: 96% (03 Jan 2023 13:05) (96% - 96%)    Parameters below as of 03 Jan 2023 13:05  Patient On (Oxygen Delivery Method): room air                            9.8    3.09  )-----------( 124      ( 04 Jan 2023 08:22 )             31.9                 01-04    138  |  97<L>  |  32<H>  ----------------------------<  103<H>  3.8   |  31  |  0.9    Ca    9.3      04 Jan 2023 08:22  Mg     1.8     01-03    TPro  7.2  /  Alb  2.5<L>  /  TBili  0.5  /  DBili  x   /  AST  20  /  ALT  9   /  AlkPhos  99  01-03      Physical Exam    Derm: Dorsal left second toe ulcer at IPJ with overlying hyperkeratotic tissue, no drainage, no malodor, erythema and swelling noted to entire second toe  Vascular: DP and PT Pulses non palpable B/L ; Foot is Warm to cool to touch; slight mottled skin changes noted left foot    Neuro: Gross and light touch sensation intact  MSK: Pain On Palpation at Wound Site, hammertoe deformity of all left foot lesser digits and toes 3-5 R foot.     Assessment:  Left foot second digit with signs of infection- clinically positive for Osteomyelitis  Cellulitic left second toe  Hammertoes digits 2-5, left foot    Plan:  Chart reviewed and Patient evaluated. All Questions and Concerns Addressed and Answered  XR Imaging Left foot reviewed, no evidence of osteomyelitis  Pending MRI results, but indicated for surgical intervention regardless due to clinical findings  Lower extremity arterial duplex; results reviewed, findings normal  Sharp debridement of left second toe ulcer to layer of tendon- purulence noted with bone exposure  Local Wound Care; Wound Flushed w/ NS; Wound Packed w/ Betadine Soaked Gauze / DSD / Kerlix   Cont w/LWC as stated above q24h  Culture swab of toe taken, results pending  Weight Bearing Status; WBAT w/ Surgical Shoe;   ID consult active; cont w/recs, f/u culture  Surgery scheduled for Fri 1/6: amputation vs arthroplasty of left second toe  Request: Medical clearance prior to OR  Please make pt NPO MN 1/5  Please optimize lab values prior to OR  Discussed Plan w/ Dr. Winters    Podiatry        Podiatry Progress Note    Subjective:  ARIEL INIGUEZ is a  82y Female.   Seen bedside.   Patient is a 82y old  Female who presents with a chief complaint of Weakness with poor PO intake (03 Jan 2023 17:53)    Admits to continued pain at left second toe    Past Medical History and Surgical History  PAST MEDICAL & SURGICAL HISTORY:  Hypothyroid      Hypertension      RA (rheumatoid arthritis)      High cholesterol      Poor circulation  PAD      Incontinence      Dementia      Rheumatoid arthritis      Osteopenia      Melanoma of skin      Depression with anxiety      Bilateral cataracts      Peripheral arterial disease  (s/p B/L leg stents &amp; on Plavix)      History of cirrhosis of liver      History of chronic CHF      History of hip surgery  Left ORIF and Left ORIF femur      H/O: hysterectomy           Objective:  Vital Signs Last 24 Hrs  T(C): 35.6 (04 Jan 2023 05:02), Max: 36 (03 Jan 2023 20:53)  T(F): 96 (04 Jan 2023 05:02), Max: 96.8 (03 Jan 2023 20:53)  HR: 67 (04 Jan 2023 05:02) (65 - 68)  BP: 153/67 (04 Jan 2023 05:02) (105/52 - 158/69)  BP(mean): --  RR: 18 (04 Jan 2023 05:02) (18 - 18)  SpO2: 96% (03 Jan 2023 13:05) (96% - 96%)    Parameters below as of 03 Jan 2023 13:05  Patient On (Oxygen Delivery Method): room air                            9.8    3.09  )-----------( 124      ( 04 Jan 2023 08:22 )             31.9                 01-04    138  |  97<L>  |  32<H>  ----------------------------<  103<H>  3.8   |  31  |  0.9    Ca    9.3      04 Jan 2023 08:22  Mg     1.8     01-03    TPro  7.2  /  Alb  2.5<L>  /  TBili  0.5  /  DBili  x   /  AST  20  /  ALT  9   /  AlkPhos  99  01-03      Physical Exam    Derm: Dorsal left second toe ulcer at IPJ with overlying hyperkeratotic tissue, Purulent drainage noted, erythema and swelling noted to entire second toe. probing to bone  Vascular: DP and PT Pulses non palpable B/L ; Foot is Warm to cool to touch; slight mottled skin changes noted left foot    Neuro: Gross and light touch sensation intact  MSK: Pain On Palpation at Wound Site, hammertoe deformity of all left foot lesser digits and toes 3-5 R foot.     Assessment:  Left foot second digit with signs of infection, probing to bone - clinically positive for Osteomyelitis  Cellulitic left second toe  Hammertoes digits 2-5, left foot    Plan:  Chart reviewed and Patient evaluated.   Pending MRI results  Lower extremity arterial duplex; results reviewed, findings normal  Sharp bedside excisional debridement of fibrotic/ necrotic tissue  left second toe ulcer to layer of tendon- purulence noted with bone exposure  Local Wound Care; Wound Flushed w/ NS; Wound Packed w/ Betadine Soaked Gauze / DSD / Kerlix   Cont w/LWC as stated above q24h  Culture swab of toe taken, results pending  Weight Bearing Status; WBAT w/ Surgical Shoe;   ID consult active; cont w/recs, f/u culture  Planning Surgery scheduled for Fri 1/6: amputation vs bone dbx w arthroplasty of left second toe  Request: Medical clearance prior to OR  Please make pt NPO MN 1/5  Please optimize lab values prior to OR  Discussed Plan w/ Dr. Winters    Podiatry

## 2023-01-04 NOTE — DIETITIAN INITIAL EVALUATION ADULT - NS FNS DIET ORDER
Diet, DASH/TLC:   Sodium & Cholesterol Restricted  Easy to Chew (EASYTOCHEW) (01-03-23 @ 11:27) [Active]

## 2023-01-04 NOTE — DIETITIAN INITIAL EVALUATION ADULT - ORAL INTAKE PTA/DIET HISTORY
During RD interview pt appears alert and orientated: As per pt gradual decline in po intake PTA. as per EMR pt with 12# weight loss since december.   Unsuccessful attempt at contacting family.    Presently on a DASH/TLC easy to chew diet as per SLP eval. tolerating well. Pt receptive to ensure will recommend to add 240 ml with meals

## 2023-01-05 NOTE — DIETITIAN NUTRITION RISK NOTIFICATION - TREATMENT: THE FOLLOWING DIET HAS BEEN RECOMMENDED
Diet, Easy to Chew:   DASH/TLC {Sodium & Cholesterol Restricted}  Supplement Feeding Modality:  Oral  Ensure Plus High Protein Cans or Servings Per Day:  1       Frequency:  Three Times a day (01-05-23 @ 00:23) [Pending Verification By Attending]  Diet, DASH/TLC:   Sodium & Cholesterol Restricted  Easy to Chew (EASYTOCHEW) (01-03-23 @ 11:27) [Active]

## 2023-01-05 NOTE — DIETITIAN NUTRITION RISK NOTIFICATION - PHYSICAL ASSESSMENT SCAPULA
Vaccine Information Statement(s) was given today. This has been reviewed, questions answered, and verbal consent given by Patient for injection(s) and administration of Influenza (Inactivated).    1. Does the patient have a moderate to severe fever?  No  2. Has the patient had a serious reaction to a flu shot before?   No  3. Has the patient ever had [place] Syndrome within 6 weeks of a previous flu shot?  No  4. Is the patient less that 6 months of age?  No    Patient is eligible to receive the vaccine based on all questions being answered as 'No'.    Patient tolerated without incident. See immunization grid for documentation.        
severe

## 2023-01-05 NOTE — CONSULT NOTE ADULT - NS ATTEND AMEND GEN_ALL_CORE FT
83 y/o f pmh htn, PAD, recently admitted w/ dx of new CHF 1.5wks ago now presents for failure to thrive found to have OM of the L foot. Cardiac risk stratification requested for amputation vs bone dbx w arthroplasty of left second toe. Patient denies chest pain sob. Claims could walk with walker at home.   Cardiac risk foot surgery local standby sedation intermediate.

## 2023-01-05 NOTE — CONSULT NOTE ADULT - SUBJECTIVE AND OBJECTIVE BOX
HPI:               82-year-old female, past medical history of Dementia, Hypertension, Hyperlipidemia, Peripheral artery disease: chronic lower extremity leg pains, CHF:  presenting to ER for failure to thrive.  Daughter reports poor PO intake for the past 2 days with worsening of baseline dementia:  " acting up today"  -  denies fever or chills.  - Admitted with Dx of CHF about 1.5 weeks ago and discharged home  - baseline cachectic state with scabs over her body which she continually scratches.    Cardiologist: Dr Childs (02 Jan 2023 18:36)        Cardiology Update: 81 y/o f pmh htn, hld, PAD, recently admitted w/ dx of new CHF 1.5wks ago now presents for failure to thrive found to have OM of the L foot. Cardiac risk stratification requested for amputation vs bone dbx w arthroplasty of left second toe.     Pt states she is able to walk short distances without exertional chest pain or sob.    Cardiologist: Dr Childs  Last TTE 12/22 sev MR mod AR reduced EF 35% compared to previous study 55% in 09/2022      PAST MEDICAL & SURGICAL HISTORY  Hypothyroid    Hypertension    RA (rheumatoid arthritis)    High cholesterol    Poor circulation  PAD    Incontinence    Dementia    Rheumatoid arthritis    Osteopenia    Melanoma of skin    Depression with anxiety    Bilateral cataracts    Peripheral arterial disease  (s/p B/L leg stents &amp; on Plavix)    History of cirrhosis of liver    History of chronic CHF    History of hip surgery  Left ORIF and Left ORIF femur    H/O: hysterectomy        FAMILY HISTORY:  FAMILY HISTORY:      SOCIAL HISTORY:  []smoker  []Alcohol  []Drug    ALLERGIES:  Keflex (Unknown)  losartan (Unknown)  morphine (Unknown)  Rocephin (Unknown)  sulfamethoxazole (Hives)      MEDICATIONS:  MEDICATIONS  (STANDING):  allopurinol 100 milliGRAM(s) Oral two times a day  ascorbic acid 500 milliGRAM(s) Oral daily  aspirin enteric coated 81 milliGRAM(s) Oral daily  calcium carbonate 1250 mG  + Vitamin D (OsCal 500 + D) 1 Tablet(s) Oral two times a day  chlorhexidine 2% Cloths 1 Application(s) Topical <User Schedule>  clopidogrel Tablet 75 milliGRAM(s) Oral daily  donepezil 10 milliGRAM(s) Oral at bedtime  DULoxetine 60 milliGRAM(s) Oral daily  enoxaparin Injectable 40 milliGRAM(s) SubCutaneous every 24 hours  ferrous    sulfate 325 milliGRAM(s) Oral daily  folic acid 1 milliGRAM(s) Oral daily  furosemide    Tablet 40 milliGRAM(s) Oral daily  levothyroxine 50 MICROGram(s) Oral daily  metoprolol succinate ER 50 milliGRAM(s) Oral daily  oxybutynin 10 milliGRAM(s) Oral at bedtime  sacubitril 24 mG/valsartan 26 mG 1 Tablet(s) Oral two times a day  zinc sulfate 220 milliGRAM(s) Oral daily    MEDICATIONS  (PRN):  acetaminophen     Tablet .. 650 milliGRAM(s) Oral every 6 hours PRN Temp greater or equal to 38C (100.4F), Mild Pain (1 - 3)  aluminum hydroxide/magnesium hydroxide/simethicone Suspension 30 milliLiter(s) Oral every 4 hours PRN Dyspepsia  melatonin 3 milliGRAM(s) Oral at bedtime PRN Insomnia  ondansetron Injectable 4 milliGRAM(s) IV Push every 8 hours PRN Nausea and/or Vomiting      HOME MEDICATIONS:  Home Medications:  allopurinol 100 mg oral tablet: 1 tab(s) orally 2 times a day (02 Jan 2023 17:58)  Aspir 81 oral delayed release tablet: 1 tab(s) orally once a day (02 Jan 2023 17:58)  calcium (as carbonate and lactate)-vitamin D 200 mg-6.25 mcg oral tablet: 2 tab(s) orally 2 times a day (02 Jan 2023 17:58)  donepezil 10 mg oral tablet: 1 tab(s) orally once a day (at bedtime) (02 Jan 2023 17:58)  DULoxetine 60 mg oral delayed release capsule: 1 cap(s) orally once a day (02 Jan 2023 17:58)  Entresto 24 mg-26 mg oral tablet: 1 tab(s) orally 2 times a day (02 Jan 2023 18:41)  ferrous sulfate 325 mg (65 mg elemental iron) oral tablet: orally once a day (02 Jan 2023 17:58)  folic acid 1 mg oral tablet: 1 tab(s) orally once a day (02 Jan 2023 17:58)  levothyroxine 50 mcg (0.05 mg) oral tablet: 1 tab(s) orally once a day (02 Jan 2023 17:58)  metoprolol succinate 50 mg oral tablet, extended release: 1 tab(s) orally 2 times a day (02 Jan 2023 17:58)  Orencia 125 mg/mL subcutaneous solution: 1 dose(s) subcutaneous once a week on Tuesdays (02 Jan 2023 17:58)  oxybutynin 10 mg/24 hr oral tablet, extended release: 1 tab(s) orally 2 times a day (02 Jan 2023 17:58)      VITALS:   T(F): 96.3 (01-05 @ 14:57), Max: 96.8 (01-03 @ 05:37)  HR: 61 (01-05 @ 14:57) (61 - 84)  BP: 165/69 (01-05 @ 14:57) (105/52 - 165/69)  BP(mean): --  RR: 18 (01-05 @ 14:57) (18 - 18)  SpO2: 96% (01-03 @ 13:05) (92% - 96%)    I&O's Summary    04 Jan 2023 07:01  -  05 Jan 2023 07:00  --------------------------------------------------------  IN: 0 mL / OUT: 400 mL / NET: -400 mL        REVIEW OF SYSTEMS:  CONSTITUTIONAL: No weakness, fevers or chills  EYES: No visual changes  ENT: No vertigo or throat pain   NECK: No pain or stiffness  RESPIRATORY: No cough, wheezing, hemoptysis; No shortness of breath  CARDIOVASCULAR: No chest pain or palpitations  GASTROINTESTINAL: No abdominal or epigastric pain. No nausea, vomiting, or hematemesis; No diarrhea or constipation. No melena or hematochezia.  GENITOURINARY: No dysuria, frequency or hematuria  NEUROLOGICAL: No numbness or weakness  SKIN: No itching, no rashes  MSK: no    PHYSICAL EXAM:  NEURO: patient is awake , alert and oriented  GEN: Not in acute distress  NECK: no thyroid enlargement, no JVD  LUNGS: Clear to auscultation bilaterally   CARDIOVASCULAR: S1/S2 present, RRR , no murmurs or rubs, no carotid bruits,  + PP bilaterally  ABD: Soft, non-tender, non-distended, +BS  EXT: No KIMBERLY  SKIN: Intact    LABS:                        10.0   2.64  )-----------( 141      ( 05 Jan 2023 08:35 )             32.6     01-05    136  |  96<L>  |  32<H>  ----------------------------<  108<H>  3.8   |  33<H>  |  0.9    Ca    9.3      05 Jan 2023 08:35        Sedimentation Rate, Erythrocyte: 126 mm/Hr *H* (01-05-23 @ 08:35)          Troponin trend:            RADIOLOGY:  `    ECG:    TELEMETRY EVENTS:

## 2023-01-05 NOTE — PROGRESS NOTE ADULT - SUBJECTIVE AND OBJECTIVE BOX
MEDICINE ATTENDING PROGRESS NOTE  82 year-old female, past medical history of Dementia, Hypertension, Hyperlipidemia, Peripheral artery disease: chronic lower extremity leg pains, CHF p/w failure to thrive, worsening dementia at baseline, found to have left second toe ulcer. Patient is admitted for further management    Interval/Overnight events:  01/05/23: Seen and examined patient at bedside. No acute complaints. Wants ensure. Vitals stable. Exam unchanged. Labs significant for wbc 2.64 PMN 74, . MRI reveals OM of the foot. Will complete SHOAIB today. Plan for amputation vs bone dbx w arthroplasty of left second toe tomorrow AM. NPO at MN. Pending UA    01/04/23: 01/04/23: Seen and examined patient at bedside. No acute complaints. Vitals stable. Exam unchanged. Labs significant for ESR:  130 > 130 and CRP: 127. There was no leukocytosis. WCx: pending. XR foot inconclusive for OM. US Doppler: neg for DVT. Pending MRI to rule out OM. Will defer abx for now. Will send UA to r/o UTI given acute change in baseline. ID and Podiatry on the case. Planning Surgery scheduled for Fri 1/6 with podiatry: amputation vs bone dbx w arthroplasty of left second toe. Patient needs SHOAIB tomorrow; NPO MN 1/5. Will get Wound Care RN for wound finding on exam.    ROS:   General: denies fever, chills, insomnia, depression, weight change  Skin: denies itch, jaundice   Resp: denies cough, pleuritic chest pain, wheezing   CV: denies PND  GI: denies N/V/D constipation   : denies d/c, itching, hematuria, dysuria   EXT: denies pain, swelling, erythema   Musculoskeletal: denies low back pain, joint pain, swelling   Neuro: denies headache, weakness, syncope    MEDICATIONS  (STANDING):  allopurinol 100 milliGRAM(s) Oral two times a day  ascorbic acid 500 milliGRAM(s) Oral daily  aspirin enteric coated 81 milliGRAM(s) Oral daily  calcium carbonate 1250 mG  + Vitamin D (OsCal 500 + D) 1 Tablet(s) Oral two times a day  chlorhexidine 2% Cloths 1 Application(s) Topical <User Schedule>  clopidogrel Tablet 75 milliGRAM(s) Oral daily  donepezil 10 milliGRAM(s) Oral at bedtime  DULoxetine 60 milliGRAM(s) Oral daily  enoxaparin Injectable 40 milliGRAM(s) SubCutaneous every 24 hours  ferrous    sulfate 325 milliGRAM(s) Oral daily  folic acid 1 milliGRAM(s) Oral daily  furosemide    Tablet 40 milliGRAM(s) Oral daily  levothyroxine 50 MICROGram(s) Oral daily  metoprolol succinate ER 50 milliGRAM(s) Oral daily  oxybutynin 10 milliGRAM(s) Oral at bedtime  pantoprazole    Tablet 40 milliGRAM(s) Oral before breakfast  sacubitril 24 mG/valsartan 26 mG 1 Tablet(s) Oral two times a day  zinc sulfate 220 milliGRAM(s) Oral daily    MEDICATIONS  (PRN):  acetaminophen     Tablet .. 650 milliGRAM(s) Oral every 6 hours PRN Temp greater or equal to 38C (100.4F), Mild Pain (1 - 3)  aluminum hydroxide/magnesium hydroxide/simethicone Suspension 30 milliLiter(s) Oral every 4 hours PRN Dyspepsia  melatonin 3 milliGRAM(s) Oral at bedtime PRN Insomnia  ondansetron Injectable 4 milliGRAM(s) IV Push every 8 hours PRN Nausea and/or Vomiting    RADIOLOGY  MRI Foot  IMPRESSION:  1.  Septic arthritis/osteomyelitis of the second PIP joint underlying an   ulcer.  2.  Probable nearly healed fracture of the fifth metatarsal neck. A   developing stress fracture could also have this appearance.  3.  Limited motion degraded exam.    VITALS  T(F): 95.8 (01-04-23 @ 16:54), Max: 96.8 (01-03-23 @ 20:53)  HR: 71 (01-04-23 @ 16:54) (65 - 84)  BP: 143/63 (01-04-23 @ 16:54) (143/63 - 160/71)  RR: 18 (01-04-23 @ 16:54) (18 - 18)  SpO2: --    PHYSICAL EXAM  Gen- NAD, AAOx1, Cachectic overall,   HEENT- no pallor, anicteric, moist mucous membranes  CVS- S1/S2, no m/r/g  Chest- CTA b/l no w/r/c, no use of accessory muscles, good inspiratory effort, speaking in full sentences  Abd- soft, nt, nd  Derm: Dorsal left second toe ulcer at IPJ with overlying hyperkeratotic tissue, Purulent drainage noted, erythema and swelling noted to entire second toe. probing to bone  Vascular: DP and PT Pulses non palpable B/L ; Foot is Warm to cool to touch; slight mottled skin changes noted left foot    Neuro: Gross and light touch sensation intact  MSK: Pain On Palpation at Wound Site, hammertoe deformity of all left foot lesser digits and toes 3-5 R foot  Skin: Multiple skin abrasions, Small area redness coccyx region, Ecchymosis left side of mouth  Neuro-CN II-XII intact;    LABS/IMAGING  138  |  97<L>  |  32<H>  ----------------------------<  103<H>  3.8   |  31  |  0.9    Ca    9.3      04 Jan 2023 08:22  Mg     1.8     01-03    TPro  7.2  /  Alb  2.5<L>  /  TBili  0.5  /  DBili  x   /  AST  20  /  ALT  9   /  AlkPhos  99  01-03    LIVER FUNCTIONS - ( 03 Jan 2023 05:56 )  Alb: 2.5 g/dL / Pro: 7.2 g/dL / ALK PHOS: 99 U/L / ALT: 9 U/L / AST: 20 U/L / GGT: x                               9.8    3.09  )-----------( 124      ( 04 Jan 2023 08:22 )             31.9   MICROBIOLOGY  -  WCx: pending.  - UA: sent    RADIOLOGY  US Doppler: neg for DVT    XR foot  1. No radiographic evidence of osteomyelitis.  2.  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 may represent   the sequela of inflammatory arthropathy.     MEDICINE ATTENDING PROGRESS NOTE  82 year-old female, past medical history of Dementia, Hypertension, Hyperlipidemia, Peripheral artery disease: chronic lower extremity leg pains, CHF p/w failure to thrive, worsening dementia at baseline, found to have left second toe ulcer. Patient is admitted for further management    Interval/Overnight events:  01/05/23: Seen and examined patient at bedside. No acute complaints. Wants ensure. Vitals stable. Exam unchanged. Labs significant for wbc 2.64 PMN 74, . MRI reveals OM of the foot. Will complete SHOAIB today. Plan for amputation vs bone dbx w arthroplasty of left second toe tomorrow AM. NPO at MN. Pending UA    01/04/23: 01/04/23: Seen and examined patient at bedside. No acute complaints. Vitals stable. Exam unchanged. Labs significant for ESR:  130 > 130 and CRP: 127. There was no leukocytosis. WCx: pending. XR foot inconclusive for OM. US Doppler: neg for DVT. Pending MRI to rule out OM. Will defer abx for now. Will send UA to r/o UTI given acute change in baseline. ID and Podiatry on the case. Planning Surgery scheduled for Fri 1/6 with podiatry: amputation vs bone dbx w arthroplasty of left second toe. Patient needs SHOAIB tomorrow; NPO MN 1/5. Will get Wound Care RN for wound finding on exam.    ROS:   General: denies fever, chills, insomnia, depression, weight change  Skin: denies itch, jaundice   Resp: denies cough, pleuritic chest pain, wheezing   CV: denies PND  GI: denies N/V/D constipation   : denies d/c, itching, hematuria, dysuria   EXT: denies pain, swelling, erythema   Musculoskeletal: denies low back pain, joint pain, swelling   Neuro: denies headache, weakness, syncope    MEDICATIONS  (STANDING):  allopurinol 100 milliGRAM(s) Oral two times a day  ascorbic acid 500 milliGRAM(s) Oral daily  aspirin enteric coated 81 milliGRAM(s) Oral daily  calcium carbonate 1250 mG  + Vitamin D (OsCal 500 + D) 1 Tablet(s) Oral two times a day  chlorhexidine 2% Cloths 1 Application(s) Topical <User Schedule>  clopidogrel Tablet 75 milliGRAM(s) Oral daily  donepezil 10 milliGRAM(s) Oral at bedtime  DULoxetine 60 milliGRAM(s) Oral daily  enoxaparin Injectable 40 milliGRAM(s) SubCutaneous every 24 hours  ferrous    sulfate 325 milliGRAM(s) Oral daily  folic acid 1 milliGRAM(s) Oral daily  furosemide    Tablet 40 milliGRAM(s) Oral daily  levothyroxine 50 MICROGram(s) Oral daily  metoprolol succinate ER 50 milliGRAM(s) Oral daily  oxybutynin 10 milliGRAM(s) Oral at bedtime  sacubitril 24 mG/valsartan 26 mG 1 Tablet(s) Oral two times a day  zinc sulfate 220 milliGRAM(s) Oral daily    MEDICATIONS  (PRN):  acetaminophen     Tablet .. 650 milliGRAM(s) Oral every 6 hours PRN Temp greater or equal to 38C (100.4F), Mild Pain (1 - 3)  aluminum hydroxide/magnesium hydroxide/simethicone Suspension 30 milliLiter(s) Oral every 4 hours PRN Dyspepsia  melatonin 3 milliGRAM(s) Oral at bedtime PRN Insomnia  ondansetron Injectable 4 milliGRAM(s) IV Push every 8 hours PRN Nausea and/or Vomiting    VITALS  T(F): 96.2 (01-04-23 @ 20:47), Max: 96.2 (01-04-23 @ 20:47)  HR: 63 (01-04-23 @ 20:47) (63 - 71)  BP: 147/65 (01-04-23 @ 20:47) (143/63 - 147/65)  RR: 18 (01-04-23 @ 20:47) (18 - 18)  SpO2: --      PHYSICAL EXAM  Gen- NAD, AAOx1, Cachectic overall,   HEENT- no pallor, anicteric, moist mucous membranes  CVS- S1/S2, no m/r/g  Chest- CTA b/l no w/r/c, no use of accessory muscles, good inspiratory effort, speaking in full sentences  Abd- soft, nt, nd  Derm: Dorsal left second toe ulcer at IPJ with overlying hyperkeratotic tissue, Purulent drainage noted, erythema and swelling noted to entire second toe. probing to bone  Vascular: DP and PT Pulses non palpable B/L ; Foot is Warm to cool to touch; slight mottled skin changes noted left foot    Neuro: Gross and light touch sensation intact  MSK: Pain On Palpation at Wound Site, hammertoe deformity of all left foot lesser digits and toes 3-5 R foot  Skin: Multiple skin abrasions, Small area redness coccyx region, Ecchymosis left side of mouth  Neuro-CN II-XII intact;    LABS/IMAGING  01-05    136  |  96<L>  |  32<H>  ----------------------------<  108<H>  3.8   |  33<H>  |  0.9    Ca    9.3      05 Jan 2023 08:35                         10.0   2.64  )-----------( 141      ( 05 Jan 2023 08:35 )             32.6     MICROBIOLOGY  -  WCx: pending.  - UA: sent    RADIOLOGY  US Doppler: neg for DVT    XR foot  1. No radiographic evidence of osteomyelitis.  2.  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 may represent   the sequela of inflammatory arthropathy.    MRI Foot  IMPRESSION:  1.  Septic arthritis/osteomyelitis of the second PIP joint underlying an   ulcer.  2.  Probable nearly healed fracture of the fifth metatarsal neck. A   developing stress fracture could also have this appearance.  3.  Limited motion degraded exam.    TTE (12/2022)  Summary:   1. Moderately decreased global left ventricular systolic function.   2. LV Ejection Fraction by Rice's Method with a biplane EF of 35 %.   3. The left ventricular diastolic function could not be assessed in this study.   4. Mild to moderately increased left ventricular internal cavity size.   5. Mildlyenlarged left atrium. LA volume Index is 35.2 ml/m².   6. Normal right atrial size.   7. Sclerotic aortic valve with decreased opening.   8. Moderate aortic regurgitation.   9. Moderate mitral annular calcification.  10. Moderate thickening and calcification of the anterior and posterior mitral valve leaflets.  11. Severe mitral valve regurgitation.  12. Moderate pulmonic valve regurgitation.  13. Adequate TR velocity was not obtained to accurately assess RVSP.  14. There is no evidence of pericardial effusion.  15. Compared to previous TTE in 09/2022, EF is reduced.    EKG (12/22): Sinus rhythm with occasional Premature ventricular complexes. Left ventricular hypertrophy with repolarization abnormality. QTC Calculation(Bazett) 409 ms

## 2023-01-05 NOTE — PROGRESS NOTE ADULT - SUBJECTIVE AND OBJECTIVE BOX
PROGRESS NOTE   Pt seen @ bedside during AM rounds.  Dressing +Clean, +Dry, + Intact. Daughter at bedside      Vital Signs Last 24 Hrs  T(C): 35.7 (04 Jan 2023 20:47), Max: 35.7 (04 Jan 2023 20:47)  T(F): 96.2 (04 Jan 2023 20:47), Max: 96.2 (04 Jan 2023 20:47)  HR: 63 (04 Jan 2023 20:47) (63 - 71)  BP: 147/65 (04 Jan 2023 20:47) (143/63 - 147/65)  BP(mean): --  RR: 18 (04 Jan 2023 20:47) (18 - 18)  SpO2: --                              10.0   2.64  )-----------( 141      ( 05 Jan 2023 08:35 )             32.6               01-05    136  |  96<L>  |  32<H>  ----------------------------<  108<H>  3.8   |  33<H>  |  0.9    Ca    9.3      05 Jan 2023 08:35    < from: MR Foot w/ IV Cont, Left (01.04.23 @ 15:36) >    ACC: [id number] EXAM:  MR FOOT IC LT                          PROCEDURE DATE:  01/04/2023          INTERPRETATION:  MR FOOT WITH IV CONTRAST LEFT dated 1/4/2023    INDICATION: Second toe ulceration    TECHNIQUE: Multiplanar, multisequence MR imaging of the left forefoot was   performed without and with intravenous contrast. 5 cc of Gadavist was   administered (5 cc discarded).    COMPARISON: Left foot radiographs dated 1/2/2023.    FINDINGS:    Motion degraded exam rendering some sequences nondiagnostic.    Underlying an ulcer at the dorsal aspect of the second proximal   interphalangeal joint there is septic arthritis and surrounding   osteomyelitis.    No abscess is seen. Linear edema at the fifth metatarsal neck with mild   deformity is suggestive of a nearly healed fracture. There are claw toe   deformities of the second through fifth rays. Mild flexion of the   interphalangeal joint of the great toe is also noted.    There is no evidence of acute tendon tear. There is mild edema and   atrophy of the intrinsic musculature of the foot. There is no evidence of   Lisfranc ligament injury.    IMPRESSION:  1.  Septic arthritis/osteomyelitis of the second PIP joint underlying an   ulcer.  2.  Probable nearly healed fracture of the fifthmetatarsal neck. A   developing stress fracture could also have this appearance.  3.  Limited motion degraded exam.    --- End of Report ---            DEANA HENDERSON MD; Attending Radiologist  This document has been electronically signed. Jan 5 2023  8:14AM    < end of copied text >      PHYSICAL EXAM  Physical Exam    Derm: Dorsal left second toe ulcer at IPJ with bone exposed, erythema and swelling noted to entire second toe. probing to bone  Vascular: DP and PT Pulses non palpable B/L ; Foot is Warm to cool to touch; slight mottled skin changes noted left foot    Neuro: Gross and light touch sensation intact  MSK: Pain On Palpation at Wound Site, hammertoe deformity of all left foot lesser digits and toes 3-5 R foot.     Assessment:  Left foot second digit with signs of infection, probing to bone - clinically positive for Osteomyelitis  Cellulitic left second toe  Hammertoes digits 2-5, left foot    Plan:  Chart reviewed and Patient evaluated.   MRI results discussed with patient and her daughter Ela - see report   Discussed surgical options with patient and her daughter in length. All risks benefits and potential complications discussed. Pt and daughter agree on removal of infection bone with correction of deformity Pt was made aware that she might need IV abx for 6 weeks depending on bone cultures that will be taken in OR   Due to pt vascular status pt is at risk of non healing the surgical site with risk of toe amputation  Recommend vascular follow up   Due to patients cardiac hx pt need cardio clearance - please follow up with Dr. Childs   Please make pt NPO MN   Please optimize lab values prior to OR      Podiatry

## 2023-01-05 NOTE — CONSULT NOTE ADULT - SUBJECTIVE AND OBJECTIVE BOX
HPI:   Patient is a  82-year-old female, past medical history of Dementia, Hypertension, Hyperlipidemia, Peripheral artery disease: chronic lower extremity leg pains, CHF:  presenting to ER for failure to thrive.  Daughter reports poor PO intake for the past 2 days with worsening of baseline dementia:  " acting up today"  -  denies fever or chills.  - Admitted with Dx of CHF about 1.5 weeks ago and discharged home  - baseline cachectic state with scabs over her body which she continually scratches.    Cardiologist: Dr Childs (02 Jan 2023 18:36)      PAST MEDICAL & SURGICAL HISTORY:  Hypothyroid  Hypertension  RA (rheumatoid arthritis)  High cholesterol  Poor circulation  PAD  Incontinence  Dementia  Rheumatoid arthritis  Osteopenia  Melanoma of skin  Depression with anxiety  Bilateral cataracts  Peripheral arterial disease  (s/p B/L leg stents &amp; on Plavix)  History of cirrhosis of liver  History of chronic CHF  History of hip surgery  Left ORIF and Left ORIF femur  H/O: hysterectomy        REVIEW OF SYSTEMS: All other systems negative, unless indiacted    MEDICATIONS  (STANDING):  allopurinol 100 milliGRAM(s) Oral two times a day  ascorbic acid 500 milliGRAM(s) Oral daily  aspirin enteric coated 81 milliGRAM(s) Oral daily  calcium carbonate 1250 mG  + Vitamin D (OsCal 500 + D) 1 Tablet(s) Oral two times a day  chlorhexidine 2% Cloths 1 Application(s) Topical <User Schedule>  clopidogrel Tablet 75 milliGRAM(s) Oral daily  donepezil 10 milliGRAM(s) Oral at bedtime  DULoxetine 60 milliGRAM(s) Oral daily  enoxaparin Injectable 40 milliGRAM(s) SubCutaneous every 24 hours  ferrous    sulfate 325 milliGRAM(s) Oral daily  folic acid 1 milliGRAM(s) Oral daily  furosemide    Tablet 40 milliGRAM(s) Oral daily  levothyroxine 50 MICROGram(s) Oral daily  metoprolol succinate ER 50 milliGRAM(s) Oral daily  oxybutynin 10 milliGRAM(s) Oral at bedtime  sacubitril 24 mG/valsartan 26 mG 1 Tablet(s) Oral two times a day  zinc sulfate 220 milliGRAM(s) Oral daily    MEDICATIONS  (PRN):  acetaminophen     Tablet .. 650 milliGRAM(s) Oral every 6 hours PRN Temp greater or equal to 38C (100.4F), Mild Pain (1 - 3)  aluminum hydroxide/magnesium hydroxide/simethicone Suspension 30 milliLiter(s) Oral every 4 hours PRN Dyspepsia  melatonin 3 milliGRAM(s) Oral at bedtime PRN Insomnia  ondansetron Injectable 4 milliGRAM(s) IV Push every 8 hours PRN Nausea and/or Vomiting      Allergies    Keflex (Unknown)  losartan (Unknown)  morphine (Unknown)  Rocephin (Unknown)  sulfamethoxazole (Hives)    Intolerances    SOCIAL HISTORY:  Lives at home     FAMILY HISTORY:   No pertinent family history    PHYSICAL EXAM:  Vital Signs Last 24 Hrs  T(C): 35.7 (04 Jan 2023 20:47), Max: 35.7 (04 Jan 2023 20:47)  T(F): 96.2 (04 Jan 2023 20:47), Max: 96.2 (04 Jan 2023 20:47)  HR: 63 (04 Jan 2023 20:47) (63 - 71)  BP: 147/65 (04 Jan 2023 20:47) (143/63 - 147/65)  BP(mean): --  RR: 18 (04 Jan 2023 20:47) (18 - 18)  SpO2: --    General: denies fever, chills, insomnia, depression, weight change  Skin: denies itch, jaundice   Resp: denies cough, pleuritic chest pain, wheezing   CV: denies PND  GI: denies N/V/D constipation   : denies d/c, itching, hematuria, dysuria   EXT: denies pain, swelling, erythema   Musculoskeletal: denies low back pain, joint pain, swelling   Neuro: denies headache, weakness, syncope        LABS/ CULTURES/ RADIOLOGY:                        10.0   2.64  )-----------( 141      ( 05 Jan 2023 08:35 )             32.6       136  |  96  |  32  ----------------------------<  108      [01-05-23 @ 08:35]  3.8   |  33  |  0.9        Ca     9.3     [01-05-23 @ 08:35]

## 2023-01-05 NOTE — PROGRESS NOTE ADULT - ASSESSMENT
A/P: 83 y/o female admitted with failure to thrive, poor PO intake, and ulceration left 2nd toe    #Adult failure to thrive in setting of Dementia  #Dementia  #Acute Change in Mental Status, r/o UTI  - f/u UA to UTI that may contribute to AMS  - c/w easy to chew diet; Liquid supplement  - c/w Vit C and Zinc for wound healing.  - continue Donepezil  - dietary consult  - PEG placement not indicated at this time    #Foot ulceration in setting of h/o PVD, r/o OM  #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  - O/E: Sharp bedside excisional debridement of fibrotic/ necrotic tissue  left second toe ulcer to layer of tendon- purulence noted with bone exposure   - There is no leukocytosis or left shift  - ESR:  130 > 130 > 126  - CRP: 127 > trend  - WCx: pending  - radiographic evidence of osteomyelitis.  - MRI: OM  - Planning Surgery scheduled for Fri 1/6 with podiatry: amputation vs bone dbx w arthroplasty of left second toe  - Patient needs SHOAIB tomorrow; NPO MN 1/5  - ID on the case; recommend to watch off abx unless there is evidence of OM    #Multiple skin abrasions  #Small area redness coccyx region   #Ecchymosis left side of mouth  - Wound Care RN consult    #Hypothyroid.   - continue Synthroid.    #History of chronic CHF.   - continue Entresto, Metoprolol, on lasix  - follow BP.     #History of peripheral vascular disease.   - continue ASA/Plavix  - US Doppler: neg    #SUPPORTIVE MANAGEMENT/DISPO  - Dispo: until medically cleared  - DVT Ppx: Lovenox  - GI Ppx: not indicated  - Diet: easy chew    Spent over 35 min reviewing chart and on coordinating patient care during interdisciplinary rounds     Elton Howard M.D./[place]  Attending Physician

## 2023-01-05 NOTE — DIETITIAN NUTRITION RISK NOTIFICATION - PHYSICAL ASSESSMENT TEMPLES
Jonnie Liriano discharged to home accompanied by family member.   Patient provided with the following educational materials upon discharge:  AVS discharge summary regarding pain management, constipation prevention, and effects of narcotics causing constipation, new medication information.   Valuables and belongings sent with patient.   discharge summary, discharge instructions, medications and follow up appointments reviewed with patient and family member.  Patient and family member verbalized understanding. IV and tele removed. Patient ambulated by self to OP pharmacy to  new medications.    severe

## 2023-01-05 NOTE — PRE PROCEDURE NOTE - PRE PROCEDURE EVALUATION
HPI  82 year-old female, past medical history of Dementia, Hypertension, Hyperlipidemia, Peripheral artery disease: chronic lower extremity leg pains, CHF p/w failure to thrive, worsening dementia at baseline, found to have left second toe ulcer. Patient is planned for amputation vs bone dbx w arthroplasty of left second toe under GA by Dr Luna Winters. At baseline, patient could walk short distances with moderate ET.  Denies CP, SOB, palpitation, chills, n/v.    ROS:   General: denies fever, chills, insomnia, depression, weight change  Skin: denies itch, jaundice   Resp: denies cough, pleuritic chest pain, wheezing   CV: denies PND  GI: denies N/V/D constipation   : denies d/c, itching, hematuria, dysuria   EXT: denies pain, swelling, erythema   Musculoskeletal: denies low back pain, joint pain, swelling   Neuro: denies headache, weakness, syncope    MEDICATIONS  (STANDING):  allopurinol 100 milliGRAM(s) Oral two times a day  ascorbic acid 500 milliGRAM(s) Oral daily  aspirin enteric coated 81 milliGRAM(s) Oral daily  calcium carbonate 1250 mG  + Vitamin D (OsCal 500 + D) 1 Tablet(s) Oral two times a day  chlorhexidine 2% Cloths 1 Application(s) Topical <User Schedule>  clopidogrel Tablet 75 milliGRAM(s) Oral daily  donepezil 10 milliGRAM(s) Oral at bedtime  DULoxetine 60 milliGRAM(s) Oral daily  enoxaparin Injectable 40 milliGRAM(s) SubCutaneous every 24 hours  ferrous    sulfate 325 milliGRAM(s) Oral daily  folic acid 1 milliGRAM(s) Oral daily  furosemide    Tablet 40 milliGRAM(s) Oral daily  levothyroxine 50 MICROGram(s) Oral daily  metoprolol succinate ER 50 milliGRAM(s) Oral daily  oxybutynin 10 milliGRAM(s) Oral at bedtime  sacubitril 24 mG/valsartan 26 mG 1 Tablet(s) Oral two times a day  zinc sulfate 220 milliGRAM(s) Oral daily    MEDICATIONS  (PRN):  acetaminophen     Tablet .. 650 milliGRAM(s) Oral every 6 hours PRN Temp greater or equal to 38C (100.4F), Mild Pain (1 - 3)  aluminum hydroxide/magnesium hydroxide/simethicone Suspension 30 milliLiter(s) Oral every 4 hours PRN Dyspepsia  melatonin 3 milliGRAM(s) Oral at bedtime PRN Insomnia  ondansetron Injectable 4 milliGRAM(s) IV Push every 8 hours PRN Nausea and/or Vomiting    VITALS  T(F): 96.2 (01-04-23 @ 20:47), Max: 96.2 (01-04-23 @ 20:47)  HR: 63 (01-04-23 @ 20:47) (63 - 71)  BP: 147/65 (01-04-23 @ 20:47) (143/63 - 147/65)  RR: 18 (01-04-23 @ 20:47) (18 - 18)  SpO2: --      PHYSICAL EXAM  Gen- NAD, AAOx1, Cachectic overall,   HEENT- no pallor, anicteric, moist mucous membranes  CVS- S1/S2, no m/r/g  Chest- CTA b/l no w/r/c, no use of accessory muscles, good inspiratory effort, speaking in full sentences  Abd- soft, nt, nd  Derm: Dorsal left second toe ulcer at IPJ with overlying hyperkeratotic tissue, Purulent drainage noted, erythema and swelling noted to entire second toe. probing to bone  Vascular: DP and PT Pulses non palpable B/L ; Foot is Warm to cool to touch; slight mottled skin changes noted left foot    Neuro: Gross and light touch sensation intact  MSK: Pain On Palpation at Wound Site, hammertoe deformity of all left foot lesser digits and toes 3-5 R foot  Skin: Multiple skin abrasions, Small area redness coccyx region, Ecchymosis left side of mouth  Neuro-CN II-XII intact;    LABS/IMAGING  01-05    136  |  96<L>  |  32<H>  ----------------------------<  108<H>  3.8   |  33<H>  |  0.9    Ca    9.3      05 Jan 2023 08:35                         10.0   2.64  )-----------( 141      ( 05 Jan 2023 08:35 )             32.6       RADIOLOGY  TTE (12/2022)  Summary:   1. Moderately decreased global left ventricular systolic function.   2. LV Ejection Fraction by Rice's Method with a biplane EF of 35 %.   3. The left ventricular diastolic function could not be assessed in this study.   4. Mild to moderately increased left ventricular internal cavity size.   5. Mildlyenlarged left atrium. LA volume Index is 35.2 ml/m².   6. Normal right atrial size.   7. Sclerotic aortic valve with decreased opening.   8. Moderate aortic regurgitation.   9. Moderate mitral annular calcification.  10. Moderate thickening and calcification of the anterior and posterior mitral valve leaflets.  11. Severe mitral valve regurgitation.  12. Moderate pulmonic valve regurgitation.  13. Adequate TR velocity was not obtained to accurately assess RVSP.  14. There is no evidence of pericardial effusion.  15. Compared to previous TTE in 09/2022, EF is reduced.    EKG (12/22): Sinus rhythm with occasional Premature ventricular complexes. Left ventricular hypertrophy with repolarization abnormality. QTC Calculation(Bazett) 409 ms    #Pre-Surgical Evaluation for arthroplasty of left second toe under GA by Dr Luna Winters  - Patient is clinically asymptomatic.  - Clinical cardiac predictor(s): Systolic CHF  - Functional Status: MODERATE   - Revised Cardiac Risk Index (RCRI) for Pre-Operative Risk: 1 pts (due to CHF) (6.0% 30-days risk of death, MI, or cardiac arrest)  - Vitals and labs noted ; Imaging reviewed.   - Will get preOp labs   - ASA -> can continue  - sacubitril 24 mG/valsartan 26 mG 1 Tablet(s) Oral two times a day -> can continue  - Statin -> continue  - Plan of care discussed with team and patient.  - Surgery Risk level:  low to Intermediate  - Cardiac Risk factor: moderate to high  - Medical clearance is pending anesthesia clearance

## 2023-01-06 NOTE — CONSULT NOTE ADULT - REASON FOR ADMISSION
Weakness with poor PO intake

## 2023-01-06 NOTE — CONSULT NOTE ADULT - CONSULT REQUESTED DATE/TIME
03-Jan-2023 09:05
05-Jan-2023 19:02
06-Jan-2023 15:38
03-Jan-2023 09:18
05-Jan-2023 13:22
03-Jan-2023 17:56

## 2023-01-06 NOTE — CONSULT NOTE ADULT - SUBJECTIVE AND OBJECTIVE BOX
ARIEL INIGUEZ [id number]  82y Female  4d    HPI:               82-year-old female, past medical history of Dementia, Hypertension, Hyperlipidemia, Peripheral artery disease: chronic lower extremity leg pains, CHF:  presenting to ER for failure to thrive.  Daughter reports poor PO intake for the past 2 days with worsening of baseline dementia:  " acting up today"  -  denies fever or chills.  - Admitted with Dx of CHF about 1.5 weeks ago and discharged home  - baseline cachectic state with scabs over her body which she continually scratches.    Cardiologist: Dr Childs (02 Jan 2023 18:36)    VASCULAR: Vascular consulted via podiatry to eval for PAD in this patient who is s/p left 2nd toe PIP arthropalsty/debridement/biopsy today.  LE arterial dopplers reviewed - see recommendations    PAST MEDICAL & SURGICAL HISTORY:  Hypothyroid      Hypertension      RA (rheumatoid arthritis)      High cholesterol      Poor circulation  PAD      Incontinence      Dementia      Rheumatoid arthritis      Osteopenia      Melanoma of skin      Depression with anxiety      Bilateral cataracts      Peripheral arterial disease  (s/p B/L leg stents &amp; on Plavix)      History of cirrhosis of liver      History of chronic CHF      History of hip surgery  Left ORIF and Left ORIF femur      H/O: hysterectomy            MEDICATIONS  (STANDING):  allopurinol 100 milliGRAM(s) Oral two times a day  ascorbic acid 500 milliGRAM(s) Oral daily  aspirin enteric coated 81 milliGRAM(s) Oral daily  calcium carbonate 1250 mG  + Vitamin D (OsCal 500 + D) 1 Tablet(s) Oral two times a day  chlorhexidine 2% Cloths 1 Application(s) Topical <User Schedule>  clopidogrel Tablet 75 milliGRAM(s) Oral daily  donepezil 10 milliGRAM(s) Oral at bedtime  DULoxetine 60 milliGRAM(s) Oral daily  enoxaparin Injectable 40 milliGRAM(s) SubCutaneous every 24 hours  ferrous    sulfate 325 milliGRAM(s) Oral daily  folic acid 1 milliGRAM(s) Oral daily  furosemide    Tablet 40 milliGRAM(s) Oral daily  lactated ringers. 1000 milliLiter(s) (80 mL/Hr) IV Continuous <Continuous>  levothyroxine 50 MICROGram(s) Oral daily  metoprolol succinate ER 50 milliGRAM(s) Oral daily  oxybutynin 10 milliGRAM(s) Oral at bedtime  sacubitril 24 mG/valsartan 26 mG 1 Tablet(s) Oral two times a day  zinc sulfate 220 milliGRAM(s) Oral daily    MEDICATIONS  (PRN):  acetaminophen     Tablet .. 650 milliGRAM(s) Oral every 6 hours PRN Temp greater or equal to 38C (100.4F), Mild Pain (1 - 3)  aluminum hydroxide/magnesium hydroxide/simethicone Suspension 30 milliLiter(s) Oral every 4 hours PRN Dyspepsia  melatonin 3 milliGRAM(s) Oral at bedtime PRN Insomnia  ondansetron Injectable 4 milliGRAM(s) IV Push every 8 hours PRN Nausea and/or Vomiting  ondansetron Injectable 4 milliGRAM(s) IV Push once PRN Nausea and/or Vomiting      Allergies    Keflex (Unknown)  losartan (Unknown)  morphine (Unknown)  Rocephin (Unknown)  sulfamethoxazole (Hives)    Intolerances        REVIEW OF SYSTEMS    [ ] A ten-point review of systems was otherwise negative except as noted.  [ ] Due to altered mental status/intubation, subjective information were not able to be obtained from the patient. History was obtained, to the extent possible, from review of the chart and collateral sources of information.      Vital Signs Last 24 Hrs  T(C): 36.8 (06 Jan 2023 13:48), Max: 36.8 (06 Jan 2023 05:05)  T(F): 98.3 (06 Jan 2023 05:05), Max: 98.3 (06 Jan 2023 05:05)  HR: 104 (06 Jan 2023 13:48) (71 - 104)  BP: 127/58 (06 Jan 2023 13:48) (127/58 - 137/64)  BP(mean): --  RR: 18 (06 Jan 2023 13:48) (18 - 18)  SpO2: 96% (06 Jan 2023 13:48) (96% - 96%)        PHYSICAL EXAM:  PER PODIATRY NOTE 1/5/23:   Left foot second digit with signs of infection, probing to bone - clinically positive for Osteomyelitis  Cellulitic left second toe  Hammertoes digits 2-5, left foot    LABS:                        9.7    3.30  )-----------( 144      ( 06 Jan 2023 06:23 )             31.4       Auto Neutrophil %: 78.6 % (01-06-23 @ 06:23)  Auto Immature Granulocyte %: 0.3 % (01-06-23 @ 06:23)    01-06    135  |  95<L>  |  28<H>  ----------------------------<  93  3.7   |  30  |  0.9      Calcium, Total Serum: 9.4 mg/dL (01-06-23 @ 06:23)      LFTs:         Coags:     12.30  ----< 1.08    ( 06 Jan 2023 06:23 )     43.8      Culture - Abscess with Gram Stain (collected 04 Jan 2023 14:00)  Source: .Abscess foot  Preliminary Report (06 Jan 2023 09:24):    Moderate Staphylococcus aureus    RADIOLOGY & ADDITIONAL STUDIES:  VA Duplex Lower Extrem Arterial, Bilat (01.02.23 @ 17:17) >  INTERPRETATION:  Clinical History / Reason for exam: This patient is a   82-year-old female with history of peripheral arterial disease. Lower   extremity arterial duplex ultrasound was performed to assess for arterial   occlusive disease.    The right external iliac, common femoral and superficial femoral,   popliteal, anterior tibial, posterior tibial and peroneal arteries are   visualized with no evidence of significant arterial occlusive disease.   There is a patent stent visualized in the right popliteal artery.    The left external iliac, common femoral, superficial femoral, popliteal,   anterior tibial, posterior tibial and peroneal arteries are visualized.   No evidence of significant arterial occlusive disease.    Impression:    Normal arterial flow in the bilateral lower extremities.      < end of copied text >

## 2023-01-06 NOTE — CONSULT NOTE ADULT - ASSESSMENT
81 y/o f pmh htn, hld, PAD, recently admitted w/ dx of new CHF 1.5wks ago now presents for failure to thrive found to have OM of the L foot. Cardiac risk stratification requested for amputation vs bone dbx w arthroplasty of left second toe.     ecg nsr w/ PVC, lvh w/ early repolarization abnormality  Last TTE 12/22 sev MR mod AR reduced EF 35% compared to previous study 55% in 09/2022    Plan  - Currently hd stable no chest pain or sob  - functional status indeterminate  - elevated cardiac risk given recent chf w/ low EF, recommend further ischemic workup if surgery under GA  - Adenocard thallium when stable  - f/u with Dr Childs
Assessment:  Patient received in bed,  A/O responds appropriately to verbal. Frail, mostly  bed bound, inconstance to stool and urine                        Admitted for management of weakness and poor po intake.                       High risk for pressure injury development or progression   Skin assessed- L second toe ulcer- seen by podiatry please see recs                        Sacrum and b/l buttock intact . Intact blanchable redness on protruding coccyx bone                           No pressure injury noted at time of assessment        Plan:  Wound and skin care recs  Continue   Pressure  injury  preventive  measures  Skin  and incontinence care    Assess wound and inform primary provider of any changes   Case discussed with primary Rn  Wound/ ostomy specialist  to f/u as needed     Offloading: [ x] Frequent position changes [ ] Devices/Equipment  Cleansing: [ ] Saline [ ] Soap/Water [ ] Other: ______  Topicals: [x ] Barrier Cream [ ] Antimicrobial [ ] Enzymatic Wound Debridement  Dressings: [ ] Dry, sterile [ ] Allevyn  Foam [ ] Absorbant Pads [ ] Collagenase    Other Recs. - Per primary team        Total time for bedside assessment , review of medical records  and  discussion of plan of care with primary team greater than 35 min
A/P: 81 y/o female admitted with failure to thrive, poor PO intake, and ulceration left 2nd toe      LE arterial duplex 1/2/23: Normal arterial flow in the bilateral lower extremities.  -s/p podiatry procedure today  -no acute vascular intervention  - pt may F/u with Dr. Montes for further management/recommendations
IMPRESSION: Rehab of 82  y.o  f  rehab  for  debility      PRECAUTIONS: [  ] Cardiac  [  ] Respiratory  [  ] Seizures [  ] Contact Isolation  [  ] Droplet Isolation  [ FALL ] Other    Weight Bearing Status:     RECOMMENDATION:    Out of Bed to Chair     DVT/Decubiti Prophylaxis    REHAB PLAN:     [ x  ] Bedside P/T 3-5 times a week   [x   ]   Bedside O/T  2-3 times a week             [   ] No Rehab Therapy Indicated                   [   ]  Speech Therapy   Conditioning/ROM                                    ADL  Bed Mobility                                               Conditioning/ROM  Transfers                                                     Bed Mobility  Sitting /Standing Balance                         Transfers                                        Gait Training                                               Sitting/Standing Balance  Stair Training [   ]Applicable                    Home equipment Eval                                                                        Splinting  [   ] Only      GOALS:   ADL   [  x ]   Independent                    Transfers  [  x ] Independent                          Ambulation  [  x ] Independent     [ x   ] With device                            [ x  ]  CG                                                         [ x  ]  CG                                                                  [   ] CG                            [    ] Min A                                                   [   ] Min A                                                              [   ] Min  A          DISCHARGE PLAN:   [   ]  Good candidate for Intensive Rehabilitation/Hospital based-4A [facility]                                             Will tolerate 3hrs Intensive Rehab Daily                                       [   xx ]  Short Term Rehab in Skilled Nursing Facility and  [facility] care                                        [    ]  Home with Outpatient or  services                                         [    ]  Possible Candidate for Intensive Hospital based Rehab                                       
ASSESSMENT   82-year-old female, past medical history of Dementia, Hypertension, Hyperlipidemia, Peripheral artery disease: chronic lower extremity leg pains, CHF:  presenting to ER for failure to thrive    IMPRESSION  #Failure to Thrive   #Left 2nd toe ulcer -- swollen with eschar -- rule out OM   - Xray Foot AP + Lateral + Oblique, Left (01.02.23 @ 16:33):  No radiographic evidence of osteomyelitis.  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 may represent  the sequela of inflammatory arthropathy.    #Dementia  #PAD  #Obesity BMI (kg/m2): 52.5    #Abx allergy: Keflex (Unknown)  losartan (Unknown)  morphine (Unknown)  Rocephin (Unknown)  sulfamethoxazole (Hives)    RECOMMENDATIONS  - left second toe sausage digit/swelling with overlying ulcer - agree with MRI to rule out OM   - check ESR/CRP   - hold antibiotics for now as no evidence of sepsis     Please call or message on [employer] Teams if with any questions.  Spectra [phone]

## 2023-01-06 NOTE — CONSULT NOTE ADULT - CONSULT REASON
Pressure Injury assessment and treatment recommendation
cardiac risk stratification
weakness
r/o pad
Left 2nd toe ulcer
Weakness with poor PO intake

## 2023-01-06 NOTE — PROGRESS NOTE ADULT - ASSESSMENT
ASSESSMENT   82-year-old female, past medical history of Dementia, Hypertension, Hyperlipidemia, Peripheral artery disease: chronic lower extremity leg pains, CHF:  presenting to ER for failure to thrive    IMPRESSION  #Failure to Thrive   #Left 2nd toe ulcer with sepsis arthritis/OM of second PIP joint  - Xray Foot AP + Lateral + Oblique, Left (01.02.23 @ 16:33):  No radiographic evidence of osteomyelitis.  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 may represent  the sequela of inflammatory arthropathy.  - MR Foot w/ IV Cont, Left (01.04.23 @ 15:36):  Septic arthritis/osteomyelitis of the second PIP joint underlying an  ulcer. Probable nearly healed fracture of the fifthmetatarsal neck. A   developing stress fracture could also have this appearance. Limited motion degraded exam.  - Deep Tissue Cx growing Staph aureus     #Dementia  #PAD  #Obesity BMI (kg/m2): 52.5    #Abx allergy: Keflex (Unknown)  losartan (Unknown)  morphine (Unknown)  Rocephin (Unknown)  sulfamethoxazole (Hives)    RECOMMENDATIONS  - planned for amputation pending medical clearance   - after OR, can start Linezolid 600 mg q 12 hours   - follow-up Staph aureus susceptibilities in Wound Cx   - final course pending surgical course -- if unable to obtain grossly clear margins, will need prolonged coures     Please call or message on [employer] Teams if with any questions.  Spectra [phone]

## 2023-01-06 NOTE — PROGRESS NOTE ADULT - SUBJECTIVE AND OBJECTIVE BOX
HIRA, ARIEL  82y, Female  Allergy: Keflex (Unknown)  losartan (Unknown)  morphine (Unknown)  Rocephin (Unknown)  sulfamethoxazole (Hives)      LOS  4d    CHIEF COMPLAINT: Weakness with poor PO intake (05 Jan 2023 19:02)      INTERVAL EVENTS/HPI  - No acute events overnight  - T(F): , Max: 98.3 (01-06-23 @ 05:05)  - Denies any worsening symptoms  - Tolerating medication  - WBC Count: 3.30 (01-06-23 @ 06:23)  WBC Count: 2.64 (01-05-23 @ 08:35)     - Creatinine, Serum: 0.9 (01-06-23 @ 06:23)  Creatinine, Serum: 0.9 (01-05-23 @ 08:35)       ROS  General: Denies rigors, nightsweats  HEENT: Denies headache, rhinorrhea, sore throat, eye pain  CV: Denies CP, palpitations  PULM: Denies wheezing, hemoptysis  GI: Denies hematemesis, hematochezia, melena  : Denies discharge, hematuria  MSK: Denies arthralgias, myalgias  SKIN: Denies rash, lesions  NEURO: Denies paresthesias, weakness  PSYCH: Denies depression, anxiety    VITALS:  T(F): 98.3, Max: 98.3 (01-06-23 @ 05:05)  HR: 104  BP: 127/58  RR: 18Vital Signs Last 24 Hrs  T(C): 36.8 (06 Jan 2023 05:05), Max: 36.8 (06 Jan 2023 05:05)  T(F): 98.3 (06 Jan 2023 05:05), Max: 98.3 (06 Jan 2023 05:05)  HR: 104 (06 Jan 2023 05:05) (61 - 104)  BP: 127/58 (06 Jan 2023 05:05) (127/58 - 165/69)  BP(mean): --  RR: 18 (05 Jan 2023 21:16) (18 - 18)  SpO2: --        PHYSICAL EXAM:  Gen: NAD, resting in bed  HEENT: Normocephalic, atraumatic  Neck: supple, no lymphadenopathy  CV: Regular rate & regular rhythm  Lungs: decreased BS at bases, no fremitus  Abdomen: Soft, BS present  Ext: Warm, well perfused  Neuro: non focal, awake  Skin: no rash, no erythema  Lines: no phlebitis    FH: Non-contributory  Social Hx: Non-contributory    TESTS & MEASUREMENTS:                        9.7    3.30  )-----------( 144      ( 06 Jan 2023 06:23 )             31.4     01-06    135  |  95<L>  |  28<H>  ----------------------------<  93  3.7   |  30  |  0.9    Ca    9.4      06 Jan 2023 06:23              Culture - Abscess with Gram Stain (collected 01-04-23 @ 14:00)  Source: .Abscess foot  Preliminary Report (01-06-23 @ 09:24):    Moderate Staphylococcus aureus            INFECTIOUS DISEASES TESTING  COVID-19 PCR: NotDetec (01-02-23 @ 15:30)  COVID-19 PCR: NotDetec (09-19-22 @ 14:50)  COVID-19 PCR: NotDetec (09-16-22 @ 01:05)  COVID-19 PCR: NotDetec (09-09-22 @ 11:30)      INFLAMMATORY MARKERS  Sedimentation Rate, Erythrocyte: 130 mm/Hr (01-06-23 @ 06:23)  Sedimentation Rate, Erythrocyte: 126 mm/Hr (01-05-23 @ 08:35)  C-Reactive Protein, Serum: 70 mg/L (01-05-23 @ 08:35)  Sedimentation Rate, Erythrocyte: 130 mm/Hr (01-03-23 @ 15:45)      RADIOLOGY & ADDITIONAL TESTS:  I have personally reviewed the last available Chest xray  CXR      CT      CARDIOLOGY TESTING      MEDICATIONS  allopurinol 100 Oral two times a day  ascorbic acid 500 Oral daily  aspirin enteric coated 81 Oral daily  calcium carbonate 1250 mG  + Vitamin D (OsCal 500 + D) 1 Oral two times a day  chlorhexidine 2% Cloths 1 Topical <User Schedule>  clopidogrel Tablet 75 Oral daily  donepezil 10 Oral at bedtime  DULoxetine 60 Oral daily  enoxaparin Injectable 40 SubCutaneous every 24 hours  ferrous    sulfate 325 Oral daily  folic acid 1 Oral daily  furosemide    Tablet 40 Oral daily  levothyroxine 50 Oral daily  metoprolol succinate ER 50 Oral daily  oxybutynin 10 Oral at bedtime  sacubitril 24 mG/valsartan 26 mG 1 Oral two times a day  zinc sulfate 220 Oral daily      WEIGHT  Weight (kg): 110 (01-03-23 @ 16:16)  Creatinine, Serum: 0.9 mg/dL (01-06-23 @ 06:23)      ANTIBIOTICS:      All available historical records have been reviewed

## 2023-01-06 NOTE — CONSULT NOTE ADULT - NS ATTEND AMEND GEN_ALL_CORE FT
Delayed entry- I personally reviewed the arterial ultrasound and the study shows possibly adequate blood flow down to the ankles. Based on podiatry service, the bleeding intraoperatively during toe debridement, was not adequate.  She will undergo LLE angiogram and possible intervention on 1/10/2023.

## 2023-01-06 NOTE — PROGRESS NOTE ADULT - SUBJECTIVE AND OBJECTIVE BOX
Podiatry Progress Note    Subjective:   ARIEL INIGUEZ is a pleasant well-nourished, well developed 82y Female in mild distress, alert awake, and oriented to person, place and time.  Patient is a 82y old  Female who presents with a chief complaint of Weakness with poor PO intake (06 Jan 2023 15:38). Pt seen & evaluated at bedside. Sanguinous strikethrough to left foot bandages. Pt reports mild pain L foot. Pt denies N/V/F/C.       PAST MEDICAL & SURGICAL HISTORY:  Hypothyroid      Hypertension      RA (rheumatoid arthritis)      High cholesterol      Poor circulation  PAD      Incontinence      Dementia      Rheumatoid arthritis      Osteopenia      Melanoma of skin      Depression with anxiety      Bilateral cataracts      Peripheral arterial disease  (s/p B/L leg stents &amp; on Plavix)      History of cirrhosis of liver      History of chronic CHF      History of hip surgery  Left ORIF and Left ORIF femur      H/O: hysterectomy           Objective:  Vital Signs Last 24 Hrs  T(C): 35.9 (06 Jan 2023 17:35), Max: 36.8 (06 Jan 2023 05:05)  T(F): 96.7 (06 Jan 2023 17:35), Max: 98.3 (06 Jan 2023 05:05)  HR: 64 (06 Jan 2023 19:12) (60 - 104)  BP: 148/74 (06 Jan 2023 19:12) (127/58 - 173/72)  BP(mean): 97 (06 Jan 2023 15:34) (97 - 97)  RR: 18 (06 Jan 2023 17:35) (17 - 18)  SpO2: 95% (06 Jan 2023 16:04) (95% - 100%)    Parameters below as of 06 Jan 2023 15:34  Patient On (Oxygen Delivery Method): room air                            9.7    3.30  )-----------( 144      ( 06 Jan 2023 06:23 )             31.4                 01-06    135  |  95<L>  |  28<H>  ----------------------------<  93  3.7   |  30  |  0.9    Ca    9.4      06 Jan 2023 06:23      --------------      ACC: [id number] EXAM: DUPLEX LOW ARTERIES COMP BILAT  PROCEDURE DATE: 01/02/2023  INTERPRETATION: Clinical History / Reason for exam: This patient is a 82-year-old female with history of peripheral arterial disease. Lower extremity arterial duplex ultrasound was performed to assess for arterial occlusive disease.  The right external iliac, common femoral and superficial femoral, popliteal, anterior tibial, posterior tibial and peroneal arteries are visualized with no evidence of significant arterial occlusive disease. There is a patent stent visualized in the right popliteal artery.  The left external iliac, common femoral, superficial femoral, popliteal, anterior tibial, posterior tibial and peroneal arteries are visualized. No evidence of significant arterial occlusive disease.  Impression:  Normal arterial flow in the bilateral lower extremities.  ICD-10:I73.89  --- End of Report ---  BRIDGET HAWKINS MD; Vascular Fellow  This document has been electronically signed.  DEVANTE FRANK MD; Attending Vascular Surgeon  This document has been electronically signed. David 3 2023 4:29PM    ------------        ACC: [id number] EXAM: MR FOOT IC LT  PROCEDURE DATE: 01/04/2023  INTERPRETATION: MR FOOT WITH IV CONTRAST LEFT dated 1/4/2023  INDICATION: Second toe ulceration  TECHNIQUE: Multiplanar, multisequence MR imaging of the left forefoot was performed without and with intravenous contrast. 5 cc of Gadavist was administered (5 cc discarded).  COMPARISON: Left foot radiographs dated 1/2/2023.  FINDINGS:  Motion degraded exam rendering some sequences nondiagnostic.  Underlying an ulcer at the dorsal aspect of the second proximal interphalangeal joint there is septic arthritis and surrounding osteomyelitis.  No abscess is seen. Linear edema at the fifth metatarsal neck with mild deformity is suggestive of a nearly healed fracture. There are claw toe deformities of the second through fifth rays. Mild flexion of the interphalangeal joint of the great toe is also noted.  There is no evidence of acute tendon tear. There is mild edema and atrophy of the intrinsic musculature of the foot. There is no evidence of Lisfranc ligament injury.  IMPRESSION:  1. Septic arthritis/osteomyelitis of the second PIP joint underlying an ulcer.  2. Probable nearly healed fracture of the fifth metatarsal neck. A developing stress fracture could also have this appearance.  3. Limited motion degraded exam.  --- End of Report ---  DEANA HENDERSON MD; Attending Radiologist  This document has been electronically signed. Jan 5 2023 8:14AM    ------------      Physical Exam - Lower Extremity Focused:   #Left Lower Extremity  Derm:   Open Left Dorsal 2nd Digit Surgical Site Wound    -Skin edges well-coapted to periphery w/Nylon Suture;   -Central area open; Mild continuous active sanguinous drainage;    -No erythema/edema/purulence/malodor;   -Wound Probes to Bone  Skin to 2nd Digit soft, supple;    Vascular: DP and PT Pulses Diminished; Foot is Warm to Warm to the touch   Neuro: Protective Sensation Intact  MSK:    -Elijah 2-5   -s/p Arthroplasty 2nd PIPJ   -Pain On Palpation at Wound Site     Assessment:   Left Dorsal 2nd PIPJ Ulcer   -S/p Excisional Debridement of Soft Tissue & Bone w/Arthroplasty 2nd PIPJ, partially closed, Left Foot; DOS: 1/6/23    Plan:  Chart reviewed and Patient evaluated. All Questions and Concerns Addressed and Answered  Discussed diagnosis and treatment with patient  Wound Flushed w/ NS; Wound Packed w/ Surgicel; Dressed w/DSD / ABD / Kerlix / ACE;   Next wound eval Sun 1/8/23;  Elevate LLE when at rest;  Lower Extremity Arterial Duplex B/L; Normal flow B/L LE; See full report above;  /CRP 70;   MRI-Right Foot;  Septic arthritis/osteomyelitis of the second PIP joint underlying an ulcer ; See full report above;   ID Recs appreciated; Abx recs appreciated; if unable to obtain grossly clear margins, will need prolonged course  Per Attending, if OR proximal margin cx positive, recommend PICC;  Vascular Consult appreciated; No acute vascular intervention; F/u with Dr. Montes for further management/recommendations     Discussed Plan w/ Attending'    Podiatry

## 2023-01-06 NOTE — PROGRESS NOTE ADULT - SUBJECTIVE AND OBJECTIVE BOX
MEDICINE ATTENDING PROGRESS NOTE  82 year-old female, past medical history of Dementia, Hypertension, Hyperlipidemia, Peripheral artery disease: chronic lower extremity leg pains, CHF p/w failure to thrive, worsening dementia at baseline, found to have left second toe ulcer. Patient is admitted for further management    Interval/Overnight events:  01/06/23: Seen and examined patient at bedside. No acute complaints. Wants ensure. Vitals stable. Exam unchanged. Labs review. SHOAIB completed. Plan is go to OR.    01/05/23: Seen and examined patient at bedside. No acute complaints. Wants ensure. Vitals stable. Exam unchanged. Labs significant for wbc 2.64 PMN 74, . MRI reveals OM of the foot. Will complete SHOAIB today. Plan for amputation vs bone dbx w arthroplasty of left second toe tomorrow AM. NPO at MN. Pending UA    01/04/23: 01/04/23: Seen and examined patient at bedside. No acute complaints. Vitals stable. Exam unchanged. Labs significant for ESR:  130 > 130 and CRP: 127. There was no leukocytosis. WCx: pending. XR foot inconclusive for OM. US Doppler: neg for DVT. Pending MRI to rule out OM. Will defer abx for now. Will send UA to r/o UTI given acute change in baseline. ID and Podiatry on the case. Planning Surgery scheduled for Fri 1/6 with podiatry: amputation vs bone dbx w arthroplasty of left second toe. Patient needs SHOAIB tomorrow; NPO MN 1/5. Will get Wound Care RN for wound finding on exam.    ROS:   General: denies fever, chills, insomnia, depression, weight change  Skin: denies itch, jaundice   Resp: denies cough, pleuritic chest pain, wheezing   CV: denies PND  GI: denies N/V/D constipation   : denies d/c, itching, hematuria, dysuria   EXT: denies pain, swelling, erythema   Musculoskeletal: denies low back pain, joint pain, swelling   Neuro: denies headache, weakness, syncope    MEDICATIONS  (STANDING):  allopurinol 100 milliGRAM(s) Oral two times a day  ascorbic acid 500 milliGRAM(s) Oral daily  aspirin enteric coated 81 milliGRAM(s) Oral daily  calcium carbonate 1250 mG  + Vitamin D (OsCal 500 + D) 1 Tablet(s) Oral two times a day  chlorhexidine 2% Cloths 1 Application(s) Topical <User Schedule>  clopidogrel Tablet 75 milliGRAM(s) Oral daily  donepezil 10 milliGRAM(s) Oral at bedtime  DULoxetine 60 milliGRAM(s) Oral daily  enoxaparin Injectable 40 milliGRAM(s) SubCutaneous every 24 hours  ferrous    sulfate 325 milliGRAM(s) Oral daily  folic acid 1 milliGRAM(s) Oral daily  furosemide    Tablet 40 milliGRAM(s) Oral daily  levothyroxine 50 MICROGram(s) Oral daily  metoprolol succinate ER 50 milliGRAM(s) Oral daily  oxybutynin 10 milliGRAM(s) Oral at bedtime  sacubitril 24 mG/valsartan 26 mG 1 Tablet(s) Oral two times a day  zinc sulfate 220 milliGRAM(s) Oral daily    MEDICATIONS  (PRN):  acetaminophen     Tablet .. 650 milliGRAM(s) Oral every 6 hours PRN Temp greater or equal to 38C (100.4F), Mild Pain (1 - 3)  aluminum hydroxide/magnesium hydroxide/simethicone Suspension 30 milliLiter(s) Oral every 4 hours PRN Dyspepsia  melatonin 3 milliGRAM(s) Oral at bedtime PRN Insomnia  ondansetron Injectable 4 milliGRAM(s) IV Push every 8 hours PRN Nausea and/or Vomiting    VITALS  T(F): 96.2 (01-04-23 @ 20:47), Max: 96.2 (01-04-23 @ 20:47)  HR: 63 (01-04-23 @ 20:47) (63 - 71)  BP: 147/65 (01-04-23 @ 20:47) (143/63 - 147/65)  RR: 18 (01-04-23 @ 20:47) (18 - 18)  SpO2: --      PHYSICAL EXAM  Gen- NAD, AAOx1, Cachectic overall,   HEENT- no pallor, anicteric, moist mucous membranes  CVS- S1/S2, no m/r/g  Chest- CTA b/l no w/r/c, no use of accessory muscles, good inspiratory effort, speaking in full sentences  Abd- soft, nt, nd  Derm: Dorsal left second toe ulcer at IPJ with overlying hyperkeratotic tissue, Purulent drainage noted, erythema and swelling noted to entire second toe. probing to bone  Vascular: DP and PT Pulses non palpable B/L ; Foot is Warm to cool to touch; slight mottled skin changes noted left foot    Neuro: Gross and light touch sensation intact  MSK: Pain On Palpation at Wound Site, hammertoe deformity of all left foot lesser digits and toes 3-5 R foot  Skin: Multiple skin abrasions, Small area redness coccyx region, Ecchymosis left side of mouth  Neuro-CN II-XII intact;    LABS/IMAGING  01-05    136  |  96<L>  |  32<H>  ----------------------------<  108<H>  3.8   |  33<H>  |  0.9    Ca    9.3      05 Jan 2023 08:35                         10.0   2.64  )-----------( 141      ( 05 Jan 2023 08:35 )             32.6     MICROBIOLOGY  -  WCx: pending.  - UA: sent    RADIOLOGY  US Doppler: neg for DVT    XR foot  1. No radiographic evidence of osteomyelitis.  2.  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 may represent   the sequela of inflammatory arthropathy.    MRI Foot  IMPRESSION:  1.  Septic arthritis/osteomyelitis of the second PIP joint underlying an   ulcer.  2.  Probable nearly healed fracture of the fifth metatarsal neck. A   developing stress fracture could also have this appearance.  3.  Limited motion degraded exam.    TTE (12/2022)  Summary:   1. Moderately decreased global left ventricular systolic function.   2. LV Ejection Fraction by Rice's Method with a biplane EF of 35 %.   3. The left ventricular diastolic function could not be assessed in this study.   4. Mild to moderately increased left ventricular internal cavity size.   5. Mildlyenlarged left atrium. LA volume Index is 35.2 ml/m².   6. Normal right atrial size.   7. Sclerotic aortic valve with decreased opening.   8. Moderate aortic regurgitation.   9. Moderate mitral annular calcification.  10. Moderate thickening and calcification of the anterior and posterior mitral valve leaflets.  11. Severe mitral valve regurgitation.  12. Moderate pulmonic valve regurgitation.  13. Adequate TR velocity was not obtained to accurately assess RVSP.  14. There is no evidence of pericardial effusion.  15. Compared to previous TTE in 09/2022, EF is reduced.    EKG (12/22): Sinus rhythm with occasional Premature ventricular complexes. Left ventricular hypertrophy with repolarization abnormality. QTC Calculation(Bazett) 409 ms   MEDICINE ATTENDING PROGRESS NOTE  82 year-old female, past medical history of Dementia, Hypertension, Hyperlipidemia, Peripheral artery disease: chronic lower extremity leg pains, CHF p/w failure to thrive, worsening dementia at baseline, found to have left second toe ulcer. Patient is admitted for further management    Interval/Overnight events:  01/06/23: Seen and examined patient at bedside. No acute complaints. Wants ensure. Vitals stable. Exam unchanged. Labs review. SHOAIB completed. Plan is go to OR.    01/05/23: Seen and examined patient at bedside. No acute complaints. Wants ensure. Vitals stable. Exam unchanged. Labs significant for wbc 2.64 PMN 74, . MRI reveals OM of the foot. Will complete SHOAIB today. Plan for amputation vs bone dbx w arthroplasty of left second toe tomorrow AM. NPO at MN. Pending UA    01/04/23: 01/04/23: Seen and examined patient at bedside. No acute complaints. Vitals stable. Exam unchanged. Labs significant for ESR:  130 > 130 and CRP: 127. There was no leukocytosis. WCx: pending. XR foot inconclusive for OM. US Doppler: neg for DVT. Pending MRI to rule out OM. Will defer abx for now. Will send UA to r/o UTI given acute change in baseline. ID and Podiatry on the case. Planning Surgery scheduled for Fri 1/6 with podiatry: amputation vs bone dbx w arthroplasty of left second toe. Patient needs SHOAIB tomorrow; NPO MN 1/5. Will get Wound Care RN for wound finding on exam.    ROS:   General: denies fever, chills, insomnia, depression, weight change  Skin: denies itch, jaundice   Resp: denies cough, pleuritic chest pain, wheezing   CV: denies PND  GI: denies N/V/D constipation   : denies d/c, itching, hematuria, dysuria   EXT: denies pain, swelling, erythema   Musculoskeletal: denies low back pain, joint pain, swelling   Neuro: denies headache, weakness, syncope    MEDICATIONS  (STANDING):  allopurinol 100 milliGRAM(s) Oral two times a day  ascorbic acid 500 milliGRAM(s) Oral daily  aspirin enteric coated 81 milliGRAM(s) Oral daily  calcium carbonate 1250 mG  + Vitamin D (OsCal 500 + D) 1 Tablet(s) Oral two times a day  chlorhexidine 2% Cloths 1 Application(s) Topical <User Schedule>  clopidogrel Tablet 75 milliGRAM(s) Oral daily  donepezil 10 milliGRAM(s) Oral at bedtime  DULoxetine 60 milliGRAM(s) Oral daily  enoxaparin Injectable 40 milliGRAM(s) SubCutaneous every 24 hours  ferrous    sulfate 325 milliGRAM(s) Oral daily  folic acid 1 milliGRAM(s) Oral daily  furosemide    Tablet 40 milliGRAM(s) Oral daily  levothyroxine 50 MICROGram(s) Oral daily  metoprolol succinate ER 50 milliGRAM(s) Oral daily  oxybutynin 10 milliGRAM(s) Oral at bedtime  sacubitril 24 mG/valsartan 26 mG 1 Tablet(s) Oral two times a day  zinc sulfate 220 milliGRAM(s) Oral daily    MEDICATIONS  (PRN):  acetaminophen     Tablet .. 650 milliGRAM(s) Oral every 6 hours PRN Temp greater or equal to 38C (100.4F), Mild Pain (1 - 3)  aluminum hydroxide/magnesium hydroxide/simethicone Suspension 30 milliLiter(s) Oral every 4 hours PRN Dyspepsia  melatonin 3 milliGRAM(s) Oral at bedtime PRN Insomnia  ondansetron Injectable 4 milliGRAM(s) IV Push every 8 hours PRN Nausea and/or Vomiting    VITALS  T(F): 96.2 (01-04-23 @ 20:47), Max: 96.2 (01-04-23 @ 20:47)  HR: 63 (01-04-23 @ 20:47) (63 - 71)  BP: 147/65 (01-04-23 @ 20:47) (143/63 - 147/65)  RR: 18 (01-04-23 @ 20:47) (18 - 18)  SpO2: --      PHYSICAL EXAM  Gen- NAD, AAOx1, Cachectic overall,   HEENT- no pallor, anicteric, moist mucous membranes  CVS- S1/S2, no m/r/g  Chest- CTA b/l no w/r/c, no use of accessory muscles, good inspiratory effort, speaking in full sentences  Abd- soft, nt, nd  Derm: Dorsal left second toe ulcer at IPJ with overlying hyperkeratotic tissue, Purulent drainage noted, erythema and swelling noted to entire second toe. probing to bone  Vascular: DP and PT Pulses non palpable B/L ; Foot is Warm to cool to touch; slight mottled skin changes noted left foot    Neuro: Gross and light touch sensation intact  MSK: Pain On Palpation at Wound Site, hammertoe deformity of all left foot lesser digits and toes 3-5 R foot  Skin: Multiple skin abrasions, Small area redness coccyx region, Ecchymosis left side of mouth  Neuro-CN II-XII intact;    LABS/IMAGING  01-05    136  |  96<L>  |  32<H>  ----------------------------<  108<H>  3.8   |  33<H>  |  0.9    Ca    9.3      05 Jan 2023 08:35                         10.0   2.64  )-----------( 141      ( 05 Jan 2023 08:35 )             32.6     MICROBIOLOGY  - WCx: Staph aureus  - UA: sent    RADIOLOGY  US Doppler: neg for DVT    XR foot  1. No radiographic evidence of osteomyelitis.  2.  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 may represent   the sequela of inflammatory arthropathy.    MRI Foot  IMPRESSION:  1.  Septic arthritis/osteomyelitis of the second PIP joint underlying an   ulcer.  2.  Probable nearly healed fracture of the fifth metatarsal neck. A   developing stress fracture could also have this appearance.  3.  Limited motion degraded exam.    TTE (12/2022)  Summary:   1. Moderately decreased global left ventricular systolic function.   2. LV Ejection Fraction by Rice's Method with a biplane EF of 35 %.   3. The left ventricular diastolic function could not be assessed in this study.   4. Mild to moderately increased left ventricular internal cavity size.   5. Mildlyenlarged left atrium. LA volume Index is 35.2 ml/m².   6. Normal right atrial size.   7. Sclerotic aortic valve with decreased opening.   8. Moderate aortic regurgitation.   9. Moderate mitral annular calcification.  10. Moderate thickening and calcification of the anterior and posterior mitral valve leaflets.  11. Severe mitral valve regurgitation.  12. Moderate pulmonic valve regurgitation.  13. Adequate TR velocity was not obtained to accurately assess RVSP.  14. There is no evidence of pericardial effusion.  15. Compared to previous TTE in 09/2022, EF is reduced.    EKG (12/22): Sinus rhythm with occasional Premature ventricular complexes. Left ventricular hypertrophy with repolarization abnormality. QTC Calculation(Bazett) 409 ms

## 2023-01-06 NOTE — PROGRESS NOTE ADULT - ASSESSMENT
A/P: 83 y/o female admitted with failure to thrive, poor PO intake, and ulceration left 2nd toe    #Adult failure to thrive in setting of Dementia  #Dementia  #Acute Change in Mental Status, r/o UTI  - f/u UA to UTI that may contribute to AMS  - c/w easy to chew diet; Liquid supplement  - c/w Vit C and Zinc for wound healing.  - continue Donepezil  - dietary consult  - PEG placement not indicated at this time    #Foot ulceration in setting of h/o PVD, r/o OM  #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  - O/E: Sharp bedside excisional debridement of fibrotic/ necrotic tissue  left second toe ulcer to layer of tendon- purulence noted with bone exposure   - There is no leukocytosis or left shift  - ESR:  130 > 130 > 126  - CRP: 127 > trend  - WCx: pending  - radiographic evidence of osteomyelitis.  - MRI: OM  - Planning Surgery scheduled for Fri 1/6 with podiatry: amputation vs bone dbx w arthroplasty of left second toe  - Patient needs SHOAIB tomorrow; NPO MN 1/5  - ID on the case; recommend to watch off abx unless there is evidence of OM    #Multiple skin abrasions  #Small area redness coccyx region   #Ecchymosis left side of mouth  - Wound Care RN consult    #Hypothyroid.   - continue Synthroid.    #History of chronic CHF.   - continue Entresto, Metoprolol, on lasix  - follow BP.     #History of peripheral vascular disease.   - continue ASA/Plavix  - US Doppler: neg    #SUPPORTIVE MANAGEMENT/DISPO  - Dispo: until medically cleared  - DVT Ppx: Lovenox  - GI Ppx: not indicated  - Diet: easy chew    Spent over 35 min reviewing chart and on coordinating patient care during interdisciplinary rounds     Elton Howard M.D./[facility]  Attending Physician    A/P: 83 y/o female admitted with failure to thrive, poor PO intake, and ulceration left 2nd toe    #Foot ulceration in setting of h/o PVD, r/o OM  #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  - O/E: Sharp bedside excisional debridement of fibrotic/ necrotic tissue  left second toe ulcer to layer of tendon- purulence noted with bone exposure   - There is no leukocytosis or left shift  - ESR:  130 > 130 > 126  - CRP: 127 > trend  - WCx: Staph aureus  - radiographic evidence of osteomyelitis.  - MRI: OM  - Planning Surgery scheduled for Fri 1/6 with podiatry: amputation vs bone dbx w arthroplasty of left second toe  - SHOAIB completed  - ID on the case    #Adult failure to thrive in setting of Dementia  #Dementia  #Acute Change in Mental Status, r/o UTI  - f/u UA to UTI that may contribute to AMS  - c/w easy to chew diet; Liquid supplement  - c/w Vit C and Zinc for wound healing.  - continue Donepezil  - dietary consult  - PEG placement not indicated at this time    #Multiple skin abrasions  #Small area redness coccyx region   #Ecchymosis left side of mouth  - Wound Care RN consult    #Hypothyroid.   - continue Synthroid.    #History of chronic CHF.   - continue Entresto, Metoprolol, on lasix  - follow BP.     #History of peripheral vascular disease.   - continue ASA/Plavix  - US Doppler: neg    #SUPPORTIVE MANAGEMENT/DISPO  - Dispo: until medically cleared  - DVT Ppx: Lovenox  - GI Ppx: not indicated  - Diet: easy chew    Spent over 35 min reviewing chart and on coordinating patient care during interdisciplinary rounds     Elton Howard M.D./[facility]  Attending Physician

## 2023-01-07 NOTE — CHART NOTE - NSCHARTNOTEFT_GEN_A_CORE
pt seen in bed, alert, NAD  pt complains of left foot surgical site pain  pt states tylenol not helping  will try dilaudid x1  monitor vss  monitor pt pt seen in bed, alert, NAD  pt complains of left foot surgical site pain  pt states tylenol not helping  will try dilaudid x1  wound care  podiatry fup  monitor vss  monitor pt

## 2023-01-07 NOTE — PROGRESS NOTE ADULT - SUBJECTIVE AND OBJECTIVE BOX
MEDICINE ATTENDING PROGRESS NOTE  82 year-old female, past medical history of Dementia, Hypertension, Hyperlipidemia, Peripheral artery disease: chronic lower extremity leg pains, CHF p/w failure to thrive, worsening dementia at baseline, found to have left second toe ulcer. Patient is admitted for further management    Interval/Overnight events:  01/07/23: Seen and examined patient at bedside. No acute issue.  -S/p Excisional Debridement of Soft Tissue & Bone w/Arthroplasty 2nd PIPJ, partially closed, Left Foot. Vitals stable. Labs and imaging reviewed. cont current management.  Await biospy result. Appreciate podiatry notes    01/06/23: Seen and examined patient at bedside. No acute complaints. Wants ensure. Vitals stable. Exam unchanged. Labs review. SHOAIB completed. Plan is go to OR.    01/05/23: Seen and examined patient at bedside. No acute complaints. Wants ensure. Vitals stable. Exam unchanged. Labs significant for wbc 2.64 PMN 74, . MRI reveals OM of the foot. Will complete SHOAIB today. Plan for amputation vs bone dbx w arthroplasty of left second toe tomorrow AM. NPO at MN. Pending UA    01/04/23: 01/04/23: Seen and examined patient at bedside. No acute complaints. Vitals stable. Exam unchanged. Labs significant for ESR:  130 > 130 and CRP: 127. There was no leukocytosis. WCx: pending. XR foot inconclusive for OM. US Doppler: neg for DVT. Pending MRI to rule out OM. Will defer abx for now. Will send UA to r/o UTI given acute change in baseline. ID and Podiatry on the case. Planning Surgery scheduled for Fri 1/6 with podiatry: amputation vs bone dbx w arthroplasty of left second toe. Patient needs SHOAIB tomorrow; NPO MN 1/5. Will get Wound Care RN for wound finding on exam.    ROS:   General: denies fever, chills, insomnia, depression, weight change  Skin: denies itch, jaundice   Resp: denies cough, pleuritic chest pain, wheezing   CV: denies PND  GI: denies N/V/D constipation   : denies d/c, itching, hematuria, dysuria   EXT: denies pain, swelling, erythema   Musculoskeletal: denies low back pain, joint pain, swelling   Neuro: denies headache, weakness, syncope    MEDICATIONS  (STANDING):  allopurinol 100 milliGRAM(s) Oral two times a day  ascorbic acid 500 milliGRAM(s) Oral daily  aspirin enteric coated 81 milliGRAM(s) Oral daily  calcium carbonate 1250 mG  + Vitamin D (OsCal 500 + D) 1 Tablet(s) Oral two times a day  chlorhexidine 2% Cloths 1 Application(s) Topical <User Schedule>  clopidogrel Tablet 75 milliGRAM(s) Oral daily  donepezil 10 milliGRAM(s) Oral at bedtime  DULoxetine 60 milliGRAM(s) Oral daily  enoxaparin Injectable 40 milliGRAM(s) SubCutaneous every 24 hours  ferrous    sulfate 325 milliGRAM(s) Oral daily  folic acid 1 milliGRAM(s) Oral daily  furosemide    Tablet 40 milliGRAM(s) Oral daily  levothyroxine 50 MICROGram(s) Oral daily  metoprolol succinate ER 50 milliGRAM(s) Oral daily  oxybutynin 10 milliGRAM(s) Oral at bedtime  sacubitril 24 mG/valsartan 26 mG 1 Tablet(s) Oral two times a day  zinc sulfate 220 milliGRAM(s) Oral daily    MEDICATIONS  (PRN):  acetaminophen     Tablet .. 650 milliGRAM(s) Oral every 6 hours PRN Temp greater or equal to 38C (100.4F), Mild Pain (1 - 3)  aluminum hydroxide/magnesium hydroxide/simethicone Suspension 30 milliLiter(s) Oral every 4 hours PRN Dyspepsia  melatonin 3 milliGRAM(s) Oral at bedtime PRN Insomnia  ondansetron Injectable 4 milliGRAM(s) IV Push every 8 hours PRN Nausea and/or Vomiting    VITALS  T(F): 96.2 (01-04-23 @ 20:47), Max: 96.2 (01-04-23 @ 20:47)  HR: 63 (01-04-23 @ 20:47) (63 - 71)  BP: 147/65 (01-04-23 @ 20:47) (143/63 - 147/65)  RR: 18 (01-04-23 @ 20:47) (18 - 18)  SpO2: --      PHYSICAL EXAM  Gen- NAD, AAOx1, Cachectic overall,   HEENT- no pallor, anicteric, moist mucous membranes  CVS- S1/S2, no m/r/g  Chest- CTA b/l no w/r/c, no use of accessory muscles, good inspiratory effort, speaking in full sentences  Abd- soft, nt, nd  Derm: Dorsal left second toe ulcer at IPJ with overlying hyperkeratotic tissue, Purulent drainage noted, erythema and swelling noted to entire second toe. probing to bone  Vascular: DP and PT Pulses non palpable B/L ; Foot is Warm to cool to touch; slight mottled skin changes noted left foot    Neuro: Gross and light touch sensation intact  MSK: Pain On Palpation at Wound Site, hammertoe deformity of all left foot lesser digits and toes 3-5 R foot  Skin: Multiple skin abrasions, Small area redness coccyx region, Ecchymosis left side of mouth  Neuro-CN II-XII intact;    LABS/IMAGING  01-05    136  |  96<L>  |  32<H>  ----------------------------<  108<H>  3.8   |  33<H>  |  0.9    Ca    9.3      05 Jan 2023 08:35                         10.0   2.64  )-----------( 141      ( 05 Jan 2023 08:35 )             32.6     MICROBIOLOGY  - WCx: Staph aureus  - UA: sent    RADIOLOGY  US Doppler: neg for DVT    XR foot  1. No radiographic evidence of osteomyelitis.  2.  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 may represent   the sequela of inflammatory arthropathy.    MRI Foot  IMPRESSION:  1.  Septic arthritis/osteomyelitis of the second PIP joint underlying an   ulcer.  2.  Probable nearly healed fracture of the fifth metatarsal neck. A   developing stress fracture could also have this appearance.  3.  Limited motion degraded exam.    TTE (12/2022)  Summary:   1. Moderately decreased global left ventricular systolic function.   2. LV Ejection Fraction by Rice's Method with a biplane EF of 35 %.   3. The left ventricular diastolic function could not be assessed in this study.   4. Mild to moderately increased left ventricular internal cavity size.   5. Mildlyenlarged left atrium. LA volume Index is 35.2 ml/m².   6. Normal right atrial size.   7. Sclerotic aortic valve with decreased opening.   8. Moderate aortic regurgitation.   9. Moderate mitral annular calcification.  10. Moderate thickening and calcification of the anterior and posterior mitral valve leaflets.  11. Severe mitral valve regurgitation.  12. Moderate pulmonic valve regurgitation.  13. Adequate TR velocity was not obtained to accurately assess RVSP.  14. There is no evidence of pericardial effusion.  15. Compared to previous TTE in 09/2022, EF is reduced.    EKG (12/22): Sinus rhythm with occasional Premature ventricular complexes. Left ventricular hypertrophy with repolarization abnormality. QTC Calculation(Bazett) 409 ms

## 2023-01-07 NOTE — PROGRESS NOTE ADULT - ASSESSMENT
A/P: 81 y/o female admitted with failure to thrive, poor PO intake, and ulceration left 2nd toe    #Foot ulceration in setting of h/o PVD,  r/o OM  S/p Excisional Debridement of Soft Tissue & Bone w/Arthroplasty 2nd PIPJ, partially closed, Left Foot  #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  - O/E: Sharp bedside excisional debridement of fibrotic/ necrotic tissue  left second toe ulcer to layer of tendon- purulence noted with bone exposure   - There is no leukocytosis or left shift  - ESR:  130 > 130 > 126  - CRP: 127 > trend  - WCx: Staph aureus  - radiographic evidence of osteomyelitis.  - MRI: OM  - S/p Excisional Debridement of Soft Tissue & Bone w/Arthroplasty 2nd PIPJ, partially closed, Left Foot: 01/06/23  - SHOAIB completed  - ID on the case    #Adult failure to thrive in setting of Dementia  #Dementia  #Acute Change in Mental Status, r/o UTI  - f/u UA to UTI that may contribute to AMS  - c/w easy to chew diet; Liquid supplement  - c/w Vit C and Zinc for wound healing.  - continue Donepezil  - dietary consult  - PEG placement not indicated at this time    #Multiple skin abrasions  #Small area redness coccyx region   #Ecchymosis left side of mouth  - Wound Care RN consult    #Hypothyroid.   - continue Synthroid.    #History of chronic CHF.   - continue Entresto, Metoprolol, on lasix  - follow BP.     #History of peripheral vascular disease.   - continue ASA/Plavix  - US Doppler: neg    #SUPPORTIVE MANAGEMENT/DISPO  - Dispo: until medically cleared  - DVT Ppx: Lovenox  - GI Ppx: not indicated  - Diet: easy chew    Spent over 35 min reviewing chart and on coordinating patient care during interdisciplinary rounds     Elton Howard M.D./[facility]  Attending Physician

## 2023-01-08 NOTE — PROGRESS NOTE ADULT - SUBJECTIVE AND OBJECTIVE BOX
Podiatry Progress Note    Subjective:  ARIEL INIGUEZ is a  82y Female.   Seen bedside.   Patient is a 82y old  Female who presents with a chief complaint of Weakness with poor PO intake (08 Jan 2023 15:31)    Complaining of pain to left second toe       Past Medical History and Surgical History  PAST MEDICAL & SURGICAL HISTORY:  Hypothyroid      Hypertension      RA (rheumatoid arthritis)      High cholesterol      Poor circulation  PAD      Incontinence      Dementia      Rheumatoid arthritis      Osteopenia      Melanoma of skin      Depression with anxiety      Bilateral cataracts      Peripheral arterial disease  (s/p B/L leg stents &amp; on Plavix)      History of cirrhosis of liver      History of chronic CHF      History of hip surgery  Left ORIF and Left ORIF femur      H/O: hysterectomy           Objective:  Vital Signs Last 24 Hrs  T(C): 35.7 (08 Jan 2023 21:15), Max: 35.8 (08 Jan 2023 13:50)  T(F): 96.3 (08 Jan 2023 21:15), Max: 96.4 (08 Jan 2023 13:50)  HR: 72 (08 Jan 2023 21:15) (62 - 76)  BP: 125/58 (08 Jan 2023 21:15) (95/43 - 126/59)  BP(mean): --  RR: 18 (08 Jan 2023 21:15) (16 - 18)  SpO2: 96% (07 Jan 2023 21:47) (96% - 96%)                            9.5    9.55  )-----------( 202      ( 07 Jan 2023 07:37 )             30.0                 01-07    137  |  96<L>  |  35<H>  ----------------------------<  93  4.0   |  32  |  1.1    Ca    9.1      07 Jan 2023 07:37          Physical Exam - Left Lower Extremity Focused:   Derm: Left second toe sutures intact, necrosis of skin at incision, pallor of distal toe, moderate serosanguinous drainage noted  Vascular: DP and PT Pulses Diminished; Foot is Warm to cool to the touch; Capillary Refill Time delayed  Neuro: Gross and light touch sensation intact  MSK: Pain On Palpation at Wound Site     Assessment:  -s/p exc dbx st/b w/arthroplasty 2nd PIPJ left foot 1/6/23      Plan:  Chart reviewed and Patient evaluated. All Questions and Concerns Addressed and Answered  Local Wound Care; Wound Flushed w/ NS; Wound Packed w/ xeroform / DSD / Kerlix /ACE  Weight Bearing Status; WBAT w/ Heel Touch w/ Surgical Shoe;   Continue w/ Local Wound Care; Q24 Dressing Changes;  Pt scheduled for angio on Tue 1/10  Podiatry will continue to monitor; may plan for further surgical procedure after angio   Discussed plan w/Dr. Winters    Podiatry        Podiatry Progress Note    Subjective:  ARIEL INIGUEZ is a  82y Female.   Seen bedside.   Patient is a 82y old  Female who presents with a chief complaint of Weakness with poor PO intake (08 Jan 2023 15:31)    Complaining of pain to left second toe       Past Medical History and Surgical History  PAST MEDICAL & SURGICAL HISTORY:  Hypothyroid      Hypertension      RA (rheumatoid arthritis)      High cholesterol      Poor circulation  PAD      Incontinence      Dementia      Rheumatoid arthritis      Osteopenia      Melanoma of skin      Depression with anxiety      Bilateral cataracts      Peripheral arterial disease  (s/p B/L leg stents &amp; on Plavix)      History of cirrhosis of liver      History of chronic CHF      History of hip surgery  Left ORIF and Left ORIF femur      H/O: hysterectomy           Objective:  Vital Signs Last 24 Hrs  T(C): 35.7 (08 Jan 2023 21:15), Max: 35.8 (08 Jan 2023 13:50)  T(F): 96.3 (08 Jan 2023 21:15), Max: 96.4 (08 Jan 2023 13:50)  HR: 72 (08 Jan 2023 21:15) (62 - 76)  BP: 125/58 (08 Jan 2023 21:15) (95/43 - 126/59)  BP(mean): --  RR: 18 (08 Jan 2023 21:15) (16 - 18)  SpO2: 96% (07 Jan 2023 21:47) (96% - 96%)                            9.5    9.55  )-----------( 202      ( 07 Jan 2023 07:37 )             30.0                 01-07    137  |  96<L>  |  35<H>  ----------------------------<  93  4.0   |  32  |  1.1    Ca    9.1      07 Jan 2023 07:37          Physical Exam - Left Lower Extremity Focused:   Derm: Left second toe sutures intact, mild ischemia of skin at incision, moderate serosanguinous drainage noted  Vascular: DP and PT Pulses Diminished; Foot is Warm to cool to the touch; Capillary Refill Time <3sec  Neuro: Gross and light touch sensation intact  MSK: Pain On Palpation at Wound Site. rectus L 2nd toe     Assessment:  -s/p exc dbx st/b w/arthroplasty 2nd PIPJ left foot 1/6/23      Plan:  Chart reviewed and Patient evaluated. All Questions and Concerns Addressed and Answered  Local Wound Care; Wound Flushed w/ NS; Wound Packed w/ xeroform / DSD / Kerlix /ACE  Weight Bearing Status; WBAT w/ Surgical Shoe;   Continue w/ Local Wound Care; Q24 Dressing Changes;  Pt scheduled for angio on Tue 1/10  Podiatry will continue to monitor; may plan for further surgical procedure after angio   Discussed plan w/Dr. Winters    Podiatry

## 2023-01-08 NOTE — PROGRESS NOTE ADULT - ASSESSMENT
A/P: 81 y/o female admitted with failure to thrive, poor PO intake, and ulceration left 2nd toe    #Foot ulceration in setting of h/o PVD,  r/o OM  S/p Excisional Debridement of Soft Tissue & Bone w/Arthroplasty 2nd PIPJ, partially closed, Left Foot  #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  - O/E: Sharp bedside excisional debridement of fibrotic/ necrotic tissue  left second toe ulcer to layer of tendon- purulence noted with bone exposure   - There is no leukocytosis or left shift  - ESR:  130 > 130 > 126  - CRP: 127 > trend  - WCx: Staph aureus  - Bone Cx:  Staph aureus  - radiographic evidence of osteomyelitis.  - MRI: OM  - S/p Excisional Debridement of Soft Tissue & Bone w/Arthroplasty 2nd PIPJ, partially closed, Left Foot: 01/06/23  - ID on the case    #Adult failure to thrive in setting of Dementia  #Dementia  #Acute Change in Mental Status, r/o UTI  - f/u UA to UTI that may contribute to AMS  - c/w easy to chew diet; Liquid supplement  - c/w Vit C and Zinc for wound healing.  - continue Donepezil  - dietary consult  - PEG placement not indicated at this time    #Multiple skin abrasions  #Small area redness coccyx region   #Ecchymosis left side of mouth  - Wound Care RN consult    #Hypothyroid.   - continue Synthroid.    #History of chronic CHF.   - continue Entresto, Metoprolol, on lasix  - follow BP.     #History of peripheral vascular disease.   - continue ASA/Plavix  - US Doppler: neg    #SUPPORTIVE MANAGEMENT/DISPO  - Dispo: until medically cleared  - DVT Ppx: Lovenox  - GI Ppx: not indicated  - Diet: easy chew    Spent over 35 min reviewing chart and on coordinating patient care during interdisciplinary rounds     Elton Howard M.D./[facility]  Attending Physician

## 2023-01-08 NOTE — PROGRESS NOTE ADULT - SUBJECTIVE AND OBJECTIVE BOX
MEDICINE ATTENDING PROGRESS NOTE  82 year-old female, past medical history of Dementia, Hypertension, Hyperlipidemia, Peripheral artery disease: chronic lower extremity leg pains, CHF p/w failure to thrive, worsening dementia at baseline, found to have left second toe ulcer. Patient is admitted for further management    Interval/Overnight events:  01/08/23: Seen and examined patient at bedside. Patient reports worsening pain at the site of surgery. Vitals stable. Labs and imaging reviewed. cont current management. Bone Cx grew staph aureus. Await ID recs. Plain is to start oxy PRN + increase dilaudid to 1mg PRN for pain    01/07/23: Seen and examined patient at bedside. No acute issue.  -S/p Excisional Debridement of Soft Tissue & Bone w/Arthroplasty 2nd PIPJ, partially closed, Left Foot. Vitals stable. Labs and imaging reviewed. cont current management.  Await biospy result. Appreciate podiatry notes    01/06/23: Seen and examined patient at bedside. No acute complaints. Wants ensure. Vitals stable. Exam unchanged. Labs review. SHOAIB completed. Plan is go to OR.    01/05/23: Seen and examined patient at bedside. No acute complaints. Wants ensure. Vitals stable. Exam unchanged. Labs significant for wbc 2.64 PMN 74, . MRI reveals OM of the foot. Will complete SHOAIB today. Plan for amputation vs bone dbx w arthroplasty of left second toe tomorrow AM. NPO at MN. Pending UA    01/04/23: 01/04/23: Seen and examined patient at bedside. No acute complaints. Vitals stable. Exam unchanged. Labs significant for ESR:  130 > 130 and CRP: 127. There was no leukocytosis. WCx: pending. XR foot inconclusive for OM. US Doppler: neg for DVT. Pending MRI to rule out OM. Will defer abx for now. Will send UA to r/o UTI given acute change in baseline. ID and Podiatry on the case. Planning Surgery scheduled for Fri 1/6 with podiatry: amputation vs bone dbx w arthroplasty of left second toe. Patient needs SHOAIB tomorrow; NPO MN 1/5. Will get Wound Care RN for wound finding on exam.    ROS:   General: denies fever, chills, insomnia, depression, weight change  Skin: denies itch, jaundice   Resp: denies cough, pleuritic chest pain, wheezing   CV: denies PND  GI: denies N/V/D constipation   : denies d/c, itching, hematuria, dysuria   EXT: denies pain, swelling, erythema   Musculoskeletal: denies low back pain, joint pain, swelling   Neuro: denies headache, weakness, syncope    MEDICATIONS  (STANDING):  allopurinol 100 milliGRAM(s) Oral two times a day  ascorbic acid 500 milliGRAM(s) Oral daily  aspirin enteric coated 81 milliGRAM(s) Oral daily  calcium carbonate 1250 mG  + Vitamin D (OsCal 500 + D) 1 Tablet(s) Oral two times a day  chlorhexidine 2% Cloths 1 Application(s) Topical <User Schedule>  clopidogrel Tablet 75 milliGRAM(s) Oral daily  donepezil 10 milliGRAM(s) Oral at bedtime  DULoxetine 60 milliGRAM(s) Oral daily  enoxaparin Injectable 40 milliGRAM(s) SubCutaneous every 24 hours  ferrous    sulfate 325 milliGRAM(s) Oral daily  folic acid 1 milliGRAM(s) Oral daily  furosemide    Tablet 40 milliGRAM(s) Oral daily  levothyroxine 50 MICROGram(s) Oral daily  metoprolol succinate ER 50 milliGRAM(s) Oral daily  oxybutynin 10 milliGRAM(s) Oral at bedtime  sacubitril 24 mG/valsartan 26 mG 1 Tablet(s) Oral two times a day  zinc sulfate 220 milliGRAM(s) Oral daily    MEDICATIONS  (PRN):  acetaminophen     Tablet .. 650 milliGRAM(s) Oral every 6 hours PRN Temp greater or equal to 38C (100.4F), Mild Pain (1 - 3)  aluminum hydroxide/magnesium hydroxide/simethicone Suspension 30 milliLiter(s) Oral every 4 hours PRN Dyspepsia  HYDROmorphone  Injectable 1 milliGRAM(s) IV Push every 6 hours PRN Severe Pain (7 - 10)  melatonin 3 milliGRAM(s) Oral at bedtime PRN Insomnia  ondansetron Injectable 4 milliGRAM(s) IV Push every 8 hours PRN Nausea and/or Vomiting  oxyCODONE    IR 5 milliGRAM(s) Oral every 4 hours PRN Moderate Pain (4 - 6)    VITALS  T(F): 96.4 (01-08-23 @ 13:50), Max: 96.4 (01-08-23 @ 13:50)  HR: 70 (01-08-23 @ 13:50) (62 - 73)  BP: 109/54 (01-08-23 @ 13:50) (95/43 - 109/54)  RR: 18 (01-08-23 @ 05:00) (16 - 18)  SpO2: 96% (01-07-23 @ 21:47) (96% - 96%)      PHYSICAL EXAM  Gen- NAD, AAOx1, Cachectic overall,   HEENT- no pallor, anicteric, moist mucous membranes  CVS- S1/S2, no m/r/g  Chest- CTA b/l no w/r/c, no use of accessory muscles, good inspiratory effort, speaking in full sentences  Abd- soft, nt, nd  Neuro: Gross and light touch sensation intact  Neuro-CN II-XII intact;  Open Left Dorsal 2nd Digit Surgical Site Wound    -Skin edges well-coapted to periphery w/Nylon Suture;   -Central area open; Mild continuous active sanguinous drainage;    -No erythema/edema/purulence/malodor;   -Wound Probes to Bone  Skin to 2nd Digit soft, supple    LABS/IMAGING  01-07    137  |  96<L>  |  35<H>  ----------------------------<  93  4.0   |  32  |  1.1    Ca    9.1      07 Jan 2023 07:37                          9.5    9.55  )-----------( 202      ( 07 Jan 2023 07:37 )             30.0     MICROBIOLOGY  Culture - Other (collected 01-06-23 @ 15:20)  Source: .Other bone left foot  Preliminary Report (01-07-23 @ 19:28):    Few Staphylococcus aureus    Culture - Other (collected 01-06-23 @ 15:15)  Source: Wound deep wound left foot  Preliminary Report (01-07-23 @ 19:23):    Numerous Staphylococcus aureus      MICROBIOLOGY  - WCx: Staph aureus  - BCx: staph aureus  - UA: sent    RADIOLOGY  US Doppler: neg for DVT    XR foot  1. No radiographic evidence of osteomyelitis.  2.  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 may represent   the sequela of inflammatory arthropathy.    MRI Foot  IMPRESSION:  1.  Septic arthritis/osteomyelitis of the second PIP joint underlying an   ulcer.  2.  Probable nearly healed fracture of the fifth metatarsal neck. A   developing stress fracture could also have this appearance.  3.  Limited motion degraded exam.    TTE (12/2022)  Summary:   1. Moderately decreased global left ventricular systolic function.   2. LV Ejection Fraction by Rice's Method with a biplane EF of 35 %.   3. The left ventricular diastolic function could not be assessed in this study.   4. Mild to moderately increased left ventricular internal cavity size.   5. Mildlyenlarged left atrium. LA volume Index is 35.2 ml/m².   6. Normal right atrial size.   7. Sclerotic aortic valve with decreased opening.   8. Moderate aortic regurgitation.   9. Moderate mitral annular calcification.  10. Moderate thickening and calcification of the anterior and posterior mitral valve leaflets.  11. Severe mitral valve regurgitation.  12. Moderate pulmonic valve regurgitation.  13. Adequate TR velocity was not obtained to accurately assess RVSP.  14. There is no evidence of pericardial effusion.  15. Compared to previous TTE in 09/2022, EF is reduced.    EKG (12/22): Sinus rhythm with occasional Premature ventricular complexes. Left ventricular hypertrophy with repolarization abnormality. QTC Calculation(Bazett) 409 ms

## 2023-01-09 NOTE — PROGRESS NOTE ADULT - SUBJECTIVE AND OBJECTIVE BOX
MEDICINE ATTENDING PROGRESS NOTE  82 year-old female, past medical history of Dementia, Hypertension, Hyperlipidemia, Peripheral artery disease: chronic lower extremity leg pains, CHF p/w failure to thrive, worsening dementia at baseline, found to have left second toe ulcer. Patient is admitted for further management    Interval/Overnight events:  01/09/23: Seen and examined patient at bedside. Pain is better controlled. Vitals stable. Labs and imaging reviewed. cont current management. scheduled for LE angiogram tomorrow 1/10. Appreciate vascular note    01/08/23: Seen and examined patient at bedside. Patient reports worsening pain at the site of surgery. Vitals stable. Labs and imaging reviewed. cont current management. Bone Cx grew staph aureus. Await ID recs. Plain is to start oxy PRN + increase dilaudid to 1mg PRN for pain    01/07/23: Seen and examined patient at bedside. No acute issue.  -S/p Excisional Debridement of Soft Tissue & Bone w/Arthroplasty 2nd PIPJ, partially closed, Left Foot. Vitals stable. Labs and imaging reviewed. cont current management.  Await biospy result. Appreciate podiatry notes    01/06/23: Seen and examined patient at bedside. No acute complaints. Wants ensure. Vitals stable. Exam unchanged. Labs review. SHOAIB completed. Plan is go to OR.    01/05/23: Seen and examined patient at bedside. No acute complaints. Wants ensure. Vitals stable. Exam unchanged. Labs significant for wbc 2.64 PMN 74, . MRI reveals OM of the foot. Will complete SHOAIB today. Plan for amputation vs bone dbx w arthroplasty of left second toe tomorrow AM. NPO at MN. Pending UA    01/04/23: 01/04/23: Seen and examined patient at bedside. No acute complaints. Vitals stable. Exam unchanged. Labs significant for ESR:  130 > 130 and CRP: 127. There was no leukocytosis. WCx: pending. XR foot inconclusive for OM. US Doppler: neg for DVT. Pending MRI to rule out OM. Will defer abx for now. Will send UA to r/o UTI given acute change in baseline. ID and Podiatry on the case. Planning Surgery scheduled for Fri 1/6 with podiatry: amputation vs bone dbx w arthroplasty of left second toe. Patient needs SHOAIB tomorrow; NPO MN 1/5. Will get Wound Care RN for wound finding on exam.    ROS:   General: denies fever, chills, insomnia, depression, weight change  Skin: denies itch, jaundice   Resp: denies cough, pleuritic chest pain, wheezing   CV: denies PND  GI: denies N/V/D constipation   : denies d/c, itching, hematuria, dysuria   EXT: denies pain, swelling, erythema   Musculoskeletal: denies low back pain, joint pain, swelling   Neuro: denies headache, weakness, syncope    MEDICATIONS  (STANDING):  allopurinol 100 milliGRAM(s) Oral two times a day  ascorbic acid 500 milliGRAM(s) Oral daily  aspirin enteric coated 81 milliGRAM(s) Oral daily  calcium carbonate 1250 mG  + Vitamin D (OsCal 500 + D) 1 Tablet(s) Oral two times a day  chlorhexidine 2% Cloths 1 Application(s) Topical <User Schedule>  clopidogrel Tablet 75 milliGRAM(s) Oral daily  donepezil 10 milliGRAM(s) Oral at bedtime  DULoxetine 60 milliGRAM(s) Oral daily  enoxaparin Injectable 40 milliGRAM(s) SubCutaneous every 24 hours  ferrous    sulfate 325 milliGRAM(s) Oral daily  folic acid 1 milliGRAM(s) Oral daily  furosemide    Tablet 40 milliGRAM(s) Oral daily  levothyroxine 50 MICROGram(s) Oral daily  metoprolol succinate ER 50 milliGRAM(s) Oral daily  oxybutynin 10 milliGRAM(s) Oral at bedtime  sacubitril 24 mG/valsartan 26 mG 1 Tablet(s) Oral two times a day  zinc sulfate 220 milliGRAM(s) Oral daily    MEDICATIONS  (PRN):  acetaminophen     Tablet .. 650 milliGRAM(s) Oral every 6 hours PRN Temp greater or equal to 38C (100.4F), Mild Pain (1 - 3)  aluminum hydroxide/magnesium hydroxide/simethicone Suspension 30 milliLiter(s) Oral every 4 hours PRN Dyspepsia  HYDROmorphone  Injectable 1 milliGRAM(s) IV Push every 6 hours PRN Severe Pain (7 - 10)  melatonin 3 milliGRAM(s) Oral at bedtime PRN Insomnia  ondansetron Injectable 4 milliGRAM(s) IV Push every 8 hours PRN Nausea and/or Vomiting  oxyCODONE    IR 5 milliGRAM(s) Oral every 4 hours PRN Moderate Pain (4 - 6)    VITALS  T(F): 96.4 (01-08-23 @ 13:50), Max: 96.4 (01-08-23 @ 13:50)  HR: 70 (01-08-23 @ 13:50) (62 - 73)  BP: 109/54 (01-08-23 @ 13:50) (95/43 - 109/54)  RR: 18 (01-08-23 @ 05:00) (16 - 18)  SpO2: 96% (01-07-23 @ 21:47) (96% - 96%)      PHYSICAL EXAM  Gen- NAD, AAOx1, Cachectic overall,   HEENT- no pallor, anicteric, moist mucous membranes  CVS- S1/S2, no m/r/g  Chest- CTA b/l no w/r/c, no use of accessory muscles, good inspiratory effort, speaking in full sentences  Abd- soft, nt, nd  Neuro: Gross and light touch sensation intact  Neuro-CN II-XII intact;  Open Left Dorsal 2nd Digit Surgical Site Wound    -Skin edges well-coapted to periphery w/Nylon Suture;   -Central area open; Mild continuous active sanguinous drainage;    -No erythema/edema/purulence/malodor;   -Wound Probes to Bone  Skin to 2nd Digit soft, supple    LABS/IMAGING  01-07    137  |  96<L>  |  35<H>  ----------------------------<  93  4.0   |  32  |  1.1    Ca    9.1      07 Jan 2023 07:37                          9.5    9.55  )-----------( 202      ( 07 Jan 2023 07:37 )             30.0     MICROBIOLOGY  Culture - Other (collected 01-06-23 @ 15:20)  Source: .Other bone left foot  Preliminary Report (01-07-23 @ 19:28):    Few Staphylococcus aureus    Culture - Other (collected 01-06-23 @ 15:15)  Source: Wound deep wound left foot  Preliminary Report (01-07-23 @ 19:23):    Numerous Staphylococcus aureus      MICROBIOLOGY  - WCx: Staph aureus  - BCx: staph aureus  - UA: sent    RADIOLOGY  US Doppler: neg for DVT    XR foot  1. No radiographic evidence of osteomyelitis.  2.  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 may represent   the sequela of inflammatory arthropathy.    MRI Foot  IMPRESSION:  1.  Septic arthritis/osteomyelitis of the second PIP joint underlying an   ulcer.  2.  Probable nearly healed fracture of the fifth metatarsal neck. A   developing stress fracture could also have this appearance.  3.  Limited motion degraded exam.    TTE (12/2022)  Summary:   1. Moderately decreased global left ventricular systolic function.   2. LV Ejection Fraction by Rice's Method with a biplane EF of 35 %.   3. The left ventricular diastolic function could not be assessed in this study.   4. Mild to moderately increased left ventricular internal cavity size.   5. Mildlyenlarged left atrium. LA volume Index is 35.2 ml/m².   6. Normal right atrial size.   7. Sclerotic aortic valve with decreased opening.   8. Moderate aortic regurgitation.   9. Moderate mitral annular calcification.  10. Moderate thickening and calcification of the anterior and posterior mitral valve leaflets.  11. Severe mitral valve regurgitation.  12. Moderate pulmonic valve regurgitation.  13. Adequate TR velocity was not obtained to accurately assess RVSP.  14. There is no evidence of pericardial effusion.  15. Compared to previous TTE in 09/2022, EF is reduced.    EKG (12/22): Sinus rhythm with occasional Premature ventricular complexes. Left ventricular hypertrophy with repolarization abnormality. QTC Calculation(Bazett) 409 ms

## 2023-01-09 NOTE — PROGRESS NOTE ADULT - SUBJECTIVE AND OBJECTIVE BOX
Podiatry Progress Note    Subjective:  ARIEL INIGUEZ is a  82y Female.   Seen bedside.   Patient is a 82y old  Female who presents with a chief complaint of Weakness with poor PO intake (08 Jan 2023 21:45)      Past Medical History and Surgical History  PAST MEDICAL & SURGICAL HISTORY:  Hypothyroid      Hypertension      RA (rheumatoid arthritis)      High cholesterol      Poor circulation  PAD      Incontinence      Dementia      Rheumatoid arthritis      Osteopenia      Melanoma of skin      Depression with anxiety      Bilateral cataracts      Peripheral arterial disease  (s/p B/L leg stents &amp; on Plavix)      History of cirrhosis of liver      History of chronic CHF      History of hip surgery  Left ORIF and Left ORIF femur      H/O: hysterectomy           Objective:  Vital Signs Last 24 Hrs  T(C): 36.3 (09 Jan 2023 05:00), Max: 36.3 (09 Jan 2023 05:00)  T(F): 97.3 (09 Jan 2023 05:00), Max: 97.3 (09 Jan 2023 05:00)  HR: 81 (09 Jan 2023 05:00) (70 - 81)  BP: 138/60 (09 Jan 2023 05:00) (109/54 - 138/60)  BP(mean): --  RR: 18 (09 Jan 2023 05:00) (18 - 18)  SpO2: --                              Physical Exam - Left Lower Extremity Focused:   Derm: Left second toe sutures intact, necrosis of skin at incision, pallor of distal toe, Mild serosanguinous drainage noted  Vascular: DP and PT Pulses Diminished; Foot is Warm to cool to the touch; Capillary Refill Time delayed  Neuro: Gross and light touch sensation intact  MSK: Pain On Palpation at Wound Site     Assessment:  -s/p exc dbx st/b w/arthroplasty 2nd PIPJ left foot 1/6/23      Plan:  Chart reviewed and Patient evaluated. All Questions and Concerns Addressed and Answered  Local Wound Care; Wound Flushed w/ NS; Wound Packed w/ xeroform / DSD / Kerlix /ACE  Weight Bearing Status; WBAT w/ Heel Touch w/ Surgical Shoe;   Continue w/ Local Wound Care; Q24 Dressing Changes;  Pt scheduled for angio on Tue 1/10  Podiatry will continue to monitor; may plan for further surgical procedure after angio   Podiatry will re-evaluate Wednesday 1/11  Discussed plan w/Dr. Winters    Podiatry [phone]     Podiatry Progress Note    Subjective:  ARIEL INIGUEZ is a  82y Female.   Seen bedside.   Patient is a 82y old  Female who presents with a chief complaint of Weakness with poor PO intake (08 Jan 2023 21:45)      Past Medical History and Surgical History  PAST MEDICAL & SURGICAL HISTORY:  Hypothyroid      Hypertension      RA (rheumatoid arthritis)      High cholesterol      Poor circulation  PAD      Incontinence      Dementia      Rheumatoid arthritis      Osteopenia      Melanoma of skin      Depression with anxiety      Bilateral cataracts      Peripheral arterial disease  (s/p B/L leg stents &amp; on Plavix)      History of cirrhosis of liver      History of chronic CHF      History of hip surgery  Left ORIF and Left ORIF femur      H/O: hysterectomy           Objective:  Vital Signs Last 24 Hrs  T(C): 36.3 (09 Jan 2023 05:00), Max: 36.3 (09 Jan 2023 05:00)  T(F): 97.3 (09 Jan 2023 05:00), Max: 97.3 (09 Jan 2023 05:00)  HR: 81 (09 Jan 2023 05:00) (70 - 81)  BP: 138/60 (09 Jan 2023 05:00) (109/54 - 138/60)  BP(mean): --  RR: 18 (09 Jan 2023 05:00) (18 - 18)  SpO2: --                              Physical Exam - Left Lower Extremity Focused:   Physical Exam - Left Lower Extremity Focused:   Derm: Left second toe sutures intact, mild ischemia of skin at incision, no drainage noted  Vascular: DP and PT Pulses Diminished; Foot is Warm to cool to the touch; Capillary Refill Time <3sec  Neuro: Gross and light touch sensation intact  MSK: Pain On Palpation at Wound Site. rectus L 2nd toe     Assessment:  -s/p exc dbx st/b w/arthroplasty 2nd PIPJ left foot 1/6/23      Plan:  Chart reviewed and Patient evaluated. All Questions and Concerns Addressed and Answered  Local Wound Care; Wound Flushed w/ NS; Wound Packed w/ xeroform / DSD / Kerlix /ACE  Weight Bearing Status; WBAT w/ Surgical Shoe;   Continue w/ Local Wound Care; Q24 Dressing Changes;  Possible angio on Tue 1/10 - f/u with vascular   Podiatry will re-evaluate Wednesday 1/1  Podiatry will continue to monitor; may plan for further surgical procedure after angio   Discussed plan w/Dr. Winters    Podiatry

## 2023-01-09 NOTE — PROGRESS NOTE ADULT - ASSESSMENT
A/P: 83 y/o female admitted with failure to thrive, poor PO intake, and ulceration left 2nd toe     #s/p left 2nd toe PIP arthropalsty/debridement/biopsy 1/6    Spoke with vascualr team today:  - pt scheduled for LE angiogram tomorrow 1/10  - pt needs to be at [place] site - Main OR - by 11 AM tomorrow  - F/U covid today  - f/u labs today   - OK to continue plavix & ASA  - NPO P MN  - pt aware and agrees with plan

## 2023-01-09 NOTE — PROGRESS NOTE ADULT - ASSESSMENT
A/P: 81 y/o female admitted with failure to thrive, poor PO intake, and ulceration left 2nd toe    #Foot ulceration in setting of h/o PVD,  r/o OM  S/p Excisional Debridement of Soft Tissue & Bone w/Arthroplasty 2nd PIPJ, partially closed, Left Foot  #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  - O/E: Sharp bedside excisional debridement of fibrotic/ necrotic tissue  left second toe ulcer to layer of tendon- purulence noted with bone exposure   - There is no leukocytosis or left shift  - ESR:  130 > 130 > 126  - CRP: 127 > trend  - WCx: Staph aureus  - Bone Cx:  Staph aureus  - radiographic evidence of osteomyelitis.  - MRI: OM  - S/p Excisional Debridement of Soft Tissue & Bone w/Arthroplasty 2nd PIPJ, partially closed, Left Foot: 01/06/23  - scheduled for LE angiogram tomorrow 1/10  - ID on the case  - Vascular surgery now on the case    #Adult failure to thrive in setting of Dementia  #Dementia  #Acute Change in Mental Status, r/o UTI  - f/u UA to UTI that may contribute to AMS  - c/w easy to chew diet; Liquid supplement  - c/w Vit C and Zinc for wound healing.  - continue Donepezil  - dietary consult  - PEG placement not indicated at this time    #Multiple skin abrasions  #Small area redness coccyx region   #Ecchymosis left side of mouth  - Wound Care RN consult    #Hypothyroid.   - continue Synthroid.    #History of chronic CHF.   - continue Entresto, Metoprolol, on lasix  - follow BP.     #History of peripheral vascular disease.   - continue ASA/Plavix  - US Doppler: neg    #SUPPORTIVE MANAGEMENT/DISPO  - Dispo: until medically cleared  - DVT Ppx: Lovenox  - GI Ppx: not indicated  - Diet: easy chew    Spent over 35 min reviewing chart and on coordinating patient care during interdisciplinary rounds     Elton Howard M.D./[place]  Attending Physician

## 2023-01-09 NOTE — PROGRESS NOTE ADULT - ATTENDING COMMENTS
Agree with above plan  Cont with wound care  Abx as per ID  F/u with vascular
Wound healing with mild ischemic changes at het surgical site   Cont with wound care  L 2nd toe rectus position  F/u with ID regarding Abx  - F/u OR cultures   F/u with vascular about angio
Agree with above plan  Clinical diagnosis of OM  Pending MRI results - MRI needed to determine the level of bone infection  Will discuss with family regarding plan  F/u with ID regarding Abx - f/u cultures   Recommend surgical dbx of bone vs amp
Cont with wound care  Abx as per ID - f/u bone cultures  F/u with vascular

## 2023-01-09 NOTE — PROGRESS NOTE ADULT - SUBJECTIVE AND OBJECTIVE BOX
S; No significant overnight events. Pt is s/p `left 2nd toe PIP arthropalsty/debridement/biopsy on 1/6  and is scheduled for angiogram tomorrow 1/10 by vascular team.  Pt agrees with plan  O; Vital Signs Last 24 Hrs  T(C): 36.3 (09 Jan 2023 05:00), Max: 36.3 (09 Jan 2023 05:00)  T(F): 97.3 (09 Jan 2023 05:00), Max: 97.3 (09 Jan 2023 05:00)  HR: 81 (09 Jan 2023 05:00) (70 - 81)  BP: 138/60 (09 Jan 2023 05:00) (109/54 - 138/60)  BP(mean): --  RR: 18 (09 Jan 2023 05:00) (18 - 18)  SpO2: --        MEDICATIONS  (STANDING):  allopurinol 100 milliGRAM(s) Oral two times a day  ascorbic acid 500 milliGRAM(s) Oral daily  aspirin enteric coated 81 milliGRAM(s) Oral daily  calcium carbonate 1250 mG  + Vitamin D (OsCal 500 + D) 1 Tablet(s) Oral two times a day  chlorhexidine 2% Cloths 1 Application(s) Topical <User Schedule>  clopidogrel Tablet 75 milliGRAM(s) Oral daily  donepezil 10 milliGRAM(s) Oral at bedtime  DULoxetine 60 milliGRAM(s) Oral daily  enoxaparin Injectable 40 milliGRAM(s) SubCutaneous every 24 hours  ferrous    sulfate 325 milliGRAM(s) Oral daily  folic acid 1 milliGRAM(s) Oral daily  furosemide    Tablet 40 milliGRAM(s) Oral daily  levothyroxine 50 MICROGram(s) Oral daily  metoprolol succinate ER 50 milliGRAM(s) Oral daily  oxybutynin 10 milliGRAM(s) Oral at bedtime  sacubitril 24 mG/valsartan 26 mG 1 Tablet(s) Oral two times a day  zinc sulfate 220 milliGRAM(s) Oral daily    MEDICATIONS  (PRN):  acetaminophen     Tablet .. 650 milliGRAM(s) Oral every 6 hours PRN Temp greater or equal to 38C (100.4F), Mild Pain (1 - 3)  aluminum hydroxide/magnesium hydroxide/simethicone Suspension 30 milliLiter(s) Oral every 4 hours PRN Dyspepsia  HYDROmorphone  Injectable 1 milliGRAM(s) IV Push every 6 hours PRN Severe Pain (7 - 10)  melatonin 3 milliGRAM(s) Oral at bedtime PRN Insomnia  ondansetron Injectable 4 milliGRAM(s) IV Push every 8 hours PRN Nausea and/or Vomiting  oxyCODONE    IR 5 milliGRAM(s) Oral every 4 hours PRN Moderate Pain (4 - 6)      LABS: PENDING    Culture - Other (collected 06 Jan 2023 15:20)  Source: .Other bone left foot  Final Report (08 Jan 2023 17:13):    Few Staphylococcus aureus  Organism: Staphylococcus aureus (08 Jan 2023 17:13)  Organism: Staphylococcus aureus (08 Jan 2023 17:13)    Culture - Other (collected 06 Jan 2023 15:15)  Source: Wound deep wound left foot  Final Report (08 Jan 2023 17:14):    Numerous Staphylococcus aureus  Organism: Staphylococcus aureus (08 Jan 2023 17:14)  Organism: Staphylococcus aureus (08 Jan 2023 17:14)    RADIOLOGY:  MR Foot w/ IV Cont, Left (01.04.23 @ 15:36) >  1.  Septic arthritis/osteomyelitis of the second PIP joint underlying an   ulcer.  2.  Probable nearly healed fracture of the fifthmetatarsal neck. A   developing stress fracture could also have this appearance.  3.  Limited motion degraded exam.

## 2023-01-10 NOTE — CHART NOTE - NSCHARTNOTEFT_GEN_A_CORE
Post Operative Note  Patient: ARIEL INIGUEZ 82y (1940) Female   MRN: [id number]  Location: [facility] [address]  Visit: 01-02-23 Inpatient  Date: 01-10-23 @ 22:13    Procedure: Hammertoe of left foot    Foot osteomyelitis, left    Foot ulcer with necrosis of bone, left     S/P Arthroplasty, toe, PIP joint, for hammertoe    Bone debridement    Open biopsy, bone, toe    Atherectomy of posterior tibial artery    Subjective:   Nausea: no, Vomiting: no, Ambulating: no, Flatus:  no  Pain Assessment: Patient is complaining of mild pain that is appropriate for post-operative course.     Objective:  Vitals: T(F): 97.6 (01-10-23 @ 19:30), Max: 97.6 (01-10-23 @ 19:30)  HR: 58 (01-10-23 @ 20:30)  BP: 159/68 (01-10-23 @ 20:30) (124/58 - 174/75)  RR: 18 (01-10-23 @ 20:30)  SpO2: 99% (01-10-23 @ 20:30)  Vent Settings:     In:   01-09-23 @ 07:01  -  01-10-23 @ 07:00  --------------------------------------------------------  IN: 0 mL    01-10-23 @ 07:01  -  01-10-23 @ 22:13  --------------------------------------------------------  IN: 5 mL      IV Fluids: lactated ringers. 1000 milliLiter(s) (75 mL/Hr) IV Continuous <Continuous>      Out:   01-09-23 @ 07:01  -  01-10-23 @ 07:00  --------------------------------------------------------  OUT: 500 mL    01-10-23 @ 07:01  -  01-10-23 @ 22:13  --------------------------------------------------------  OUT: 0 mL      EBL:     Voided Urine:   01-09-23 @ 07:01  -  01-10-23 @ 07:00  --------------------------------------------------------  OUT: 500 mL    01-10-23 @ 07:01  -  01-10-23 @ 22:13  --------------------------------------------------------  OUT: 0 mL      Zhang Catheter: yes no   Drains:   SRIKANTH:    ,   Chest Tube:      NG Tube:       Physical Examination:  General Appearance: NAD,   HEENT: EOMI, sclera non-icteric.  Heart: RRR  Lungs: CTABL  Abdomen:  Soft, non tender, appropriate for post-op course, nondistended. No rigidity, guarding, or rebound tenderness.   MSK/Extremities: Warm & well-perfused. right doplerable dp/pt, left doplerable pt  Incisions/Wounds: right groing Dressings in place, clean, dry and intact, no signs of infection/active bleeding/drainage/hematoma    Medications: [Standing]  heparin  Infusion. 500 Unit(s)/Hr IV Continuous <Continuous>  HYDROmorphone  Injectable 0.5 milliGRAM(s) IV Push every 10 minutes PRN  HYDROmorphone  Injectable 1 milliGRAM(s) IV Push every 10 minutes PRN  lactated ringers. 1000 milliLiter(s) IV Continuous <Continuous>  ondansetron Injectable 4 milliGRAM(s) IV Push once PRN    Medications: [PRN]  heparin  Infusion. 500 Unit(s)/Hr IV Continuous <Continuous>  HYDROmorphone  Injectable 0.5 milliGRAM(s) IV Push every 10 minutes PRN  HYDROmorphone  Injectable 1 milliGRAM(s) IV Push every 10 minutes PRN  lactated ringers. 1000 milliLiter(s) IV Continuous <Continuous>  ondansetron Injectable 4 milliGRAM(s) IV Push once PRN      DVT PROPHYLAXIS: heparin  Infusion. 500 Unit(s)/Hr IV Continuous <Continuous>    GI PROPHYLAXIS:   ANTICOAGULATION:   ANTIBIOTICS:        Labs:                        10.2   3.29  )-----------( 220      ( 09 Jan 2023 07:44 )             33.2     01-09    136  |  96<L>  |  42<H>  ----------------------------<  123<H>  4.1   |  33<H>  |  0.9    Ca    9.2      09 Jan 2023 13:52      PT/INR - ( 09 Jan 2023 15:21 )   PT: 12.00 sec;   INR: 1.05 ratio                 Imaging:  No post-op imaging studies    Assessment:  82yF s/p angiogram left lower extremity, Left posterior tibial artery atherectomy and balloon angioplasty       Plan:  - neurovascular checks q4 hours  - Monitor vitals  - Monitor post-op labs and replete as necessary  - Monitor for bowel function  - Continue Pain Medications if necessaryry  - Encourage ambulation as tolerated  - Monitor urine output   - DVT and GI Prophylaxis  - Monitor wound and dressing for changes, redress as needed.      Date/Time: 01-10-23 @ 22:13

## 2023-01-10 NOTE — PROGRESS NOTE ADULT - SUBJECTIVE AND OBJECTIVE BOX
MEDICINE ATTENDING PROGRESS NOTE  82 year-old female, past medical history of Dementia, Hypertension, Hyperlipidemia, Peripheral artery disease: chronic lower extremity leg pains, CHF p/w failure to thrive, worsening dementia at baseline, found to have left second toe ulcer. Patient is admitted for further management    Interval/Overnight events:  01/10/23: Seen and examined patient at bedside. No complaints. Vitals stable. Labs and imaging reviewed. cont current management. Pending LE angiogram today.    01/09/23: Seen and examined patient at bedside. Pain is better controlled. Vitals stable. Labs and imaging reviewed. cont current management. scheduled for LE angiogram tomorrow 1/10. Appreciate vascular note    01/08/23: Seen and examined patient at bedside. Patient reports worsening pain at the site of surgery. Vitals stable. Labs and imaging reviewed. cont current management. Bone Cx grew staph aureus. Await ID recs. Plain is to start oxy PRN + increase dilaudid to 1mg PRN for pain    01/07/23: Seen and examined patient at bedside. No acute issue.  -S/p Excisional Debridement of Soft Tissue & Bone w/Arthroplasty 2nd PIPJ, partially closed, Left Foot. Vitals stable. Labs and imaging reviewed. cont current management.  Await biospy result. Appreciate podiatry notes    01/06/23: Seen and examined patient at bedside. No acute complaints. Wants ensure. Vitals stable. Exam unchanged. Labs review. SHOAIB completed. Plan is go to OR.    01/05/23: Seen and examined patient at bedside. No acute complaints. Wants ensure. Vitals stable. Exam unchanged. Labs significant for wbc 2.64 PMN 74, . MRI reveals OM of the foot. Will complete SHOAIB today. Plan for amputation vs bone dbx w arthroplasty of left second toe tomorrow AM. NPO at MN. Pending UA    01/04/23: 01/04/23: Seen and examined patient at bedside. No acute complaints. Vitals stable. Exam unchanged. Labs significant for ESR:  130 > 130 and CRP: 127. There was no leukocytosis. WCx: pending. XR foot inconclusive for OM. US Doppler: neg for DVT. Pending MRI to rule out OM. Will defer abx for now. Will send UA to r/o UTI given acute change in baseline. ID and Podiatry on the case. Planning Surgery scheduled for Fri 1/6 with podiatry: amputation vs bone dbx w arthroplasty of left second toe. Patient needs SHOAIB tomorrow; NPO MN 1/5. Will get Wound Care RN for wound finding on exam.    ROS:   General: denies fever, chills, insomnia, depression, weight change  Skin: denies itch, jaundice   Resp: denies cough, pleuritic chest pain, wheezing   CV: denies PND  GI: denies N/V/D constipation   : denies d/c, itching, hematuria, dysuria   EXT: denies pain, swelling, erythema   Musculoskeletal: denies low back pain, joint pain, swelling   Neuro: denies headache, weakness, syncope    MEDICATIONS  (STANDING):  allopurinol 100 milliGRAM(s) Oral two times a day  ascorbic acid 500 milliGRAM(s) Oral daily  aspirin enteric coated 81 milliGRAM(s) Oral daily  calcium carbonate 1250 mG  + Vitamin D (OsCal 500 + D) 1 Tablet(s) Oral two times a day  chlorhexidine 2% Cloths 1 Application(s) Topical <User Schedule>  clopidogrel Tablet 75 milliGRAM(s) Oral daily  donepezil 10 milliGRAM(s) Oral at bedtime  DULoxetine 60 milliGRAM(s) Oral daily  enoxaparin Injectable 40 milliGRAM(s) SubCutaneous every 24 hours  ferrous    sulfate 325 milliGRAM(s) Oral daily  folic acid 1 milliGRAM(s) Oral daily  furosemide    Tablet 40 milliGRAM(s) Oral daily  levothyroxine 50 MICROGram(s) Oral daily  metoprolol succinate ER 50 milliGRAM(s) Oral daily  oxybutynin 10 milliGRAM(s) Oral at bedtime  sacubitril 24 mG/valsartan 26 mG 1 Tablet(s) Oral two times a day  zinc sulfate 220 milliGRAM(s) Oral daily    MEDICATIONS  (PRN):  acetaminophen     Tablet .. 650 milliGRAM(s) Oral every 6 hours PRN Temp greater or equal to 38C (100.4F), Mild Pain (1 - 3)  aluminum hydroxide/magnesium hydroxide/simethicone Suspension 30 milliLiter(s) Oral every 4 hours PRN Dyspepsia  HYDROmorphone  Injectable 1 milliGRAM(s) IV Push every 6 hours PRN Severe Pain (7 - 10)  melatonin 3 milliGRAM(s) Oral at bedtime PRN Insomnia  ondansetron Injectable 4 milliGRAM(s) IV Push every 8 hours PRN Nausea and/or Vomiting  oxyCODONE    IR 5 milliGRAM(s) Oral every 4 hours PRN Moderate Pain (4 - 6)    VITALS  T(F): 96.1 (01-10-23 @ 05:45), Max: 97 (01-09-23 @ 14:21)  HR: 66 (01-10-23 @ 05:45) (66 - 73)  BP: 124/58 (01-10-23 @ 05:45) (121/58 - 162/68)  RR: 18 (01-10-23 @ 05:45) (18 - 18)  SpO2: --    PHYSICAL EXAM  Gen- NAD, AAOx1, Cachectic overall,   HEENT- no pallor, anicteric, moist mucous membranes  CVS- S1/S2, no m/r/g  Chest- CTA b/l no w/r/c, no use of accessory muscles, good inspiratory effort, speaking in full sentences  Abd- soft, nt, nd  Neuro: Gross and light touch sensation intact  Neuro-CN II-XII intact;  Open Left Dorsal 2nd Digit Surgical Site Wound    -Skin edges well-coapted to periphery w/Nylon Suture;   -Central area open; Mild continuous active sanguinous drainage;    -No erythema/edema/purulence/malodor;   -Wound Probes to Bone  Skin to 2nd Digit soft, supple    LABS/IMAGING  01-07    137  |  96<L>  |  35<H>  ----------------------------<  93  4.0   |  32  |  1.1    Ca    9.1      07 Jan 2023 07:37                          9.5    9.55  )-----------( 202      ( 07 Jan 2023 07:37 )             30.0     MICROBIOLOGY  Culture - Other (collected 01-06-23 @ 15:20)  Source: .Other bone left foot  Preliminary Report (01-07-23 @ 19:28):    Few Staphylococcus aureus    Culture - Other (collected 01-06-23 @ 15:15)  Source: Wound deep wound left foot  Preliminary Report (01-07-23 @ 19:23):    Numerous Staphylococcus aureus    MICROBIOLOGY  - WCx: Staph aureus  - BCx: staph aureus  - UA: sent    RADIOLOGY  US Doppler: neg for DVT    XR foot  1. No radiographic evidence of osteomyelitis.  2.  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 may represent   the sequela of inflammatory arthropathy.    MRI Foot  IMPRESSION:  1.  Septic arthritis/osteomyelitis of the second PIP joint underlying an   ulcer.  2.  Probable nearly healed fracture of the fifth metatarsal neck. A   developing stress fracture could also have this appearance.  3.  Limited motion degraded exam.    TTE (12/2022)  Summary:   1. Moderately decreased global left ventricular systolic function.   2. LV Ejection Fraction by Rice's Method with a biplane EF of 35 %.   3. The left ventricular diastolic function could not be assessed in this study.   4. Mild to moderately increased left ventricular internal cavity size.   5. Mildlyenlarged left atrium. LA volume Index is 35.2 ml/m².   6. Normal right atrial size.   7. Sclerotic aortic valve with decreased opening.   8. Moderate aortic regurgitation.   9. Moderate mitral annular calcification.  10. Moderate thickening and calcification of the anterior and posterior mitral valve leaflets.  11. Severe mitral valve regurgitation.  12. Moderate pulmonic valve regurgitation.  13. Adequate TR velocity was not obtained to accurately assess RVSP.  14. There is no evidence of pericardial effusion.  15. Compared to previous TTE in 09/2022, EF is reduced.    EKG (12/22): Sinus rhythm with occasional Premature ventricular complexes. Left ventricular hypertrophy with repolarization abnormality. QTC Calculation(Bazett) 409 ms   MEDICINE ATTENDING PROGRESS NOTE  82 year-old female, past medical history of Dementia, Hypertension, Hyperlipidemia, Peripheral artery disease: chronic lower extremity leg pains, CHF p/w failure to thrive, worsening dementia at baseline, found to have left second toe ulcer. Patient is admitted for further management. Pt s/p debridement and amputation. Now undergoing angiography for possible revascularization     Hand-off  [ ] follow podiatry recs    Interval/Overnight events:  01/10/23: Seen and examined patient at bedside. No complaints. Vitals stable. Labs and imaging reviewed. cont current management. Pending LE angiogram today.    01/09/23: Seen and examined patient at bedside. Pain is better controlled. Vitals stable. Labs and imaging reviewed. cont current management. scheduled for LE angiogram tomorrow 1/10. Appreciate vascular note    01/08/23: Seen and examined patient at bedside. Patient reports worsening pain at the site of surgery. Vitals stable. Labs and imaging reviewed. cont current management. Bone Cx grew staph aureus. Await ID recs. Plain is to start oxy PRN + increase dilaudid to 1mg PRN for pain    01/07/23: Seen and examined patient at bedside. No acute issue.  -S/p Excisional Debridement of Soft Tissue & Bone w/Arthroplasty 2nd PIPJ, partially closed, Left Foot. Vitals stable. Labs and imaging reviewed. cont current management.  Await biospy result. Appreciate podiatry notes    01/06/23: Seen and examined patient at bedside. No acute complaints. Wants ensure. Vitals stable. Exam unchanged. Labs review. SHOAIB completed. Plan is go to OR.    01/05/23: Seen and examined patient at bedside. No acute complaints. Wants ensure. Vitals stable. Exam unchanged. Labs significant for wbc 2.64 PMN 74, . MRI reveals OM of the foot. Will complete SHOAIB today. Plan for amputation vs bone dbx w arthroplasty of left second toe tomorrow AM. NPO at MN. Pending UA    01/04/23: 01/04/23: Seen and examined patient at bedside. No acute complaints. Vitals stable. Exam unchanged. Labs significant for ESR:  130 > 130 and CRP: 127. There was no leukocytosis. WCx: pending. XR foot inconclusive for OM. US Doppler: neg for DVT. Pending MRI to rule out OM. Will defer abx for now. Will send UA to r/o UTI given acute change in baseline. ID and Podiatry on the case. Planning Surgery scheduled for Fri 1/6 with podiatry: amputation vs bone dbx w arthroplasty of left second toe. Patient needs SHOAIB tomorrow; NPO MN 1/5. Will get Wound Care RN for wound finding on exam.    ROS:   General: denies fever, chills, insomnia, depression, weight change  Skin: denies itch, jaundice   Resp: denies cough, pleuritic chest pain, wheezing   CV: denies PND  GI: denies N/V/D constipation   : denies d/c, itching, hematuria, dysuria   EXT: denies pain, swelling, erythema   Musculoskeletal: denies low back pain, joint pain, swelling   Neuro: denies headache, weakness, syncope    MEDICATIONS  (STANDING):  allopurinol 100 milliGRAM(s) Oral two times a day  ascorbic acid 500 milliGRAM(s) Oral daily  aspirin enteric coated 81 milliGRAM(s) Oral daily  calcium carbonate 1250 mG  + Vitamin D (OsCal 500 + D) 1 Tablet(s) Oral two times a day  chlorhexidine 2% Cloths 1 Application(s) Topical <User Schedule>  clopidogrel Tablet 75 milliGRAM(s) Oral daily  donepezil 10 milliGRAM(s) Oral at bedtime  DULoxetine 60 milliGRAM(s) Oral daily  enoxaparin Injectable 40 milliGRAM(s) SubCutaneous every 24 hours  ferrous    sulfate 325 milliGRAM(s) Oral daily  folic acid 1 milliGRAM(s) Oral daily  furosemide    Tablet 40 milliGRAM(s) Oral daily  levothyroxine 50 MICROGram(s) Oral daily  metoprolol succinate ER 50 milliGRAM(s) Oral daily  oxybutynin 10 milliGRAM(s) Oral at bedtime  sacubitril 24 mG/valsartan 26 mG 1 Tablet(s) Oral two times a day  zinc sulfate 220 milliGRAM(s) Oral daily    MEDICATIONS  (PRN):  acetaminophen     Tablet .. 650 milliGRAM(s) Oral every 6 hours PRN Temp greater or equal to 38C (100.4F), Mild Pain (1 - 3)  aluminum hydroxide/magnesium hydroxide/simethicone Suspension 30 milliLiter(s) Oral every 4 hours PRN Dyspepsia  HYDROmorphone  Injectable 1 milliGRAM(s) IV Push every 6 hours PRN Severe Pain (7 - 10)  melatonin 3 milliGRAM(s) Oral at bedtime PRN Insomnia  ondansetron Injectable 4 milliGRAM(s) IV Push every 8 hours PRN Nausea and/or Vomiting  oxyCODONE    IR 5 milliGRAM(s) Oral every 4 hours PRN Moderate Pain (4 - 6)    VITALS  T(F): 96.1 (01-10-23 @ 05:45), Max: 97 (01-09-23 @ 14:21)  HR: 66 (01-10-23 @ 05:45) (66 - 73)  BP: 124/58 (01-10-23 @ 05:45) (121/58 - 162/68)  RR: 18 (01-10-23 @ 05:45) (18 - 18)  SpO2: --    PHYSICAL EXAM  Gen- NAD, AAOx1, Cachectic overall,   HEENT- no pallor, anicteric, moist mucous membranes  CVS- S1/S2, no m/r/g  Chest- CTA b/l no w/r/c, no use of accessory muscles, good inspiratory effort, speaking in full sentences  Abd- soft, nt, nd  Neuro: Gross and light touch sensation intact  Neuro-CN II-XII intact;  Open Left Dorsal 2nd Digit Surgical Site Wound    -Skin edges well-coapted to periphery w/Nylon Suture;   -Central area open; Mild continuous active sanguinous drainage;    -No erythema/edema/purulence/malodor;   -Wound Probes to Bone  Skin to 2nd Digit soft, supple    LABS/IMAGING  01-07    137  |  96<L>  |  35<H>  ----------------------------<  93  4.0   |  32  |  1.1    Ca    9.1      07 Jan 2023 07:37                          9.5    9.55  )-----------( 202      ( 07 Jan 2023 07:37 )             30.0     MICROBIOLOGY  Culture - Other (collected 01-06-23 @ 15:20)  Source: .Other bone left foot  Preliminary Report (01-07-23 @ 19:28):    Few Staphylococcus aureus    Culture - Other (collected 01-06-23 @ 15:15)  Source: Wound deep wound left foot  Preliminary Report (01-07-23 @ 19:23):    Numerous Staphylococcus aureus    MICROBIOLOGY  - WCx: Staph aureus  - BCx: staph aureus  - UA: sent    RADIOLOGY  US Doppler: neg for DVT    XR foot  1. No radiographic evidence of osteomyelitis.  2.  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 may represent   the sequela of inflammatory arthropathy.    MRI Foot  IMPRESSION:  1.  Septic arthritis/osteomyelitis of the second PIP joint underlying an   ulcer.  2.  Probable nearly healed fracture of the fifth metatarsal neck. A   developing stress fracture could also have this appearance.  3.  Limited motion degraded exam.    TTE (12/2022)  Summary:   1. Moderately decreased global left ventricular systolic function.   2. LV Ejection Fraction by Rice's Method with a biplane EF of 35 %.   3. The left ventricular diastolic function could not be assessed in this study.   4. Mild to moderately increased left ventricular internal cavity size.   5. Mildlyenlarged left atrium. LA volume Index is 35.2 ml/m².   6. Normal right atrial size.   7. Sclerotic aortic valve with decreased opening.   8. Moderate aortic regurgitation.   9. Moderate mitral annular calcification.  10. Moderate thickening and calcification of the anterior and posterior mitral valve leaflets.  11. Severe mitral valve regurgitation.  12. Moderate pulmonic valve regurgitation.  13. Adequate TR velocity was not obtained to accurately assess RVSP.  14. There is no evidence of pericardial effusion.  15. Compared to previous TTE in 09/2022, EF is reduced.    EKG (12/22): Sinus rhythm with occasional Premature ventricular complexes. Left ventricular hypertrophy with repolarization abnormality. QTC Calculation(Bazett) 409 ms

## 2023-01-10 NOTE — PROGRESS NOTE ADULT - ASSESSMENT
A/P: 83 y/o female admitted with failure to thrive, poor PO intake, and ulceration left 2nd toe    #Foot ulceration in setting of h/o PVD,  r/o OM  S/p Excisional Debridement of Soft Tissue & Bone w/Arthroplasty 2nd PIPJ, partially closed, Left Foot  #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  - O/E: Sharp bedside excisional debridement of fibrotic/ necrotic tissue  left second toe ulcer to layer of tendon- purulence noted with bone exposure   - There is no leukocytosis or left shift  - ESR:  130 > 130 > 126  - CRP: 127   - WCx: Staph aureus  - Bone Cx:  Staph aureus  - radiographic evidence of osteomyelitis.  - MRI: OM  - S/p Excisional Debridement of Soft Tissue & Bone w/Arthroplasty 2nd PIPJ, partially closed, Left Foot: 01/06/23  - LE angiogram 1/10  - ID on the case  - Vascular surgery now on the case    #Adult failure to thrive in setting of Dementia  #Dementia  #Acute Change in Mental Status in setting of infectious process  - WCx: Staph aureus  - Bone Cx:  Staph aureus  - c/w easy to chew diet; Liquid supplement  - c/w Vit C and Zinc for wound healing.  - continue Donepezil  - dietary consult  - PEG placement not indicated at this time given patient medical condition is still active    #Multiple skin abrasions  #Small area redness coccyx region   #Ecchymosis left side of mouth  - Wound Care RN consult    #Hypothyroid.   - continue Synthroid.    #History of chronic CHF.   - continue Entresto, Metoprolol, on lasix  - follow BP.     #History of peripheral vascular disease.   - continue ASA/Plavix  - US Doppler: neg  - LE angiogram 1/10    #SUPPORTIVE MANAGEMENT/DISPO  - Dispo: until medically cleared  - DVT Ppx: Lovenox  - GI Ppx: not indicated  - Diet: easy chew    Spent over 35 min reviewing chart and on coordinating patient care during interdisciplinary rounds     Elton Howard M.D./[place]  Attending Physician

## 2023-01-10 NOTE — BRIEF OPERATIVE NOTE - TYPE OF ANESTHESIA
MAC
Local
Wartpeel Counseling:  I discussed with the patient the risks of Wartpeel including but not limited to erythema, scaling, itching, weeping, crusting, and pain.

## 2023-01-10 NOTE — BRIEF OPERATIVE NOTE - COMMENTS
+PT signals postop, 4HR lay flat period, plan to start flat rate heparin at [facility], can continue asa and plavix, Q4HR BL LE pulse checks and R groin check

## 2023-01-11 NOTE — PROGRESS NOTE ADULT - SUBJECTIVE AND OBJECTIVE BOX
Progress note    Patient seen and examined at bedside. She is resting comfortably in bed and denies any acute complaints.    INTERVAL HPI/OVERNIGHT EVENTS:    REVIEW OF SYSTEMS:  CONSTITUTIONAL: No fever, weight loss, or fatigue  EYES: No eye pain, visual disturbances, or discharge  ENMT:  No difficulty hearing, tinnitus, vertigo; No sinus or throat pain  NECK: No pain or stiffness  BREASTS: No pain, masses, or nipple discharge  RESPIRATORY: No cough, wheezing, chills or hemoptysis; No shortness of breath  CARDIOVASCULAR: No chest pain, palpitations, dizziness, or leg swelling  GASTROINTESTINAL: No abdominal or epigastric pain. No nausea, vomiting, or hematemesis; No diarrhea or constipation. No melena or hematochezia.  GENITOURINARY: No dysuria, frequency, hematuria, or incontinence  NEUROLOGICAL: No headaches, memory loss, loss of strength, numbness, or tremors  SKIN: No itching, burning, rashes, or lesions   LYMPH NODES: No enlarged glands  ENDOCRINE: No heat or cold intolerance; No hair loss  MUSCULOSKELETAL: No joint pain or swelling; No muscle, back, or extremity pain  PSYCHIATRIC: No depression, anxiety, mood swings, or difficulty sleeping  HEME/LYMPH: No easy bruising, or bleeding gums  ALLERY AND IMMUNOLOGIC: No hives or eczema  FAMILY HISTORY:    T(C): 35.9 (01-11-23 @ 13:55), Max: 36.5 (01-10-23 @ 22:30)  HR: 81 (01-11-23 @ 13:55) (50 - 81)  BP: 135/74 (01-11-23 @ 13:55) (123/56 - 174/75)  RR: 18 (01-11-23 @ 13:55) (12 - 20)  SpO2: 99% (01-10-23 @ 22:30) (97% - 100%)  Wt(kg): --Vital Signs Last 24 Hrs  T(C): 35.9 (11 Jan 2023 13:55), Max: 36.5 (10 David 2023 22:30)  T(F): 96.7 (11 Jan 2023 13:55), Max: 97.7 (10 David 2023 22:30)  HR: 81 (11 Jan 2023 13:55) (50 - 81)  BP: 135/74 (11 Jan 2023 13:55) (123/56 - 174/75)  BP(mean): 95 (10 David 2023 19:30) (95 - 95)  RR: 18 (11 Jan 2023 13:55) (12 - 20)  SpO2: 99% (10 David 2023 22:30) (97% - 100%)    Parameters below as of 10 David 2023 22:30  Patient On (Oxygen Delivery Method): room air      Keflex (Unknown)  losartan (Unknown)  morphine (Unknown)  Rocephin (Unknown)  sulfamethoxazole (Hives)      PHYSICAL EXAM:  GENERAL: NAD, well-groomed, well-developed  HEAD:  Atraumatic, Normocephalic  EYES: EOMI, PERRLA, conjunctiva and sclera clear  ENMT: No tonsillar erythema, exudates, or enlargement; Moist mucous membranes, Good dentition, No lesions  NECK: Supple, No JVD, Normal thyroid  NERVOUS SYSTEM:  Alert & Oriented X3, Good concentration; Motor Strength 5/5 B/L upper and lower extremities; DTRs 2+ intact and symmetric  CHEST/LUNG: Clear to percussion bilaterally; No rales, rhonchi, wheezing, or rubs  HEART: Regular rate and rhythm; No murmurs, rubs, or gallops  ABDOMEN: Soft, Nontender, Nondistended; Bowel sounds present  EXTREMITIES:  L foot dressings and bandages  LYMPH: No lymphadenopathy noted  SKIN: No rashes or lesions    Consultant(s) Notes Reviewed:  [x ] YES  [ ] NO  Care Discussed with Consultants/Other Providers [ x] YES  [ ] NO    LABS:      RADIOLOGY & ADDITIONAL TESTS:    Imaging Personally Reviewed:  [ ] YES  [ ] NO  acetaminophen     Tablet .. 650 milliGRAM(s) Oral every 6 hours PRN  allopurinol 100 milliGRAM(s) Oral two times a day  aluminum hydroxide/magnesium hydroxide/simethicone Suspension 30 milliLiter(s) Oral every 4 hours PRN  ascorbic acid 500 milliGRAM(s) Oral daily  calcium carbonate 1250 mG  + Vitamin D (OsCal 500 + D) 1 Tablet(s) Oral two times a day  chlorhexidine 2% Cloths 1 Application(s) Topical <User Schedule>  donepezil 10 milliGRAM(s) Oral at bedtime  DULoxetine 60 milliGRAM(s) Oral daily  ferrous    sulfate 325 milliGRAM(s) Oral daily  folic acid 1 milliGRAM(s) Oral daily  furosemide    Tablet 40 milliGRAM(s) Oral daily  heparin  Infusion. 500 Unit(s)/Hr IV Continuous <Continuous>  levothyroxine 50 MICROGram(s) Oral daily  linezolid    Tablet 600 milliGRAM(s) Oral every 12 hours  melatonin 3 milliGRAM(s) Oral at bedtime PRN  metoprolol succinate ER 50 milliGRAM(s) Oral daily  ondansetron Injectable 4 milliGRAM(s) IV Push every 8 hours PRN  oxybutynin 10 milliGRAM(s) Oral at bedtime  pantoprazole    Tablet 40 milliGRAM(s) Oral before breakfast  sacubitril 24 mG/valsartan 26 mG 1 Tablet(s) Oral two times a day  zinc sulfate 220 milliGRAM(s) Oral daily      HEALTH ISSUES - PROBLEM Dx:  Adult failure to thrive    Foot ulceration    Hypothyroid    Dementia    History of chronic CHF    History of peripheral vascular disease    A/P: 81 y/o female admitted with failure to thrive, poor PO intake, and ulceration left 2nd toe    #Foot ulceration in setting of h/o PVD,  r/o OM  S/p Excisional Debridement of Soft Tissue & Bone w/Arthroplasty 2nd PIPJ, partially closed, Left Foot  #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  - O/E: Sharp bedside excisional debridement of fibrotic/ necrotic tissue  left second toe ulcer to layer of tendon- purulence noted with bone exposure   - There is no leukocytosis or left shift  - Elevated ESR/CRP  - Bone Cx:  Staph aureus  - MRI: OM  - S/p Excisional Debridement of Soft Tissue & Bone w/Arthroplasty 2nd PIPJ, partially closed, Left Foot: 01/06/23  - WCx: Staph aureus  - Vascular surgery - LE angiogram 1/10 - Occluded L anterior tibial artery at the origin, near occluded stenosis posterior tibial artery near the origin with backfilling from peroneal and PT flow into the pedal arch  - Awaiting further recs from podiatry  - ID consult - Linezolid 600mg q12 for now pending sensitivities    #Adult failure to thrive in setting of Dementia  #Dementia  #Acute Change in Mental Status in setting of infectious process  - WCx: Staph aureus  - Bone Cx:  Staph aureus  - c/w easy to chew diet; Liquid supplement  - c/w Vit C and Zinc for wound healing.  - continue Donepezil  - dietary consult  - PEG placement not indicated at this time given patient medical condition is still active    #Multiple skin abrasions  #Small area redness coccyx region   #Ecchymosis left side of mouth  - Wound Care RN consult    #Hypothyroid.   - continue Synthroid.    #History of chronic CHF.   - continue Entresto, Metoprolol, on lasix  - follow BP.     #History of peripheral vascular disease.   - continue ASA/Plavix  - US Doppler: neg  - LE angiogram 1/10 - results above    #SUPPORTIVE MANAGEMENT/DISPO  - Dispo: until medically cleared  - DVT Ppx: Lovenox  - GI Ppx: not indicated  - Diet: easy chew    Dispo: pending podiatry and ID recs, will need Rehab      Total time spent to complete patient's bedside assessment, review medical chart, discuss medical plan of care with covering medical team was ___25_____ with > 50% of time spent face to face w/ patient, discussion with patient/family and/or coordination of care

## 2023-01-11 NOTE — CHART NOTE - NSCHARTNOTEFT_GEN_A_CORE
Pt s/p LLE angioplasty on 1/10. No further or intervention as per vascular.   Will reinstate orders for home meds and restart easy to chew diet.

## 2023-01-11 NOTE — PROGRESS NOTE ADULT - SUBJECTIVE AND OBJECTIVE BOX
PROGRESS NOTE   Pt seen @ bedside during AM rounds. Dressing +Clean, +Dry, + Intact      Vital Signs Last 24 Hrs  T(C): 35.9 (11 Jan 2023 13:55), Max: 36.5 (10 David 2023 22:30)  T(F): 96.7 (11 Jan 2023 13:55), Max: 97.7 (10 David 2023 22:30)  HR: 81 (11 Jan 2023 13:55) (58 - 81)  BP: 135/74 (11 Jan 2023 13:55) (123/56 - 161/69)  BP(mean): 95 (10 David 2023 19:30) (95 - 95)  RR: 18 (11 Jan 2023 13:55) (15 - 18)  SpO2: 99% (10 David 2023 22:30) (98% - 99%)    Parameters below as of 10 David 2023 22:30  Patient On (Oxygen Delivery Method): room air                              9.0    3.39  )-----------( 139      ( 11 Jan 2023 07:24 )             29.9               01-11    140  |  100  |  41<H>  ----------------------------<  91  4.4   |  30  |  0.9    Ca    9.6      11 Jan 2023 07:24  Phos  3.6     01-11  Mg     1.9     01-11        Physical Exam - Left Lower Extremity Focused:   Derm: Left second toe sutures intact, ischemia of skin at incision, no drainage noted.  Vascular: DP and PT Pulses Diminished; Foot is cool to the touch  Neuro: Gross and light touch sensation intact  MSK: Pain On Palpation at Wound Site. rectus L 2nd toe     Assessment:  -s/p exc dbx st/b w/arthroplasty 2nd PIPJ left foot 1/6/23  - PAD - s/p Angio LLE      Plan:  Chart reviewed and Patient evaluated. All Questions and Concerns Addressed and Answered  Local Wound Care; Wound Flushed w/ NS; Wound Packed w/ xeroform / DSD / Kerlix /ACE  Weight Bearing Status; WBAT w/ Surgical Shoe;   Continue w/ Local Wound Care; Q24 Dressing Changes;  Podiatry will continue to monitor. Day to day. Will determine the viability of L 2nd jean carlos in the next couple days   Ischemic Rest pain - recommend pain control   Recommend PICC line - f/u with ID and OR bone cultures     Podiatry

## 2023-01-11 NOTE — PROGRESS NOTE ADULT - SUBJECTIVE AND OBJECTIVE BOX
ARIEL INIGUEZ  82y, Female  Allergy: Keflex (Unknown)  losartan (Unknown)  morphine (Unknown)  Rocephin (Unknown)  sulfamethoxazole (Hives)      LOS  9d    CHIEF COMPLAINT: Weakness with poor PO intake (11 Jan 2023 18:26)      INTERVAL EVENTS/HPI  - No acute events overnight  - T(F): , Max: 97.7 (01-10-23 @ 22:30)  - WBC Count: 3.39 (01-11-23 @ 07:24)  WBC Count: 3.29 (01-09-23 @ 07:44)     - Creatinine, Serum: 0.9 (01-11-23 @ 07:24)       ROS  General: Denies rigors, nightsweats  HEENT: Denies headache, rhinorrhea, sore throat, eye pain  CV: Denies CP, palpitations  PULM: Denies wheezing, hemoptysis  GI: Denies hematemesis, hematochezia, melena  : Denies discharge, hematuria  MSK: Denies arthralgias, myalgias  SKIN: Denies rash, lesions  NEURO: Denies paresthesias, weakness  PSYCH: Denies depression, anxiety    VITALS:  T(F): 96.7, Max: 97.7 (01-10-23 @ 22:30)  HR: 81  BP: 135/74  RR: 18Vital Signs Last 24 Hrs  T(C): 35.9 (11 Jan 2023 13:55), Max: 36.5 (10 David 2023 22:30)  T(F): 96.7 (11 Jan 2023 13:55), Max: 97.7 (10 David 2023 22:30)  HR: 81 (11 Jan 2023 13:55) (60 - 81)  BP: 135/74 (11 Jan 2023 13:55) (123/56 - 161/69)  BP(mean): --  RR: 18 (11 Jan 2023 13:55) (18 - 18)  SpO2: 99% (10 David 2023 22:30) (99% - 99%)    Parameters below as of 10 David 2023 22:30  Patient On (Oxygen Delivery Method): room air        PHYSICAL EXAM:  Gen: NAD, resting in bed  HEENT: Normocephalic, atraumatic  Neck: supple, no lymphadenopathy  CV: Regular rate & regular rhythm  Lungs: decreased BS at bases, no fremitus  Abdomen: Soft, BS present  Ext: Warm, well perfused  Neuro: non focal, awake  Skin: no rash, no erythema  Lines: no phlebitis    FH: Non-contributory  Social Hx: Non-contributory    TESTS & MEASUREMENTS:                        9.0    3.39  )-----------( 139      ( 11 Jan 2023 07:24 )             29.9     01-11    140  |  100  |  41<H>  ----------------------------<  91  4.4   |  30  |  0.9    Ca    9.6      11 Jan 2023 07:24  Phos  3.6     01-11  Mg     1.9     01-11              Culture - Other (collected 01-06-23 @ 15:20)  Source: .Other bone left foot  Final Report (01-08-23 @ 17:13):    Few Staphylococcus aureus  Organism: Staphylococcus aureus (01-08-23 @ 17:13)  Organism: Staphylococcus aureus (01-08-23 @ 17:13)      -  Ampicillin/Sulbactam: S <=8/4      -  Cefazolin: S <=4      -  Clindamycin: S <=0.25      -  Erythromycin: R >4      -  Gentamicin: S <=1 Should not be used as monotherapy      -  Oxacillin: S 1 Oxacillin predicts susceptibility for dicloxacillin, methicillin, and nafcillin      -  Penicillin: R >8      -  Rifampin: S <=1 Should not be used as monotherapy      -  Tetracycline: R >8      -  Trimethoprim/Sulfamethoxazole: R >2/38      -  Vancomycin: S 1      Method Type: GREGORIO    Culture - Other (collected 01-06-23 @ 15:15)  Source: Wound deep wound left foot  Final Report (01-08-23 @ 17:14):    Numerous Staphylococcus aureus  Organism: Staphylococcus aureus (01-08-23 @ 17:14)  Organism: Staphylococcus aureus (01-08-23 @ 17:14)      -  Ampicillin/Sulbactam: S <=8/4      -  Cefazolin: S <=4      -  Clindamycin: S <=0.25      -  Erythromycin: R >4      -  Gentamicin: S <=1 Should not be used as monotherapy      -  Oxacillin: S 1 Oxacillin predicts susceptibility for dicloxacillin, methicillin, and nafcillin      -  Penicillin: R >8      -  Rifampin: S <=1 Should not be used as monotherapy      -  Tetracycline: R >8      -  Trimethoprim/Sulfamethoxazole: R >2/38      -  Vancomycin: S 1      Method Type: GREGORIO    Culture - Abscess with Gram Stain (collected 01-04-23 @ 14:00)  Source: .Abscess foot  Final Report (01-10-23 @ 08:37):    Moderate Staphylococcus aureus  Organism: Staphylococcus aureus (01-10-23 @ 08:37)  Organism: Staphylococcus aureus (01-10-23 @ 08:37)      -  Ampicillin/Sulbactam: S <=8/4      -  Cefazolin: S 8      -  Clindamycin: S <=0.25      -  Erythromycin: R >4      -  Gentamicin: S <=1 Should not be used as monotherapy      -  Oxacillin: S 2 Oxacillin predicts susceptibility for dicloxacillin, methicillin, and nafcillin      -  Penicillin: R >8      -  Rifampin: S <=1 Should not be used as monotherapy      -  Tetracycline: R >8      -  Trimethoprim/Sulfamethoxazole: R >2/38      -  Vancomycin: S 2      Method Type: GREGORIO            INFECTIOUS DISEASES TESTING  COVID-19 PCR: NotDetec (01-09-23 @ 11:08)  COVID-19 PCR: NotDetec (01-02-23 @ 15:30)  COVID-19 PCR: NotDetec (09-19-22 @ 14:50)  COVID-19 PCR: NotDetec (09-16-22 @ 01:05)  COVID-19 PCR: NotDetec (09-09-22 @ 11:30)      INFLAMMATORY MARKERS  Sedimentation Rate, Erythrocyte: 140 mm/Hr (01-09-23 @ 07:44)  C-Reactive Protein, Serum: 90 mg/L (01-09-23 @ 07:44)  Sedimentation Rate, Erythrocyte: 130 mm/Hr (01-07-23 @ 07:37)  C-Reactive Protein, Serum: 81 mg/L (01-07-23 @ 07:37)      RADIOLOGY & ADDITIONAL TESTS:  I have personally reviewed the last available Chest xray  CXR      CT      CARDIOLOGY TESTING      MEDICATIONS  allopurinol 100 Oral two times a day  ascorbic acid 500 Oral daily  calcium carbonate 1250 mG  + Vitamin D (OsCal 500 + D) 1 Oral two times a day  chlorhexidine 2% Cloths 1 Topical <User Schedule>  donepezil 10 Oral at bedtime  DULoxetine 60 Oral daily  ferrous    sulfate 325 Oral daily  folic acid 1 Oral daily  furosemide    Tablet 40 Oral daily  heparin  Infusion. 500 IV Continuous <Continuous>  levothyroxine 50 Oral daily  linezolid    Tablet 600 Oral every 12 hours  metoprolol succinate ER 50 Oral daily  oxybutynin 10 Oral at bedtime  pantoprazole    Tablet 40 Oral before breakfast  sacubitril 24 mG/valsartan 26 mG 1 Oral two times a day  zinc sulfate 220 Oral daily      WEIGHT  Weight (kg): 110 (01-10-23 @ 13:49)  Creatinine, Serum: 0.9 mg/dL (01-11-23 @ 07:24)      ANTIBIOTICS:  linezolid    Tablet 600 milliGRAM(s) Oral every 12 hours      All available historical records have been reviewed

## 2023-01-11 NOTE — PROGRESS NOTE ADULT - ASSESSMENT
ASSESSMENT   82-year-old female, past medical history of Dementia, Hypertension, Hyperlipidemia, Peripheral artery disease: chronic lower extremity leg pains, CHF:  presenting to ER for failure to thrive    IMPRESSION  #Failure to Thrive   #Left 2nd toe ulcer with sepsis arthritis/OM of second PIP joint  - Xray Foot AP + Lateral + Oblique, Left (01.02.23 @ 16:33):  No radiographic evidence of osteomyelitis.  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 may represent  the sequela of inflammatory arthropathy.  - MR Foot w/ IV Cont, Left (01.04.23 @ 15:36):  Septic arthritis/osteomyelitis of the second PIP joint underlying an  ulcer. Probable nearly healed fracture of the fifthmetatarsal neck. A   developing stress fracture could also have this appearance. Limited motion degraded exam.  - Deep Tissue Cx growing Staph aureus   - s/p debridement with arthroplasty 1/6 -- Bone Cx with MSSA    #Dementia  #PAD  #Obesity BMI (kg/m2): 52.5    #Abx allergy: Keflex (Unknown)  losartan (Unknown)  morphine (Unknown)  Rocephin (Unknown)  sulfamethoxazole (Hives)    RECOMMENDATIONS  - conitnue Linezolid 600 mg q 12 hours   - will need to clarify kelfex allergy with family   - will plan prolonged antibiotic course for OM without amputation     Please call or message on [employer] Teams if with any questions.  Spectra [phone]

## 2023-01-11 NOTE — PROGRESS NOTE ADULT - ASSESSMENT
A/P: 83 y/o female admitted with failure to thrive, poor PO intake, and ulceration left 2nd toe     #s/p left 2nd toe PIP arthropalsty/debridement/biopsy 1/6  POD #1 s/p Atherectomy of posterior tibial artery    Spoke with vascular team today:   - no further vascular intervention at this time   - may stop IV heparin after 24 hrs then start ASA 81 mg daily   - resume home meds   - proceed with podiatry plans  -F/u with Simon Voss in 2 weeks  -reconsult PRN

## 2023-01-11 NOTE — PROGRESS NOTE ADULT - SUBJECTIVE AND OBJECTIVE BOX
S; No significant overnight events. P{t is s/p LLE angiopolasty yesterday - has been on IV heparin overnight  O; Vital Signs Last 24 Hrs  T(C): 35.6 (11 Jan 2023 05:11), Max: 36.5 (10 David 2023 22:30)  T(F): 96.1 (11 Jan 2023 05:11), Max: 97.7 (10 David 2023 22:30)  HR: 75 (11 Jan 2023 05:11) (50 - 75)  BP: 123/56 (11 Jan 2023 05:11) (123/56 - 174/75)  BP(mean): 95 (10 David 2023 19:30) (95 - 97)  RR: 18 (11 Jan 2023 05:11) (12 - 20)  SpO2: 99% (10 David 2023 22:30) (97% - 100%)    Parameters below as of 10 David 2023 22:30  Patient On (Oxygen Delivery Method): room air    MEDICATIONS  (STANDING):  heparin  Infusion. 500 Unit(s)/Hr (5 mL/Hr) IV Continuous <Continuous>    EXAM:  Incisions/Wounds: Right groin dressing c/d/i. site without erythema/edema/ecchymosis LE's Warm & well-perfused.   Pulses: RLE:  dopplerable dp/palpable pt,   LLE:  palpable pt/dopplerable DP (at base of 2nd digit) . Left foot with dressing c/d/i    Labs:  CAPILLARY BLOOD GLUCOSE                              9.0    3.39  )-----------( 139      ( 11 Jan 2023 07:24 )             29.9         01-11    140  |  100  |  41<H>  ----------------------------<  91  4.4   |  30  |  0.9      Calcium, Total Serum: 9.6 mg/dL (01-11-23 @ 07:24)      LFTs:         Coags:     12.00  ----< 1.05    ( 09 Jan 2023 15:21 )     x                    S; No significant overnight events. P{t is s/p LLE angiopolasty yesterday - has been on IV heparin overnight  O; Vital Signs Last 24 Hrs  T(C): 35.6 (11 Jan 2023 05:11), Max: 36.5 (10 David 2023 22:30)  T(F): 96.1 (11 Jan 2023 05:11), Max: 97.7 (10 David 2023 22:30)  HR: 75 (11 Jan 2023 05:11) (50 - 75)  BP: 123/56 (11 Jan 2023 05:11) (123/56 - 174/75)  BP(mean): 95 (10 David 2023 19:30) (95 - 97)  RR: 18 (11 Jan 2023 05:11) (12 - 20)  SpO2: 99% (10 David 2023 22:30) (97% - 100%)    Parameters below as of 10 David 2023 22:30  Patient On (Oxygen Delivery Method): room air    MEDICATIONS  (STANDING):  heparin  Infusion. 500 Unit(s)/Hr (5 mL/Hr) IV Continuous <Continuous>    EXAM:  Incisions/Wounds: Right groin dressing c/d/i. site without erythema/edema/ecchymosis LE's Warm & well-perfused.   Pulses: RLE:  dopplerable dp/palpable pt,   LLE:  palpable pt/dopplerable DP (at base of 2nd digit) . Left foot with dressing c/d/i    Labs:                        9.0    3.39  )-----------( 139      ( 11 Jan 2023 07:24 )             29.9         01-11    140  |  100  |  41<H>  ----------------------------<  91  4.4   |  30  |  0.9      Calcium, Total Serum: 9.6 mg/dL (01-11-23 @ 07:24)      LFTs:         Coags:     12.00  ----< 1.05    ( 09 Jan 2023 15:21 )     x           Pre-Op Diagnosis, Post-Op Diagnosis and Procedure:   Pre-Op, Post-Op and Procedure Selector:  ·  PRE-OP DIAGNOSIS:  Foot ulcer with necrosis of bone, left 06-Jan-2023 15:27:37  Luna Winters.  ·  PROCEDURES:  Atherectomy of posterior tibial artery 10-David-2023 16:26:35  Daisy Claudio.      Operative Findings:  · Operative Findings	Procedure:   1. US guided R CFA access 5Fr sheath  2. Third order angiogram left lower extremity  3. Left posterior tibial artery 1.25 Diamondback atherectomy of proximal segment  4. Left posterior tibial 2mm x 40mm balloon angioplasty proximal segment and at the ankle    Findings: Occluded L anterior tibial artery at the origin, near occluded stenosis posterior tibial artery near the origin with backfilling from peroneal and PT flow into the pedal arch  Specimens/Blood Loss/IV/Output/Protocol/VTE:  · Specimens	None  · Estimated Blood Loss	10 milliLiter(s)      Other Details/Condition Post op/Disposition:  · Comments/Other Details	+PT signals postop, 4HR lay flat period, plan to start flat rate heparin at [facility] site, can continue asa and plavix, Q4HR BL LE pulse checks and R groin check  · Condition Post op	Stable  · Disposition	[facility]

## 2023-01-12 NOTE — PROGRESS NOTE ADULT - SUBJECTIVE AND OBJECTIVE BOX
PROGRESS NOTE   Pt seen @ bedside during PM rounds.  Dressing +Clean, +Dry, + Intact      Vital Signs Last 24 Hrs  T(C): 35.7 (12 Jan 2023 14:05), Max: 36.6 (11 Jan 2023 21:11)  T(F): 96.2 (12 Jan 2023 14:05), Max: 97.8 (11 Jan 2023 21:11)  HR: 52 (12 Jan 2023 14:05) (52 - 87)  BP: 149/68 (12 Jan 2023 14:05) (130/62 - 188/74)  BP(mean): --  RR: 18 (12 Jan 2023 14:05) (18 - 18)  SpO2: --                              8.4    3.52  )-----------( 94       ( 12 Jan 2023 07:14 )             27.9               01-12    140  |  99  |  35<H>  ----------------------------<  184<H>  3.6   |  29  |  1.0    Ca    9.2      12 Jan 2023 07:14  Phos  3.6     01-11  Mg     1.9     01-11        Physical Exam - Left Lower Extremity Focused:   Derm: Left second toe, ischemia of skin at incision, Minimal drainage noted. dehiscence due to ischemic changes   Vascular: DP and PT Pulses Diminished; Foot is cool to the touch  Neuro: Gross and light touch sensation intact  MSK: Pain On Palpation at Wound Site. rectus L 2nd toe     Assessment:  -s/p exc dbx st/b w/arthroplasty 2nd PIPJ left foot 1/6/23  - Ischemic changes with OM of the L 2nd toe   - PAD - s/p Angio LLE      Plan:  Chart reviewed and Patient evaluated. All Questions and Concerns Addressed and Answered  Discussed with family the treatment plan. Recommend amputation of the toe as a measure of preventing spread of infection. Pt thinking about it. All risks, benefits and possible complications were discussed with the patient   Local Wound Care; Wound Flushed w/ NS; Wound Packed w/ xeroform / DSD / Kerlix /ACE  Weight Bearing Status; WBAT w/ Surgical Shoe;   Continue w/ Local Wound Care; Q24 Dressing Changes;  Ischemic Rest pain - recommend pain control   Abx as per ID     Podiatry

## 2023-01-12 NOTE — PROGRESS NOTE ADULT - ASSESSMENT
ASSESSMENT   82-year-old female, past medical history of Dementia, Hypertension, Hyperlipidemia, Peripheral artery disease: chronic lower extremity leg pains, CHF:  presenting to ER for failure to thrive    IMPRESSION  #Failure to Thrive   #Left 2nd toe ulcer with sepsis arthritis/OM of second PIP joint  - Xray Foot AP + Lateral + Oblique, Left (01.02.23 @ 16:33):  No radiographic evidence of osteomyelitis.  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 may represent  the sequela of inflammatory arthropathy.  - MR Foot w/ IV Cont, Left (01.04.23 @ 15:36):  Septic arthritis/osteomyelitis of the second PIP joint underlying an  ulcer. Probable nearly healed fracture of the fifthmetatarsal neck. A   developing stress fracture could also have this appearance. Limited motion degraded exam.  - Deep Tissue Cx growing Staph aureus   - s/p debridement with arthroplasty 1/6 -- Bone Cx with MSSA    #Dementia  #PAD  #Obesity BMI (kg/m2): 52.5    #Abx allergy: Keflex (Unknown) - History of angioedema  losartan (Unknown)  morphine (Unknown)  Rocephin (Unknown)  sulfamethoxazole (Hives)    RECOMMENDATIONS  - developing pancytopenia from linezolid   - stop linezolid and start daptomycin 8 mg/kg q 24 hours   - check CK baseline   - will follow plans for amputation vs conservative management     Please call or message on [employer] Teams if with any questions.  Spectra [phone] ASSESSMENT   82-year-old female, past medical history of Dementia, Hypertension, Hyperlipidemia, Peripheral artery disease: chronic lower extremity leg pains, CHF:  presenting to ER for failure to thrive    IMPRESSION  #Failure to Thrive   #Left 2nd toe ulcer with sepsis arthritis/OM of second PIP joint  - Xray Foot AP + Lateral + Oblique, Left (01.02.23 @ 16:33):  No radiographic evidence of osteomyelitis.  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 may represent  the sequela of inflammatory arthropathy.  - MR Foot w/ IV Cont, Left (01.04.23 @ 15:36):  Septic arthritis/osteomyelitis of the second PIP joint underlying an  ulcer. Probable nearly healed fracture of the fifthmetatarsal neck. A   developing stress fracture could also have this appearance. Limited motion degraded exam.  - Deep Tissue Cx growing Staph aureus   - s/p debridement with arthroplasty 1/6 -- Bone Cx with MSSA    #Dementia  #PAD  #Obesity BMI (kg/m2): 52.5    #Abx allergy: Keflex (Unknown) - History of angioedema  losartan (Unknown)  morphine (Unknown)  Rocephin (Unknown)  sulfamethoxazole (Hives)    RECOMMENDATIONS  - possibly developing pancytopenia from linezolid   - stop linezolid and start daptomycin 6 mg/kg q 24 hours (ordered)  - check CK baseline   - will follow plans for amputation vs conservative management     Please call or message on [employer] Teams if with any questions.  Spectra [phone]

## 2023-01-12 NOTE — CHART NOTE - NSCHARTNOTEFT_GEN_A_CORE
Possible plans for amputation noted per podiatry. Will hold on PICC line at this time until patient decides to move forward with amputation.

## 2023-01-12 NOTE — PROGRESS NOTE ADULT - SUBJECTIVE AND OBJECTIVE BOX
ARIEL INIGUEZ  82y, Female  Allergy: Keflex (Unknown)  losartan (Unknown)  morphine (Unknown)  Rocephin (Unknown)  sulfamethoxazole (Hives)      LOS  10d    CHIEF COMPLAINT: Weakness with poor PO intake (12 Jan 2023 16:34)      INTERVAL EVENTS/HPI  - No acute events overnight  - T(F): , Max: 97.8 (01-11-23 @ 21:11)  - WBC Count: 3.52 (01-12-23 @ 07:14)  WBC Count: 3.39 (01-11-23 @ 07:24)     - Creatinine, Serum: 1.0 (01-12-23 @ 07:14)  Creatinine, Serum: 0.9 (01-11-23 @ 07:24)       ROS  General: Denies rigors, nightsweats  HEENT: Denies headache, rhinorrhea, sore throat, eye pain  CV: Denies CP, palpitations  PULM: Denies wheezing, hemoptysis  GI: Denies hematemesis, hematochezia, melena  : Denies discharge, hematuria  MSK: Denies arthralgias, myalgias  SKIN: Denies rash, lesions  NEURO: Denies paresthesias, weakness  PSYCH: Denies depression, anxiety    VITALS:  T(F): 97.5, Max: 97.8 (01-11-23 @ 21:11)  HR: 81  BP: 130/59  RR: 18Vital Signs Last 24 Hrs  T(C): 36.4 (12 Jan 2023 20:33), Max: 36.6 (11 Jan 2023 21:11)  T(F): 97.5 (12 Jan 2023 20:33), Max: 97.8 (11 Jan 2023 21:11)  HR: 81 (12 Jan 2023 20:33) (52 - 87)  BP: 130/59 (12 Jan 2023 20:33) (130/59 - 188/74)  BP(mean): --  RR: 18 (12 Jan 2023 20:33) (18 - 18)  SpO2: --        PHYSICAL EXAM:  Gen: NAD, resting in bed  HEENT: Normocephalic, atraumatic  Neck: supple, no lymphadenopathy  CV: Regular rate & regular rhythm  Lungs: decreased BS at bases, no fremitus  Abdomen: Soft, BS present  Ext: Warm, well perfused  Neuro: non focal, awake  Skin: no rash, no erythema  Lines: no phlebitis    FH: Non-contributory  Social Hx: Non-contributory    TESTS & MEASUREMENTS:                        8.4    3.52  )-----------( 94       ( 12 Jan 2023 07:14 )             27.9     01-12    140  |  99  |  35<H>  ----------------------------<  184<H>  3.6   |  29  |  1.0    Ca    9.2      12 Jan 2023 07:14  Phos  3.6     01-11  Mg     1.9     01-11              Culture - Other (collected 01-06-23 @ 15:20)  Source: .Other bone left foot  Final Report (01-08-23 @ 17:13):    Few Staphylococcus aureus  Organism: Staphylococcus aureus (01-08-23 @ 17:13)  Organism: Staphylococcus aureus (01-08-23 @ 17:13)      -  Ampicillin/Sulbactam: S <=8/4      -  Cefazolin: S <=4      -  Clindamycin: S <=0.25      -  Erythromycin: R >4      -  Gentamicin: S <=1 Should not be used as monotherapy      -  Oxacillin: S 1 Oxacillin predicts susceptibility for dicloxacillin, methicillin, and nafcillin      -  Penicillin: R >8      -  Rifampin: S <=1 Should not be used as monotherapy      -  Tetracycline: R >8      -  Trimethoprim/Sulfamethoxazole: R >2/38      -  Vancomycin: S 1      Method Type: GREGORIO    Culture - Other (collected 01-06-23 @ 15:15)  Source: Wound deep wound left foot  Final Report (01-08-23 @ 17:14):    Numerous Staphylococcus aureus  Organism: Staphylococcus aureus (01-08-23 @ 17:14)  Organism: Staphylococcus aureus (01-08-23 @ 17:14)      -  Ampicillin/Sulbactam: S <=8/4      -  Cefazolin: S <=4      -  Clindamycin: S <=0.25      -  Erythromycin: R >4      -  Gentamicin: S <=1 Should not be used as monotherapy      -  Oxacillin: S 1 Oxacillin predicts susceptibility for dicloxacillin, methicillin, and nafcillin      -  Penicillin: R >8      -  Rifampin: S <=1 Should not be used as monotherapy      -  Tetracycline: R >8      -  Trimethoprim/Sulfamethoxazole: R >2/38      -  Vancomycin: S 1      Method Type: GREGORIO    Culture - Abscess with Gram Stain (collected 01-04-23 @ 14:00)  Source: .Abscess foot  Final Report (01-10-23 @ 08:37):    Moderate Staphylococcus aureus  Organism: Staphylococcus aureus (01-10-23 @ 08:37)  Organism: Staphylococcus aureus (01-10-23 @ 08:37)      -  Ampicillin/Sulbactam: S <=8/4      -  Cefazolin: S 8      -  Clindamycin: S <=0.25      -  Erythromycin: R >4      -  Gentamicin: S <=1 Should not be used as monotherapy      -  Oxacillin: S 2 Oxacillin predicts susceptibility for dicloxacillin, methicillin, and nafcillin      -  Penicillin: R >8      -  Rifampin: S <=1 Should not be used as monotherapy      -  Tetracycline: R >8      -  Trimethoprim/Sulfamethoxazole: R >2/38      -  Vancomycin: S 2      Method Type: GREGORIO            INFECTIOUS DISEASES TESTING  COVID-19 PCR: NotDetec (01-09-23 @ 11:08)  COVID-19 PCR: NotDetec (01-02-23 @ 15:30)  COVID-19 PCR: NotDetec (09-19-22 @ 14:50)  COVID-19 PCR: NotDetec (09-16-22 @ 01:05)  COVID-19 PCR: NotDetec (09-09-22 @ 11:30)      INFLAMMATORY MARKERS  Sedimentation Rate, Erythrocyte: 140 mm/Hr (01-09-23 @ 07:44)  C-Reactive Protein, Serum: 90 mg/L (01-09-23 @ 07:44)  Sedimentation Rate, Erythrocyte: 130 mm/Hr (01-07-23 @ 07:37)  C-Reactive Protein, Serum: 81 mg/L (01-07-23 @ 07:37)      RADIOLOGY & ADDITIONAL TESTS:  I have personally reviewed the last available Chest xray  CXR      CT      CARDIOLOGY TESTING      MEDICATIONS  allopurinol 100 Oral two times a day  ascorbic acid 500 Oral daily  aspirin  chewable 81 Oral daily  calcium carbonate 1250 mG  + Vitamin D (OsCal 500 + D) 1 Oral two times a day  chlorhexidine 2% Cloths 1 Topical <User Schedule>  donepezil 10 Oral at bedtime  DULoxetine 60 Oral daily  ferrous    sulfate 325 Oral daily  folic acid 1 Oral daily  furosemide    Tablet 40 Oral daily  heparin   Injectable 5000 SubCutaneous every 8 hours  levothyroxine 50 Oral daily  linezolid    Tablet 600 Oral every 12 hours  metoprolol succinate ER 50 Oral daily  oxybutynin 10 Oral at bedtime  pantoprazole    Tablet 40 Oral before breakfast  potassium chloride    Tablet ER 20 Oral every 2 hours  sacubitril 24 mG/valsartan 26 mG 1 Oral two times a day  zinc sulfate 220 Oral daily      WEIGHT  Weight (kg): 110 (01-10-23 @ 13:49)  Creatinine, Serum: 1.0 mg/dL (01-12-23 @ 07:14)      ANTIBIOTICS:  linezolid    Tablet 600 milliGRAM(s) Oral every 12 hours      All available historical records have been reviewed

## 2023-01-12 NOTE — PROGRESS NOTE ADULT - SUBJECTIVE AND OBJECTIVE BOX
Progress note    Patient seen and examined at bedside. She is awake alert and follows commands.    INTERVAL HPI/OVERNIGHT EVENTS:    REVIEW OF SYSTEMS:  CONSTITUTIONAL: No fever, weight loss, or fatigue  EYES: No eye pain, visual disturbances, or discharge  ENMT:  No difficulty hearing, tinnitus, vertigo; No sinus or throat pain  NECK: No pain or stiffness  BREASTS: No pain, masses, or nipple discharge  RESPIRATORY: No cough, wheezing, chills or hemoptysis; No shortness of breath  CARDIOVASCULAR: No chest pain, palpitations, dizziness, or leg swelling  GASTROINTESTINAL: No abdominal or epigastric pain. No nausea, vomiting, or hematemesis; No diarrhea or constipation. No melena or hematochezia.  GENITOURINARY: No dysuria, frequency, hematuria, or incontinence  NEUROLOGICAL: No headaches, memory loss, loss of strength, numbness, or tremors  SKIN: No itching, burning, rashes, or lesions   LYMPH NODES: No enlarged glands  ENDOCRINE: No heat or cold intolerance; No hair loss  MUSCULOSKELETAL: No joint pain or swelling; No muscle, back, or extremity pain  PSYCHIATRIC: No depression, anxiety, mood swings, or difficulty sleeping  HEME/LYMPH: No easy bruising, or bleeding gums  ALLERY AND IMMUNOLOGIC: No hives or eczema  FAMILY HISTORY:    T(C): 35.7 (01-12-23 @ 14:05), Max: 36.6 (01-11-23 @ 21:11)  HR: 52 (01-12-23 @ 14:05) (52 - 87)  BP: 149/68 (01-12-23 @ 14:05) (130/62 - 188/74)  RR: 18 (01-12-23 @ 14:05) (18 - 18)  SpO2: --  Wt(kg): --Vital Signs Last 24 Hrs  T(C): 35.7 (12 Jan 2023 14:05), Max: 36.6 (11 Jan 2023 21:11)  T(F): 96.2 (12 Jan 2023 14:05), Max: 97.8 (11 Jan 2023 21:11)  HR: 52 (12 Jan 2023 14:05) (52 - 87)  BP: 149/68 (12 Jan 2023 14:05) (130/62 - 188/74)  BP(mean): --  RR: 18 (12 Jan 2023 14:05) (18 - 18)  SpO2: --      Keflex (Unknown)  losartan (Unknown)  morphine (Unknown)  Rocephin (Unknown)  sulfamethoxazole (Hives)      PHYSICAL EXAM:  GENERAL: NAD, well-groomed, well-developed  HEAD:  Atraumatic, Normocephalic  EYES: EOMI, PERRLA, conjunctiva and sclera clear  ENMT: No tonsillar erythema, exudates, or enlargement; Moist mucous membranes, Good dentition, No lesions  NECK: Supple, No JVD, Normal thyroid  NERVOUS SYSTEM:  Alert & Oriented X3, Good concentration; Motor Strength 5/5 B/L upper and lower extremities; DTRs 2+ intact and symmetric  CHEST/LUNG: Clear to percussion bilaterally; No rales, rhonchi, wheezing, or rubs  HEART: Regular rate and rhythm; No murmurs, rubs, or gallops  ABDOMEN: Soft, Nontender, Nondistended; Bowel sounds present  EXTREMITIES:  RLE covered with bandages  LYMPH: No lymphadenopathy noted  SKIN: No rashes or lesions    Consultant(s) Notes Reviewed:  [x ] YES  [ ] NO  Care Discussed with Consultants/Other Providers [ x] YES  [ ] NO    LABS:      RADIOLOGY & ADDITIONAL TESTS:    Imaging Personally Reviewed:  [ ] YES  [ ] NO  acetaminophen     Tablet .. 650 milliGRAM(s) Oral every 6 hours PRN  allopurinol 100 milliGRAM(s) Oral two times a day  aluminum hydroxide/magnesium hydroxide/simethicone Suspension 30 milliLiter(s) Oral every 4 hours PRN  ascorbic acid 500 milliGRAM(s) Oral daily  aspirin  chewable 81 milliGRAM(s) Oral daily  calcium carbonate 1250 mG  + Vitamin D (OsCal 500 + D) 1 Tablet(s) Oral two times a day  chlorhexidine 2% Cloths 1 Application(s) Topical <User Schedule>  donepezil 10 milliGRAM(s) Oral at bedtime  DULoxetine 60 milliGRAM(s) Oral daily  ferrous    sulfate 325 milliGRAM(s) Oral daily  folic acid 1 milliGRAM(s) Oral daily  furosemide    Tablet 40 milliGRAM(s) Oral daily  levothyroxine 50 MICROGram(s) Oral daily  linezolid    Tablet 600 milliGRAM(s) Oral every 12 hours  melatonin 3 milliGRAM(s) Oral at bedtime PRN  metoprolol succinate ER 50 milliGRAM(s) Oral daily  ondansetron Injectable 4 milliGRAM(s) IV Push every 8 hours PRN  oxybutynin 10 milliGRAM(s) Oral at bedtime  pantoprazole    Tablet 40 milliGRAM(s) Oral before breakfast  sacubitril 24 mG/valsartan 26 mG 1 Tablet(s) Oral two times a day  zinc sulfate 220 milliGRAM(s) Oral daily      HEALTH ISSUES - PROBLEM Dx:  Adult failure to thrive    Foot ulceration    Hypothyroid    Dementia    History of chronic CHF    History of peripheral vascular disease    A/P: 83 y/o female admitted with failure to thrive, poor PO intake, and ulceration left 2nd toe    #Foot ulceration in setting of h/o PVD,  r/o OM  S/p Excisional Debridement of Soft Tissue & Bone w/Arthroplasty 2nd PIPJ, partially closed, Left Foot  #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  - O/E: Sharp bedside excisional debridement of fibrotic/ necrotic tissue  left second toe ulcer to layer of tendon- purulence noted with bone exposure   - There is no leukocytosis or left shift  - Elevated ESR/CRP  - MRI: OM  - S/p Excisional Debridement of Soft Tissue & Bone w/Arthroplasty 2nd PIPJ, partially closed, Left Foot: 01/06/23  - WCx/BoneCx: Staph aureus  - Vascular surgery - LE angiogram 1/10 - Occluded L anterior tibial artery at the origin, near occluded stenosis posterior tibial artery near the origin with backfilling from peroneal and PT flow into the pedal arch  - Awaiting further recs from podiatry  - ID consult - Linezolid 600mg q12 for now pending sensitivities, Will need PICC line and will do Daptomycin 8mg/kg every 8 hours    #Adult failure to thrive in setting of Dementia  #Dementia  #Acute Change in Mental Status in setting of infectious process  - WCx/boneCx: Staph aureus  - c/w easy to chew diet; Liquid supplement  - c/w Vit C and Zinc for wound healing.  - continue Donepezil  - dietary consult  - PEG placement not indicated at this time given patient medical condition is still active    #Multiple skin abrasions  #Small area redness coccyx region   #Ecchymosis left side of mouth  - Wound Care RN consult    #Hypothyroid.   - continue Synthroid.    #History of chronic CHF.   - continue Entresto, Metoprolol, on lasix  - follow BP.     #History of peripheral vascular disease.   - continue ASA/Plavix  - US Doppler: neg  - LE angiogram 1/10 - results above    #SUPPORTIVE MANAGEMENT/DISPO  - Dispo: until medically cleared  - DVT Ppx: Lovenox  - GI Ppx: not indicated  - Diet: easy chew    DispoL will need daptomycin as above, PICC line ordered, pending IR c/s    Total time spent to complete patient's bedside assessment, review medical chart, discuss medical plan of care with covering medical team was ____25____ with > 50% of time spent face to face w/ patient, discussion with patient/family and/or coordination of care

## 2023-01-12 NOTE — CHART NOTE - NSCHARTNOTEFT_GEN_A_CORE
Registered Dietitian Follow-Up     Patient Profile Reviewed                           Yes [X]   No []     Nutrition History Previously Obtained        Yes X[]  No []       Pertinent Information: pt is 82 year old female with hx of dementia, HTN, PAD, RA, chronic LE pain, CHF, presents with failure to thrive with poor po intake x 2 days and worsening baseline dementia. + ulcer to L 2nd toe. 1/4 s/p bedside debridement of necrotic tissue. 1/6 s/p arthroplasty excisional debridement of L 2nd toe. 1/10 s/p atherectomy of posterior tibial artery. + wound cultures. + PCM       Diet, Easy to Chew:   DASH/TLC {Sodium & Cholesterol Restricted}  Supplement Feeding Modality:  Oral  Ensure Enlive Cans or Servings Per Day:  3       Frequency:  Three Times a day (01-12-23 @ 14:56) [Active]     Anthropometrics:  - Ht. 160 cm  - Wt. 41.5 kg  - %wt change  - BMI   - IBW      Pertinent Lab Data:  01-12    140  |  99  |  35<H>  ----------------------------<  184<H>  3.6   |  29  |  1.0    Ca    9.2      12 Jan 2023 07:14  Phos  3.6     01-11  Mg     1.9     01-11                            8.4    3.52  )-----------( 94       ( 12 Jan 2023 07:14 )             27.9        Pertinent Meds:  MEDICATIONS  (STANDING):  allopurinol 100 milliGRAM(s) Oral two times a day  ascorbic acid 500 milliGRAM(s) Oral daily  aspirin  chewable 81 milliGRAM(s) Oral daily  calcium carbonate 1250 mG  + Vitamin D (OsCal 500 + D) 1 Tablet(s) Oral two times a day  chlorhexidine 2% Cloths 1 Application(s) Topical <User Schedule>  DAPTOmycin IVPB 650 milliGRAM(s) IV Intermittent every 24 hours  donepezil 10 milliGRAM(s) Oral at bedtime  DULoxetine 60 milliGRAM(s) Oral daily  ferrous    sulfate 325 milliGRAM(s) Oral daily  folic acid 1 milliGRAM(s) Oral daily  furosemide    Tablet 40 milliGRAM(s) Oral daily  heparin   Injectable 5000 Unit(s) SubCutaneous every 8 hours  levothyroxine 50 MICROGram(s) Oral daily  metoprolol succinate ER 50 milliGRAM(s) Oral daily  oxybutynin 10 milliGRAM(s) Oral at bedtime  pantoprazole    Tablet 40 milliGRAM(s) Oral before breakfast  potassium chloride    Tablet ER 20 milliEquivalent(s) Oral every 2 hours  sacubitril 24 mG/valsartan 26 mG 1 Tablet(s) Oral two times a day  zinc sulfate 220 milliGRAM(s) Oral daily    MEDICATIONS  (PRN):  acetaminophen     Tablet .. 650 milliGRAM(s) Oral every 6 hours PRN Temp greater or equal to 38C (100.4F), Mild Pain (1 - 3)  aluminum hydroxide/magnesium hydroxide/simethicone Suspension 30 milliLiter(s) Oral every 4 hours PRN Dyspepsia  melatonin 3 milliGRAM(s) Oral at bedtime PRN Insomnia  ondansetron Injectable 4 milliGRAM(s) IV Push every 8 hours PRN Nausea and/or Vomiting       Physical Findings:  - Appearance: alert, orientated   - GI function: +BS  - Tubes: n/a   - Oral/Mouth cavity:  - Skin:  L second toe ulcer s/p debridement     Nutrition Requirements  Weight Used:  41.5 kgs     Estimated Energy Needs  0496-0628 kcals  50-58g pro  1:1 kcals     Nutrient Intake: 25-50% of meals consumed with >50% of ensure        [] Previous Nutrition Diagnosis:            [X Ongoing          [] Resolved    severe PCM     Nutrition Intervention: maintain on present diet/supplements      Goal/Expected Outcome: pt to tolerate po diet and meet at least 50-75% of estimated nutrient needs within 4 days      Indicator/Monitoring: RD to monitor tolerance to po diet, labs/meds, NFPF and f/u as needed within 4 days  high risk

## 2023-01-13 NOTE — PROGRESS NOTE ADULT - SUBJECTIVE AND OBJECTIVE BOX
Podiatry Progress Note    Subjective:   ARIEL INIGUEZ is a pleasant well-nourished, well developed 82y Female in no acute distress, alert awake, and oriented to person, place and time.  Patient is a 82y old  Female who presents with a chief complaint of Weakness with poor PO intake (13 Jan 2023 12:46). Pt seen & evaluated at bedside. Pt denies N/V/F/C/pain. Dressings D/C/I to LLE. No other pedal complaints.       PAST MEDICAL & SURGICAL HISTORY:  Hypothyroid      Hypertension      RA (rheumatoid arthritis)      High cholesterol      Poor circulation  PAD      Incontinence      Dementia      Rheumatoid arthritis      Osteopenia      Melanoma of skin      Depression with anxiety      Bilateral cataracts      Peripheral arterial disease  (s/p B/L leg stents &amp; on Plavix)      History of cirrhosis of liver      History of chronic CHF      History of hip surgery  Left ORIF and Left ORIF femur      H/O: hysterectomy           Objective:  Vital Signs Last 24 Hrs  T(C): 35.8 (13 Jan 2023 14:00), Max: 36.4 (12 Jan 2023 20:33)  T(F): 96.4 (13 Jan 2023 14:00), Max: 97.5 (12 Jan 2023 20:33)  HR: 69 (13 Jan 2023 14:00) (69 - 81)  BP: 139/62 (13 Jan 2023 14:00) (129/61 - 139/62)  BP(mean): --  RR: 16 (13 Jan 2023 14:00) (16 - 18)  SpO2: 99% (13 Jan 2023 09:17) (96% - 99%)    Parameters below as of 13 Jan 2023 09:17  Patient On (Oxygen Delivery Method): room air                            9.1    5.08  )-----------( 131      ( 13 Jan 2023 08:47 )             29.8                 01-13    137  |  98  |  31<H>  ----------------------------<  135<H>  4.9   |  31  |  0.9    Ca    9.1      13 Jan 2023 08:47  Mg     1.9     01-13    TPro  7.4  /  Alb  2.6<L>  /  TBili  0.3  /  DBili  x   /  AST  46<H>  /  ALT  18  /  AlkPhos  122<H>  01-13            Physical Exam - Lower Extremity Focused:   #Left Lower Extremity  Derm:   Open Sx Site Wound to Dorsal 2nd PIPJ    -Wound Base Fibrous/Necrotic   -Wound Probes Deep to Bone w/ No Active Drainage/Bleeding;    -Ischemic Changes Noted To Periwound    Vascular: DP and PT Pulses Diminished; Foot is Warm to Warm to the touch   Neuro: Protective Sensation Intact  MSK: 2nd Digit contracture at PIPJ; Pain On Palpation at Wound Site       Assessment:  Left 2nd Dorsal DIPJ Ulcer   -s/p exc dbx st/b w/arthroplasty 2nd PIPJ left foot 1/6/23  - Ischemic changes with OM of the L 2nd toe   - PAD - s/p Angio LLE 1/10/23      Plan:  Chart reviewed and Patient evaluated. All Questions and Concerns Addressed and Answered  MRI 1/4/23: 1. Septic arthritis/osteomyelitis of the second PIP joint underlying an ulcer. 2. Probable nearly healed fracture of the fifth metatarsal neck. A developing stress fracture could also have this appearance. 3. Limited motion degraded exam.  Dr. Winters discussed with family the treatment plan. Recommend amputation of 2nd digit for source control. Pt agreeable to amputation. All risks, benefits and possible complications were discussed with the patient.  Plan for Sx Mon 1/16/23 or Tues 1/17/23; Excisional Debridement Soft Tissue & Bone w/2nd Toe Amputation;  Will update when Sx plan finalized;   Dr. Winters out of town until 1/21/23; Dr. Miller to manage patient care until Dr. Winters returns.    Local Wound Care; Wound Cleansed w/ NS; Wound Packed w/ Saline-soaked Gauze / DSD / Kerlix / ACE; Q24;  Weight Bearing Status; WBAT w/ Surgical Shoe;   Continue w/ Local Wound Care; Q24 Dressing Changes;  Ischemic Rest pain - recommend pain control   Abx as per ID     Podiatry

## 2023-01-13 NOTE — PROGRESS NOTE ADULT - SUBJECTIVE AND OBJECTIVE BOX
Progress note    Patient seen and examined at bedside. No acute distress. She is breathing comfortably on room air.    INTERVAL HPI/OVERNIGHT EVENTS:    REVIEW OF SYSTEMS:  CONSTITUTIONAL: No fever, weight loss, or fatigue  EYES: No eye pain, visual disturbances, or discharge  ENMT:  No difficulty hearing, tinnitus, vertigo; No sinus or throat pain  NECK: No pain or stiffness  BREASTS: No pain, masses, or nipple discharge  RESPIRATORY: No cough, wheezing, chills or hemoptysis; No shortness of breath  CARDIOVASCULAR: No chest pain, palpitations, dizziness, or leg swelling  GASTROINTESTINAL: No abdominal or epigastric pain. No nausea, vomiting, or hematemesis; No diarrhea or constipation. No melena or hematochezia.  GENITOURINARY: No dysuria, frequency, hematuria, or incontinence  NEUROLOGICAL: No headaches, memory loss, loss of strength, numbness, or tremors  SKIN: No itching, burning, rashes, or lesions   LYMPH NODES: No enlarged glands  ENDOCRINE: No heat or cold intolerance; No hair loss  MUSCULOSKELETAL: No joint pain or swelling; No muscle, back, or extremity pain  PSYCHIATRIC: No depression, anxiety, mood swings, or difficulty sleeping  HEME/LYMPH: No easy bruising, or bleeding gums  ALLERY AND IMMUNOLOGIC: No hives or eczema  FAMILY HISTORY:    T(C): 35.9 (01-13-23 @ 05:39), Max: 36.4 (01-12-23 @ 20:33)  HR: 71 (01-13-23 @ 05:39) (52 - 81)  BP: 129/61 (01-13-23 @ 05:39) (129/61 - 149/68)  RR: 16 (01-13-23 @ 05:39) (16 - 18)  SpO2: 99% (01-13-23 @ 09:17) (96% - 99%)  Wt(kg): --Vital Signs Last 24 Hrs  T(C): 35.9 (13 Jan 2023 05:39), Max: 36.4 (12 Jan 2023 20:33)  T(F): 96.7 (13 Jan 2023 05:39), Max: 97.5 (12 Jan 2023 20:33)  HR: 71 (13 Jan 2023 05:39) (52 - 81)  BP: 129/61 (13 Jan 2023 05:39) (129/61 - 149/68)  BP(mean): --  RR: 16 (13 Jan 2023 05:39) (16 - 18)  SpO2: 99% (13 Jan 2023 09:17) (96% - 99%)    Parameters below as of 13 Jan 2023 09:17  Patient On (Oxygen Delivery Method): room air      Keflex (Unknown)  losartan (Unknown)  morphine (Unknown)  Rocephin (Unknown)  sulfamethoxazole (Hives)      PHYSICAL EXAM:  GENERAL: NAD, well-groomed, well-developed  HEAD:  Atraumatic, Normocephalic  EYES: EOMI, PERRLA, conjunctiva and sclera clear  ENMT: No tonsillar erythema, exudates, or enlargement; Moist mucous membranes, Good dentition, No lesions  NECK: Supple, No JVD, Normal thyroid  NERVOUS SYSTEM:  Alert & Oriented X3, Good concentration; Motor Strength 5/5 B/L upper and lower extremities; DTRs 2+ intact and symmetric  CHEST/LUNG: Clear to percussion bilaterally; No rales, rhonchi, wheezing, or rubs  HEART: Regular rate and rhythm; No murmurs, rubs, or gallops  ABDOMEN: Soft, Nontender, Nondistended; Bowel sounds present  EXTREMITIES:  L leg bandaged  LYMPH: No lymphadenopathy noted  SKIN: No rashes or lesions    Consultant(s) Notes Reviewed:  [x ] YES  [ ] NO  Care Discussed with Consultants/Other Providers [ x] YES  [ ] NO    LABS:      RADIOLOGY & ADDITIONAL TESTS:    Imaging Personally Reviewed:  [ ] YES  [ ] NO  acetaminophen     Tablet .. 650 milliGRAM(s) Oral every 6 hours PRN  allopurinol 100 milliGRAM(s) Oral two times a day  aluminum hydroxide/magnesium hydroxide/simethicone Suspension 30 milliLiter(s) Oral every 4 hours PRN  ascorbic acid 500 milliGRAM(s) Oral daily  aspirin  chewable 81 milliGRAM(s) Oral daily  calcium carbonate 1250 mG  + Vitamin D (OsCal 500 + D) 1 Tablet(s) Oral two times a day  chlorhexidine 2% Cloths 1 Application(s) Topical <User Schedule>  DAPTOmycin IVPB 650 milliGRAM(s) IV Intermittent every 24 hours  donepezil 10 milliGRAM(s) Oral at bedtime  DULoxetine 60 milliGRAM(s) Oral daily  ferrous    sulfate 325 milliGRAM(s) Oral daily  folic acid 1 milliGRAM(s) Oral daily  furosemide    Tablet 40 milliGRAM(s) Oral daily  heparin   Injectable 5000 Unit(s) SubCutaneous every 8 hours  levothyroxine 50 MICROGram(s) Oral daily  melatonin 3 milliGRAM(s) Oral at bedtime PRN  metoprolol succinate ER 50 milliGRAM(s) Oral daily  ondansetron Injectable 4 milliGRAM(s) IV Push every 8 hours PRN  oxybutynin 10 milliGRAM(s) Oral at bedtime  pantoprazole    Tablet 40 milliGRAM(s) Oral before breakfast  potassium chloride    Tablet ER 20 milliEquivalent(s) Oral every 2 hours  sacubitril 24 mG/valsartan 26 mG 1 Tablet(s) Oral two times a day  zinc sulfate 220 milliGRAM(s) Oral daily      HEALTH ISSUES - PROBLEM Dx:  Adult failure to thrive    Foot ulceration    Hypothyroid    Dementia    History of chronic CHF    History of peripheral vascular disease    A/P: 81 y/o female admitted with failure to thrive, poor PO intake, and ulceration left 2nd toe    #Foot ulceration in setting of h/o PVD,  r/o OM  S/p Excisional Debridement of Soft Tissue & Bone w/Arthroplasty 2nd PIPJ, partially closed, Left Foot  #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  - O/E: [facility] bedside excisional debridement of fibrotic/ necrotic tissue  left second toe ulcer to layer of tendon- purulence noted with bone exposure   - There is no leukocytosis or left shift  - Elevated ESR/CRP  - MRI: OM  - S/p Excisional Debridement of Soft Tissue & Bone w/Arthroplasty 2nd PIPJ, partially closed, Left Foot: 01/06/23  - WCx/BoneCx: Staph aureus  - Vascular surgery - LE angiogram 1/10 - Occluded L anterior tibial artery at the origin, near occluded stenosis posterior tibial artery near the origin with backfilling from peroneal and PT flow into the pedal arch  - ID consult - Stop Linezolid d/t pancytopenia, start daptomycin, check CK level pending  -Podiatry - will proceed with further surgical intervention after weekend    #Adult failure to thrive in setting of Dementia  #Dementia  #Acute Change in Mental Status in setting of infectious process  - WCx/boneCx: Staph aureus  - c/w easy to chew diet; Liquid supplement  - c/w Vit C and Zinc for wound healing.  - continue Donepezil  - dietary consult  - PEG placement not indicated at this time given patient medical condition is still active    #Multiple skin abrasions  #Small area redness coccyx region   #Ecchymosis left side of mouth  - Wound Care RN consult    #Hypothyroid.   - continue Synthroid.    #History of chronic CHF.   - continue Entresto, Metoprolol, on lasix  - follow BP.     #History of peripheral vascular disease.   - continue ASA/Plavix  - US Doppler: neg  - LE angiogram 1/10 - results above    #SUPPORTIVE MANAGEMENT/DISPO  - Dispo: until medically cleared  - DVT Ppx: Lovenox  - GI Ppx: not indicated  - Diet: easy chew    Dispo: for OR next week, continue daptomycin    Total time spent to complete patient's bedside assessment, review medical chart, discuss medical plan of care with covering medical team was ____25____ with > 50% of time spent face to face w/ patient, discussion with patient/family and/or coordination of care

## 2023-01-14 NOTE — PROGRESS NOTE ADULT - SUBJECTIVE AND OBJECTIVE BOX
Progress note    Patient seen and examined at bedside. No acute distress. Sitting down eating breakfast.    INTERVAL HPI/OVERNIGHT EVENTS:    REVIEW OF SYSTEMS:  CONSTITUTIONAL: No fever, weight loss, or fatigue  EYES: No eye pain, visual disturbances, or discharge  ENMT:  No difficulty hearing, tinnitus, vertigo; No sinus or throat pain  NECK: No pain or stiffness  BREASTS: No pain, masses, or nipple discharge  RESPIRATORY: No cough, wheezing, chills or hemoptysis; No shortness of breath  CARDIOVASCULAR: No chest pain, palpitations, dizziness, or leg swelling  GASTROINTESTINAL: No abdominal or epigastric pain. No nausea, vomiting, or hematemesis; No diarrhea or constipation. No melena or hematochezia.  GENITOURINARY: No dysuria, frequency, hematuria, or incontinence  NEUROLOGICAL: No headaches, memory loss, loss of strength, numbness, or tremors  SKIN: No itching, burning, rashes, or lesions   LYMPH NODES: No enlarged glands  ENDOCRINE: No heat or cold intolerance; No hair loss  MUSCULOSKELETAL: No joint pain or swelling; No muscle, back, or extremity pain  PSYCHIATRIC: No depression, anxiety, mood swings, or difficulty sleeping  HEME/LYMPH: No easy bruising, or bleeding gums  ALLERY AND IMMUNOLOGIC: No hives or eczema  FAMILY HISTORY:    T(C): 35.6 (01-14-23 @ 05:51), Max: 35.9 (01-13-23 @ 23:00)  HR: 71 (01-14-23 @ 05:51) (67 - 71)  BP: 145/58 (01-14-23 @ 05:51) (132/61 - 145/58)  RR: 18 (01-14-23 @ 05:51) (16 - 18)  SpO2: --  Wt(kg): --Vital Signs Last 24 Hrs  T(C): 35.6 (14 Jan 2023 05:51), Max: 35.9 (13 Jan 2023 23:00)  T(F): 96 (14 Jan 2023 05:51), Max: 96.7 (13 Jan 2023 23:00)  HR: 71 (14 Jan 2023 05:51) (67 - 71)  BP: 145/58 (14 Jan 2023 05:51) (132/61 - 145/58)  BP(mean): --  RR: 18 (14 Jan 2023 05:51) (16 - 18)  SpO2: --      Keflex (Unknown)  losartan (Unknown)  morphine (Unknown)  Rocephin (Unknown)  sulfamethoxazole (Hives)      PHYSICAL EXAM:  GENERAL: NAD, well-groomed, well-developed  HEAD:  Atraumatic, Normocephalic  EYES: EOMI, PERRLA, conjunctiva and sclera clear  ENMT: No tonsillar erythema, exudates, or enlargement; Moist mucous membranes, Good dentition, No lesions  NECK: Supple, No JVD, Normal thyroid  NERVOUS SYSTEM:  Alert & Oriented X3, Good concentration; Motor Strength 5/5 B/L upper and lower extremities; DTRs 2+ intact and symmetric  CHEST/LUNG: Clear to percussion bilaterally; No rales, rhonchi, wheezing, or rubs  HEART: Regular rate and rhythm; No murmurs, rubs, or gallops  ABDOMEN: Soft, Nontender, Nondistended; Bowel sounds present  EXTREMITIES:  L foot wrapped in dressing.  LYMPH: No lymphadenopathy noted  SKIN: No rashes or lesions    Consultant(s) Notes Reviewed:  [x ] YES  [ ] NO  Care Discussed with Consultants/Other Providers [ x] YES  [ ] NO    LABS:      RADIOLOGY & ADDITIONAL TESTS:    Imaging Personally Reviewed:  [ ] YES  [ ] NO  acetaminophen     Tablet .. 650 milliGRAM(s) Oral every 6 hours PRN  allopurinol 100 milliGRAM(s) Oral two times a day  aluminum hydroxide/magnesium hydroxide/simethicone Suspension 30 milliLiter(s) Oral every 4 hours PRN  ascorbic acid 500 milliGRAM(s) Oral daily  aspirin  chewable 81 milliGRAM(s) Oral daily  calcium carbonate 1250 mG  + Vitamin D (OsCal 500 + D) 1 Tablet(s) Oral two times a day  chlorhexidine 2% Cloths 1 Application(s) Topical <User Schedule>  DAPTOmycin IVPB 650 milliGRAM(s) IV Intermittent every 24 hours  donepezil 10 milliGRAM(s) Oral at bedtime  DULoxetine 60 milliGRAM(s) Oral daily  ferrous    sulfate 325 milliGRAM(s) Oral daily  folic acid 1 milliGRAM(s) Oral daily  furosemide    Tablet 40 milliGRAM(s) Oral daily  heparin   Injectable 5000 Unit(s) SubCutaneous every 8 hours  levothyroxine 50 MICROGram(s) Oral daily  melatonin 3 milliGRAM(s) Oral at bedtime PRN  metoprolol succinate ER 50 milliGRAM(s) Oral daily  ondansetron Injectable 4 milliGRAM(s) IV Push every 8 hours PRN  oxybutynin 10 milliGRAM(s) Oral at bedtime  pantoprazole    Tablet 40 milliGRAM(s) Oral before breakfast  potassium chloride    Tablet ER 20 milliEquivalent(s) Oral every 2 hours  sacubitril 24 mG/valsartan 26 mG 1 Tablet(s) Oral two times a day  zinc sulfate 220 milliGRAM(s) Oral daily      HEALTH ISSUES - PROBLEM Dx:  Adult failure to thrive    Foot ulceration    Hypothyroid    Dementia    History of chronic CHF    History of peripheral vascular disease    A/P: 81 y/o female admitted with failure to thrive, poor PO intake, and ulceration left 2nd toe    #Foot ulceration in setting of h/o PVD,  r/o OM  S/p Excisional Debridement of Soft Tissue & Bone w/Arthroplasty 2nd PIPJ, partially closed, Left Foot  #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  - O/E: [facility] bedside excisional debridement of fibrotic/ necrotic tissue  left second toe ulcer to layer of tendon- purulence noted with bone exposure   - Elevated ESR/CRP  - MRI: OM  - S/p Excisional Debridement of Soft Tissue & Bone w/Arthroplasty 2nd PIPJ, partially closed, Left Foot: 01/06/23  - WCx/BoneCx: Staph aureus  - Vascular surgery - LE angiogram 1/10 - Occluded L anterior tibial artery at the origin, near occluded stenosis posterior tibial artery near the origin with backfilling from peroneal and PT flow into the pedal arch  - ID consult - Stop Linezolid d/t pancytopenia, start daptomycin, baseline Cr 27  -Podiatry - will proceed with further surgical intervention after weekend    #Adult failure to thrive in setting of Dementia  #Dementia  #Acute Change in Mental Status in setting of infectious process  - WCx/boneCx: Staph aureus  - c/w easy to chew diet; Liquid supplement  - c/w Vit C and Zinc for wound healing.  - continue Donepezil  - dietary consult    #Multiple skin abrasions  #Small area redness coccyx region   #Ecchymosis left side of mouth  - Wound Care RN consult    #Hypothyroid.   - continue Synthroid.    #History of chronic CHF.   - continue Entresto, Metoprolol, on lasix     #History of peripheral vascular disease.   - continue ASA/Plavix  - US Doppler: neg  - LE angiogram 1/10 - results above    #SUPPORTIVE MANAGEMENT/DISPO  - Dispo: until medically cleared  - DVT Ppx: Lovenox  - GI Ppx: not indicated  - Diet: easy chew    Dispo: for OR next week    Total time spent to complete patient's bedside assessment, review medical chart, discuss medical plan of care with covering medical team was ___25_____ with > 50% of time spent face to face w/ patient, discussion with patient/family and/or coordination of care

## 2023-01-15 NOTE — PROGRESS NOTE ADULT - SUBJECTIVE AND OBJECTIVE BOX
Progress note    Patient seen and examined at bedside. She is in no acute distress.    INTERVAL HPI/OVERNIGHT EVENTS:    REVIEW OF SYSTEMS:  CONSTITUTIONAL: No fever, weight loss, or fatigue  EYES: No eye pain, visual disturbances, or discharge  ENMT:  No difficulty hearing, tinnitus, vertigo; No sinus or throat pain  NECK: No pain or stiffness  BREASTS: No pain, masses, or nipple discharge  RESPIRATORY: No cough, wheezing, chills or hemoptysis; No shortness of breath  CARDIOVASCULAR: No chest pain, palpitations, dizziness, or leg swelling  GASTROINTESTINAL: No abdominal or epigastric pain. No nausea, vomiting, or hematemesis; No diarrhea or constipation. No melena or hematochezia.  GENITOURINARY: No dysuria, frequency, hematuria, or incontinence  NEUROLOGICAL: No headaches, memory loss, loss of strength, numbness, or tremors  SKIN: No itching, burning, rashes, or lesions   LYMPH NODES: No enlarged glands  ENDOCRINE: No heat or cold intolerance; No hair loss  MUSCULOSKELETAL: No joint pain or swelling; No muscle, back, or extremity pain  PSYCHIATRIC: No depression, anxiety, mood swings, or difficulty sleeping  HEME/LYMPH: No easy bruising, or bleeding gums  ALLERY AND IMMUNOLOGIC: No hives or eczema  FAMILY HISTORY:    T(C): 35.6 (01-15-23 @ 05:00), Max: 35.7 (01-14-23 @ 13:36)  HR: 72 (01-15-23 @ 05:00) (65 - 76)  BP: 151/70 (01-15-23 @ 05:00) (129/61 - 151/70)  RR: 18 (01-15-23 @ 05:00) (18 - 18)  SpO2: --  Wt(kg): --Vital Signs Last 24 Hrs  T(C): 35.6 (15 David 2023 05:00), Max: 35.7 (14 Jan 2023 13:36)  T(F): 96 (15 David 2023 05:00), Max: 96.3 (14 Jan 2023 13:36)  HR: 72 (15 David 2023 05:00) (65 - 76)  BP: 151/70 (15 David 2023 05:00) (129/61 - 151/70)  BP(mean): --  RR: 18 (15 David 2023 05:00) (18 - 18)  SpO2: --      Keflex (Unknown)  losartan (Unknown)  morphine (Unknown)  Rocephin (Unknown)  sulfamethoxazole (Hives)      PHYSICAL EXAM:  GENERAL: NAD, well-groomed, well-developed  HEAD:  Atraumatic, Normocephalic  EYES: EOMI, PERRLA, conjunctiva and sclera clear  ENMT: No tonsillar erythema, exudates, or enlargement; Moist mucous membranes, Good dentition, No lesions  NECK: Supple, No JVD, Normal thyroid  NERVOUS SYSTEM:  Alert & Oriented X3, Good concentration; Motor Strength 5/5 B/L upper and lower extremities; DTRs 2+ intact and symmetric  CHEST/LUNG: Clear to percussion bilaterally; No rales, rhonchi, wheezing, or rubs  HEART: Regular rate and rhythm; No murmurs, rubs, or gallops  ABDOMEN: Soft, Nontender, Nondistended; Bowel sounds present  EXTREMITIES:  2+ Peripheral Pulses, No clubbing, cyanosis, or edema  LYMPH: No lymphadenopathy noted  SKIN: No rashes or lesions    Consultant(s) Notes Reviewed:  [x ] YES  [ ] NO  Care Discussed with Consultants/Other Providers [ x] YES  [ ] NO    LABS:      RADIOLOGY & ADDITIONAL TESTS:    Imaging Personally Reviewed:  [ ] YES  [ ] NO  acetaminophen     Tablet .. 650 milliGRAM(s) Oral every 6 hours PRN  allopurinol 100 milliGRAM(s) Oral two times a day  aluminum hydroxide/magnesium hydroxide/simethicone Suspension 30 milliLiter(s) Oral every 4 hours PRN  ascorbic acid 500 milliGRAM(s) Oral daily  aspirin  chewable 81 milliGRAM(s) Oral daily  calcium carbonate 1250 mG  + Vitamin D (OsCal 500 + D) 1 Tablet(s) Oral two times a day  chlorhexidine 2% Cloths 1 Application(s) Topical <User Schedule>  DAPTOmycin IVPB 650 milliGRAM(s) IV Intermittent every 24 hours  donepezil 10 milliGRAM(s) Oral at bedtime  DULoxetine 60 milliGRAM(s) Oral daily  ferrous    sulfate 325 milliGRAM(s) Oral daily  folic acid 1 milliGRAM(s) Oral daily  furosemide    Tablet 40 milliGRAM(s) Oral daily  heparin   Injectable 5000 Unit(s) SubCutaneous every 8 hours  levothyroxine 50 MICROGram(s) Oral daily  melatonin 3 milliGRAM(s) Oral at bedtime PRN  metoprolol succinate ER 50 milliGRAM(s) Oral daily  ondansetron Injectable 4 milliGRAM(s) IV Push every 8 hours PRN  oxybutynin 10 milliGRAM(s) Oral at bedtime  pantoprazole    Tablet 40 milliGRAM(s) Oral before breakfast  potassium chloride    Tablet ER 20 milliEquivalent(s) Oral every 2 hours  sacubitril 24 mG/valsartan 26 mG 1 Tablet(s) Oral two times a day  zinc sulfate 220 milliGRAM(s) Oral daily      HEALTH ISSUES - PROBLEM Dx:  Adult failure to thrive    Foot ulceration    Hypothyroid    Dementia    History of chronic CHF    History of peripheral vascular disease    A/P: 81 y/o female admitted with failure to thrive, poor PO intake, and ulceration left 2nd toe    #Foot ulceration in setting of h/o PVD,  r/o OM  S/p Excisional Debridement of Soft Tissue & Bone w/Arthroplasty 2nd PIPJ, partially closed, Left Foot  #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  - O/E: [facility] bedside excisional debridement of fibrotic/ necrotic tissue  left second toe ulcer to layer of tendon- purulence noted with bone exposure   - Elevated ESR/CRP  - MRI: OM  - S/p Excisional Debridement of Soft Tissue & Bone w/Arthroplasty 2nd PIPJ, partially closed, Left Foot: 01/06/23  - WCx/BoneCx: Staph aureus  - Vascular surgery - LE angiogram 1/10 - Occluded L anterior tibial artery at the origin, near occluded stenosis posterior tibial artery near the origin with backfilling from peroneal and PT flow into the pedal arch  - ID consult - Stop Linezolid d/t pancytopenia, start daptomycin, baseline Cr 27  -Podiatry - will proceed with further surgical intervention after weekend    #Adult failure to thrive in setting of Dementia  #Dementia  #Acute Change in Mental Status in setting of infectious process  - WCx/boneCx: Staph aureus  - c/w easy to chew diet; Liquid supplement  - c/w Vit C and Zinc for wound healing.  - continue Donepezil  - dietary consult    #Multiple skin abrasions  #Small area redness coccyx region   #Ecchymosis left side of mouth  - Wound Care RN consult    #Hypothyroid.   - continue Synthroid.    #History of chronic CHF.   - continue Entresto, Metoprolol, on lasix     #History of peripheral vascular disease.   - continue ASA/Plavix  - US Doppler: neg  - LE angiogram 1/10 - results above    #SUPPORTIVE MANAGEMENT/DISPO  - Dispo: until medically cleared  - DVT Ppx: Lovenox  - GI Ppx: not indicated  - Diet: easy chew    Dispo: for OR next week      Total time spent to complete patient's bedside assessment, review medical chart, discuss medical plan of care with covering medical team was ____25____ with > 50% of time spent face to face w/ patient, discussion with patient/family and/or coordination of care

## 2023-01-16 NOTE — CHART NOTE - NSCHARTNOTEFT_GEN_A_CORE
Patient is scheduled for 2nd toe amputation left foot tomorrow, Tue 1/17 at 4:00 pm. Please make pt NPO MN 1/16. Please optimize all lab values prior to OR.     Podiatry   [phone]

## 2023-01-16 NOTE — PROGRESS NOTE ADULT - SUBJECTIVE AND OBJECTIVE BOX
Progress note    Patient seen and examined at bedside. She denies any acute complaints other than feeling tired and fatigued.    INTERVAL HPI/OVERNIGHT EVENTS:    REVIEW OF SYSTEMS:  CONSTITUTIONAL: No fever, weight loss, or fatigue  EYES: No eye pain, visual disturbances, or discharge  ENMT:  No difficulty hearing, tinnitus, vertigo; No sinus or throat pain  NECK: No pain or stiffness  BREASTS: No pain, masses, or nipple discharge  RESPIRATORY: No cough, wheezing, chills or hemoptysis; No shortness of breath  CARDIOVASCULAR: No chest pain, palpitations, dizziness, or leg swelling  GASTROINTESTINAL: No abdominal or epigastric pain. No nausea, vomiting, or hematemesis; No diarrhea or constipation. No melena or hematochezia.  GENITOURINARY: No dysuria, frequency, hematuria, or incontinence  NEUROLOGICAL: No headaches, memory loss, loss of strength, numbness, or tremors  SKIN: No itching, burning, rashes, or lesions   LYMPH NODES: No enlarged glands  ENDOCRINE: No heat or cold intolerance; No hair loss  MUSCULOSKELETAL: No joint pain or swelling; No muscle, back, or extremity pain  PSYCHIATRIC: No depression, anxiety, mood swings, or difficulty sleeping  HEME/LYMPH: No easy bruising, or bleeding gums  ALLERY AND IMMUNOLOGIC: No hives or eczema  FAMILY HISTORY:    T(C): 35.5 (01-16-23 @ 13:14), Max: 36.3 (01-15-23 @ 22:17)  HR: 92 (01-16-23 @ 13:14) (76 - 115)  BP: 125/60 (01-16-23 @ 13:14) (125/60 - 169/72)  RR: 16 (01-16-23 @ 13:14) (16 - 16)  SpO2: --  Wt(kg): --Vital Signs Last 24 Hrs  T(C): 35.5 (16 Jan 2023 13:14), Max: 36.3 (15 David 2023 22:17)  T(F): 95.9 (16 Jan 2023 13:14), Max: 97.4 (15 David 2023 22:17)  HR: 92 (16 Jan 2023 13:14) (76 - 115)  BP: 125/60 (16 Jan 2023 13:14) (125/60 - 169/72)  BP(mean): --  RR: 16 (16 Jan 2023 13:14) (16 - 16)  SpO2: --    Parameters below as of 16 Jan 2023 13:14  Patient On (Oxygen Delivery Method): room air      Keflex (Unknown)  losartan (Unknown)  morphine (Unknown)  Rocephin (Unknown)  sulfamethoxazole (Hives)      PHYSICAL EXAM:  GENERAL: NAD, well-groomed, well-developed  HEAD:  Atraumatic, Normocephalic  EYES: EOMI, PERRLA, conjunctiva and sclera clear  ENMT: No tonsillar erythema, exudates, or enlargement; Moist mucous membranes, Good dentition, No lesions  NECK: Supple, No JVD, Normal thyroid  NERVOUS SYSTEM:  Alert & Oriented X3, Good concentration; Motor Strength 5/5 B/L upper and lower extremities; DTRs 2+ intact and symmetric  CHEST/LUNG: Clear to percussion bilaterally; No rales, rhonchi, wheezing, or rubs  HEART: Regular rate and rhythm; No murmurs, rubs, or gallops  ABDOMEN: Soft, Nontender, Nondistended; Bowel sounds present  EXTREMITIES: L foot bandaged  LYMPH: No lymphadenopathy noted  SKIN: No rashes or lesions    Consultant(s) Notes Reviewed:  [x ] YES  [ ] NO  Care Discussed with Consultants/Other Providers [ x] YES  [ ] NO    LABS:      RADIOLOGY & ADDITIONAL TESTS:    Imaging Personally Reviewed:  [ ] YES  [ ] NO  acetaminophen     Tablet .. 650 milliGRAM(s) Oral every 6 hours PRN  allopurinol 100 milliGRAM(s) Oral two times a day  aluminum hydroxide/magnesium hydroxide/simethicone Suspension 30 milliLiter(s) Oral every 4 hours PRN  ascorbic acid 500 milliGRAM(s) Oral daily  aspirin  chewable 81 milliGRAM(s) Oral daily  calcium carbonate 1250 mG  + Vitamin D (OsCal 500 + D) 1 Tablet(s) Oral two times a day  chlorhexidine 2% Cloths 1 Application(s) Topical <User Schedule>  DAPTOmycin IVPB 650 milliGRAM(s) IV Intermittent every 24 hours  donepezil 10 milliGRAM(s) Oral at bedtime  DULoxetine 60 milliGRAM(s) Oral daily  ferrous    sulfate 325 milliGRAM(s) Oral daily  folic acid 1 milliGRAM(s) Oral daily  furosemide    Tablet 40 milliGRAM(s) Oral daily  heparin   Injectable 5000 Unit(s) SubCutaneous every 8 hours  levothyroxine 50 MICROGram(s) Oral daily  melatonin 3 milliGRAM(s) Oral at bedtime PRN  metoprolol succinate ER 50 milliGRAM(s) Oral daily  ondansetron Injectable 4 milliGRAM(s) IV Push every 8 hours PRN  oxybutynin 10 milliGRAM(s) Oral at bedtime  pantoprazole    Tablet 40 milliGRAM(s) Oral before breakfast  potassium chloride    Tablet ER 20 milliEquivalent(s) Oral every 2 hours  sacubitril 24 mG/valsartan 26 mG 1 Tablet(s) Oral two times a day  zinc sulfate 220 milliGRAM(s) Oral daily      HEALTH ISSUES - PROBLEM Dx:  Adult failure to thrive    Foot ulceration    Hypothyroid    Dementia    History of chronic CHF    History of peripheral vascular disease    A/P: 83 y/o female admitted with failure to thrive, poor PO intake, and ulceration left 2nd toe    #Foot ulceration in setting of h/o PVD,  r/o OM  S/p Excisional Debridement of Soft Tissue & Bone w/Arthroplasty 2nd PIPJ, partially closed, Left Foot  #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  - O/E: [facility] bedside excisional debridement of fibrotic/ necrotic tissue  left second toe ulcer to layer of tendon- purulence noted with bone exposure   - Elevated ESR/CRP  - MRI: OM  - S/p Excisional Debridement of Soft Tissue & Bone w/Arthroplasty 2nd PIPJ, partially closed, Left Foot: 01/06/23  - WCx/BoneCx: Staph aureus  - Vascular surgery - LE angiogram 1/10 - Occluded L anterior tibial artery at the origin, near occluded stenosis posterior tibial artery near the origin with backfilling from peroneal and PT flow into the pedal arch  - ID consult - Stop Linezolid d/t pancytopenia, start daptomycin, baseline Cr 27  -Podiatry - Plans for OR 1/17    #Adult failure to thrive in setting of Dementia  #Dementia  #Acute Change in Mental Status in setting of infectious process  - WCx/boneCx: Staph aureus  - c/w easy to chew diet; Liquid supplement  - c/w Vit C and Zinc for wound healing.  - continue Donepezil  - dietary consult    #Multiple skin abrasions  #Small area redness coccyx region   #Ecchymosis left side of mouth  - Wound Care RN consult    #Hypothyroid.   - continue Synthroid.    #History of chronic CHF.   - continue Entresto, Metoprolol, on lasix     #History of peripheral vascular disease.   - continue ASA/Plavix  - US Doppler: neg  - LE angiogram 1/10 - results above    #SUPPORTIVE MANAGEMENT/DISPO  - Dispo: until medically cleared  - DVT Ppx: Lovenox  - GI Ppx: not indicated  - Diet: easy chew    Dispo: for OR in am        Total time spent to complete patient's bedside assessment, review medical chart, discuss medical plan of care with covering medical team was ____25____ with > 50% of time spent face to face w/ patient, discussion with patient/family and/or coordination of care

## 2023-01-17 NOTE — PROGRESS NOTE ADULT - SUBJECTIVE AND OBJECTIVE BOX
Progress note    Patient seen and examined at bedside. She is in no acute distress. OR rescheduled.    INTERVAL HPI/OVERNIGHT EVENTS:    REVIEW OF SYSTEMS:  CONSTITUTIONAL: No fever, weight loss, or fatigue  EYES: No eye pain, visual disturbances, or discharge  ENMT:  No difficulty hearing, tinnitus, vertigo; No sinus or throat pain  NECK: No pain or stiffness  BREASTS: No pain, masses, or nipple discharge  RESPIRATORY: No cough, wheezing, chills or hemoptysis; No shortness of breath  CARDIOVASCULAR: No chest pain, palpitations, dizziness, or leg swelling  GASTROINTESTINAL: No abdominal or epigastric pain. No nausea, vomiting, or hematemesis; No diarrhea or constipation. No melena or hematochezia.  GENITOURINARY: No dysuria, frequency, hematuria, or incontinence  NEUROLOGICAL: No headaches, memory loss, loss of strength, numbness, or tremors  SKIN: No itching, burning, rashes, or lesions   LYMPH NODES: No enlarged glands  ENDOCRINE: No heat or cold intolerance; No hair loss  MUSCULOSKELETAL: No joint pain or swelling; No muscle, back, or extremity pain  PSYCHIATRIC: No depression, anxiety, mood swings, or difficulty sleeping  HEME/LYMPH: No easy bruising, or bleeding gums  ALLERY AND IMMUNOLOGIC: No hives or eczema  FAMILY HISTORY:    T(C): 35.8 (01-17-23 @ 15:41), Max: 36.3 (01-16-23 @ 21:03)  HR: 80 (01-17-23 @ 17:02) (65 - 84)  BP: 150/65 (01-17-23 @ 17:02) (138/64 - 167/74)  RR: 16 (01-17-23 @ 15:41) (16 - 18)  SpO2: 96% (01-17-23 @ 04:37) (96% - 96%)  Wt(kg): --Vital Signs Last 24 Hrs  T(C): 35.8 (17 Jan 2023 15:41), Max: 36.3 (16 Jan 2023 21:03)  T(F): 96.5 (17 Jan 2023 15:41), Max: 97.3 (16 Jan 2023 21:03)  HR: 80 (17 Jan 2023 17:02) (65 - 84)  BP: 150/65 (17 Jan 2023 17:02) (138/64 - 167/74)  BP(mean): --  RR: 16 (17 Jan 2023 15:41) (16 - 18)  SpO2: 96% (17 Jan 2023 04:37) (96% - 96%)    Parameters below as of 17 Jan 2023 13:35  Patient On (Oxygen Delivery Method): room air      Keflex (Unknown)  losartan (Unknown)  morphine (Unknown)  Rocephin (Unknown)  sulfamethoxazole (Hives)      PHYSICAL EXAM:  GENERAL: NAD, well-groomed, well-developed  HEAD:  Atraumatic, Normocephalic  EYES: EOMI, PERRLA, conjunctiva and sclera clear  ENMT: No tonsillar erythema, exudates, or enlargement; Moist mucous membranes, Good dentition, No lesions  NECK: Supple, No JVD, Normal thyroid  NERVOUS SYSTEM:  Alert & Oriented X3, Good concentration; Motor Strength 5/5 B/L upper and lower extremities; DTRs 2+ intact and symmetric  CHEST/LUNG: Clear to percussion bilaterally; No rales, rhonchi, wheezing, or rubs  HEART: Regular rate and rhythm; No murmurs, rubs, or gallops  ABDOMEN: Soft, Nontender, Nondistended; Bowel sounds present  EXTREMITIES:  2+ Peripheral Pulses, No clubbing, cyanosis, or edema  LYMPH: No lymphadenopathy noted  SKIN: No rashes or lesions    Consultant(s) Notes Reviewed:  [x ] YES  [ ] NO  Care Discussed with Consultants/Other Providers [ x] YES  [ ] NO    LABS:      RADIOLOGY & ADDITIONAL TESTS:    Imaging Personally Reviewed:  [ ] YES  [ ] NO  acetaminophen     Tablet .. 650 milliGRAM(s) Oral every 6 hours PRN  allopurinol 100 milliGRAM(s) Oral two times a day  aluminum hydroxide/magnesium hydroxide/simethicone Suspension 30 milliLiter(s) Oral every 4 hours PRN  ascorbic acid 500 milliGRAM(s) Oral daily  aspirin  chewable 81 milliGRAM(s) Oral daily  calcium carbonate 1250 mG  + Vitamin D (OsCal 500 + D) 1 Tablet(s) Oral two times a day  chlorhexidine 2% Cloths 1 Application(s) Topical <User Schedule>  DAPTOmycin IVPB 650 milliGRAM(s) IV Intermittent every 24 hours  donepezil 10 milliGRAM(s) Oral at bedtime  DULoxetine 60 milliGRAM(s) Oral daily  ferrous    sulfate 325 milliGRAM(s) Oral daily  folic acid 1 milliGRAM(s) Oral daily  furosemide    Tablet 40 milliGRAM(s) Oral daily  heparin   Injectable 5000 Unit(s) SubCutaneous every 8 hours  levothyroxine 50 MICROGram(s) Oral daily  melatonin 3 milliGRAM(s) Oral at bedtime PRN  metoprolol succinate ER 50 milliGRAM(s) Oral daily  ondansetron Injectable 4 milliGRAM(s) IV Push every 8 hours PRN  oxybutynin 10 milliGRAM(s) Oral at bedtime  pantoprazole    Tablet 40 milliGRAM(s) Oral before breakfast  potassium chloride    Tablet ER 20 milliEquivalent(s) Oral every 2 hours  sacubitril 24 mG/valsartan 26 mG 1 Tablet(s) Oral two times a day  zinc sulfate 220 milliGRAM(s) Oral daily      HEALTH ISSUES - PROBLEM Dx:  Adult failure to thrive    Foot ulceration    Hypothyroid    Dementia    History of chronic CHF    History of peripheral vascular disease    A/P: 83 y/o female admitted with failure to thrive, poor PO intake, and ulceration left 2nd toe    #Foot ulceration in setting of h/o PVD,  r/o OM  S/p Excisional Debridement of Soft Tissue & Bone w/Arthroplasty 2nd PIPJ, partially closed, Left Foot  #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  - O/E: [facility] bedside excisional debridement of fibrotic/ necrotic tissue  left second toe ulcer to layer of tendon- purulence noted with bone exposure   - Elevated ESR/CRP  - MRI: OM  - S/p Excisional Debridement of Soft Tissue & Bone w/Arthroplasty 2nd PIPJ, partially closed, Left Foot: 01/06/23  - WCx/BoneCx: Staph aureus  - Vascular surgery - LE angiogram 1/10 - Occluded L anterior tibial artery at the origin, near occluded stenosis posterior tibial artery near the origin with backfilling from peroneal and PT flow into the pedal arch  - ID consult - Stop Linezolid d/t pancytopenia, daptomycin 6mg/kg, baseline Cr 27, recheck on 1/23  -Podiatry - Plans for OR 1/18, K>5, Lokelma given, recheck labs tonight    #Adult failure to thrive in setting of Dementia  #Dementia  #Acute Change in Mental Status in setting of infectious process  - WCx/boneCx: Staph aureus  - c/w easy to chew diet; Liquid supplement  - c/w Vit C and Zinc for wound healing.  - continue Donepezil  - dietary consult    #Multiple skin abrasions  #Small area redness coccyx region   #Ecchymosis left side of mouth  - Wound Care RN consult    #Hypothyroid.   - continue Synthroid.    #History of chronic CHF.   - continue Entresto, Metoprolol, on lasix     #History of peripheral vascular disease.   - continue ASA/Plavix  - US Doppler: neg  - LE angiogram 1/10 - results above    #SUPPORTIVE MANAGEMENT/DISPO  - Dispo: until medically cleared  - DVT Ppx: Lovenox  - GI Ppx: not indicated  - Diet: easy chew    Dispo: for OR in am      Total time spent to complete patient's bedside assessment, review medical chart, discuss medical plan of care with covering medical team was ___25_____ with > 50% of time spent face to face w/ patient, discussion with patient/family and/or coordination of care Progress note    Patient seen and examined at bedside. She is in no acute distress. OR rescheduled.    INTERVAL HPI/OVERNIGHT EVENTS:    REVIEW OF SYSTEMS:  CONSTITUTIONAL: No fever, weight loss, or fatigue  EYES: No eye pain, visual disturbances, or discharge  ENMT:  No difficulty hearing, tinnitus, vertigo; No sinus or throat pain  NECK: No pain or stiffness  BREASTS: No pain, masses, or nipple discharge  RESPIRATORY: No cough, wheezing, chills or hemoptysis; No shortness of breath  CARDIOVASCULAR: No chest pain, palpitations, dizziness, or leg swelling  GASTROINTESTINAL: No abdominal or epigastric pain. No nausea, vomiting, or hematemesis; No diarrhea or constipation. No melena or hematochezia.  GENITOURINARY: No dysuria, frequency, hematuria, or incontinence  NEUROLOGICAL: No headaches, memory loss, loss of strength, numbness, or tremors  SKIN: No itching, burning, rashes, or lesions   LYMPH NODES: No enlarged glands  ENDOCRINE: No heat or cold intolerance; No hair loss  MUSCULOSKELETAL: No joint pain or swelling; No muscle, back, or extremity pain  PSYCHIATRIC: No depression, anxiety, mood swings, or difficulty sleeping  HEME/LYMPH: No easy bruising, or bleeding gums  ALLERY AND IMMUNOLOGIC: No hives or eczema  FAMILY HISTORY:    T(C): 35.8 (01-17-23 @ 15:41), Max: 36.3 (01-16-23 @ 21:03)  HR: 80 (01-17-23 @ 17:02) (65 - 84)  BP: 150/65 (01-17-23 @ 17:02) (138/64 - 167/74)  RR: 16 (01-17-23 @ 15:41) (16 - 18)  SpO2: 96% (01-17-23 @ 04:37) (96% - 96%)  Wt(kg): --Vital Signs Last 24 Hrs  T(C): 35.8 (17 Jan 2023 15:41), Max: 36.3 (16 Jan 2023 21:03)  T(F): 96.5 (17 Jan 2023 15:41), Max: 97.3 (16 Jan 2023 21:03)  HR: 80 (17 Jan 2023 17:02) (65 - 84)  BP: 150/65 (17 Jan 2023 17:02) (138/64 - 167/74)  BP(mean): --  RR: 16 (17 Jan 2023 15:41) (16 - 18)  SpO2: 96% (17 Jan 2023 04:37) (96% - 96%)    Parameters below as of 17 Jan 2023 13:35  Patient On (Oxygen Delivery Method): room air      Keflex (Unknown)  losartan (Unknown)  morphine (Unknown)  Rocephin (Unknown)  sulfamethoxazole (Hives)      PHYSICAL EXAM:  GENERAL: NAD, well-groomed, well-developed  HEAD:  Atraumatic, Normocephalic  EYES: EOMI, PERRLA, conjunctiva and sclera clear  ENMT: No tonsillar erythema, exudates, or enlargement; Moist mucous membranes, Good dentition, No lesions  NECK: Supple, No JVD, Normal thyroid  NERVOUS SYSTEM:  Alert & Oriented X3, Good concentration; Motor Strength 5/5 B/L upper and lower extremities; DTRs 2+ intact and symmetric  CHEST/LUNG: Clear to percussion bilaterally; No rales, rhonchi, wheezing, or rubs  HEART: Regular rate and rhythm; No murmurs, rubs, or gallops  ABDOMEN: Soft, Nontender, Nondistended; Bowel sounds present  EXTREMITIES:  2+ Peripheral Pulses, No clubbing, cyanosis, or edema  LYMPH: No lymphadenopathy noted  SKIN: No rashes or lesions    Consultant(s) Notes Reviewed:  [x ] YES  [ ] NO  Care Discussed with Consultants/Other Providers [ x] YES  [ ] NO    LABS:      RADIOLOGY & ADDITIONAL TESTS:    Imaging Personally Reviewed:  [ ] YES  [ ] NO  acetaminophen     Tablet .. 650 milliGRAM(s) Oral every 6 hours PRN  allopurinol 100 milliGRAM(s) Oral two times a day  aluminum hydroxide/magnesium hydroxide/simethicone Suspension 30 milliLiter(s) Oral every 4 hours PRN  ascorbic acid 500 milliGRAM(s) Oral daily  aspirin  chewable 81 milliGRAM(s) Oral daily  calcium carbonate 1250 mG  + Vitamin D (OsCal 500 + D) 1 Tablet(s) Oral two times a day  chlorhexidine 2% Cloths 1 Application(s) Topical <User Schedule>  DAPTOmycin IVPB 650 milliGRAM(s) IV Intermittent every 24 hours  donepezil 10 milliGRAM(s) Oral at bedtime  DULoxetine 60 milliGRAM(s) Oral daily  ferrous    sulfate 325 milliGRAM(s) Oral daily  folic acid 1 milliGRAM(s) Oral daily  furosemide    Tablet 40 milliGRAM(s) Oral daily  heparin   Injectable 5000 Unit(s) SubCutaneous every 8 hours  levothyroxine 50 MICROGram(s) Oral daily  melatonin 3 milliGRAM(s) Oral at bedtime PRN  metoprolol succinate ER 50 milliGRAM(s) Oral daily  ondansetron Injectable 4 milliGRAM(s) IV Push every 8 hours PRN  oxybutynin 10 milliGRAM(s) Oral at bedtime  pantoprazole    Tablet 40 milliGRAM(s) Oral before breakfast  potassium chloride    Tablet ER 20 milliEquivalent(s) Oral every 2 hours  sacubitril 24 mG/valsartan 26 mG 1 Tablet(s) Oral two times a day  zinc sulfate 220 milliGRAM(s) Oral daily      HEALTH ISSUES - PROBLEM Dx:  Adult failure to thrive    Foot ulceration    Hypothyroid    Dementia    History of chronic CHF    History of peripheral vascular disease    A/P: 83 y/o female admitted with failure to thrive, poor PO intake, and ulceration left 2nd toe    #Foot ulceration in setting of h/o PVD,  r/o OM  S/p Excisional Debridement of Soft Tissue & Bone w/Arthroplasty 2nd PIPJ, partially closed, Left Foot  #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  - O/E: [facility] bedside excisional debridement of fibrotic/ necrotic tissue  left second toe ulcer to layer of tendon- purulence noted with bone exposure   - Elevated ESR/CRP  - MRI: OM  - S/p Excisional Debridement of Soft Tissue & Bone w/Arthroplasty 2nd PIPJ, partially closed, Left Foot: 01/06/23  - WCx/BoneCx: Staph aureus  - Vascular surgery - LE angiogram 1/10 - Occluded L anterior tibial artery at the origin, near occluded stenosis posterior tibial artery near the origin with backfilling from peroneal and PT flow into the pedal arch  - ID consult - Stop Linezolid d/t pancytopenia, daptomycin 6mg/kg, baseline Cr 27, recheck on 1/23  -Podiatry - Plans for OR 1/18, K>5, Lokelma given, recheck labs tonight    #Adult failure to thrive in setting of Dementia  #Dementia  #Acute Change in Mental Status in setting of infectious process  - WCx/boneCx: Staph aureus  - c/w easy to chew diet; Liquid supplement  - c/w Vit C and Zinc for wound healing.  - continue Donepezil  - dietary consult    #Multiple skin abrasions  #Small area redness coccyx region   #Ecchymosis left side of mouth  - Wound Care RN consult    #Hypothyroid.   - continue Synthroid.    #History of chronic CHF.   - continue Entresto, Metoprolol, on lasix     #History of peripheral vascular disease.   - continue ASA/Plavix  - US Doppler: neg  - LE angiogram 1/10 - results above    #SUPPORTIVE MANAGEMENT/DISPO  - Dispo: until medically cleared  - DVT Ppx: Lovenox  - GI Ppx: not indicated  - Diet: easy chew    Dispo: for OR in am  Handoff: OR in am with podiatry (pending resolution of K), follow ID recs - daptomycin, check CK lvl 1/23, Will need placement    Total time spent to complete patient's bedside assessment, review medical chart, discuss medical plan of care with covering medical team was ___25_____ with > 50% of time spent face to face w/ patient, discussion with patient/family and/or coordination of care

## 2023-01-17 NOTE — PROGRESS NOTE ADULT - SUBJECTIVE AND OBJECTIVE BOX
Podiatry Progress Note    Subjective:  ARIEL INIGUEZ is a  82y Female.   Seen bedside.   Patient is a 82y old  Female who presents with a chief complaint of Weakness with poor PO intake (17 Jan 2023 16:04)      Past Medical History and Surgical History  PAST MEDICAL & SURGICAL HISTORY:  Hypothyroid      Hypertension      RA (rheumatoid arthritis)      High cholesterol      Poor circulation  PAD      Incontinence      Dementia      Rheumatoid arthritis      Osteopenia      Melanoma of skin      Depression with anxiety      Bilateral cataracts      Peripheral arterial disease  (s/p B/L leg stents &amp; on Plavix)      History of cirrhosis of liver      History of chronic CHF      History of hip surgery  Left ORIF and Left ORIF femur      H/O: hysterectomy           Objective:  Vital Signs Last 24 Hrs  T(C): 35.8 (17 Jan 2023 15:41), Max: 36.3 (16 Jan 2023 21:03)  T(F): 96.5 (17 Jan 2023 15:41), Max: 97.3 (16 Jan 2023 21:03)  HR: 65 (17 Jan 2023 15:41) (65 - 84)  BP: 167/74 (17 Jan 2023 15:41) (138/64 - 167/74)  BP(mean): --  RR: 16 (17 Jan 2023 15:41) (16 - 18)  SpO2: 96% (17 Jan 2023 04:37) (96% - 96%)    Parameters below as of 17 Jan 2023 13:35  Patient On (Oxygen Delivery Method): room air                            8.5    11.17 )-----------( 212      ( 17 Jan 2023 06:56 )             27.5                 01-17    137  |  96<L>  |  46<H>  ----------------------------<  101<H>  5.5<H>   |  30  |  0.8    Ca    9.0      17 Jan 2023 06:56  Mg     2.3     01-17           Physical Exam - Lower Extremity Focused:   #Left Lower Extremity  Derm:   Open Sx Site Wound to Dorsal 2nd PIPJ    -Wound Base Fibrous/Necrotic   -Wound Probes Deep to Bone w/ No Active Drainage/Bleeding;    -Ischemic Changes Noted To Periwound    Vascular: DP and PT Pulses Diminished; Foot is Warm to Warm to the touch   Neuro: Protective Sensation Intact  MSK: 2nd Digit contracture at PIPJ; Pain On Palpation at Wound Site       Assessment:  Left 2nd Dorsal DIPJ Ulcer   -s/p exc dbx st/b w/arthroplasty 2nd PIPJ left foot 1/6/23  - Ischemic changes with OM of the L 2nd toe   - PAD - s/p Angio LLE 1/10/23      Plan:  Chart reviewed and Patient evaluated. All Questions and Concerns Addressed and Answered  Surgery rescheduled,   Plan for Sx WED 1/18/ 23  ( exc dbx st/b w/ L 2nd toe amp.   Pt NPO MN  T&S ordered,   PLEASE optimize lab values   PLEASE correct K so K<5  Req updated  COVID status please obtain covid swab.  Local Wound Care; Wound Cleansed w/ NS; Wound Packed w/ Saline-soaked Gauze / DSD / Kerlix / ACE; Q24;  Weight Bearing Status; WBAT w/ Surgical Shoe;   Continue w/ Local Wound Care; Q24 Dressing Changes;  Ischemic Rest pain - recommend pain control   Abx as per ID

## 2023-01-17 NOTE — PROGRESS NOTE ADULT - SUBJECTIVE AND OBJECTIVE BOX
HIRA, ARIEL  82y, Female  Allergy: Keflex (Unknown)  losartan (Unknown)  morphine (Unknown)  Rocephin (Unknown)  sulfamethoxazole (Hives)      LOS  15d    CHIEF COMPLAINT: Weakness with poor PO intake (16 Jan 2023 16:40)      INTERVAL EVENTS/HPI  - No acute events overnight  - T(F): , Max: 97.3 (01-16-23 @ 21:03)  - Denies any worsening symptoms  - Tolerating medication  - WBC Count: 11.17 (01-17-23 @ 06:56)  WBC Count: 7.55 (01-16-23 @ 06:40)     - Creatinine, Serum: 0.8 (01-17-23 @ 06:56)  Creatinine, Serum: 1.0 (01-16-23 @ 06:40)       ROS  General: Denies rigors, nightsweats  HEENT: Denies headache, rhinorrhea, sore throat, eye pain  CV: Denies CP, palpitations  PULM: Denies wheezing, hemoptysis  GI: Denies hematemesis, hematochezia, melena  : Denies discharge, hematuria  MSK: Denies arthralgias, myalgias  SKIN: Denies rash, lesions  NEURO: Denies paresthesias, weakness  PSYCH: Denies depression, anxiety    VITALS:  T(F): 96.5, Max: 97.3 (01-16-23 @ 21:03)  HR: 65  BP: 167/74  RR: 16Vital Signs Last 24 Hrs  T(C): 35.8 (17 Jan 2023 15:41), Max: 36.3 (16 Jan 2023 21:03)  T(F): 96.5 (17 Jan 2023 15:41), Max: 97.3 (16 Jan 2023 21:03)  HR: 65 (17 Jan 2023 15:41) (65 - 84)  BP: 167/74 (17 Jan 2023 15:41) (138/64 - 167/74)  BP(mean): --  RR: 16 (17 Jan 2023 15:41) (16 - 18)  SpO2: 96% (17 Jan 2023 04:37) (96% - 96%)    Parameters below as of 17 Jan 2023 13:35  Patient On (Oxygen Delivery Method): room air        PHYSICAL EXAM:  Gen: NAD, resting in bed  HEENT: Normocephalic, atraumatic  Neck: supple, no lymphadenopathy  CV: Regular rate & regular rhythm  Lungs: decreased BS at bases, no fremitus  Abdomen: Soft, BS present  Ext: Warm, well perfused  Neuro: non focal, awake  Skin: no rash, no erythema  Lines: no phlebitis    FH: Non-contributory  Social Hx: Non-contributory    TESTS & MEASUREMENTS:                        8.5    11.17 )-----------( 212      ( 17 Jan 2023 06:56 )             27.5     01-17    137  |  96<L>  |  46<H>  ----------------------------<  101<H>  5.5<H>   |  30  |  0.8    Ca    9.0      17 Jan 2023 06:56  Mg     2.3     01-17              Culture - Other (collected 01-06-23 @ 15:20)  Source: .Other bone left foot  Final Report (01-08-23 @ 17:13):    Few Staphylococcus aureus  Organism: Staphylococcus aureus (01-08-23 @ 17:13)  Organism: Staphylococcus aureus (01-08-23 @ 17:13)      -  Ampicillin/Sulbactam: S <=8/4      -  Cefazolin: S <=4      -  Clindamycin: S <=0.25      -  Erythromycin: R >4      -  Gentamicin: S <=1 Should not be used as monotherapy      -  Oxacillin: S 1 Oxacillin predicts susceptibility for dicloxacillin, methicillin, and nafcillin      -  Penicillin: R >8      -  Rifampin: S <=1 Should not be used as monotherapy      -  Tetracycline: R >8      -  Trimethoprim/Sulfamethoxazole: R >2/38      -  Vancomycin: S 1      Method Type: GREGORIO    Culture - Other (collected 01-06-23 @ 15:15)  Source: Wound deep wound left foot  Final Report (01-08-23 @ 17:14):    Numerous Staphylococcus aureus  Organism: Staphylococcus aureus (01-08-23 @ 17:14)  Organism: Staphylococcus aureus (01-08-23 @ 17:14)      -  Ampicillin/Sulbactam: S <=8/4      -  Cefazolin: S <=4      -  Clindamycin: S <=0.25      -  Erythromycin: R >4      -  Gentamicin: S <=1 Should not be used as monotherapy      -  Oxacillin: S 1 Oxacillin predicts susceptibility for dicloxacillin, methicillin, and nafcillin      -  Penicillin: R >8      -  Rifampin: S <=1 Should not be used as monotherapy      -  Tetracycline: R >8      -  Trimethoprim/Sulfamethoxazole: R >2/38      -  Vancomycin: S 1      Method Type: GREGORIO    Culture - Abscess with Gram Stain (collected 01-04-23 @ 14:00)  Source: .Abscess foot  Final Report (01-10-23 @ 08:37):    Moderate Staphylococcus aureus  Organism: Staphylococcus aureus (01-10-23 @ 08:37)  Organism: Staphylococcus aureus (01-10-23 @ 08:37)      -  Ampicillin/Sulbactam: S <=8/4      -  Cefazolin: S 8      -  Clindamycin: S <=0.25      -  Erythromycin: R >4      -  Gentamicin: S <=1 Should not be used as monotherapy      -  Oxacillin: S 2 Oxacillin predicts susceptibility for dicloxacillin, methicillin, and nafcillin      -  Penicillin: R >8      -  Rifampin: S <=1 Should not be used as monotherapy      -  Tetracycline: R >8      -  Trimethoprim/Sulfamethoxazole: R >2/38      -  Vancomycin: S 2      Method Type: GREGORIO            INFECTIOUS DISEASES TESTING  COVID-19 PCR: NotDetec (01-09-23 @ 11:08)  COVID-19 PCR: NotDetec (01-02-23 @ 15:30)  COVID-19 PCR: NotDetec (09-19-22 @ 14:50)  COVID-19 PCR: NotDetec (09-16-22 @ 01:05)  COVID-19 PCR: NotDetec (09-09-22 @ 11:30)      INFLAMMATORY MARKERS  Sedimentation Rate, Erythrocyte: 140 mm/Hr (01-09-23 @ 07:44)  C-Reactive Protein, Serum: 90 mg/L (01-09-23 @ 07:44)  Sedimentation Rate, Erythrocyte: 130 mm/Hr (01-07-23 @ 07:37)  C-Reactive Protein, Serum: 81 mg/L (01-07-23 @ 07:37)      RADIOLOGY & ADDITIONAL TESTS:  I have personally reviewed the last available Chest xray  CXR      CT      CARDIOLOGY TESTING      MEDICATIONS  allopurinol 100 Oral two times a day  ascorbic acid 500 Oral daily  aspirin  chewable 81 Oral daily  calcium carbonate 1250 mG  + Vitamin D (OsCal 500 + D) 1 Oral two times a day  chlorhexidine 2% Cloths 1 Topical <User Schedule>  DAPTOmycin IVPB 650 IV Intermittent every 24 hours  donepezil 10 Oral at bedtime  DULoxetine 60 Oral daily  ferrous    sulfate 325 Oral daily  folic acid 1 Oral daily  furosemide    Tablet 40 Oral daily  heparin   Injectable 5000 SubCutaneous every 8 hours  levothyroxine 50 Oral daily  metoprolol succinate ER 50 Oral daily  oxybutynin 10 Oral at bedtime  pantoprazole    Tablet 40 Oral before breakfast  potassium chloride    Tablet ER 20 Oral every 2 hours  sacubitril 24 mG/valsartan 26 mG 1 Oral two times a day  zinc sulfate 220 Oral daily      WEIGHT  Weight (kg): 110 (01-10-23 @ 13:49)  Creatinine, Serum: 0.8 mg/dL (01-17-23 @ 06:56)      ANTIBIOTICS:  DAPTOmycin IVPB 650 milliGRAM(s) IV Intermittent every 24 hours      All available historical records have been reviewed

## 2023-01-17 NOTE — PROGRESS NOTE ADULT - ASSESSMENT
ASSESSMENT   82-year-old female, past medical history of Dementia, Hypertension, Hyperlipidemia, Peripheral artery disease: chronic lower extremity leg pains, CHF:  presenting to ER for failure to thrive    IMPRESSION  #Failure to Thrive   #Left 2nd toe ulcer with sepsis arthritis/OM of second PIP joint  - Xray Foot AP + Lateral + Oblique, Left (01.02.23 @ 16:33):  No radiographic evidence of osteomyelitis.  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 may represent  the sequela of inflammatory arthropathy.  - MR Foot w/ IV Cont, Left (01.04.23 @ 15:36):  Septic arthritis/osteomyelitis of the second PIP joint underlying an  ulcer. Probable nearly healed fracture of the fifthmetatarsal neck. A   developing stress fracture could also have this appearance. Limited motion degraded exam.  - Deep Tissue Cx growing Staph aureus   - s/p debridement with arthroplasty 1/6 -- Bone Cx with MSSA    #Pancytopenia - secondary to Linezolid - resolving     #Dementia  #PAD  #Obesity BMI (kg/m2): 52.5    #Abx allergy: Keflex (Unknown) - History of angioedema  losartan (Unknown)  morphine (Unknown)  Rocephin (Unknown)  sulfamethoxazole (Hives)    RECOMMENDATIONS  - continue daptomycin 6 mg/kg q 24 hours (ordered)  -- Creatine Kinase, Serum: 179 U/L (01.17.23 @ 06:56) -- check on 1/23   - planned for amputation -- antibiotic plans pending path and if clear margins grossly obtained     Please call or message on [employer] Teams if with any questions.  [id number]

## 2023-01-17 NOTE — CHART NOTE - NSCHARTNOTEFT_GEN_A_CORE
Registered Dietitian Follow-Up  Patient Profile Reviewed                           Yes [x]   No []  Nutrition History Previously Obtained        Yes [x]  No []      Pertinent Medical Interventions:   81 y/o female admitted with failure to thrive, poor PO intake, and ulceration left 2nd toe  #Foot ulceration in setting of h/o PVD,  r/o OM  S/p Excisional Debridement of Soft Tissue & Bone w/Arthroplasty 2nd PIPJ, partially closed, Left Foot  #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   plan for OR in morning for amputation of toe    Nutrition History:   Met with daughter and patient; history obtained, patient has been having poor PO intake prior to admission primarily due to lack of interest in food, decreased appetite, lack of saliva/ dry mouth, food preferences  has 24 hr aids who help patient at home with cooking/eating as well as good family support  NKFA  no trouble chewing/swallowing at baseline  dislikes pepper on food  </=75% estimated energy needs >/=1mo PRIOR TO ADMISSION    During admission:  SLP recommends easy to chew / thin liquids -- tolerating  -patient was NPO today for planned procedure though was cancelled in late afternoon  diet order was reactivated and patient was able to have dinner, was hungry at time of visit and looking forward to dinner meal  -at time of visit patient drank 100% of ensure supplement   **has been having fair PO intake, average amount of meals she has ~50% of + always consumes 100% of desserts  -has been having 100% of ensure supplements twice a day (+ 700kcal, +40g protein )  -reports that she will eat when she likes food, other meals if she dislikes she will not eat as much. overall patient is eating better at hospital than she typically does at home per daughters report  Nutrient Intake: Patient meeting % of estimated energy needs in-house     Diet order:   Diet, Easy to Chew (01-17-23 @ 16:43) [Active]  Diet, NPO after Midnight:      NPO Start Date: 17-Jan-2023,   NPO Start Time: 23:59 (01-17-23 @ 16:30) [Active]    Anthropometrics:  144.8cm  50kg, 110lb (incorrectly documented as kg)  16kg/m2    MEDICATIONS  (STANDING):  allopurinol 100 milliGRAM(s) Oral two times a day  ascorbic acid 500 milliGRAM(s) Oral daily  aspirin  chewable 81 milliGRAM(s) Oral daily  calcium carbonate 1250 mG  + Vitamin D (OsCal 500 + D) 1 Tablet(s) Oral two times a day  chlorhexidine 2% Cloths 1 Application(s) Topical <User Schedule>  DAPTOmycin IVPB 650 milliGRAM(s) IV Intermittent every 24 hours  donepezil 10 milliGRAM(s) Oral at bedtime  DULoxetine 60 milliGRAM(s) Oral daily  ferrous    sulfate 325 milliGRAM(s) Oral daily  folic acid 1 milliGRAM(s) Oral daily  furosemide    Tablet 40 milliGRAM(s) Oral daily  heparin   Injectable 5000 Unit(s) SubCutaneous every 8 hours  levothyroxine 50 MICROGram(s) Oral daily  metoprolol succinate ER 50 milliGRAM(s) Oral daily  oxybutynin 10 milliGRAM(s) Oral at bedtime  pantoprazole    Tablet 40 milliGRAM(s) Oral before breakfast  potassium chloride    Tablet ER 20 milliEquivalent(s) Oral every 2 hours  sacubitril 24 mG/valsartan 26 mG 1 Tablet(s) Oral two times a day  zinc sulfate 220 milliGRAM(s) Oral daily    MEDICATIONS  (PRN):  acetaminophen     Tablet .. 650 milliGRAM(s) Oral every 6 hours PRN Temp greater or equal to 38C (100.4F), Mild Pain (1 - 3)  aluminum hydroxide/magnesium hydroxide/simethicone Suspension 30 milliLiter(s) Oral every 4 hours PRN Dyspepsia  melatonin 3 milliGRAM(s) Oral at bedtime PRN Insomnia  ondansetron Injectable 4 milliGRAM(s) IV Push every 8 hours PRN Nausea and/or Vomiting    Pertinent Labs: 01-17 @ 06:56: Na 137, BUN 46<H>, Cr 0.8, <H>, K+ 5.5<H>, Phos --, Mg 2.3, Alk Phos --, ALT/SGPT --, AST/SGOT --, HbA1c --    Physical Findings:  - Cognition: patient alert at time of visit and answering questions appropriately  - GI function: Last BM not noted in flowsheets  - Tubes: n/a   - Oral/Mouth cavity: easy to chew  - Skin: no pressure injuries noted in flowsheets   - Edema: no edema noted      Nutrition Requirements: with consideration for age, weight, BMI  Weight Used: 41.5kg     Estimated Energy Needs    Continue [x]  Adjust [] 8689-7798 kcal/day (30-35kcal/kg)  Estimated Protein Needs    Continue [x]  Adjust [] 50-58 g/day ( 1.2-1.4)  Estimated Fluid Needs        Continue [x]  Adjust [] 4421-9268 mL/day (25-30mL/kg)    [x] Previous Nutrition Diagnosis:            [x] Ongoing          [] Resolved  #1 Malnutrition  Goal/Expected Outcome: meet >/=75% est energy needs within 6 days    Nutrition Intervention: Meals and Snacks, Medical Food Supplement, Vitamin Supplement, Nutrition Related Medication, Coordination of Care  Indicator/Monitoring:  Monitor diet order, energy intake, food and nutrient intake, body composition, weight    Recommendations:  - CONTINUE: easy to chew / thin liquids  - after procedure tomorrow please add Ensure HIGH PROTEIN 2x/day to optimize pro/kcal intake (350kcal, 20 g pro/serving) + Ensure high protein pudding 1x/daily (240kcal, 12 g pro/serving), + magic cup 1x for patient to trial  * food preferences include no pepper  - can consider discharging patient with appetite stimulant [ consider megace vs mirtazipine, could benefit patient-- NOT marinol]  - MV with minerals daily  - potassium may be HIGH due to dehydration, does not need potassium restriction-- encourage intake of fluids  - recommend bowel regimen to promote regular bowel movements     * Nutrition therapy education: extensively educated patient and daughter on HIGH calorie HIGH protein / underweight / oral supplements  provided verbal instruction as well as printed material. consider meals on wheels/ [employer] delivery services after discharge.  patient meeting needs during admission with consumption of oral supplements  Gloria Cabezas, #[id number] or via TEAMS  Patient is at MOD Risk, follow up x 6days

## 2023-01-18 NOTE — PRE-ANESTHESIA EVALUATION ADULT - NSANTHOSAYNRD_GEN_A_CORE
No. JAYCE screening performed.  STOP BANG Legend: 0-2 = LOW Risk; 3-4 = INTERMEDIATE Risk; 5-8 = HIGH Risk

## 2023-01-18 NOTE — PROGRESS NOTE ADULT - SUBJECTIVE AND OBJECTIVE BOX
PROGRESS NOTE:   Jose Mojica MD  Available on teams    Patient is a 82y old  Female who presents with a chief complaint of Weakness with poor PO intake (17 Jan 2023 17:49)      SUBJECTIVE / OVERNIGHT EVENTS:  Seen for follow up for foot ulceration and decreased PO intake  Reports no new complaints  Denies fever, chills, fatigue, chest pain, shortness of breath, abdominal pain, nausea/ vomiting or diarrhea    ADDITIONAL REVIEW OF SYSTEMS:    MEDICATIONS  (STANDING):  potassium chloride    Tablet ER 20 milliEquivalent(s) Oral every 2 hours    MEDICATIONS  (PRN):      CAPILLARY BLOOD GLUCOSE        I&O's Summary    17 Jan 2023 07:01  -  18 Jan 2023 07:00  --------------------------------------------------------  IN: 0 mL / OUT: 750 mL / NET: -750 mL        PHYSICAL EXAM:  Vital Signs Last 24 Hrs  T(C): 36.1 (18 Jan 2023 11:27), Max: 36.1 (18 Jan 2023 04:55)  T(F): 97 (18 Jan 2023 04:55), Max: 97 (18 Jan 2023 04:55)  HR: 80 (18 Jan 2023 11:27) (68 - 80)  BP: 130/63 (18 Jan 2023 11:27) (129/59 - 167/74)  BP(mean): 95 (18 Jan 2023 11:27) (95 - 95)  RR: 16 (18 Jan 2023 11:27) (16 - 16)  SpO2: 97% (18 Jan 2023 11:27) (97% - 97%)    Parameters below as of 18 Jan 2023 11:27    O2 Flow (L/min): 2      GENERAL: No acute distress, well-developed  HEAD:  Atraumatic, Normocephalic  EYES: EOMI, PERRLA, conjunctiva and sclera clear  NECK: Supple, no lymphadenopathy, no JVD  CHEST/LUNG: CTAB; No wheezes, rales, or rhonchi  HEART: Regular rate and rhythm; No murmurs, rubs, or gallops  ABDOMEN: Soft, non-tender, non-distended; normal bowel sounds, no organomegaly  EXTREMITIES:  2+ peripheral pulses b/l, No clubbing, cyanosis, or edema  NEUROLOGY: A&O x 3, no focal deficits  SKIN: No rashes or lesions    LABS:                        9.2    10.47 )-----------( 218      ( 18 Jan 2023 06:38 )             29.4     01-18    135  |  94<L>  |  53<H>  ----------------------------<  119<H>  4.6   |  32  |  0.9    Ca    9.1      18 Jan 2023 06:38  Phos  4.1     01-18  Mg     2.3     01-18    TPro  7.2  /  Alb  2.5<L>  /  TBili  0.4  /  DBili  x   /  AST  53<H>  /  ALT  29  /  AlkPhos  123<H>  01-18      CARDIAC MARKERS ( 17 Jan 2023 06:56 )  x     / x     / 179 U/L / x     / x                RADIOLOGY & ADDITIONAL TESTS:  Results Reviewed:   Imaging Personally Reviewed:  Electrocardiogram Personally Reviewed:    COORDINATION OF CARE:  Care Discussed with Consultants/Other Providers [Y/N]:  Prior or Outpatient Records Reviewed [Y/N]:

## 2023-01-18 NOTE — BRIEF OPERATIVE NOTE - NSICDXBRIEFPREOP_GEN_ALL_CORE_FT
PRE-OP DIAGNOSIS:  Osteomyelitis of ankle or foot, acute 06-Jan-2023 15:27:03  Luna Winters  
PRE-OP DIAGNOSIS:  Foot ulcer with necrosis of bone, left 06-Jan-2023 15:27:37  Luna Winters  
PRE-OP DIAGNOSIS:  Osteomyelitis of ankle or foot, acute 06-Jan-2023 15:27:03  Luna Winters  Hammertoe of left foot 06-Jan-2023 15:27:25  Luna Winters  Foot ulcer with necrosis of bone, left 06-Jan-2023 15:27:37  Luna Winters

## 2023-01-18 NOTE — CHART NOTE - NSCHARTNOTEFT_GEN_A_CORE
PACU ANESTHESIA ADMISSION NOTE      Procedure: left 2nd toe amputation    ____  Intubated  TV:______       Rate: ______      FiO2: ______    ___x_  Patent Airway    __x__  Full return of protective reflexes    __x__  Full recovery from anesthesia / back to baseline     Vitals:   T:     98.4 R:    16            BP:  143/63               Sat:        100           P:61    Mental Status:  ___x_ Awake   _____ Alert   _____ Drowsy   _____ Sedated    Nausea/Vomiting:  __x__ NO  ______Yes,   See Post - Op Orders          Pain Scale (0-10):  _0____    Treatment: ____ None    ____ See Post - Op/PCA Orders    Post - Operative Fluids:   ____ Oral   ___x_ See Post - Op Orders    Plan: Discharge:   ____Home       ___x__Floor     _____Critical Care    _____  Other:_________________    Comments:

## 2023-01-18 NOTE — BRIEF OPERATIVE NOTE - NSICDXBRIEFPROCEDURE_GEN_ALL_CORE_FT
PROCEDURES:  Atherectomy of posterior tibial artery 10-David-2023 16:26:35  Daisy Claudio  
PROCEDURES:  Complete amputation of second toe of left foot by open approach 18-Jan-2023 12:42:43  Adria Miller  
PROCEDURES:  Arthroplasty, toe, PIP joint, for carrilloe 06-Jan-2023 15:26:09  Luna Winters  Bone debridement 06-Jan-2023 15:26:20  Luna Winters  Open biopsy, bone, toe 06-Jan-2023 15:26:48  Luna Winters

## 2023-01-18 NOTE — PROGRESS NOTE ADULT - ASSESSMENT
81 y/o female admitted with failure to thrive, poor PO intake, and ulceration left 2nd toe    Foot ulceration in setting of h/o PVD, complicated by OM on MRI  S/p Excisional Debridement of Soft Tissue & Bone w/Arthroplasty 2nd PIPJ, partially closed, Left Foot  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  O/E: Sharp bedside excisional debridement of fibrotic/ necrotic tissue  left second toe ulcer to layer of tendon- purulence noted with bone exposure   Elevated ESR/CRP  S/p Excisional Debridement of Soft Tissue & Bone w/Arthroplasty 2nd PIPJ, partially closed, Left Foot: 01/06/23  WCx/BoneCx: Staph aureus  Vascular surgery - LE angiogram 1/10 - Occluded L anterior tibial artery at the origin, near occluded stenosis posterior tibial artery near the origin with backfilling from peroneal and PT flow into the pedal arch  - ID following- Stop Linezolid d/t pancytopenia; C/w daptomycin 6mg/kg, baseline Cr 27, recheck on 1/23  - Podiatry following, OR today    Adult failure to thrive in setting of Dementia  Dementia  Acute Change in Mental Status in setting of infectious process  - WCx/boneCx: Staph aureus  - c/w easy to chew diet; Liquid supplement  - c/w Vit C and Zinc for wound healing.  - continue Donepezil  - dietary consult    Multiple skin abrasions  Small area redness coccyx region   Ecchymosis left side of mouth  - Wound Care RN consult    Hyperkalemia, resolved    Hypothyroid.   - continue Synthroid.    History of chronic CHF.   - continue Entresto, Metoprolol, on lasix     History of peripheral vascular disease.   - continue ASA/Plavix  - US Doppler: neg  - LE angiogram 1/10 - results above    - DVT Ppx: Lovenox  - GI Ppx: not indicated  - Diet: easy chew

## 2023-01-18 NOTE — BRIEF OPERATIVE NOTE - NSICDXBRIEFPOSTOP_GEN_ALL_CORE_FT
POST-OP DIAGNOSIS:  Foot osteomyelitis, left 06-Jan-2023 15:28:21  Luna Winters  
POST-OP DIAGNOSIS:  Hammertoe of left foot 06-Jan-2023 15:27:48  Peco, Luna  Foot osteomyelitis, left 06-Jan-2023 15:28:21  Peco, Luna  Foot ulcer with necrosis of bone, left 06-Jan-2023 15:28:31  Luna Winters

## 2023-01-18 NOTE — BRIEF OPERATIVE NOTE - OPERATION/FINDINGS
Procedure:   1. US guided R CFA access 5Fr sheath  2. Third order angiogram left lower extremity  3. Left posterior tibial artery 1.25 Diamondback atherectomy of proximal segment  4. Left posterior tibial 2mm x 40mm balloon angioplasty proximal segment and at the ankle    Findings: Occluded L anterior tibial artery at the origin, near occluded stenosis posterior tibial artery near the origin with backfilling from peroneal and PT flow into the pedal arch
non viable bone L 2nd toe with hammertoe contraction
soft bone, minimal bleeding

## 2023-01-19 NOTE — PROGRESS NOTE ADULT - ASSESSMENT
81 y/o female admitted with failure to thrive, poor PO intake, and ulceration left 2nd toe    Foot ulceration in setting of h/o PVD, complicated by OM on MRI; s/p amputation of second toe of left foot 1/18  Previous:   S/p Excisional Debridement of Soft Tissue & Bone w/Arthroplasty 2nd PIPJ, partially closed, Left Foot  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  O/E: Sharp bedside excisional debridement of fibrotic/ necrotic tissue  left second toe ulcer to layer of tendon- purulence noted with bone exposure   Elevated ESR/CRP  S/p Excisional Debridement of Soft Tissue & Bone w/Arthroplasty 2nd PIPJ, partially closed, Left Foot: 01/06/23  WCx/BoneCx: Staph aureus  Vascular surgery - LE angiogram 1/10 - Occluded L anterior tibial artery at the origin, near occluded stenosis posterior tibial artery near the origin with backfilling from peroneal and PT flow into the pedal arch  - ID following- Stop Linezolid d/t pancytopenia; C/w daptomycin 6mg/kg, baseline Cr 27, recheck on 1/23  - f/u up for DC recs  - Podiatry following;    Adult failure to thrive in setting of Dementia  Metabolic encephalopathy  in setting of infectious process  - WCx/boneCx: Staph aureus  - c/w easy to chew diet; Liquid supplement  - c/w Vit C and Zinc for wound healing.  - continue Donepezil  - dietary consult  - Trial of Seroquel qhs for agitation    Multiple skin abrasions  Small area redness coccyx region   Ecchymosis left side of mouth  - Wound Care RN consult    Hyperkalemia, resolved    Hypothyroid.   - continue Synthroid.    History of chronic CHF.   - continue Entresto, Metoprolol, on lasix     History of peripheral vascular disease.   - continue ASA/Plavix  - US Doppler: neg  - LE angiogram 1/10 - results above    - DVT Ppx: Lovenox  - GI Ppx: not indicated  - Diet: easy chew

## 2023-01-19 NOTE — PROGRESS NOTE ADULT - SUBJECTIVE AND OBJECTIVE BOX
PROGRESS NOTE:   Jose Mojica MD  Available on teams    Patient is a 82y old  Female who presents with a chief complaint of Weakness with poor PO intake (17 Jan 2023 17:49)      SUBJECTIVE / OVERNIGHT EVENTS:  Seen for follow up for foot ulceration   Reports no new complaints  Denies fever, chills, fatigue, chest pain, shortness of breath, abdominal pain, nausea/ vomiting or diarrhea  Agitated overnight  s/p amputation of toe    ADDITIONAL REVIEW OF SYSTEMS:    MEDICATIONS  (STANDING):  potassium chloride    Tablet ER 20 milliEquivalent(s) Oral every 2 hours    MEDICATIONS  (PRN):      CAPILLARY BLOOD GLUCOSE        I&O's Summary    17 Jan 2023 07:01  -  18 Jan 2023 07:00  --------------------------------------------------------  IN: 0 mL / OUT: 750 mL / NET: -750 mL        PHYSICAL EXAM:  Vital Signs Last 24 Hrs  T(C): 36.1 (18 Jan 2023 11:27), Max: 36.1 (18 Jan 2023 04:55)  T(F): 97 (18 Jan 2023 04:55), Max: 97 (18 Jan 2023 04:55)  HR: 80 (18 Jan 2023 11:27) (68 - 80)  BP: 130/63 (18 Jan 2023 11:27) (129/59 - 167/74)  BP(mean): 95 (18 Jan 2023 11:27) (95 - 95)  RR: 16 (18 Jan 2023 11:27) (16 - 16)  SpO2: 97% (18 Jan 2023 11:27) (97% - 97%)    Parameters below as of 18 Jan 2023 11:27    O2 Flow (L/min): 2      GENERAL: No acute distress, well-developed  HEAD:  Atraumatic, Normocephalic  EYES: EOMI, PERRLA, conjunctiva and sclera clear  NECK: Supple, no lymphadenopathy, no JVD  CHEST/LUNG: CTAB; No wheezes, rales, or rhonchi  HEART: Regular rate and rhythm; No murmurs, rubs, or gallops  ABDOMEN: Soft, non-tender, non-distended; normal bowel sounds, no organomegaly  EXTREMITIES:  2+ peripheral pulses b/l, No clubbing, cyanosis, or edema  NEUROLOGY: no focal deficits  SKIN: No rashes or lesions    LABS:                        9.2    10.47 )-----------( 218      ( 18 Jan 2023 06:38 )             29.4     01-18    135  |  94<L>  |  53<H>  ----------------------------<  119<H>  4.6   |  32  |  0.9    Ca    9.1      18 Jan 2023 06:38  Phos  4.1     01-18  Mg     2.3     01-18    TPro  7.2  /  Alb  2.5<L>  /  TBili  0.4  /  DBili  x   /  AST  53<H>  /  ALT  29  /  AlkPhos  123<H>  01-18      CARDIAC MARKERS ( 17 Jan 2023 06:56 )  x     / x     / 179 U/L / x     / x                RADIOLOGY & ADDITIONAL TESTS:  Results Reviewed:   Imaging Personally Reviewed:  Electrocardiogram Personally Reviewed:    COORDINATION OF CARE:  Care Discussed with Consultants/Other Providers [Y/N]:  Prior or Outpatient Records Reviewed [Y/N]:

## 2023-01-19 NOTE — PROGRESS NOTE ADULT - SUBJECTIVE AND OBJECTIVE BOX
Podiatry Progress Note    Subjective:  ARIEL INIGUEZ is a  82y Female.   Seen bedside.   Patient is a 82y old  Female who presents with a chief complaint of Weakness with poor PO intake (19 Jan 2023 13:33)      Past Medical History and Surgical History  PAST MEDICAL & SURGICAL HISTORY:  Hypothyroid      Hypertension      RA (rheumatoid arthritis)      High cholesterol      Poor circulation  PAD      Incontinence      Dementia      Rheumatoid arthritis      Osteopenia      Melanoma of skin      Depression with anxiety      Bilateral cataracts      Peripheral arterial disease  (s/p B/L leg stents &amp; on Plavix)      History of cirrhosis of liver      History of chronic CHF      History of hip surgery  Left ORIF and Left ORIF femur      H/O: hysterectomy           Objective:  Vital Signs Last 24 Hrs  T(C): 35.3 (19 Jan 2023 14:42), Max: 35.8 (19 Jan 2023 04:42)  T(F): 95.5 (19 Jan 2023 14:42), Max: 96.4 (19 Jan 2023 04:42)  HR: 59 (19 Jan 2023 14:42) (59 - 112)  BP: 141/60 (19 Jan 2023 14:42) (117/56 - 166/72)  BP(mean): --  RR: 18 (19 Jan 2023 04:42) (18 - 18)  SpO2: 94% (19 Jan 2023 09:00) (94% - 96%)    Parameters below as of 19 Jan 2023 09:00  Patient On (Oxygen Delivery Method): room air                            8.6    8.60  )-----------( 187      ( 19 Jan 2023 06:56 )             27.7                 01-19    133<L>  |  94<L>  |  54<H>  ----------------------------<  112<H>  4.0   |  32  |  1.0    Ca    8.7      19 Jan 2023 06:56  Phos  4.1     01-18  Mg     2.3     01-18    TPro  7.2  /  Alb  2.5<L>  /  TBili  0.4  /  DBili  x   /  AST  53<H>  /  ALT  29  /  AlkPhos  123<H>  01-18        Physical Exam - Lower Extremity Focused:   Derm: 2nd left digit amputated, nylon sutures intact, minimal bleeding noted.   Vascular: DP and PT Pulses Diminished; Foot is Warm to Warm to the touch; Capillary Refill Time < 3 Seconds;    Neuro: Protective Sensation Diminished / Moderately Neuropathic   MSK: Pain On Palpation at Wound Site, hammer toes, lesser digits 3,4,5    Assessment:  - S/P Right 2nd digit amputation, DOS; 1/18/2023    Plan:  Chart reviewed and Patient evaluated. All Questions and Concerns Addressed and Answered  Local Wound Care; Wound Flushed w/ NS; Wound Packed w/ Betadine Soaked Gauze / DSD / Kerlix / ACE   Weight Bearing Status; WBAT w/ Heel Touch w/ Surgical Shoe;   Continue w/ Local Wound Care; Q24 Dressing Changes;  No Further Podiatric  Sx Debridement;   Patient Stable Per Podiatry Standpoint; Follow Up as an Outpatient w/   ; Justino    Discussed Plan w/ Attending;     Podiatry

## 2023-01-20 NOTE — PROGRESS NOTE ADULT - ASSESSMENT
81 y/o female admitted with failure to thrive, poor PO intake, and ulceration left 2nd toe    Foot ulceration in setting of h/o PVD, complicated by OM on MRI; s/p amputation of second toe of left foot 1/18  Previous:   S/p Excisional Debridement of Soft Tissue & Bone w/Arthroplasty 2nd PIPJ, partially closed, Left Foot  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  O/E: Sharp bedside excisional debridement of fibrotic/ necrotic tissue  left second toe ulcer to layer of tendon- purulence noted with bone exposure   Elevated ESR/CRP  S/p Excisional Debridement of Soft Tissue & Bone w/Arthroplasty 2nd PIPJ, partially closed, Left Foot: 01/06/23  WCx/BoneCx: Staph aureus  Vascular surgery - LE angiogram 1/10 - Occluded L anterior tibial artery at the origin, near occluded stenosis posterior tibial artery near the origin with backfilling from peroneal and PT flow into the pedal arch  - ID following- Stop Linezolid d/t pancytopenia; C/w daptomycin 6mg/kg, baseline Cr 27, recheck on 1/23  - f/u up for DC recs  - Podiatry following;  Stable Per Podiatry Standpoint; Follow Up as an Outpatient w/   ; Justino    Adult failure to thrive in setting of Dementia  Metabolic encephalopathy  in setting of infectious process  - WCx/boneCx: Staph aureus  - c/w easy to chew diet; Liquid supplement  - c/w Vit C and Zinc for wound healing.  - continue Donepezil  - dietary consult  - Trial of Seroquel qhs for agitation    Multiple skin abrasions  Small area redness coccyx region   Ecchymosis left side of mouth  - Wound Care RN consult    Hyperkalemia, resolved    Hypothyroid.   - continue Synthroid.    History of chronic CHF.   - continue Entresto, Metoprolol, on lasix     History of peripheral vascular disease.   - continue ASA/Plavix  - US Doppler: neg  - LE angiogram 1/10 - results above    - DVT Ppx: Lovenox  - GI Ppx: not indicated  - Diet: easy chew

## 2023-01-20 NOTE — CHART NOTE - NSCHARTNOTESELECT_GEN_ALL_CORE
Event Note
PA  Note/Event Note
Transfer Note
post op check/Event Note
Event Note
PA Note/Event Note
PA Note/Event Note

## 2023-01-20 NOTE — PROGRESS NOTE ADULT - SUBJECTIVE AND OBJECTIVE BOX
Podiatry Progress Note    Subjective:  ARIEL INIGUEZ is a  82y Female.   Seen bedside.   Patient is a 82y old  Female who presents with a chief complaint of Weakness with poor PO intake (20 Jan 2023 13:26)      Past Medical History and Surgical History  PAST MEDICAL & SURGICAL HISTORY:  Hypothyroid      Hypertension      RA (rheumatoid arthritis)      High cholesterol      Poor circulation  PAD      Incontinence      Dementia      Rheumatoid arthritis      Osteopenia      Melanoma of skin      Depression with anxiety      Bilateral cataracts      Peripheral arterial disease  (s/p B/L leg stents &amp; on Plavix)      History of cirrhosis of liver      History of chronic CHF      History of hip surgery  Left ORIF and Left ORIF femur      H/O: hysterectomy           Objective:  Vital Signs Last 24 Hrs  T(C): 35.8 (20 Jan 2023 14:42), Max: 36.6 (20 Jan 2023 05:16)  T(F): 96.5 (20 Jan 2023 14:42), Max: 97.8 (20 Jan 2023 05:16)  HR: 62 (20 Jan 2023 14:42) (62 - 87)  BP: 109/53 (20 Jan 2023 14:42) (109/53 - 124/58)  BP(mean): --  RR: 18 (20 Jan 2023 14:42) (18 - 18)  SpO2: --                            7.7    3.59  )-----------( 109      ( 20 Jan 2023 06:24 )             25.8                 01-20    137  |  100  |  49<H>  ----------------------------<  147<H>  5.1<H>   |  30  |  0.9    Ca    8.6      20 Jan 2023 06:24          Physical Exam - Lower Extremity Focused:   Derm: 2nd left digit amputated, nylon sutures intact, no bleeding noted  Vascular: DP and PT Pulses Diminished; Foot is Warm to Warm to the touch; Capillary Refill Time < 3 Seconds;    Neuro: Protective Sensation Diminished / Moderately Neuropathic   MSK: Pain On Palpation at Wound Site, hammer toes, lesser digits 3,4,5    Assessment:  - S/P Right 2nd digit amputation, DOS; 1/18/2023    Plan:  Chart reviewed and Patient evaluated. All Questions and Concerns Addressed and Answered  Local Wound Care; Wound Flushed w/ NS; Wound Packed w/ Betadine Soaked Gauze / DSD / Kerlix / ACE   Weight Bearing Status; WBAT w/ Heel Touch w/ Surgical Shoe;   Continue w/ Local Wound Care; Q24 Dressing Changes;  No Further Podiatric  Sx Debridement;   Patient Stable Per Podiatry Standpoint; Follow Up as an Outpatient w/   ; Justino      Discussed Plan w/ Attending;     Podiatry

## 2023-01-20 NOTE — CHART NOTE - NSCHARTNOTEFT_GEN_A_CORE
PT attempted to walk with pt who is c/o foot pain.   Pt s/p complete amputation of second toe of left foot on 1/18.  Will give 1 time dose of Toradol 15. PT will reassess pt.

## 2023-01-20 NOTE — PROGRESS NOTE ADULT - ASSESSMENT
ASSESSMENT   82-year-old female, past medical history of Dementia, Hypertension, Hyperlipidemia, Peripheral artery disease: chronic lower extremity leg pains, CHF:  presenting to ER for failure to thrive    IMPRESSION  #Failure to Thrive   #Left 2nd toe ulcer with sepsis arthritis/OM of second PIP joint  - Xray Foot AP + Lateral + Oblique, Left (01.02.23 @ 16:33):  No radiographic evidence of osteomyelitis.  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 may represent  the sequela of inflammatory arthropathy.  - MR Foot w/ IV Cont, Left (01.04.23 @ 15:36):  Septic arthritis/osteomyelitis of the second PIP joint underlying an  ulcer. Probable nearly healed fracture of the fifthmetatarsal neck. A   developing stress fracture could also have this appearance. Limited motion degraded exam.  - Deep Tissue Cx growing Staph aureus   - s/p debridement with arthroplasty 1/6 -- Bone Cx with MSSA  - s/p 2nd toe complete amputation 1/18    #Pancytopenia - secondary to Linezolid - resolving     #Dementia  #PAD  #Obesity BMI (kg/m2): 52.5    #Abx allergy: Keflex (Unknown) - History of angioedema  losartan (Unknown)  morphine (Unknown)  Rocephin (Unknown)  sulfamethoxazole (Hives)    RECOMMENDATIONS  - continue daptomycin while in house   - s/p complete amputation with clean margins grossly obtained -- switch to PO clindamycin 600 mg TID to complete until 1/24 on discharge    Please call or message on [employer] Teams if with any questions.  Spectra [phone]

## 2023-01-20 NOTE — PROGRESS NOTE ADULT - TIME BILLING
Complexity of pathology and treatment plan discussed with family
I have personally seen and examined this patient.    I have reviewed all pertinent clinical information and reviewed all relevant imaging and diagnostic studies personally.   I counseled the patient about diagnostic testing and treatment plan. All questions were answered.   I discussed recommendations with the primary team.
I have personally seen and examined this patient.    I have reviewed all pertinent clinical information and reviewed all relevant imaging and diagnostic studies personally.   I counseled the patient about diagnostic testing and treatment plan. All questions were answered.   I discussed recommendations with the primary team.
Discussed surgical management in length. All risk, benefits and potential complications discussed with the pt and her daughter
I have personally seen and examined this patient.    I have reviewed all pertinent clinical information and reviewed all relevant imaging and diagnostic studies personally.   I counseled the patient about diagnostic testing and treatment plan. All questions were answered.   I discussed recommendations with the primary team.

## 2023-01-20 NOTE — PROGRESS NOTE ADULT - SUBJECTIVE AND OBJECTIVE BOX
HIRA, ARIEL  82y, Female  Allergy: Keflex (Unknown)  losartan (Unknown)  morphine (Unknown)  Rocephin (Unknown)  sulfamethoxazole (Hives)      LOS  18d    CHIEF COMPLAINT: Weakness with poor PO intake (20 Jan 2023 13:17)      INTERVAL EVENTS/HPI  - No acute events overnight  - T(F): , Max: 97.8 (01-20-23 @ 05:16)  - Denies any worsening symptoms  - Tolerating medication  - WBC Count: 3.59 (01-20-23 @ 06:24)  WBC Count: 8.60 (01-19-23 @ 06:56)     - Creatinine, Serum: 0.9 (01-20-23 @ 06:24)  Creatinine, Serum: 1.0 (01-19-23 @ 06:56)       ROS  General: Denies rigors, nightsweats  HEENT: Denies headache, rhinorrhea, sore throat, eye pain  CV: Denies CP, palpitations  PULM: Denies wheezing, hemoptysis  GI: Denies hematemesis, hematochezia, melena  : Denies discharge, hematuria  MSK: Denies arthralgias, myalgias  SKIN: Denies rash, lesions  NEURO: Denies paresthesias, weakness  PSYCH: Denies depression, anxiety    VITALS:  T(F): 97.8, Max: 97.8 (01-20-23 @ 05:16)  HR: 87  BP: 121/78  RR: 18Vital Signs Last 24 Hrs  T(C): 36.6 (20 Jan 2023 05:16), Max: 36.6 (20 Jan 2023 05:16)  T(F): 97.8 (20 Jan 2023 05:16), Max: 97.8 (20 Jan 2023 05:16)  HR: 87 (20 Jan 2023 05:16) (59 - 87)  BP: 121/78 (20 Jan 2023 05:16) (121/78 - 141/60)  BP(mean): --  RR: 18 (20 Jan 2023 05:16) (18 - 18)  SpO2: --        PHYSICAL EXAM:  Gen: NAD, resting in bed  HEENT: Normocephalic, atraumatic  Neck: supple, no lymphadenopathy  CV: Regular rate & regular rhythm  Lungs: decreased BS at bases, no fremitus  Abdomen: Soft, BS present  Ext: Warm, well perfused  Neuro: non focal, awake  Skin: no rash, no erythema  Lines: no phlebitis    FH: Non-contributory  Social Hx: Non-contributory    TESTS & MEASUREMENTS:                        7.7    3.59  )-----------( 109      ( 20 Jan 2023 06:24 )             25.8     01-20    137  |  100  |  49<H>  ----------------------------<  147<H>  5.1<H>   |  30  |  0.9    Ca    8.6      20 Jan 2023 06:24              Culture - Tissue with Gram Stain (collected 01-18-23 @ 12:24)  Source: .Tissue BONE  LEFT FOOT SECOND DIGIT  Gram Stain (01-19-23 @ 04:03):    No polymorphonuclear leukocytes seen per low power field    No organisms seen per oil power field    Culture - Other (collected 01-06-23 @ 15:20)  Source: .Other bone left foot  Final Report (01-08-23 @ 17:13):    Few Staphylococcus aureus  Organism: Staphylococcus aureus (01-08-23 @ 17:13)  Organism: Staphylococcus aureus (01-08-23 @ 17:13)      -  Ampicillin/Sulbactam: S <=8/4      -  Cefazolin: S <=4      -  Clindamycin: S <=0.25      -  Erythromycin: R >4      -  Gentamicin: S <=1 Should not be used as monotherapy      -  Oxacillin: S 1 Oxacillin predicts susceptibility for dicloxacillin, methicillin, and nafcillin      -  Penicillin: R >8      -  Rifampin: S <=1 Should not be used as monotherapy      -  Tetracycline: R >8      -  Trimethoprim/Sulfamethoxazole: R >2/38      -  Vancomycin: S 1      Method Type: GREGORIO    Culture - Other (collected 01-06-23 @ 15:15)  Source: Wound deep wound left foot  Final Report (01-08-23 @ 17:14):    Numerous Staphylococcus aureus  Organism: Staphylococcus aureus (01-08-23 @ 17:14)  Organism: Staphylococcus aureus (01-08-23 @ 17:14)      -  Ampicillin/Sulbactam: S <=8/4      -  Cefazolin: S <=4      -  Clindamycin: S <=0.25      -  Erythromycin: R >4      -  Gentamicin: S <=1 Should not be used as monotherapy      -  Oxacillin: S 1 Oxacillin predicts susceptibility for dicloxacillin, methicillin, and nafcillin      -  Penicillin: R >8      -  Rifampin: S <=1 Should not be used as monotherapy      -  Tetracycline: R >8      -  Trimethoprim/Sulfamethoxazole: R >2/38      -  Vancomycin: S 1      Method Type: GREGORIO    Culture - Abscess with Gram Stain (collected 01-04-23 @ 14:00)  Source: .Abscess foot  Final Report (01-10-23 @ 08:37):    Moderate Staphylococcus aureus  Organism: Staphylococcus aureus (01-10-23 @ 08:37)  Organism: Staphylococcus aureus (01-10-23 @ 08:37)      -  Ampicillin/Sulbactam: S <=8/4      -  Cefazolin: S 8      -  Clindamycin: S <=0.25      -  Erythromycin: R >4      -  Gentamicin: S <=1 Should not be used as monotherapy      -  Oxacillin: S 2 Oxacillin predicts susceptibility for dicloxacillin, methicillin, and nafcillin      -  Penicillin: R >8      -  Rifampin: S <=1 Should not be used as monotherapy      -  Tetracycline: R >8      -  Trimethoprim/Sulfamethoxazole: R >2/38      -  Vancomycin: S 2      Method Type: GREGORIO            INFECTIOUS DISEASES TESTING  COVID-19 PCR: NotDetec (01-17-23 @ 16:40)  COVID-19 PCR: NotDetec (01-09-23 @ 11:08)  COVID-19 PCR: NotDetec (01-02-23 @ 15:30)  COVID-19 PCR: NotDetec (09-19-22 @ 14:50)  COVID-19 PCR: NotDetec (09-16-22 @ 01:05)  COVID-19 PCR: NotDetec (09-09-22 @ 11:30)      INFLAMMATORY MARKERS  Sedimentation Rate, Erythrocyte: 140 mm/Hr (01-09-23 @ 07:44)  C-Reactive Protein, Serum: 90 mg/L (01-09-23 @ 07:44)  Sedimentation Rate, Erythrocyte: 130 mm/Hr (01-07-23 @ 07:37)  C-Reactive Protein, Serum: 81 mg/L (01-07-23 @ 07:37)      RADIOLOGY & ADDITIONAL TESTS:  I have personally reviewed the last available Chest xray  CXR      CT      CARDIOLOGY TESTING      MEDICATIONS  allopurinol 100 Oral two times a day  ascorbic acid 500 Oral daily  aspirin  chewable 81 Oral daily  calcium carbonate 1250 mG  + Vitamin D (OsCal 500 + D) 1 Oral two times a day  DAPTOmycin IVPB 650 IV Intermittent every 24 hours  donepezil 10 Oral at bedtime  DULoxetine 60 Oral daily  ferrous    sulfate 325 Oral daily  folic acid 1 Oral daily  furosemide    Tablet 40 Oral daily  heparin   Injectable 5000 SubCutaneous every 8 hours  levothyroxine 50 Oral daily  metoprolol succinate ER 50 Oral daily  oxybutynin 10 Oral at bedtime  pantoprazole    Tablet 40 Oral before breakfast  potassium chloride    Tablet ER 20 Oral every 2 hours  QUEtiapine 12.5 Oral at bedtime  sacubitril 24 mG/valsartan 26 mG 1 Oral two times a day  zinc sulfate 220 Oral daily      WEIGHT  Weight (kg): 38.6 (01-18-23 @ 11:27)  Creatinine, Serum: 0.9 mg/dL (01-20-23 @ 06:24)      ANTIBIOTICS:  DAPTOmycin IVPB 650 milliGRAM(s) IV Intermittent every 24 hours      All available historical records have been reviewed

## 2023-01-20 NOTE — PROGRESS NOTE ADULT - SUBJECTIVE AND OBJECTIVE BOX
PROGRESS NOTE:   Jose Mojica MD  Available on teams    Patient is a 82y old  Female who presents with a chief complaint of Weakness with poor PO intake (17 Jan 2023 17:49)      SUBJECTIVE / OVERNIGHT EVENTS:  Seen for follow up for foot ulceration   Reports no new complaints  Denies fever, chills, fatigue, chest pain, shortness of breath, abdominal pain, nausea/ vomiting or diarrhea  s/p amputation of toe    ADDITIONAL REVIEW OF SYSTEMS:    MEDICATIONS  (STANDING):  potassium chloride    Tablet ER 20 milliEquivalent(s) Oral every 2 hours    MEDICATIONS  (PRN):      CAPILLARY BLOOD GLUCOSE        I&O's Summary    17 Jan 2023 07:01  -  18 Jan 2023 07:00  --------------------------------------------------------  IN: 0 mL / OUT: 750 mL / NET: -750 mL        PHYSICAL EXAM:  Vital Signs Last 24 Hrs  T(C): 36.6 (20 Jan 2023 05:16), Max: 36.6 (20 Jan 2023 05:16)  T(F): 97.8 (20 Jan 2023 05:16), Max: 97.8 (20 Jan 2023 05:16)  HR: 87 (20 Jan 2023 05:16) (59 - 87)  BP: 121/78 (20 Jan 2023 05:16) (121/78 - 141/60)  BP(mean): --  RR: 18 (20 Jan 2023 05:16) (18 - 18)  SpO2: --      GENERAL: No acute distress, well-developed  HEAD:  Atraumatic, Normocephalic  EYES: EOMI, PERRLA, conjunctiva and sclera clear  NECK: Supple, no lymphadenopathy, no JVD  CHEST/LUNG: CTAB; No wheezes, rales, or rhonchi  HEART: Regular rate and rhythm; No murmurs, rubs, or gallops  ABDOMEN: Soft, non-tender, non-distended; normal bowel sounds, no organomegaly  EXTREMITIES:  Left toe bandaged  NEUROLOGY: no focal deficits  SKIN: No rashes or lesions    LABS:                        9.2    10.47 )-----------( 218      ( 18 Jan 2023 06:38 )             29.4     01-18    135  |  94<L>  |  53<H>  ----------------------------<  119<H>  4.6   |  32  |  0.9    Ca    9.1      18 Jan 2023 06:38  Phos  4.1     01-18  Mg     2.3     01-18    TPro  7.2  /  Alb  2.5<L>  /  TBili  0.4  /  DBili  x   /  AST  53<H>  /  ALT  29  /  AlkPhos  123<H>  01-18      CARDIAC MARKERS ( 17 Jan 2023 06:56 )  x     / x     / 179 U/L / x     / x                RADIOLOGY & ADDITIONAL TESTS:  Results Reviewed:   Imaging Personally Reviewed:  Electrocardiogram Personally Reviewed:    COORDINATION OF CARE:  Care Discussed with Consultants/Other Providers [Y/N]:  Prior or Outpatient Records Reviewed [Y/N]:

## 2023-01-20 NOTE — CHART NOTE - NSCHARTNOTEFT_GEN_A_CORE
Chart shows pt is allergic to morphine. pt has been given dilaudid multiple times with no complications including earlier today as discussed with VAISHALI Ramirez. Will order dilaudid 0.6mg IV x1 for severe foot pain s/p toe amputation. No pain recs were given by podiatry.

## 2023-01-21 NOTE — DISCHARGE NOTE PROVIDER - NSDCCAREPROVSEEN_GEN_ALL_CORE_FT
Leonardo, Juliette Moon, Myra Carson, Rashard Vernon, Reinier Mojica, Jose Nash, Jacinto Small, Neville Fabian, Michael Khan, Susan Moore, Nano Donaldson, Miles Sarmiento, Sheyla Todd, Richard Del Cid, Akash Goldman, Gilberto Winters, Luna Moon, Lynnette Cihlds, Paco Paiz, Anna CARTER

## 2023-01-21 NOTE — DISCHARGE NOTE PROVIDER - ATTENDING DISCHARGE PHYSICAL EXAMINATION:
GENERAL: No acute distress, well-developed  HEAD:  Atraumatic, Normocephalic  EYES: EOMI, PERRLA, conjunctiva and sclera clear  NECK: Supple, no lymphadenopathy, no JVD  CHEST/LUNG: CTAB; No wheezes, rales, or rhonchi  HEART: Regular rate and rhythm; No murmurs, rubs, or gallops  ABDOMEN: Soft, non-tender, non-distended; normal bowel sounds, no organomegaly  EXTREMITIES:  Left toe bandaged  NEUROLOGY: no focal deficits  SKIN: No rashes or lesions

## 2023-01-21 NOTE — PROGRESS NOTE ADULT - REASON FOR ADMISSION
Weakness with poor PO intake

## 2023-01-21 NOTE — PROGRESS NOTE ADULT - ASSESSMENT
81 y/o female admitted with failure to thrive, poor PO intake, and ulceration left 2nd toe    Foot ulceration in setting of h/o PVD, complicated by OM on MRI; s/p amputation of second toe of left foot 1/18  Previous:   S/p Excisional Debridement of Soft Tissue & Bone w/Arthroplasty 2nd PIPJ, partially closed, Left Foot  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  O/E: Sharp bedside excisional debridement of fibrotic/ necrotic tissue  left second toe ulcer to layer of tendon- purulence noted with bone exposure   Elevated ESR/CRP  S/p Excisional Debridement of Soft Tissue & Bone w/Arthroplasty 2nd PIPJ, partially closed, Left Foot: 01/06/23  WCx/BoneCx: Staph aureus  Vascular surgery - LE angiogram 1/10 - Occluded L anterior tibial artery at the origin, near occluded stenosis posterior tibial artery near the origin with backfilling from peroneal and PT flow into the pedal arch  - ID following- Stop Linezolid d/t pancytopenia; C/w daptomycin 6mg/kg, baseline Cr 27, recheck on 1/23  - f/u up for DC recs  - Podiatry following;  Stable Per Podiatry Standpoint; Follow Up as an Outpatient w/   ; Justino  - Pain control    Adult failure to thrive in setting of Dementia  Metabolic encephalopathy  in setting of infectious process  - WCx/boneCx: Staph aureus  - c/w easy to chew diet; Liquid supplement  - c/w Vit C and Zinc for wound healing.  - continue Donepezil  - dietary consult  - Trial of Seroquel qhs for agitation    Acute blood loss anemia  No vinicio bleeding, likely due to multiple blood draws  Vitals stable  -Limit blood draws    Multiple skin abrasions  Small area redness coccyx region   Ecchymosis left side of mouth  - Wound Care RN consult    Hyperkalemia, resolved    Hypothyroid.   - continue Synthroid.    History of chronic CHF.   - continue Entresto, Metoprolol, on lasix     History of peripheral vascular disease.   - continue ASA/Plavix  - US Doppler: neg  - LE angiogram 1/10 - results above    - DVT Ppx: Holding due to anemia  - GI Ppx: not indicated  - Diet: easy chew

## 2023-01-21 NOTE — PROGRESS NOTE ADULT - SUBJECTIVE AND OBJECTIVE BOX
PROGRESS NOTE:   Jose Mojica MD  Available on teams    Patient is a 82y old  Female who presents with a chief complaint of Weakness with poor PO intake (17 Jan 2023 17:49)      SUBJECTIVE / OVERNIGHT EVENTS:  Seen for follow up for foot ulceration   Denies fever, chills, fatigue, chest pain, shortness of breath, abdominal pain, nausea/ vomiting or diarrhea  s/p amputation of toe  Complaining of pain overnight, diluadid given and tolerated  drop in hgb  no reports of dark/red bm or bleeding    ADDITIONAL REVIEW OF SYSTEMS:    MEDICATIONS  (STANDING):  potassium chloride    Tablet ER 20 milliEquivalent(s) Oral every 2 hours    MEDICATIONS  (PRN):      CAPILLARY BLOOD GLUCOSE        I&O's Summary    17 Jan 2023 07:01  -  18 Jan 2023 07:00  --------------------------------------------------------  IN: 0 mL / OUT: 750 mL / NET: -750 mL        PHYSICAL EXAM:  Vital Signs Last 24 Hrs  T(C): 36.6 (20 Jan 2023 05:16), Max: 36.6 (20 Jan 2023 05:16)  T(F): 97.8 (20 Jan 2023 05:16), Max: 97.8 (20 Jan 2023 05:16)  HR: 87 (20 Jan 2023 05:16) (59 - 87)  BP: 121/78 (20 Jan 2023 05:16) (121/78 - 141/60)  BP(mean): --  RR: 18 (20 Jan 2023 05:16) (18 - 18)  SpO2: --      GENERAL: No acute distress, well-developed  HEAD:  Atraumatic, Normocephalic  EYES: EOMI, PERRLA, conjunctiva and sclera clear  NECK: Supple, no lymphadenopathy, no JVD  CHEST/LUNG: CTAB; No wheezes, rales, or rhonchi  HEART: Regular rate and rhythm; No murmurs, rubs, or gallops  ABDOMEN: Soft, non-tender, non-distended; normal bowel sounds, no organomegaly  EXTREMITIES:  Left toe bandaged  NEUROLOGY: no focal deficits  SKIN: No rashes or lesions    LABS:                        9.2    10.47 )-----------( 218      ( 18 Jan 2023 06:38 )             29.4     01-18    135  |  94<L>  |  53<H>  ----------------------------<  119<H>  4.6   |  32  |  0.9    Ca    9.1      18 Jan 2023 06:38  Phos  4.1     01-18  Mg     2.3     01-18    TPro  7.2  /  Alb  2.5<L>  /  TBili  0.4  /  DBili  x   /  AST  53<H>  /  ALT  29  /  AlkPhos  123<H>  01-18      CARDIAC MARKERS ( 17 Jan 2023 06:56 )  x     / x     / 179 U/L / x     / x                RADIOLOGY & ADDITIONAL TESTS:  Results Reviewed:   Imaging Personally Reviewed:  Electrocardiogram Personally Reviewed:    COORDINATION OF CARE:  Care Discussed with Consultants/Other Providers [Y/N]:  Prior or Outpatient Records Reviewed [Y/N]:

## 2023-01-21 NOTE — DISCHARGE NOTE PROVIDER - NSDCMRMEDTOKEN_GEN_ALL_CORE_FT
allopurinol 100 mg oral tablet: 1 tab(s) orally 2 times a day  ascorbic acid 500 mg oral tablet: 1 tab(s) orally once a day  Aspir 81 oral delayed release tablet: 1 tab(s) orally once a day  calcium (as carbonate and lactate)-vitamin D 200 mg-6.25 mcg oral tablet: 2 tab(s) orally 2 times a day  clindamycin 300 mg oral capsule: 2 cap(s) orally every 8 hours  donepezil 10 mg oral tablet: 1 tab(s) orally once a day (at bedtime)  DULoxetine 60 mg oral delayed release capsule: 1 cap(s) orally once a day  Entresto 24 mg-26 mg oral tablet: 1 tab(s) orally 2 times a day  ferrous sulfate 325 mg (65 mg elemental iron) oral tablet: orally once a day  folic acid 1 mg oral tablet: 1 tab(s) orally once a day  furosemide 40 mg oral tablet: 1 tab(s) orally once a day  levothyroxine 50 mcg (0.05 mg) oral tablet: 1 tab(s) orally once a day  metoprolol succinate 50 mg oral tablet, extended release: 1 tab(s) orally 2 times a day  Orencia 125 mg/mL subcutaneous solution: 1 dose(s) subcutaneous once a week on Tuesdays  oxybutynin 10 mg/24 hr oral tablet, extended release: 1 tab(s) orally 2 times a day  oxycodone-acetaminophen 5 mg-325 mg oral tablet: 1 tab(s) orally every 6 hours, As needed, Severe Pain (7 - 10)  pantoprazole 40 mg oral delayed release tablet: 1 tab(s) orally 2 times a day   Plavix 75 mg oral tablet: 1 tab(s) orally once a day   zinc sulfate 220 mg oral capsule: 1 cap(s) orally once a day

## 2023-01-21 NOTE — DISCHARGE NOTE PROVIDER - DETAILS OF MALNUTRITION DIAGNOSIS/DIAGNOSES
This patient has been assessed with a concern for Malnutrition and was treated during this hospitalization for the following Nutrition diagnosis/diagnoses:     -  01/05/2023: Severe protein-calorie malnutrition   -  01/05/2023: Underweight (BMI < 19)

## 2023-01-21 NOTE — DISCHARGE NOTE PROVIDER - HOSPITAL COURSE
83 y/o female admitted with failure to thrive, poor PO intake, and ulceration left 2nd toe. Foot ulceration in setting of h/o PVD, complicated by OM on MRI; s/p amputation of second toe of left foot 1/18  Previous Sx Hx:  S/p Excisional Debridement of Soft Tissue & Bone w/Arthroplasty 2nd PIPJ, partially closed, Left Foot  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  O/E: Sharp bedside excisional debridement of fibrotic/ necrotic tissue  left second toe ulcer to layer of tendon- purulence noted with bone exposure   Elevated ESR/CRP  S/p Excisional Debridement of Soft Tissue & Bone w/Arthroplasty 2nd PIPJ, partially closed, Left Foot: 01/06/23  WCx/BoneCx: Staph aureus  Vascular surgery - LE angiogram 1/10 - Occluded L anterior tibial artery at the origin, near occluded stenosis posterior tibial artery near the origin with backfilling from peroneal and PT flow into the pedal arch  - ID following- Clindamycin 600mg TID until 2/14/23  - Podiatry following;  Stable Per Podiatry Standpoint; Follow Up as an Outpatient w/   ; Justino    Adult failure to thrive in setting of Dementia  Metabolic encephalopathy  in setting of infectious process, resolved  - c/w easy to chew diet; Liquid supplement  - c/w Vit C and Zinc for wound healing.  - continue Donepezil    Acute blood loss anemia  No vinicio bleeding or reports of black/red BMs, likely due to multiple blood draws  Vitals stable  -Limit blood draws  -c/w iron supplement    Multiple skin abrasions  Small area redness coccyx region   Ecchymosis left side of mouth  - Wound Care RN eval'd    Hyperkalemia, resolved    Hypothyroid.   - continue Synthroid.    History of chronic CHF.   - continue Entresto, Metoprolol, on lasix     History of peripheral vascular disease.   - continue ASA/Plavix  - US Doppler: neg  - LE angiogram 1/10 - results above

## 2023-01-21 NOTE — PROGRESS NOTE ADULT - PROVIDER SPECIALTY LIST ADULT
Hospitalist
Podiatry
Podiatry
Hospitalist
Infectious Disease
Podiatry
Hospitalist
Podiatry
Vascular Surgery
Vascular Surgery
Hospitalist
Hospitalist
Infectious Disease
Hospitalist
Hospitalist
Infectious Disease
Infectious Disease
Podiatry
Infectious Disease

## 2023-01-21 NOTE — DISCHARGE NOTE PROVIDER - CARE PROVIDER_API CALL
Gabriele Badillo (DPADIEL)  Podiatric Medicine and Surgery  [address], [address], [address]  [place] [address]  Phone: [phone]  Fax: [phone]  Follow Up Time: Routine    Caron Davis (MD)  Surgery; Vascular Surgery  [address] [place] [address]  Phone: [phone]  Fax: [phone]  Follow Up Time: 2 weeks

## 2023-01-21 NOTE — DISCHARGE NOTE PROVIDER - PROVIDER TOKENS
PROVIDER:[TOKEN:[[id number]],FOLLOWUP:[Routine]],PROVIDER:[TOKEN:[[id number]],FOLLOWUP:[2 weeks]]

## 2023-01-21 NOTE — DISCHARGE NOTE PROVIDER - NSDCFUSCHEDAPPT_GEN_ALL_CORE_FT
Birdie Spear  [facility]  [id number]  Scheduled Appointment: 01/31/2023

## 2023-01-21 NOTE — DISCHARGE NOTE PROVIDER - NSDCCPCAREPLAN_GEN_ALL_CORE_FT
PRINCIPAL DISCHARGE DIAGNOSIS  Diagnosis: Diabetic ulcer of left foot  Assessment and Plan of Treatment: You were admitted for your left foot ulcer. You were seen by podiarty, vascular and infectious disease. You had your left toe amputated. You were placed on IV antibiotics for staph auerus. You were transition to PO antibiotics.  Please follow up with your podiatrist.   Please follow up with your PCP in 1 week      SECONDARY DISCHARGE DIAGNOSES  Diagnosis: Peripheral vascular disease  Assessment and Plan of Treatment: You were seen by vascular surgery. You received excisional Debridement of Soft Tissue & Bone w/Arthroplasty 2nd PIPJ, partially closed, Left Foot: 01/06/23  Please follow up with your vascular surgeron in 2 weeks.    Diagnosis: Cellulitis  Assessment and Plan of Treatment:

## 2023-01-21 NOTE — PROGRESS NOTE ADULT - NUTRITIONAL ASSESSMENT
This patient has been assessed with a concern for Malnutrition and has been determined to have a diagnosis/diagnoses of Severe protein-calorie malnutrition and Underweight (BMI < 19).    This patient is being managed with:   Diet Easy to Chew-  DASH/TLC {Sodium & Cholesterol Restricted}  Supplement Feeding Modality:  Oral  Ensure Enlive Cans or Servings Per Day:  3       Frequency:  Three Times a day  Entered: Jan 12 2023  2:56PM    
This patient has been assessed with a concern for Malnutrition and has been determined to have a diagnosis/diagnoses of Severe protein-calorie malnutrition and Underweight (BMI < 19).    This patient is being managed with:   Diet Easy to Chew-  DASH/TLC {Sodium & Cholesterol Restricted}  Supplement Feeding Modality:  Oral  Ensure Enlive Cans or Servings Per Day:  3       Frequency:  Three Times a day  Entered: Jan 12 2023  2:56PM    
This patient has been assessed with a concern for Malnutrition and has been determined to have a diagnosis/diagnoses of Severe protein-calorie malnutrition and Underweight (BMI < 19).    This patient is being managed with:   Diet Easy to Chew-  DASH/TLC {Sodium & Cholesterol Restricted}  Entered: Jan 11 2023  1:21PM    
This patient has been assessed with a concern for Malnutrition and has been determined to have a diagnosis/diagnoses of Severe protein-calorie malnutrition and Underweight (BMI < 19).    This patient is being managed with:   Diet Easy to Chew-  DASH/TLC {Sodium & Cholesterol Restricted}  Supplement Feeding Modality:  Oral  Ensure Pudding Cans or Servings Per Day:  1       Frequency:  Daily  Ensure Plus High Protein Cans or Servings Per Day:  2       Frequency:  Two Times a day  Entered: Jan 18 2023  5:36PM    
This patient has been assessed with a concern for Malnutrition and has been determined to have a diagnosis/diagnoses of Severe protein-calorie malnutrition and Underweight (BMI < 19).    This patient is being managed with:   Diet Easy to Chew-  DASH/TLC {Sodium & Cholesterol Restricted}  Supplement Feeding Modality:  Oral  Ensure Pudding Cans or Servings Per Day:  1       Frequency:  Daily  Ensure Plus High Protein Cans or Servings Per Day:  2       Frequency:  Two Times a day  Entered: Jan 18 2023  5:36PM    
This patient has been assessed with a concern for Malnutrition and has been determined to have a diagnosis/diagnoses of Severe protein-calorie malnutrition and Underweight (BMI < 19).      
This patient has been assessed with a concern for Malnutrition and has been determined to have a diagnosis/diagnoses of Severe protein-calorie malnutrition and Underweight (BMI < 19).    This patient is being managed with:   Diet Easy to Chew-  DASH/TLC {Sodium & Cholesterol Restricted}  Supplement Feeding Modality:  Oral  Ensure Enlive Cans or Servings Per Day:  3       Frequency:  Three Times a day  Entered: Jan 12 2023  2:56PM    
This patient has been assessed with a concern for Malnutrition and has been determined to have a diagnosis/diagnoses of Severe protein-calorie malnutrition and Underweight (BMI < 19).    This patient is being managed with:   Diet Easy to Chew-  DASH/TLC {Sodium & Cholesterol Restricted}  Supplement Feeding Modality:  Oral  Ensure Enlive Cans or Servings Per Day:  3       Frequency:  Three Times a day  Entered: Jan 12 2023  2:56PM    
This patient has been assessed with a concern for Malnutrition and has been determined to have a diagnosis/diagnoses of Severe protein-calorie malnutrition and Underweight (BMI < 19).    This patient is being managed with:   Diet Easy to Chew-  DASH/TLC {Sodium & Cholesterol Restricted}  Supplement Feeding Modality:  Oral  Ensure Pudding Cans or Servings Per Day:  1       Frequency:  Daily  Ensure Plus High Protein Cans or Servings Per Day:  2       Frequency:  Two Times a day  Entered: Jan 18 2023  5:36PM    
This patient has been assessed with a concern for Malnutrition and has been determined to have a diagnosis/diagnoses of Severe protein-calorie malnutrition and Underweight (BMI < 19).    This patient is being managed with:   Diet Easy to Chew-  Entered: Jan 17 2023  4:43PM    Diet NPO after Midnight-     NPO Start Date: 17-Jan-2023   NPO Start Time: 23:59  Entered: Jan 17 2023  4:30PM    
This patient has been assessed with a concern for Malnutrition and has been determined to have a diagnosis/diagnoses of Severe protein-calorie malnutrition and Underweight (BMI < 19).    This patient is being managed with:   Diet Easy to Chew-  DASH/TLC {Sodium & Cholesterol Restricted}  Entered: Jan 11 2023  1:21PM    
This patient has been assessed with a concern for Malnutrition and has been determined to have a diagnosis/diagnoses of Severe protein-calorie malnutrition and Underweight (BMI < 19).    This patient is being managed with:   Diet Easy to Chew-  DASH/TLC {Sodium & Cholesterol Restricted}  Supplement Feeding Modality:  Oral  Ensure Clear Cans or Servings Per Day:  1       Frequency:  Three Times a day  Ensure Plus High Protein Cans or Servings Per Day:  1       Frequency:  Three Times a day  Entered: Jan 5 2023 11:40AM    Diet Easy to Chew-  DASH/TLC {Sodium & Cholesterol Restricted}  Supplement Feeding Modality:  Oral  Ensure Plus High Protein Cans or Servings Per Day:  1       Frequency:  Three Times a day  Entered: Jan 5 2023 12:23AM    The following pending diet order is being considered for treatment of Severe protein-calorie malnutrition and Underweight (BMI < 19):null
This patient has been assessed with a concern for Malnutrition and has been determined to have a diagnosis/diagnoses of Severe protein-calorie malnutrition and Underweight (BMI < 19).    This patient is being managed with:   Diet NPO after Midnight-     NPO Start Date: 16-Jan-2023   NPO Start Time: 23:59  Entered: Jan 16 2023  3:08PM    Diet Easy to Chew-  DASH/TLC {Sodium & Cholesterol Restricted}  Supplement Feeding Modality:  Oral  Ensure Enlive Cans or Servings Per Day:  3       Frequency:  Three Times a day  Entered: Jan 12 2023  2:56PM

## 2023-01-21 NOTE — DISCHARGE NOTE NURSING/CASE MANAGEMENT/SOCIAL WORK - PATIENT PORTAL LINK FT
You can access the FollowMyHealth Patient Portal offered by [facility] by registering at the following website: [email or web address]. By joining [facility]’s FollowMyHealth portal, you will also be able to view your health information using other applications (apps) compatible with our system.

## 2023-01-23 PROBLEM — Z87.19 PERSONAL HISTORY OF OTHER DISEASES OF THE DIGESTIVE SYSTEM: Chronic | Status: ACTIVE | Noted: 2023-01-01

## 2023-01-23 PROBLEM — Z86.79 PERSONAL HISTORY OF OTHER DISEASES OF THE CIRCULATORY SYSTEM: Chronic | Status: ACTIVE | Noted: 2023-01-01

## 2023-01-26 NOTE — ASSESSMENT
[FreeTextEntry1] : - Pt seen and evaluated \par - s/p left 2nd digit amputation\par - RTC next week for suture removal \par

## 2023-01-26 NOTE — REVIEW OF SYSTEMS
[Arthralgias] : arthralgias [Joint Stiffness] : joint stiffness [Limb Pain] : limb pain [As Noted in HPI] : as noted in HPI [Negative] : Constitutional

## 2023-01-26 NOTE — END OF VISIT
[] : Resident [FreeTextEntry3] : s/p left 2nd toe amputation 1/18\par incision site well approximated. no skin necrosis. sutures intact. no erythema around the incision site.\par return one week for suture removal

## 2023-01-26 NOTE — HISTORY OF PRESENT ILLNESS
[Sneakers] : jennifer [Wheelchair] : a wheelchair [FreeTextEntry1] : presents for follow up care s/p toe amputation

## 2023-01-26 NOTE — REASON FOR VISIT
[Initial Visit] : an initial visit for [Foot Deformity] : foot deformity [Foot Pain] : foot pain [FreeTextEntry2] : S/P left 2nd digit amp, 1/18/2023

## 2023-01-26 NOTE — PHYSICAL EXAM
[Delayed in the Right Toes] : capillary refills delayed in the right toes [Delayed in the Left Toes] : capillary refills delayed in the left toes [2+] : left foot posterior tibialis 2+ [Vibration Dec.] : diminished vibratory sensation at the level of the toes [Diminished Throughout Right Foot] : diminished sensation with monofilament testing throughout right foot [Diminished Throughout Left Foot] : diminished sensation with monofilament testing throughout left foot [Oriented To Time, Place, And Person] : oriented to person, place, and time [de-identified] : - Hammer toes lesser digits, 3,4,5. malet toe, left hallux \par  [FreeTextEntry1] : - Nylon sutures on the left 2nd digit intact, no bleeding or SOI noted

## 2023-02-03 NOTE — PHYSICAL EXAM
[Delayed in the Right Toes] : capillary refills delayed in the right toes [Delayed in the Left Toes] : capillary refills delayed in the left toes [2+] : left foot posterior tibialis 2+ [Vibration Dec.] : diminished vibratory sensation at the level of the toes [Diminished Throughout Right Foot] : diminished sensation with monofilament testing throughout right foot [Diminished Throughout Left Foot] : diminished sensation with monofilament testing throughout left foot [Oriented To Time, Place, And Person] : oriented to person, place, and time [de-identified] : - Hammer toes lesser digits, 3,4,5. malet toe, left hallux \par  [FreeTextEntry1] : - Nylon sutures on the left 2nd digit intact, no bleeding or SOI noted

## 2023-02-03 NOTE — ASSESSMENT
[FreeTextEntry1] : - Pt seen and evaluated \par - s/p left 2nd digit amputation\par -sutures removed. small scab noted along the incision site\par -local wound care w/ xeroform and gauze dressing\par -return 1 month \par

## 2023-02-06 NOTE — REASON FOR VISIT
[Follow-Up: _____] : a [unfilled] follow-up visit [FreeTextEntry1] : sp left tibial arty plasty for a non healing 2n toe ulcer sp toe amputation.

## 2023-02-06 NOTE — ASSESSMENT
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  The patient had a second toe wound which subsequently she underwent a second toe amputation.  She presents today for follow-up evaluation.  Her toe amputation site is healed.  She has new stage II pressure ulcers to her fifth toe and the base of the first metatarsal.  I recommend offloading local wound care as per podiatry and daily skin emollient.  There is no sign of active infection present.  I will see her back in my office in 3 months time or sooner if her wounds worsen.

## 2023-02-07 NOTE — PATIENT PROFILE ADULT - NSPROPASSIVESMOKEEXPOSURE_GEN_A_NUR
No Complex Repair And Burow's Graft Text: The defect edges were debeveled with a #15 scalpel blade.  The primary defect was closed partially with a complex linear closure.  Given the location of the defect, shape of the defect, the proximity to free margins and the presence of a standing cone deformity a Burow's graft was deemed most appropriate to repair the remaining defect.  The graft was trimmed to fit the size of the remaining defect.  The graft was then placed in the primary defect, oriented appropriately, and sutured into place.

## 2023-03-10 PROBLEM — L98.8 MACERATION OF SKIN: Status: ACTIVE | Noted: 2023-01-01

## 2023-03-10 PROBLEM — L97.529 NON-PRESSURE CHRONIC ULCER OF OTHER PART OF LEFT FOOT: Status: ACTIVE | Noted: 2023-01-01

## 2023-03-10 NOTE — PHYSICAL EXAM
[Delayed in the Right Toes] : capillary refills delayed in the right toes [Delayed in the Left Toes] : capillary refills delayed in the left toes [2+] : left foot posterior tibialis 2+ [Vibration Dec.] : diminished vibratory sensation at the level of the toes [Diminished Throughout Right Foot] : diminished sensation with monofilament testing throughout right foot [Diminished Throughout Left Foot] : diminished sensation with monofilament testing throughout left foot [Oriented To Time, Place, And Person] : oriented to person, place, and time [de-identified] : - Hammer toes lesser digits, 3,4,5. malet toe, left hallux \par  [FreeTextEntry1] : scab to the left toe surgical amp site. eschar present to the lateral 5th toe.

## 2023-03-10 NOTE — HISTORY OF PRESENT ILLNESS
[Sneakers] : jennifer [Wheelchair] : a wheelchair [FreeTextEntry1] : 82 y.o female here today with a new chief complaint of  left 5th toe pain and redness \par

## 2023-03-10 NOTE — END OF VISIT
[] : Resident [FreeTextEntry3] : persistent scab on left foot 2nd toe amputation stump--->excisionally debrided with 15 blade--.> superficial underlying wound, local wound care with xeroform and gauze dressing \par left 5th toe with superficial eschar on lateral aspect of the toe-->excisionally debrided with 15 blade-->noted superficial skin break down\par left 4th webspace is macerated with superficial ulcer on the medial aspect of the 5th toe\par -Rx augmentin 875mg bid x   7 days\par -Recommend application of Betadine or gentian violet in webspace \par -return 3 weeks or earlier if worsening erythema \par My notes supersedes the above resident documentation in case of discrepancy. Correction for their notes is in my notes. \par \par
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
## 2023-03-29 PROBLEM — I10 HYPERTENSION: Status: ACTIVE | Noted: 2022-01-01

## 2023-03-29 PROBLEM — I50.20 HFREF (HEART FAILURE WITH REDUCED EJECTION FRACTION): Status: ACTIVE | Noted: 2022-01-01

## 2023-03-29 PROBLEM — K04.7 TOOTH ABSCESS: Status: ACTIVE | Noted: 2023-01-01

## 2023-03-29 NOTE — PHYSICAL EXAM
[Frail] : frail [Ill-Appearing] : ill-appearing [Cachectic] : cachexia was observed [Clear Lung Fields] : clear lung fields [S3] : no S3 [S4] : no S4 [de-identified] : Examined in w/c Daughter Ela is present [de-identified] : Can only transfer unable to ambulate on her own

## 2023-03-29 NOTE — DISCUSSION/SUMMARY
[FreeTextEntry1] : 81 y/o lived in [facility]  has PMH of PVD stents legs, HTN,. 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 also w h/o  Chronic DVT, hypothy, Gout, Depression and new dx vascular  dementia..,MR,  10/4/22 ORIF femur fx without complication. Uses walker at home with mild MORRIS.  She has home care 8 hours. 5 days.\par Pt had several days of increased SOB no chest pain and was admitted 12/2 and Disch 12/7 with Pul Edema  and CHF. BNP 13,605. ECHO 12/5/22: EF 35%LAE, mod AI, Severe MR,Mod thickening and calcification of ant and post mitral valve leaflets.12/6  BUN/cr 29/0.8 K 3.5   H/H 10.2/33. Pt here w daughter Ela . Pt clinically without SOB and stable VS. Will start low dose entresto and ck CMP, Lipid TSH BNP in 2 wk  f/u visit in3 wk .All questions answered risk/benefit of med explained to pt and daughter\par Labs 12/21 and repeat 1/3 show pancytopenia HGB 9.5/31. Her HGB/HCT 12/5 was 12/39. ALB 2,5. Repeat  BNP 1782\par Pt had stage 2 ulcer left metatarsal area w osteomyelitis and is s/p 2nd digit amputation 1/18/23. She also had left tibial angioplasty . Followed by podiatry and vasc.\par Pt has not had ischemic w/u for her HFrEF. Ordered  Adenosine and GDMT . Ordered  spironolactone  25 every other day, increase entresto 24/26 to full pill in AM and half in pPM x 1wk and then full pill twice daily  . Pt on metoprolol. Will eventually add farxiga . PCP to come to house in 2 wk and pt daughter will inform us of her BP. Lab to home 1 wk after starting spironolactone  .Pt with infected tooth has been unable to eat .She has lost an additional 20 lbs. Encouraged ensure and high protein supplements.Encouraged pt to get tooth extracted ASAP .She will need to hold  asa /plavix no longer than 3-5 days.Pt euvolemic with significant wt loss. Will reduce lasix  and started MRA. Pt to f/u 6 wk

## 2023-03-29 NOTE — REVIEW OF SYSTEMS
[Fever] : no fever [Chills] : no chills [Blurry Vision] : no blurred vision [Cough] : no cough [Abdominal Pain] : no abdominal pain [Dizziness] : no dizziness [Confusion] : no confusion was observed [Easy Bleeding] : no tendency for easy bleeding

## 2023-03-30 NOTE — PATIENT PROFILE ADULT - PUBLIC BENEFITS
no
FAMILY HISTORY:  Uncle  Still living? Unknown  Family history of diabetes mellitus, Age at diagnosis: Age Unknown

## 2023-03-31 NOTE — REASON FOR VISIT
[Follow-Up Visit] : a follow-up visit for [Foot Deformity] : foot deformity [Foot Pain] : foot pain [Other:___] : [unfilled]

## 2023-04-02 NOTE — H&P ADULT - NS ATTEND AMEND GEN_ALL_CORE FT
Seen in the ER, agree with assessment and plan. Expect medical optimization within 12 hours but await EKG and am lab results. Seen in the ER, agree with assessment and plan. Expect medical optimization within a timely manner but await EKG and am lab results. Based on revised Cardiac Risk Index, patient at moderate of adverse outcome with currently planned non cardiac surgery with 6.6% risk of myocardial infarction, pulmonary edema, ventricular fibrillation, cardiac arrest or CHF in post op period Seen in the ER, agree with assessment and plan. Expect medical optimization within a timely manner but await EKG and am lab results. Based on revised Cardiac Risk Index, patient at moderate risk (Class 3) of adverse outcome with currently planned non cardiac surgery with 10.1% risk of myocardial infarction, pulmonary edema, ventricular fibrillation, cardiac arrest or CHF in post op period (assumes EKG will show evidence of prior cardiac event)

## 2023-04-02 NOTE — ED ADULT NURSE NOTE - NSICDXPASTMEDICALHX_GEN_ALL_CORE_FT
PAST MEDICAL HISTORY:  Bilateral cataracts     Dementia     Depression with anxiety     High cholesterol     History of chronic CHF     History of cirrhosis of liver     Hypertension     Hypothyroid     Incontinence     Melanoma of skin     Osteopenia     Peripheral arterial disease (s/p B/L leg stents & on Plavix)    Poor circulation PAD    RA (rheumatoid arthritis)     Rheumatoid arthritis

## 2023-04-02 NOTE — CONSULT NOTE ADULT - SUBJECTIVE AND OBJECTIVE BOX
Podiatry Consult Note    Subjective:  ARIEL INIGUEZ is a pleasant well-nourished, well developed 82y Female in no acute distress, alert awake, and oriented to person, place and time.   Patient is a 82y old  Female who presents with a chief complaint of Left 5th toe wound. Pt was seen in Podiatry clinic last week and was told to come into Hospital on Tues 4/4 for admission. Daughter bedside. Says pt has been drained ever since getting COVID.  Pt seen & evaluated at bedside. Pt limited responsiveness due to lethargy. No acute distress. No dressings to Left foot.   HPI:    PAST MEDICAL & SURGICAL HISTORY:  Hypothyroid      Hypertension      RA (rheumatoid arthritis)      High cholesterol      Poor circulation  PAD      Incontinence      Dementia      Rheumatoid arthritis      Osteopenia      Melanoma of skin      Depression with anxiety      Bilateral cataracts      Peripheral arterial disease  (s/p B/L leg stents & on Plavix)      History of cirrhosis of liver      History of chronic CHF      History of hip surgery  Left ORIF and Left ORIF femur      H/O: hysterectomy         Objective:  Vital Signs Last 24 Hrs  T(C): 35.8 (02 Apr 2023 16:23), Max: 35.8 (02 Apr 2023 16:23)  T(F): 96.5 (02 Apr 2023 16:23), Max: 96.5 (02 Apr 2023 16:23)  HR: 72 (02 Apr 2023 18:30) (64 - 72)  BP: 125/54 (02 Apr 2023 18:30) (125/54 - 140/60)  BP(mean): --  RR: 20 (02 Apr 2023 18:30) (19 - 20)  SpO2: 98% (02 Apr 2023 18:30) (86% - 99%)    Parameters below as of 02 Apr 2023 18:30  Patient On (Oxygen Delivery Method): nasal cannula  O2 Flow (L/min): 4                  04-02    135  |  93<L>  |  47<H>  ----------------------------<  137<H>  5.2<H>   |  27  |  1.4    Ca    9.5      02 Apr 2023 17:38    TPro  7.5  /  Alb  2.9<L>  /  TBili  0.5  /  DBili  x   /  AST  50<H>  /  ALT  14  /  AlkPhos  161<H>  04-02        Physical Exam - Lower Extremity Focused:   #Left Lower Extremity   Derm:   Open Left 5th Medial IPJ Ulcer    -Wound base fibrogranular; 50% Fibrous, 50% Granular.    -Wound Probes to Subcutaneous tissue w/ no active drainage/bleeding; No malodor;   -Wound margins irregular  Skin thin & atrophic LLE     Ischemic Changes Noted on the Left 5th Digit Extending to the MTP Joint, toe is tepid, capillary refill is delayed to the toes     Vascular: DP and PT Pulses Diminished; Foot is Warm to Warm to the touch   Neuro: Protective Sensation intact to light touch  MSK: 2nd digit amputation. No Pain On Palpation at Wound Site     Assessment:  Left 5th Digit Medial DIPJ Ulcer - Probes to Subcutaneous Tissue  Cyanosis Left 5th Digit Extending to MTP Joint    Plan:  Chart reviewed and Patient evaluated. All Questions and Concerns Addressed and Answered  Discussed diagnosis and treatment with patient  Wound Flushed w/ NS; Wound Packed w/ Betadine Soaked Gauze / DSD / Kerlix; Q24  Local Wound Care; As Stated Above   Wound Culture Obtained; Sent to Pathology; Pending Results   XR Imaging Left Foot; Pending Results  Recommend; Lower Extremity Arterial Duplex B/L; ESR/CRP;   Request ID Consult; WBC; 27.30 / Follow Up w/ Wound Culture  Plan for Surgical Debridement; Pending Arterial Duplex, ESR/CRP, and XR Imaging; Left 5th toe amputation.   Will f/u when time/date finalized;  Request Medical Clearance;  Request pre-lab optimization & OR stratification prior to sx;  Discussed Plan w/ Attending    Podiatry

## 2023-04-02 NOTE — H&P ADULT - ASSESSMENT
Podiatry Plan:  Chart reviewed and Patient evaluated. All Questions and Concerns Addressed and Answered  Discussed diagnosis and treatment with patient  Wound Flushed w/ NS; Wound Packed w/ Betadine Soaked Gauze / DSD / Kerlix; Q24  Local Wound Care; As Stated Above   Wound Culture Obtained; Sent to Pathology; Pending Results   XR Imaging Left Foot; Pending Results  Recommend; Lower Extremity Arterial Duplex B/L; ESR/CRP;   Request ID Consult; WBC; 27.30 / Follow Up w/ Wound Culture  Plan for Surgical Debridement; Pending Arterial Duplex, ESR/CRP, and XR Imaging; Left 5th toe amputation.   Will f/u when time/date finalized;  Request Medical Clearance;  Request pre-lab optimization & OR stratification prior to sx;  Discussed Plan w/ Attending 81 y/o F w/PMHx of dementia, HTN, HDL, HFrEF, mitral valve regurgitation hypothyroidism, TIA, cirrhosis, neuropathy,  gout, RA, cataract, diplopia, skin ca, left 2nd toe amputation 1/2023 presented for evaluation of "fever" as per pts full time aide    # Sepsis  # Left 2nd toe amputation  - WBC 30, afebrile  - pt was seen by podiatry     - podiatry performed Wound Flushed w/ NS; Wound Packed w/ Betadine Soaked Gauze / DSD / Kerlix; Q24     - Local Wound Care     - Wound Culture Obtained; Sent to Pathology; Pending Results      - XR Imaging Left Foot; Pending Results     - Recommend; Lower Extremity Arterial Duplex B/L; ESR/CRP;      - Follow Up w/ Wound Culture     - Plan for Surgical Debridement; Pending Arterial Duplex, ESR/CRP, and XR Imaging; Left 5th toe amputation.      - Will f/u when time/date finalized;     - Request Medical Clearance;     - Request pre-lab optimization & OR stratification prior to sx;  - c/w IVAB  - f/u Bcx  - f/u Ucx  - ID consult   - monitor for pyrexia  - O2  - Lactate 2.2, will repeat  - trend WBC  - EKG for medical clearance     # Dementia  - c/w donepezil    # HTN  #  HFrEF  - c/w lasix, spironolactone   - c/w metoprolol and Entresto    # MVR  - c/w plavix    # Hypothyroidism  - c/w synthroid    # Gout  - c/w allopurinol       - Daughter Ela stated she will provide copy of pts will which states DNR/DNI tomorrow when she comes

## 2023-04-02 NOTE — H&P ADULT - NSHPLABSRESULTS_GEN_ALL_CORE
LABS:  cret                        10.4   29.32 )-----------( 207      ( 02 Apr 2023 17:38 )             34.0     04-02    135  |  93<L>  |  47<H>  ----------------------------<  137<H>  5.2<H>   |  27  |  1.4    Ca    9.5      02 Apr 2023 17:38    TPro  7.5  /  Alb  2.9<L>  /  TBili  0.5  /  DBili  x   /  AST  50<H>  /  ALT  14  /  AlkPhos  161<H>  04-02    PT/INR - ( 02 Apr 2023 17:38 )   PT: 12.20 sec;   INR: 1.07 ratio         PTT - ( 02 Apr 2023 17:38 )  PTT:33.1 sec

## 2023-04-02 NOTE — ED ADULT NURSE NOTE - NSFALLRSKOUTCOME_ED_ALL_ED
Addended by: GOOD JURADO on: 3/1/2019 11:52 AM     Modules accepted: Orders    
Addended by: RAPHAEL MADRIGAL on: 2/28/2019 03:38 PM     Modules accepted: Orders    
Fall with Harm Risk

## 2023-04-02 NOTE — ED ADULT NURSE NOTE - NSIMPLEMENTINTERV_GEN_ALL_ED
Implemented All Fall with Harm Risk Interventions:  [place] to call system. Call bell, personal items and telephone within reach. Instruct patient to call for assistance. Room bathroom lighting operational. Non-slip footwear when patient is off stretcher. Physically safe environment: no spills, clutter or unnecessary equipment. Stretcher in lowest position, wheels locked, appropriate side rails in place. Provide visual cue, wrist band, yellow gown, etc. Monitor gait and stability. Monitor for mental status changes and reorient to person, place, and time. Review medications for side effects contributing to fall risk. Reinforce activity limits and safety measures with patient and family. Provide visual clues: red socks.

## 2023-04-02 NOTE — H&P ADULT - NSHPPHYSICALEXAM_GEN_ALL_CORE
Physical Exam - Lower Extremity Focused:   #Left Lower Extremity   Derm:   Open Left 5th Medial IPJ Ulcer    -Wound base fibrogranular; 50% Fibrous, 50% Granular.    -Wound Probes to Subcutaneous tissue w/ no active drainage/bleeding; No malodor;   -Wound margins irregular  Skin thin & atrophic LLE     Ischemic Changes Noted on the Left 5th Digit Extending to the MTP Joint, toe is tepid, capillary refill is delayed to the toes     Vascular: DP and PT Pulses Diminished; Foot is Warm to Warm to the touch   Neuro: Protective Sensation intact to light touch  MSK: 2nd digit amputation. No Pain On Palpation at Wound Site VITALS:   T(C): 36.9 (04-02-23 @ 18:56), Max: 36.9 (04-02-23 @ 18:56)  HR: 59 (04-02-23 @ 20:08) (59 - 72)  BP: 147/65 (04-02-23 @ 20:08) (115/57 - 147/65)  RR: 18 (04-02-23 @ 20:08) (18 - 20)  SpO2: 99% (04-02-23 @ 20:08) (86% - 99%)    GENERAL: NAD, lying in bed comfortably  HEAD:  Atraumatic, Normocephalic  EYES: EOMI,  conjunctiva and sclera clear  ENT: Moist mucous membranes  NECK: Supple, No JVD  CHEST/LUNG: CTA b/l, Unlabored respirations  HEART: Regular rate and rhythm; No murmurs, rubs, or gallops  ABDOMEN: Bowel sounds present; Soft, Nontender, Nondistended. No hepatomegally  EXTREMITIES:  2+ Peripheral Pulses, brisk capillary refill. No clubbing, cyanosis, or edema  NERVOUS SYSTEM:  Alert & Oriented X3, speech clear. No deficits   MSK: FROM all 4 extremities, full and equal strength  SKIN: No rashes or lesions      Physical Exam - Lower Extremity Focused:   #Left Lower Extremity   Derm:   Open Left 5th Medial IPJ Ulcer    -Wound base fibrogranular; 50% Fibrous, 50% Granular.    -Wound Probes to Subcutaneous tissue w/ no active drainage/bleeding; No malodor;   -Wound margins irregular  Skin thin & atrophic LLE     Ischemic Changes Noted on the Left 5th Digit Extending to the MTP Joint, toe is tepid, capillary refill is delayed to the toes     Vascular: DP and PT Pulses Diminished; Foot is Warm to Warm to the touch   Neuro: Protective Sensation intact to light touch  MSK: 2nd digit amputation. No Pain On Palpation at Wound Site VITALS:   T(C): 36.9 (04-02-23 @ 18:56), Max: 36.9 (04-02-23 @ 18:56)  HR: 59 (04-02-23 @ 20:08) (59 - 72)  BP: 147/65 (04-02-23 @ 20:08) (115/57 - 147/65)  RR: 18 (04-02-23 @ 20:08) (18 - 20)  SpO2: 99% (04-02-23 @ 20:08) (86% - 99%)    GENERAL: NAD, lying in bed comfortably  HEAD:  Atraumatic, Normocephalic  EYES: EOMI,  conjunctiva and sclera clear  ENT: Moist mucous membranes  NECK: Supple, No JVD  CHEST/LUNG: CTA b/l, Unlabored respirations  HEART: Regular rate and rhythm; No murmurs, rubs, or gallops  ABDOMEN: Bowel sounds present; Soft, Nontender, Nondistended.   EXTREMITIES: No clubbing, cyanosis, or edema  LEFT FOOT: see detailed PE as per podiatry below   NERVOUS SYSTEM:  Alert & Oriented X3, speech clear.   MSK: FROM all 4 extremities, full and equal strength  SKIN: No rashes or lesions    Physical Exam as per podiatry:   #Left Lower Extremity   Derm:   Open Left 5th Medial IPJ Ulcer    -Wound base fibrogranular; 50% Fibrous, 50% Granular.    -Wound Probes to Subcutaneous tissue w/ no active drainage/bleeding; No malodor;   -Wound margins irregular  Skin thin & atrophic LLE     Ischemic Changes Noted on the Left 5th Digit Extending to the MTP Joint, toe is tepid, capillary refill is delayed to the toes     Vascular: DP and PT Pulses Diminished; Foot is Warm to Warm to the touch   Neuro: Protective Sensation intact to light touch  MSK: 2nd digit amputation. No Pain On Palpation at Wound Site

## 2023-04-02 NOTE — ED PROVIDER NOTE - OBJECTIVE STATEMENT
Patient is an 82-year-old female presents for evaluation of worsening fever over the past several days patient was diagnosed with COVID at home presents with generalized weakness and worsening mental status she is unable to provide history at this time obtained from daughter who corroborates history she endorses no respiratory distress no increased effort but notes confusion with decreased p.o. intake she is able to maintain urine output daughter denies any diarrhea denies any vomiting despite the triage note upon further questioning daughter notes vomiting clear mucus after coughing

## 2023-04-02 NOTE — ED PROVIDER NOTE - CLINICAL SUMMARY MEDICAL DECISION MAKING FREE TEXT BOX
Assessment plan patient presents for evaluation of fevers and confusion however no respiratory distress patient seems improved with the oxygen at this point most likely explanation is infectious given her age and symptoms I initiated IV fluid bolus initiated empiric antibiotics consisting of clindamycin as well as Levaquin considering her allergies patient found to have elevated white blood cell count at this time source unclear per my independent evaluation of the x-ray unlikely pneumonia I did obtain an EKG per my depend evaluation is not consistent with STEMI given the patient's right foot ulcer at this point most likely explanation for source I consulted podiatry discussed case with admitting team I will admit for further evaluation of note patient's daughter is here providing medication list past medical history also notes that the patient has advanced directives and is DNR/DNI

## 2023-04-02 NOTE — H&P ADULT - HISTORY OF PRESENT ILLNESS
82-year-old female presented for evaluation of worsening fever over the past several days patient was diagnosed with COVID at home presented with generalized weakness and worsening mental status she is unable to provide history at this time obtained from daughter who corroborates history she endorses no respiratory distress no increased effort but notes confusion with decreased p.o. intake she is able to maintain urine output daughter denies any diarrhea denies any vomiting despite the triage note upon further questioning daughter notes vomiting clear mucus after coughing 81 y/o F w/PMHx of dementia, HTN, HDL, HFrEF, mitral valve regurgitation hypothyroidism, TIA, cirrhosis, neuropathy,  gout, dementia, RA, cataract diplopia, skin ca, presented for evaluation of "fever" as per pts full time aide. No actual temp was taken by the aide. patient was diagnosed with COVID at home, but negative in ED, presented with generalized weakness and worsening mental status as per daughter, pt is unable to provide full history at this time. Daughter states pt has no respiratory distress, but notes confusion with decreased p.o. intake, pt is able to maintain urine output.  daughter denies any diarrhea, but admits to vomiting clear mucus after coughing. Pt denies CP, SOB, abd pain.   Pt is AA+Ox3m but confused with other questions.  81 y/o F w/PMHx of dementia, HTN, HDL, HFrEF, mitral valve regurgitation hypothyroidism, TIA, cirrhosis, neuropathy,  gout, dementia, RA, cataract diplopia, skin ca, left 2nd toe amputation 1/2023 presented for evaluation of "fever" as per pts full time aide. No actual temp was taken by the aide. patient was diagnosed with COVID at home, but negative in ED, presented with generalized weakness and worsening mental status as per daughter, pt is unable to provide full history at this time. Daughter states pt has no respiratory distress, but notes confusion with decreased p.o. intake, pt is able to maintain urine output.  daughter denies any diarrhea, but admits to vomiting clear mucus after coughing. Pt denies CP, SOB, abd pain.   Pt is AA+Ox3m but confused with other questions.

## 2023-04-02 NOTE — ED ADULT TRIAGE NOTE - CHIEF COMPLAINT QUOTE
pt from home, pt is covid positive, as per family pt had fever at home and is hallucinating. pt vomiting.

## 2023-04-02 NOTE — ED PROVIDER NOTE - PHYSICAL EXAMINATION
On physical exam patient is normocephalic atraumatic pupils equally round and reactive to light and accommodation oropharynx and dry but clear she has several sores that are around the lips nothing intraorally no increased respiratory effort chest clear to auscultation bilaterally abdomen soft nontender nondistended bowel sounds positive no guarding or rebound radial pulses 2+ lower right extremity within normal limits lower left reveals diabetic foot ulcer no signs of a sending cellulitis at this time no signs or crepitance

## 2023-04-03 PROBLEM — M86.9 TOE OSTEOMYELITIS, LEFT: Status: ACTIVE | Noted: 2023-01-01

## 2023-04-03 NOTE — PHYSICAL EXAM
[General Appearance - Alert] : alert [General Appearance - Well Nourished] : well nourished [General Appearance - Well Developed] : well developed [Ankle Swelling On The Left] : of the left ankle [Varicose Veins Of The Left Leg] : on the left foot/ankle [Delayed in the Left Toes] : capillary refills delayed in the left toes [2+] : left foot posterior tibialis 2+ [Skin Turgor] : normal skin turgor [Foot Ulcer] : foot ulcer [Vibration Dec.] : diminished vibratory sensation at the level of the toes [Diminished Throughout Right Foot] : diminished sensation with monofilament testing throughout right foot [Diminished Throughout Left Foot] : diminished sensation with monofilament testing throughout left foot [Oriented To Time, Place, And Person] : oriented to person, place, and time [Ankle Swelling (On Exam)] : not present [Varicose Veins Of Lower Extremities] : not present [] : not present [de-identified] : -Hammer toes lesser digits 3-5, L foot\par -Mallet toe, L hallux  [Skin Induration] : no skin induration [FreeTextEntry1] : Scab to the left toe surgical amp site\par Ulcer to medial aspect of PIPJ; wound base fibrous w/mild drainage; mild erythema to periwound

## 2023-04-03 NOTE — PROGRESS NOTE ADULT - ASSESSMENT
83 y/o female admitted with failure to thrive, poor PO intake, and ulceration left 2nd toe    Foot ulceration in setting of h/o PVD, complicated by OM on MRI; s/p amputation of second toe of left foot 1/18  Previous:   S/p Excisional Debridement of Soft Tissue & Bone w/Arthroplasty 2nd PIPJ, partially closed, Left Foot  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  O/E: Sharp bedside excisional debridement of fibrotic/ necrotic tissue  left second toe ulcer to layer of tendon- purulence noted with bone exposure   Elevated ESR/CRP  S/p Excisional Debridement of Soft Tissue & Bone w/Arthroplasty 2nd PIPJ, partially closed, Left Foot: 01/06/23  WCx/BoneCx: Staph aureus  Vascular surgery - LE angiogram 1/10 - Occluded L anterior tibial artery at the origin, near occluded stenosis posterior tibial artery near the origin with backfilling from peroneal and PT flow into the pedal arch  - ID following- Stop Linezolid d/t pancytopenia; C/w daptomycin 6mg/kg, baseline Cr 27, recheck on 1/23  - f/u up for DC recs  - Podiatry following;  Stable Per Podiatry Standpoint; Follow Up as an Outpatient w/   ; Justino  - Pain control    Adult failure to thrive in setting of Dementia  Metabolic encephalopathy  in setting of infectious process  - WCx/boneCx: Staph aureus  - c/w easy to chew diet; Liquid supplement  - c/w Vit C and Zinc for wound healing.  - continue Donepezil  - dietary consult  - Trial of Seroquel qhs for agitation    Acute blood loss anemia  No vinicio bleeding, likely due to multiple blood draws  Vitals stable  -Limit blood draws    Multiple skin abrasions  Small area redness coccyx region   Ecchymosis left side of mouth  - Wound Care RN consult    Hyperkalemia, resolved    Hypothyroid.   - continue Synthroid.    History of chronic CHF.   - continue Entresto, Metoprolol, on lasix     History of peripheral vascular disease.   - continue ASA/Plavix  - US Doppler: neg  - LE angiogram 1/10 - results above    - DVT Ppx: Holding due to anemia  - GI Ppx: not indicated  - Diet: easy chew 83 y/o F w/PMHx of dementia, HTN, HDL, HFrEF, mitral valve regurgitation hypothyroidism, TIA, cirrhosis, neuropathy,  gout, RA, cataract, diplopia, skin ca, left 2nd toe amputation 1/2023 presented for evaluation of "fever" as per pts full time aide    Metabolic encephalopathy secondary to Sepsis present on admission d/t UTI and Left 5th Digit Ulcer   - Podiatry following  - Pending surigcal debridement/amputation  - c/w zyvox  - start cefepime  - f/u Bcx and Ucx  - Check CT ab/p  - Trend lactate, elevated  - IV hydration    Dementia  - c/w donepezil    HTN  HFrEF  - c/w lasix, spironolactone   - c/w metoprolol and Entresto    Hyperkalemia  -lokelma x1    MVR  - c/w plavix    Hypothyroidism  - c/w synthroid    Gout  - c/w allopurinol       DNR/DNI      Based on revised Cardiac Risk Index, patient at moderate risk (Class 3) of adverse outcome with currently planned non cardiac surgery with 10.1% risk of myocardial infarction, pulmonary edema, ventricular fibrillation, cardiac arrest or CHF in post op period

## 2023-04-03 NOTE — PROGRESS NOTE ADULT - NUTRITIONAL ASSESSMENT
This patient has been assessed with a concern for Malnutrition and has been determined to have a diagnosis/diagnoses of Severe protein-calorie malnutrition and Underweight (BMI < 19).    This patient is being managed with:   Diet Easy to Chew-  DASH/TLC {Sodium & Cholesterol Restricted}  Supplement Feeding Modality:  Oral  Ensure Pudding Cans or Servings Per Day:  1       Frequency:  Daily  Ensure Plus High Protein Cans or Servings Per Day:  2       Frequency:  Two Times a day  Entered: Jan 18 2023  5:36PM

## 2023-04-03 NOTE — ASSESSMENT
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

## 2023-04-03 NOTE — HISTORY OF PRESENT ILLNESS
[Wheelchair] : a wheelchair [FreeTextEntry1] : CC: "Left 5th toe wound & pain"\par HPI:\par -82F presents to clinic for f/u left 5th toe pain and redness \par -Minimal improvement\par -VNS dressing wound daily w/betadine\par \par

## 2023-04-03 NOTE — PROGRESS NOTE ADULT - SUBJECTIVE AND OBJECTIVE BOX
PROGRESS NOTE:   Jose Mojica MD  Available on teams    Patient is a 82y old  Female who presents with a chief complaint of fever    SUBJECTIVE / OVERNIGHT EVENTS:  Seen for follow up for fever  Reports abdominal pain  Denies fever, chills, fatigue, chest pain, shortness of breath,, nausea/ vomiting or diarrhea  dautgher at bedside, updates given    ADDITIONAL REVIEW OF SYSTEMS:    MEDICATIONS  (STANDING):  allopurinol 100 milliGRAM(s) Oral two times a day  ascorbic acid 500 milliGRAM(s) Oral daily  aspirin  chewable 81 milliGRAM(s) Oral daily  calcium carbonate   1250 mG (OsCal) 1 Tablet(s) Oral daily  cefepime   IVPB 1000 milliGRAM(s) IV Intermittent once  cefepime   IVPB      clopidogrel Tablet 75 milliGRAM(s) Oral daily  donepezil 10 milliGRAM(s) Oral at bedtime  ferrous    sulfate 325 milliGRAM(s) Oral daily  folic acid 1 milliGRAM(s) Oral daily  furosemide    Tablet 40 milliGRAM(s) Oral daily  heparin   Injectable 5000 Unit(s) SubCutaneous every 8 hours  levothyroxine 50 MICROGram(s) Oral daily  linezolid  IVPB      linezolid  IVPB 600 milliGRAM(s) IV Intermittent every 12 hours  metoprolol succinate ER 50 milliGRAM(s) Oral every 12 hours  oxybutynin 10 milliGRAM(s) Oral daily  pantoprazole    Tablet 40 milliGRAM(s) Oral before breakfast  sacubitril 24 mG/valsartan 26 mG 1 Tablet(s) Oral two times a day  sodium chloride 0.9%. 1000 milliLiter(s) (100 mL/Hr) IV Continuous <Continuous>  spironolactone 25 milliGRAM(s) Oral <User Schedule>  zinc sulfate 220 milliGRAM(s) Oral daily    MEDICATIONS  (PRN):  acetaminophen     Tablet .. 650 milliGRAM(s) Oral every 6 hours PRN Temp greater or equal to 38C (100.4F), Mild Pain (1 - 3)  aluminum hydroxide/magnesium hydroxide/simethicone Suspension 30 milliLiter(s) Oral every 4 hours PRN Dyspepsia  ondansetron Injectable 4 milliGRAM(s) IV Push every 8 hours PRN Nausea and/or Vomiting      CAPILLARY BLOOD GLUCOSE        I&O's Summary      PHYSICAL EXAM:  Vital Signs Last 24 Hrs  T(C): 35.9 (2023 13:10), Max: 36.9 (2023 18:56)  T(F): 96.7 (2023 13:10), Max: 98.4 (2023 18:56)  HR: 74 (2023 13:10) (59 - 74)  BP: 145/67 (2023 13:10) (107/50 - 152/65)  BP(mean): --  RR: 18 (2023 13:10) (18 - 20)  SpO2: 95% (2023 13:10) (86% - 100%)    Parameters below as of 2023 20:08  Patient On (Oxygen Delivery Method): nasal cannula  O2 Flow (L/min): 4      GENERAL: No acute distress, well-developed, frail  HEAD:  Atraumatic, Normocephalic  EYES: EOMI, PERRLA, conjunctiva and sclera clear  NECK: Supple, no lymphadenopathy, no JVD  CHEST/LUNG: CTAB; No wheezes, rales, or rhonchi  HEART: Regular rate and rhythm; No murmurs, rubs, or gallops  ABDOMEN: Soft, non-tender, non-distended; normal bowel sounds, no organomegaly  EXTREMITIES:  2+ peripheral pulses b/l, No clubbing, cyanosis, or edema  NEUROLOGY: A&O x 3, no focal deficits  SKIN: No rashes or lesions    LABS:                        9.4    18.45 )-----------( 171      ( 2023 06:13 )             31.6     04-03    141  |  101  |  51<H>  ----------------------------<  103<H>  5.4<H>   |  25  |  1.3    Ca    9.0      2023 06:13    TPro  6.9  /  Alb  2.8<L>  /  TBili  0.5  /  DBili  x   /  AST  39  /  ALT  12  /  AlkPhos  184<H>  04-03    PT/INR - ( 2023 17:38 )   PT: 12.20 sec;   INR: 1.07 ratio         PTT - ( 2023 17:38 )  PTT:33.1 sec  CARDIAC MARKERS ( 2023 17:38 )  x     / <0.01 ng/mL / x     / x     / x          Urinalysis Basic - ( 2023 11:05 )    Color: Brown / Appearance: Turbid / S.020 / pH: x  Gluc: x / Ketone: 15  / Bili: Large / Urobili: 2.0 mg/dL   Blood: x / Protein: >=300 mg/dL / Nitrite: Negative   Leuk Esterase: Large / RBC: 26-50 /HPF / WBC 10-25 /HPF   Sq Epi: x / Non Sq Epi: Occasional /HPF / Bacteria: Many          RADIOLOGY & ADDITIONAL TESTS:  Results Reviewed:   Imaging Personally Reviewed:  Electrocardiogram Personally Reviewed:    COORDINATION OF CARE:  Care Discussed with Consultants/Other Providers [Y/N]:  Prior or Outpatient Records Reviewed [Y/N]:   PROGRESS NOTE:   Jose Mojica MD  Available on teams    Patient is a 82y old  Female who presents with a chief complaint of fever    SUBJECTIVE / OVERNIGHT EVENTS:  Seen for follow up for fever  Reports abdominal pain  Denies fever, chills, fatigue, chest pain, shortness of breath,, nausea/ vomiting or diarrhea  dautgher at bedside, updates given    ADDITIONAL REVIEW OF SYSTEMS:    MEDICATIONS  (STANDING):  allopurinol 100 milliGRAM(s) Oral two times a day  ascorbic acid 500 milliGRAM(s) Oral daily  aspirin  chewable 81 milliGRAM(s) Oral daily  calcium carbonate   1250 mG (OsCal) 1 Tablet(s) Oral daily  cefepime   IVPB 1000 milliGRAM(s) IV Intermittent once  cefepime   IVPB      clopidogrel Tablet 75 milliGRAM(s) Oral daily  donepezil 10 milliGRAM(s) Oral at bedtime  ferrous    sulfate 325 milliGRAM(s) Oral daily  folic acid 1 milliGRAM(s) Oral daily  furosemide    Tablet 40 milliGRAM(s) Oral daily  heparin   Injectable 5000 Unit(s) SubCutaneous every 8 hours  levothyroxine 50 MICROGram(s) Oral daily  linezolid  IVPB      linezolid  IVPB 600 milliGRAM(s) IV Intermittent every 12 hours  metoprolol succinate ER 50 milliGRAM(s) Oral every 12 hours  oxybutynin 10 milliGRAM(s) Oral daily  pantoprazole    Tablet 40 milliGRAM(s) Oral before breakfast  sacubitril 24 mG/valsartan 26 mG 1 Tablet(s) Oral two times a day  sodium chloride 0.9%. 1000 milliLiter(s) (100 mL/Hr) IV Continuous <Continuous>  spironolactone 25 milliGRAM(s) Oral <User Schedule>  zinc sulfate 220 milliGRAM(s) Oral daily    MEDICATIONS  (PRN):  acetaminophen     Tablet .. 650 milliGRAM(s) Oral every 6 hours PRN Temp greater or equal to 38C (100.4F), Mild Pain (1 - 3)  aluminum hydroxide/magnesium hydroxide/simethicone Suspension 30 milliLiter(s) Oral every 4 hours PRN Dyspepsia  ondansetron Injectable 4 milliGRAM(s) IV Push every 8 hours PRN Nausea and/or Vomiting      CAPILLARY BLOOD GLUCOSE        I&O's Summary      PHYSICAL EXAM:  Vital Signs Last 24 Hrs  T(C): 35.9 (2023 13:10), Max: 36.9 (2023 18:56)  T(F): 96.7 (2023 13:10), Max: 98.4 (2023 18:56)  HR: 74 (2023 13:10) (59 - 74)  BP: 145/67 (2023 13:10) (107/50 - 152/65)  BP(mean): --  RR: 18 (2023 13:10) (18 - 20)  SpO2: 95% (2023 13:10) (86% - 100%)    Parameters below as of 2023 20:08  Patient On (Oxygen Delivery Method): nasal cannula  O2 Flow (L/min): 4      GENERAL: No acute distress, well-developed, frail  HEAD:  Atraumatic, Normocephalic  EYES: EOMI, PERRLA, conjunctiva and sclera clear  NECK: Supple, no lymphadenopathy, no JVD  CHEST/LUNG: CTAB; No wheezes, rales, or rhonchi  HEART: Regular rate and rhythm; No murmurs, rubs, or gallops  ABDOMEN: Soft, non-tender, non-distended; normal bowel sounds, no organomegaly  EXTREMITIES:  Left toe ulcer  NEUROLOGY: A&O x 3, no focal deficits  SKIN: No rashes or lesions    LABS:                        9.4    18.45 )-----------( 171      ( 2023 06:13 )             31.6     04-03    141  |  101  |  51<H>  ----------------------------<  103<H>  5.4<H>   |  25  |  1.3    Ca    9.0      2023 06:13    TPro  6.9  /  Alb  2.8<L>  /  TBili  0.5  /  DBili  x   /  AST  39  /  ALT  12  /  AlkPhos  184<H>  04-03    PT/INR - ( 2023 17:38 )   PT: 12.20 sec;   INR: 1.07 ratio         PTT - ( 2023 17:38 )  PTT:33.1 sec  CARDIAC MARKERS ( 2023 17:38 )  x     / <0.01 ng/mL / x     / x     / x          Urinalysis Basic - ( 2023 11:05 )    Color: Brown / Appearance: Turbid / S.020 / pH: x  Gluc: x / Ketone: 15  / Bili: Large / Urobili: 2.0 mg/dL   Blood: x / Protein: >=300 mg/dL / Nitrite: Negative   Leuk Esterase: Large / RBC: 26-50 /HPF / WBC 10-25 /HPF   Sq Epi: x / Non Sq Epi: Occasional /HPF / Bacteria: Many          RADIOLOGY & ADDITIONAL TESTS:  Results Reviewed:   Imaging Personally Reviewed:  Electrocardiogram Personally Reviewed:    COORDINATION OF CARE:  Care Discussed with Consultants/Other Providers [Y/N]:  Prior or Outpatient Records Reviewed [Y/N]:

## 2023-04-03 NOTE — CONSULT NOTE ADULT - SUBJECTIVE AND OBJECTIVE BOX
Patient is a 82y old  Female who presents with a chief complaint of         REVIEW OF SYSTEMS: Total of twelve systems have been reviewed and found to be negative unless mentioned in HPI        PAST MEDICAL & SURGICAL HISTORY:  Hypothyroid  Hypertension  RA (rheumatoid arthritis)  High cholesterol  Poor circulation  PAD  Incontinence  Dementia  Rheumatoid arthritis  Osteopenia  Melanoma of skin  Depression with anxiety  Bilateral cataracts  Peripheral arterial disease  (s/p B/L leg stents & on Plavix)  History of cirrhosis of liver  History of chronic CHF  History of hip surgery  Left ORIF and Left ORIF femur  H/O: hysterectomy        SOCIAL HISTORY  Alcohol: Does not drink  Tobacco: Does not smoke  Illicit substance use: None      FAMILY HISTORY: Non contributory to the present illness        ALLERGIES: Keflex (Unknown)  losartan (Unknown)  morphine (Unknown)  Rocephin (Unknown)  sulfamethoxazole (Hives)        Vital Signs Last 24 Hrs  T(C): 35.9 (2023 13:10), Max: 36.9 (2023 18:56)  T(F): 96.7 (2023 13:10), Max: 98.4 (2023 18:56)  HR: 74 (2023 13:10) (59 - 74)  BP: 145/67 (2023 13:10) (107/50 - 152/65)  BP(mean): --  RR: 18 (2023 13:10) (18 - 20)  SpO2: 95% (2023 13:10) (86% - 100%)    Parameters below as of 2023 20:08  Patient On (Oxygen Delivery Method): nasal cannula  O2 Flow (L/min): 4        PHYSICAL EXAM:  GENERAL: Not in distress   CHEST/LUNG:  Aire ntry bilaterally  HEART: s1 and s2 present  ABDOMEN:  Nontender and  Nondistended  EXTREMITIES: No pedal  edema  CNS: Awake and Alert      LABS:                        9.4    18.45 )-----------( 171      ( 2023 06:13 )             31.6       04-03    141  |  101  |  51<H>  ----------------------------<  103<H>  5.4<H>   |  25  |  1.3    Ca    9.0      2023 06:13    TPro  6.9  /  Alb  2.8<L>  /  TBili  0.5  /  DBili  x   /  AST  39  /  ALT  12  /  AlkPhos  184<H>  04-03    PT/INR - ( 2023 17:38 )   PT: 12.20 sec;   INR: 1.07 ratio      PTT - ( 2023 17:38 )  PTT:33.1 sec      Urinalysis Basic - ( 2023 11:05 )  Color: Brown / Appearance: Turbid / S.020 / pH: x  Gluc: x / Ketone: 15  / Bili: Large / Urobili: 2.0 mg/dL   Blood: x / Protein: >=300 mg/dL / Nitrite: Negative   Leuk Esterase: Large / RBC: 26-50 /HPF / WBC 10-25 /HPF   Sq Epi: x / Non Sq Epi: Occasional /HPF / Bacteria: Many        MEDICATIONS  (STANDING):  allopurinol 100 milliGRAM(s) Oral two times a day  ascorbic acid 500 milliGRAM(s) Oral daily  aspirin  chewable 81 milliGRAM(s) Oral daily  calcium carbonate   1250 mG (OsCal) 1 Tablet(s) Oral daily  clopidogrel Tablet 75 milliGRAM(s) Oral daily  donepezil 10 milliGRAM(s) Oral at bedtime  ferrous    sulfate 325 milliGRAM(s) Oral daily  folic acid 1 milliGRAM(s) Oral daily  furosemide    Tablet 40 milliGRAM(s) Oral daily  heparin   Injectable 5000 Unit(s) SubCutaneous every 8 hours  levothyroxine 50 MICROGram(s) Oral daily  linezolid  IVPB      linezolid  IVPB 600 milliGRAM(s) IV Intermittent every 12 hours  metoprolol succinate ER 50 milliGRAM(s) Oral every 12 hours  oxybutynin 10 milliGRAM(s) Oral daily  pantoprazole    Tablet 40 milliGRAM(s) Oral before breakfast  sacubitril 24 mG/valsartan 26 mG 1 Tablet(s) Oral two times a day  sodium chloride 0.9%. 1000 milliLiter(s) (100 mL/Hr) IV Continuous <Continuous>  spironolactone 25 milliGRAM(s) Oral <User Schedule>  zinc sulfate 220 milliGRAM(s) Oral daily    MEDICATIONS  (PRN):  acetaminophen     Tablet .. 650 milliGRAM(s) Oral every 6 hours PRN Temp greater or equal to 38C (100.4F), Mild Pain (1 - 3)  aluminum hydroxide/magnesium hydroxide/simethicone Suspension 30 milliLiter(s) Oral every 4 hours PRN Dyspepsia  ondansetron Injectable 4 milliGRAM(s) IV Push every 8 hours PRN Nausea and/or Vomiting          RADIOLOGY & ADDITIONAL TESTS:               Patient is a 82y old  Female w/PMHx of dementia, HTN, HDL, HFrEF, mitral valve regurgitation hypothyroidism, TIA, cirrhosis, neuropathy,  gout, dementia, RA, cataract diplopia, skin ca, left 2nd toe amputation 2023 , presents to the ER for evaluation of "fever, generalized weakness and worsening mental status, as per pts full time aide and patient's daughter. On admission, she found to have no fever, but hypoxia requiring 4 liter oxygen via NC. The CXR shows no infiltrate but Xray of left foot shows  a partial amputation of the second digit. There is adjacent air. Questionable residual proximal second phalanx with some indistinctness, which could reflect osteomyelitis. She has started on Clindamycin and Levaquin. and the ID consult requested to assist with further evaluation and antibiotic management.        REVIEW OF SYSTEMS: Unable to obtain due to mental status unless mentioned in HPI        PAST MEDICAL & SURGICAL HISTORY:  Hypothyroid  Hypertension  RA (rheumatoid arthritis)  High cholesterol  Poor circulation, PAD  Incontinence  Dementia  Rheumatoid arthritis  Osteopenia  Melanoma of skin  Depression with anxiety  Bilateral cataracts  (s/p B/L leg stents & on Plavix)  History of cirrhosis of liver  History of chronic CHF  History of hip surgery  Left ORIF and Left ORIF femur  H/O: hysterectomy        SOCIAL HISTORY  Alcohol: Does not drink  Tobacco: Does not smoke  Illicit substance use: None      FAMILY HISTORY: Non contributory to the present illness        ALLERGIES: Keflex (Unknown), losartan (Unknown), morphine (Unknown)  Rocephin (Unknown), sulfamethoxazole (Hives)        Vital Signs Last 24 Hrs  T(C): 35.9 (2023 13:10), Max: 36.9 (2023 18:56)  T(F): 96.7 (2023 13:10), Max: 98.4 (2023 18:56)  HR: 74 (2023 13:10) (59 - 74)  BP: 145/67 (2023 13:10) (107/50 - 152/65)  BP(mean): --  RR: 18 (2023 13:10) (18 - 20)  SpO2: 95% (2023 13:10) (86% - 100%)    Parameters below as of 2023 20:08  Patient On (Oxygen Delivery Method): nasal cannula  O2 Flow (L/min): 4        PHYSICAL EXAM:  GENERAL: Not in distress   CHEST/LUNG:  Not using accessory muscles   HEART: s1 and s2 present  ABDOMEN:  Nontender and  Nondistended  EXTREMITIES: left foot bandage in placed  CNS: Awake and Alert      LABS:                        9.4    18.45 )-----------( 171      ( 2023 06:13 )             31.6       04-    141  |  101  |  51<H>  ----------------------------<  103<H>  5.4<H>   |  25  |  1.3    Ca    9.0      2023 06:13    TPro  6.9  /  Alb  2.8<L>  /  TBili  0.5  /  DBili  x   /  AST  39  /  ALT  12  /  AlkPhos  184<H>  04-03    PT/INR - ( 2023 17:38 )   PT: 12.20 sec;   INR: 1.07 ratio      PTT - ( 2023 17:38 )  PTT:33.1 sec      Urinalysis Basic - ( 2023 11:05 )  Color: Brown / Appearance: Turbid / S.020 / pH: x  Gluc: x / Ketone: 15  / Bili: Large / Urobili: 2.0 mg/dL   Blood: x / Protein: >=300 mg/dL / Nitrite: Negative   Leuk Esterase: Large / RBC: 26-50 /HPF / WBC 10-25 /HPF   Sq Epi: x / Non Sq Epi: Occasional /HPF / Bacteria: Many        MEDICATIONS  (STANDING):  allopurinol 100 milliGRAM(s) Oral two times a day  ascorbic acid 500 milliGRAM(s) Oral daily  aspirin  chewable 81 milliGRAM(s) Oral daily  calcium carbonate   1250 mG (OsCal) 1 Tablet(s) Oral daily  clopidogrel Tablet 75 milliGRAM(s) Oral daily  donepezil 10 milliGRAM(s) Oral at bedtime  ferrous    sulfate 325 milliGRAM(s) Oral daily  folic acid 1 milliGRAM(s) Oral daily  furosemide    Tablet 40 milliGRAM(s) Oral daily  heparin   Injectable 5000 Unit(s) SubCutaneous every 8 hours  levothyroxine 50 MICROGram(s) Oral daily  linezolid  IVPB      linezolid  IVPB 600 milliGRAM(s) IV Intermittent every 12 hours  metoprolol succinate ER 50 milliGRAM(s) Oral every 12 hours  oxybutynin 10 milliGRAM(s) Oral daily  pantoprazole    Tablet 40 milliGRAM(s) Oral before breakfast  sacubitril 24 mG/valsartan 26 mG 1 Tablet(s) Oral two times a day  sodium chloride 0.9%. 1000 milliLiter(s) (100 mL/Hr) IV Continuous <Continuous>  spironolactone 25 milliGRAM(s) Oral <User Schedule>  zinc sulfate 220 milliGRAM(s) Oral daily    MEDICATIONS  (PRN):  acetaminophen     Tablet .. 650 milliGRAM(s) Oral every 6 hours PRN Temp greater or equal to 38C (100.4F), Mild Pain (1 - 3)  aluminum hydroxide/magnesium hydroxide/simethicone Suspension 30 milliLiter(s) Oral every 4 hours PRN Dyspepsia  ondansetron Injectable 4 milliGRAM(s) IV Push every 8 hours PRN Nausea and/or Vomiting        RADIOLOGY & ADDITIONAL TESTS:    23 : Xray Foot AP + Lateral + Oblique, Left (23 @ 20:47) Study limited by portable technique and severe osteopenia. Hammertoes   further limit evaluation. Again demonstrated is a partial amputation of the second digit. There is adjacent air. Questionable residual proximal   second phalanx with some indistinctness, which could reflect osteomyelitis.    According to history, there is ulceration of the distal interphalangeal joint of the fifth digit. This area not well evaluated due to significant hammertoes.    Erosive change of the first distal metatarsal. Calcaneal spurring.      23: Xray Chest 1 View- PORTABLE-Urgent (23 @ 18:31) No radiographic evidence of acute cardiopulmonary disease.        MICROBIOLOGY DATA;    COVID-19 PCR . (23 @ 17:39)   COVID-19 PCR: NotDetec:

## 2023-04-03 NOTE — END OF VISIT
[] : Resident [FreeTextEntry3] : worsening left 5th toe ulcer with exposed bone, extruding from wound. distal lateral necrotic tissue remain superficial. Toe is swollen. poor soft tissue envelope. poor prognosis for salvage of toe. would need amputation.\par rx augmentin\par referred to ED

## 2023-04-04 NOTE — BRIEF OPERATIVE NOTE - NSICDXBRIEFPROCEDURE_GEN_ALL_CORE_FT
PROCEDURES:  Complete amputation of first toe of left foot by open approach 04-Apr-2023 16:05:21  Adria Miller

## 2023-04-04 NOTE — CONSULT NOTE ADULT - SUBJECTIVE AND OBJECTIVE BOX
81 y/o F w/PMHx of dementia, HTN, HDL, HFrEF, mitral valve regurgitation hypothyroidism, TIA, cirrhosis, neuropathy,  gout, RA, cataract, diplopia, skin ca, left 2nd toe amputation 1/2023 presented for evaluation of "fever" as per pts full time aide. Admitted to the hospital for Metabolic encephalopathy secondary to Sepsis present on admission d/t UTI and Left 5th Digit Ulcer.     MEDICATIONS  (STANDING):  allopurinol 100 milliGRAM(s) Oral two times a day  ascorbic acid 500 milliGRAM(s) Oral daily  aspirin  chewable 81 milliGRAM(s) Oral daily  calcium carbonate   1250 mG (OsCal) 1 Tablet(s) Oral daily  clopidogrel Tablet 75 milliGRAM(s) Oral daily  donepezil 10 milliGRAM(s) Oral at bedtime  ferrous    sulfate 325 milliGRAM(s) Oral daily  folic acid 1 milliGRAM(s) Oral daily  furosemide    Tablet 40 milliGRAM(s) Oral daily  heparin   Injectable 5000 Unit(s) SubCutaneous every 8 hours  levothyroxine 50 MICROGram(s) Oral daily  linezolid  IVPB      linezolid  IVPB 600 milliGRAM(s) IV Intermittent every 12 hours  metoprolol succinate ER 50 milliGRAM(s) Oral every 12 hours  oxybutynin 10 milliGRAM(s) Oral daily  pantoprazole    Tablet 40 milliGRAM(s) Oral before breakfast  piperacillin/tazobactam IVPB.. 3.375 Gram(s) IV Intermittent every 8 hours  sacubitril 24 mG/valsartan 26 mG 1 Tablet(s) Oral two times a day  sodium chloride 0.9%. 1000 milliLiter(s) (100 mL/Hr) IV Continuous <Continuous>  spironolactone 25 milliGRAM(s) Oral <User Schedule>  zinc sulfate 220 milliGRAM(s) Oral daily    MEDICATIONS  (PRN):  acetaminophen     Tablet .. 650 milliGRAM(s) Oral every 6 hours PRN Temp greater or equal to 38C (100.4F), Mild Pain (1 - 3)  aluminum hydroxide/magnesium hydroxide/simethicone Suspension 30 milliLiter(s) Oral every 4 hours PRN Dyspepsia  ondansetron Injectable 4 milliGRAM(s) IV Push every 8 hours PRN Nausea and/or Vomiting  oxyCODONE    IR 5 milliGRAM(s) Oral three times a day PRN Moderate Pain (4 - 6)      The goal of this consult is to provide insights to improve medication selection, educate physicians and patients, avoid adverse effects, and help evaluate care quality and medication use trends for older adults. The following are recommendations to consider but ultimately up to the primary team to make final decision based on clinical assessment of the patient.     Recommendations     - Recommend discontinuation of allopurinol given recent decline of GFR. On discharge recommend restarting at a lower dose with measurement of uric acid levels prior to restarting.   - Recommend discontinuation of oral vitamin supplementation- Vit C , Zinc, Calcium. Instead optimization of diet intake with high calorie Ensure.   - Upon review of outpatient records- patients cardiologist decreased patient furosemide 40 mg to  furosemide 20 mg.   - Given patients functional status, and currently on 2 diuretics, discontinuation of oxybutynin 10 mg is recommended.   - On patient outpatient medication reconciliation, it is states patient is on pantoprazole 40 mg BID. It is recommended that patient on discharge should be on pantoprazole once a day. discontinuation of medication may not be advised given the fact patient is on DAPT with history of Varices.   - Further examination of cause of patient dementia is warranted. As per history and CT imaging, the likely cause of patient dementia is from vascular etiology. If this is the case , then Donepezil is not beneficial with vascular dementia and should therefore be discontinued.

## 2023-04-04 NOTE — PROGRESS NOTE ADULT - SUBJECTIVE AND OBJECTIVE BOX
HIRA, ARIEL  82y, Female  Allergy: Keflex (Unknown)  losartan (Unknown)  morphine (Unknown)  Rocephin (Unknown)  sulfamethoxazole (Hives)      LOS  2d    CHIEF COMPLAINT: Weakness with poor PO intake (2023 12:59)      INTERVAL EVENTS/HPI  - No acute events overnight  - T(F): , Max: 97.5 (23 @ 05:20)  - Denies any worsening symptoms  - Tolerating medication  - WBC Count: 11.72 (23 @ 06:24)  WBC Count: 18.45 (23 @ 06:13)     - Creatinine, Serum: 1.2 (23 @ 06:24)  Creatinine, Serum: 1.3 (23 @ 06:13)       ROS  General: Denies rigors, nightsweats  HEENT: Denies headache, rhinorrhea, sore throat, eye pain  CV: Denies CP, palpitations  PULM: Denies wheezing, hemoptysis  GI: Denies hematemesis, hematochezia, melena  : Denies discharge, hematuria  MSK: Denies arthralgias, myalgias  SKIN: Denies rash, lesions  NEURO: Denies paresthesias, weakness  PSYCH: Denies depression, anxiety    VITALS:  T(F): 97.5, Max: 97.5 (23 @ 05:20)  HR: 63  BP: 113/51  RR: 16Vital Signs Last 24 Hrs  T(C): 36.4 (2023 05:20), Max: 36.4 (2023 05:20)  T(F): 97.5 (2023 05:20), Max: 97.5 (2023 05:20)  HR: 63 (2023 05:20) (63 - 67)  BP: 113/51 (2023 05:20) (113/51 - 154/63)  BP(mean): --  RR: 16 (2023 05:20) (16 - 18)  SpO2: 100% (2023 05:20) (91% - 100%)        PHYSICAL EXAM:  Gen: NAD, resting in bed  HEENT: Normocephalic, atraumatic  Neck: supple, no lymphadenopathy  CV: Regular rate & regular rhythm  Lungs: decreased BS at bases, no fremitus  Abdomen: Soft, BS present  Ext: Warm, well perfused  Neuro: non focal, awake  Skin: 5th toe with dried blood, ulcer with bone exposed  Lines: no phlebitis    FH: Non-contributory  Social Hx: Non-contributory    TESTS & MEASUREMENTS:                        7.8    11.72 )-----------( 127      ( 2023 06:24 )             25.4     04-    140  |  102  |  45<H>  ----------------------------<  91  4.4   |  29  |  1.2    Ca    8.6      2023 06:24    TPro  6.9  /  Alb  2.8<L>  /  TBili  0.5  /  DBili  x   /  AST  39  /  ALT  12  /  AlkPhos  184<H>  04-      LIVER FUNCTIONS - ( 2023 06:13 )  Alb: 2.8 g/dL / Pro: 6.9 g/dL / ALK PHOS: 184 U/L / ALT: 12 U/L / AST: 39 U/L / GGT: x           Urinalysis Basic - ( 2023 11:05 )    Color: Brown / Appearance: Turbid / S.020 / pH: x  Gluc: x / Ketone: 15  / Bili: Large / Urobili: 2.0 mg/dL   Blood: x / Protein: >=300 mg/dL / Nitrite: Negative   Leuk Esterase: Large / RBC: 26-50 /HPF / WBC 10-25 /HPF   Sq Epi: x / Non Sq Epi: Occasional /HPF / Bacteria: Many        Culture - Abscess with Gram Stain (collected 23 @ 19:10)  Source: .Abscess Left 5th toe wound  Preliminary Report (23 @ 18:54):    Moderate Staphylococcus aureus    Culture - Blood (collected 23 @ 17:37)  Source: .Blood Blood-Peripheral  Preliminary Report (23 @ 01:03):    No growth to date.    Culture - Blood (collected 23 @ 17:36)  Source: .Blood Blood-Peripheral  Preliminary Report (23 @ 01:03):    No growth to date.        Lactate, Blood: 1.7 mmol/L (23 @ 06:24)  Lactate, Blood: 2.9 mmol/L (23 @ 22:30)  Lactate, Blood: 3.3 mmol/L (23 @ 16:05)  Lactate, Blood: 4.0 mmol/L (23 @ 10:20)  Lactate, Blood: 2.2 mmol/L (23 @ 19:46)  Blood Gas Venous - Lactate: 5.00 mmol/L (23 @ 19:07)      INFECTIOUS DISEASES TESTING  COVID-19 PCR: NotDetec (23 @ 17:39)  COVID-19 PCR: NotDetec (23 @ 13:40)  COVID-19 PCR: NotDetec (23 @ 09:00)  COVID-19 PCR: NotDetec (23 @ 16:40)  COVID-19 PCR: NotDetec (23 @ 11:08)  COVID-19 PCR: NotDetec (23 @ 15:30)  COVID-19 PCR: NotDetec (22 @ 14:50)  COVID-19 PCR: NotDetec (22 @ 01:05)  COVID-19 PCR: NotDetec (22 @ 11:30)      INFLAMMATORY MARKERS  C-Reactive Protein, Serum: 163.5 mg/L (23 @ 14:06)  Sedimentation Rate, Erythrocyte: 140 mm/Hr (23 @ 07:44)  C-Reactive Protein, Serum: 90 mg/L (23 @ 07:44)  Sedimentation Rate, Erythrocyte: 130 mm/Hr (23 @ 07:37)      RADIOLOGY & ADDITIONAL TESTS:  I have personally reviewed the last available Chest xray  CXR  Xray Chest 1 View- PORTABLE-Urgent:   ACC: [id number] EXAM:  XR CHEST PORTABLE URGENT 1V   ORDERED BY: LORI AYERS     PROCEDURE DATE:  2023          INTERPRETATION:  Clinical History / Reason for exam: Sepsis    Comparison : Chest radiograph 2022.    Technique/Positioning: AP chest.    Findings:    Support devices: None.    Cardiac/mediastinum/hilum: Change    Lung parenchyma/Pleura: Within normal limits.    Skeleton/soft tissues: Degenerative changes    Impression:    No radiographic evidence of acute cardiopulmonary disease.        --- End of Report ---            POLI HARDY MD; Attending Radiologist  This document has been electronically signed. Apr  3 2023  9:56AM (23 @ 18:31)      CT  CT Abdomen and Pelvis w/ IV Cont:   ACC: [id number] EXAM:  CT ABDOMEN AND PELVIS IC   ORDERED BY: STEVO CAMPBELL     PROCEDURE DATE:  2023          INTERPRETATION:  CLINICAL STATEMENT: Abscess.    TECHNIQUE: Contiguous axial CT images were obtained from the lower chest   to the pubic symphysis following administration of 100cc Optiray 320   intravenous contrast.  Oral contrast was not administered.  Reformatted   images in the coronal and sagittal planes were acquired.    COMPARISON CT: 2022.    FINDINGS:    LOWER CHEST: Mild bibasilar bronchiolectasis and bronchial wall   thickening, with subsegmental atelectasis, right greater than left,   suggestive of underlying small airways disease. Interval resolution of   previously seen bilateral pleural effusions.    HEPATOBILIARY: Unchanged nodular liver contour, compatible with   cirrhosis. Cholelithiasis with unchanged mild gallbladder wall thickening.    SPLEEN: Multiple new wedge-shaped areas of hypoenhancement within the   spleen, suspicious for splenic infarcts. Multiple additional   subcentimeter splenic hypodensities are overall unchanged and remain   indeterminate, possibly siderotic nodules. 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    ADRENAL GLANDS: Unremarkable.    KIDNEYS: Symmetric renal enhancement. No hydronephrosis. Few   subcentimeter bilateral renal hypodensities are too small to further   characterize.    ABDOMINOPELVIC NODES: Multiple subcentimeter and mildly enlarged   retroperitoneal lymph nodes are overall unchanged.    PELVIC ORGANS: Status post hysterectomy. Urinary bladder is decompressed,   limiting further evaluation.    PERITONEUM/MESENTERY/BOWEL: No focal drainable fluid collection. New   moderate mural thickening involving the sigmoid, descending and   transverse colon, with moderate pericolonic fat stranding, suspicious for   acute colitis. No evidence of bowel obstruction. Decreased small volume   abdominopelvic ascites. No freeintraperitoneal air.    BONES/SOFT TISSUES: Status post left femoral fixation. Degenerative   changes of the spine.    OTHER: Atherosclerotic vascular calcification.      IMPRESSION:    1. Since 2022, new moderate mural thickening involving the   sigmoid, descending and transverse colon, with moderate pericolonic fat   stranding, suspicious for acute colitis; no focal drainable fluid   collection. Correlation with direct visualization is suggested when acute   episode resolves to evaluate for underlying neoplastic process.    2. Multiple new wedge-shaped areas of hypoenhancement within the spleen,   suspicious for splenic infarcts.    3. Nodular cirrhotic liver with decreased small volume abdominopelvic   ascites.    4. Unchanged cholelithiasis with unchanged mild gallbladder wall   thickening.    5. Interval resolution of previously seen bilateral pleural effusions.    --- End of Report ---            DERRICK CHAPA MD; Attending Radiologist  This document has been electronically signed. 2023  8:09AM (23 @ 17:55)      CARDIOLOGY TESTING  12 Lead ECG:   Ventricular Rate 58 BPM    Atrial Rate 58 BPM    P-R Interval 104 ms    QRS Duration 98 ms    Q-T Interval 532 ms    QTC Calculation(Bazett) 522 ms    P Axis 83 degrees    R Axis 24 degrees    T Axis 73 degrees    Diagnosis Line Sinus bradycardia with short NE  Nonspecific ST and T wave abnormality  Abnormal ECG    Confirmed by SHILPI CERVANTES MD ([id number]) on 2023 9:33:34 AM (23 @ 09:00)  12 Lead ECG:   Ventricular Rate 60 BPM    Atrial Rate 60 BPM    QRS Duration 96 ms    Q-T Interval 428 ms    QTC Calculation(Bazett) 428 ms    R Axis 25 degrees    T Axis 247 degrees    Diagnosis Line Junctional rhythm  Nonspecific ST-T changes  Confirmed by SHILPI CERVANTES MD ([id number]) on 2023 9:33:25 AM (23 @ 08:59)      MEDICATIONS  allopurinol 100 Oral two times a day  ascorbic acid 500 Oral daily  aspirin  chewable 81 Oral daily  calcium carbonate   1250 mG (OsCal) 1 Oral daily  clopidogrel Tablet 75 Oral daily  donepezil 10 Oral at bedtime  ferrous    sulfate 325 Oral daily  folic acid 1 Oral daily  furosemide    Tablet 40 Oral daily  heparin   Injectable 5000 SubCutaneous every 8 hours  levothyroxine 50 Oral daily  linezolid  IVPB     linezolid  IVPB 600 IV Intermittent every 12 hours  metoprolol succinate ER 50 Oral every 12 hours  oxybutynin 10 Oral daily  pantoprazole    Tablet 40 Oral before breakfast  piperacillin/tazobactam IVPB.. 3.375 IV Intermittent every 8 hours  sacubitril 24 mG/valsartan 26 mG 1 Oral two times a day  sodium chloride 0.9%. 1000 IV Continuous <Continuous>  spironolactone 25 Oral <User Schedule>  zinc sulfate 220 Oral daily      WEIGHT  Weight (kg): 38.6 (23 @ 11:27)  Creatinine, Serum: 1.2 mg/dL (23 @ 06:24)      ANTIBIOTICS:  linezolid  IVPB      linezolid  IVPB 600 milliGRAM(s) IV Intermittent every 12 hours  piperacillin/tazobactam IVPB.. 3.375 Gram(s) IV Intermittent every 8 hours      All available historical records have been reviewed

## 2023-04-04 NOTE — PROGRESS NOTE ADULT - ASSESSMENT
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 On admission, she found to have no fever, but hypoxia requiring 4 liter oxygen via NC. The CXR shows no infiltrate but Xray of left foot shows  a partial amputation of the second digit. There is adjacent air. Questionable residual proximal second phalanx with some indistinctness, which could reflect osteomyelitis. She has started on Clindamycin and Levaquin. and the ID consult requested to assist with further evaluation and antibiotic management.    # Left DFU with air and OM of partially amputated second toe- wound flushed and packed at the bed side by Podiatry on 4/2  -  Xray Foot AP + Lateral + Oblique, Left (04.02.23 @ 20:47): 3 views of the left foot were obtained. Study limited by portable technique and severe osteopenia. Hammertoes  further limit evaluation. Again demonstrated is a partial amputation of  the second digit. There is adjacent air. Questionable residual proximal  second phalanx with some indistinctness, which could reflect  osteomyelitis. According to history, there is ulceration of the distal interphalangeal joint of the fifth digit. This area not well evaluated due to significant  hammertoes. Erosive change of the first distal metatarsal. Calcaneal spurring.  - wound Cx Staph aureus     # Leukocytosis    #Splenic infarct  - CT Abdomen and Pelvis w/ IV Cont (04.03.23 @ 17:55): Multiple new wedge-shaped areas of hypoenhancement within the spleen,   suspicious for splenic infarcts.    #Colitis?  CT Abd/pelvis 4/3: 1. Since September 14, 2022, new moderate mural thickening involving the  sigmoid, descending and transverse colon, with moderate pericolonic fat  stranding, suspicious for acute colitis; no focal drainable fluid  collection. Correlation with direct visualization is suggested when acute   episode resolves to evaluate for underlying neoplastic process.    # Allergies:  Cephalosporins ( Keflex, Rocephine ) and Bactrim    Recommendations  - wound cx growing Staph aureus -- follow-up susceptibilities   - planned for 5th toe amputation as bone exposed   - continue linezolid and zosyn for now   - follow-up CT chest read    Please call or message on [employer] Teams if with any questions.  Spectra [id number]

## 2023-04-04 NOTE — PROGRESS NOTE ADULT - ASSESSMENT
81 y/o F w/PMHx of dementia, HTN, HDL, HFrEF, mitral valve regurgitation hypothyroidism, TIA, cirrhosis, neuropathy,  gout, RA, cataract, diplopia, skin ca, left 2nd toe amputation 1/2023 presented for evaluation of "fever" as per pts full time aide    Metabolic encephalopathy secondary to Sepsis present on admission d/t UTI and Left 5th Digit Ulcer   CT ab/pelvis: colitis, splenic infarct?  - Podiatry following  - Pending surigcal debridement/amputation  - ID following  - c/w Zyvox and levaquin  - f/u Bcx and Ucx  - Check CT chest  - Check TTE    Dementia  - c/w donepezil    HTN  HFrEF  - c/w lasix, spironolactone   - c/w metoprolol and Entresto    Hyperkalemia  -lokelma x1    MVR  - c/w plavix    Hypothyroidism  - c/w synthroid    Gout  - c/w allopurinol       DNR/DNI      Based on revised Cardiac Risk Index, patient at moderate risk (Class 3) of adverse outcome with currently planned non cardiac surgery with 10.1% risk of myocardial infarction, pulmonary edema, ventricular fibrillation, cardiac arrest or CHF in post op period

## 2023-04-04 NOTE — BRIEF OPERATIVE NOTE - NSICDXBRIEFPREOP_GEN_ALL_CORE_FT
PRE-OP DIAGNOSIS:  Acute osteomyelitis 04-Apr-2023 16:05:34  Adria Miller  Diabetic toe ulcer 04-Apr-2023 16:05:41  Adria Miller

## 2023-04-04 NOTE — PROGRESS NOTE ADULT - SUBJECTIVE AND OBJECTIVE BOX
PROGRESS NOTE:   Jose Mojica MD  Available on teams    Patient is a 82y old  Female who presents with a chief complaint of fever    SUBJECTIVE / OVERNIGHT EVENTS:  Seen for follow up for fever  Reports no new complaints  Denies fever, chills, fatigue, chest pain, shortness of breath,, nausea/ vomiting or diarrhea      ADDITIONAL REVIEW OF SYSTEMS:    MEDICATIONS  (STANDING):  allopurinol 100 milliGRAM(s) Oral two times a day  ascorbic acid 500 milliGRAM(s) Oral daily  aspirin  chewable 81 milliGRAM(s) Oral daily  calcium carbonate   1250 mG (OsCal) 1 Tablet(s) Oral daily  cefepime   IVPB 1000 milliGRAM(s) IV Intermittent once  cefepime   IVPB      clopidogrel Tablet 75 milliGRAM(s) Oral daily  donepezil 10 milliGRAM(s) Oral at bedtime  ferrous    sulfate 325 milliGRAM(s) Oral daily  folic acid 1 milliGRAM(s) Oral daily  furosemide    Tablet 40 milliGRAM(s) Oral daily  heparin   Injectable 5000 Unit(s) SubCutaneous every 8 hours  levothyroxine 50 MICROGram(s) Oral daily  linezolid  IVPB      linezolid  IVPB 600 milliGRAM(s) IV Intermittent every 12 hours  metoprolol succinate ER 50 milliGRAM(s) Oral every 12 hours  oxybutynin 10 milliGRAM(s) Oral daily  pantoprazole    Tablet 40 milliGRAM(s) Oral before breakfast  sacubitril 24 mG/valsartan 26 mG 1 Tablet(s) Oral two times a day  sodium chloride 0.9%. 1000 milliLiter(s) (100 mL/Hr) IV Continuous <Continuous>  spironolactone 25 milliGRAM(s) Oral <User Schedule>  zinc sulfate 220 milliGRAM(s) Oral daily    MEDICATIONS  (PRN):  acetaminophen     Tablet .. 650 milliGRAM(s) Oral every 6 hours PRN Temp greater or equal to 38C (100.4F), Mild Pain (1 - 3)  aluminum hydroxide/magnesium hydroxide/simethicone Suspension 30 milliLiter(s) Oral every 4 hours PRN Dyspepsia  ondansetron Injectable 4 milliGRAM(s) IV Push every 8 hours PRN Nausea and/or Vomiting      CAPILLARY BLOOD GLUCOSE        I&O's Summary      PHYSICAL EXAM:  Vital Signs Last 24 Hrs  T(C): 36.4 (2023 05:20), Max: 36.4 (2023 05:20)  T(F): 97.5 (2023 05:20), Max: 97.5 (2023 05:20)  HR: 63 (2023 05:20) (63 - 74)  BP: 113/51 (2023 05:20) (113/51 - 154/63)  BP(mean): --  RR: 16 (2023 05:20) (16 - 18)  SpO2: 100% (2023 05:20) (91% - 100%)        GENERAL: No acute distress, well-developed, frail  HEAD:  Atraumatic, Normocephalic  EYES: EOMI, PERRLA, conjunctiva and sclera clear  NECK: Supple, no lymphadenopathy, no JVD  CHEST/LUNG: CTAB; No wheezes, rales, or rhonchi  HEART: Regular rate and rhythm; No murmurs, rubs, or gallops  ABDOMEN: Soft, non-tender, non-distended; normal bowel sounds, no organomegaly  EXTREMITIES:  Left toe ulcer  NEUROLOGY: A&O x 3, no focal deficits  SKIN: No rashes or lesions    LABS:                        9.4    18.45 )-----------( 171      ( 2023 06:13 )             31.6     -    141  |  101  |  51<H>  ----------------------------<  103<H>  5.4<H>   |  25  |  1.3    Ca    9.0      2023 06:13    TPro  6.9  /  Alb  2.8<L>  /  TBili  0.5  /  DBili  x   /  AST  39  /  ALT  12  /  AlkPhos  184<H>  04-03    PT/INR - ( 2023 17:38 )   PT: 12.20 sec;   INR: 1.07 ratio         PTT - ( 2023 17:38 )  PTT:33.1 sec  CARDIAC MARKERS ( 2023 17:38 )  x     / <0.01 ng/mL / x     / x     / x          Urinalysis Basic - ( 2023 11:05 )    Color: Brown / Appearance: Turbid / S.020 / pH: x  Gluc: x / Ketone: 15  / Bili: Large / Urobili: 2.0 mg/dL   Blood: x / Protein: >=300 mg/dL / Nitrite: Negative   Leuk Esterase: Large / RBC: 26-50 /HPF / WBC 10-25 /HPF   Sq Epi: x / Non Sq Epi: Occasional /HPF / Bacteria: Many          RADIOLOGY & ADDITIONAL TESTS:  Results Reviewed:   Imaging Personally Reviewed:  Electrocardiogram Personally Reviewed:    COORDINATION OF CARE:  Care Discussed with Consultants/Other Providers [Y/N]:  Prior or Outpatient Records Reviewed [Y/N]:

## 2023-04-05 NOTE — PROGRESS NOTE ADULT - SUBJECTIVE AND OBJECTIVE BOX
Patient is seen and examined at the bed side, is afebrile.      REVIEW OF SYSTEMS: Unable to obtain due to mental status       ALLERGIES: Keflex (Unknown), losartan (Unknown), morphine (Unknown)  Rocephin (Unknown), sulfamethoxazole (Hives)        Vital Signs Last 24 Hrs  T(C): 35.6 (2023 12:40), Max: 36.7 (2023 21:14)  T(F): 96.1 (2023 12:40), Max: 98.1 (2023 21:14)  HR: 55 (2023 12:40) (55 - 62)  BP: 157/65 (2023 12:40) (102/87 - 157/65)  BP(mean): --  RR: 16 (2023 12:40) (16 - 16)  SpO2: 99% (2023 12:40) (99% - 100%)    Parameters below as of 2023 02:12  Patient On (Oxygen Delivery Method): nasal cannula  O2 Flow (L/min): 2        PHYSICAL EXAM:  GENERAL: Not in distress   CHEST/LUNG:  Not using accessory muscles   HEART: s1 and s2 present  ABDOMEN:  Nontender and  Nondistended  EXTREMITIES: left foot bandage in placed  CNS: Awake and Alert      LABS:                        7.8    11.72 )-----------( 127      ( 2023 06:24 )             25.4                           9.4    18.45 )-----------( 171      ( 2023 06:13 )             31.6       04-04    140  |  102  |  45<H>  ----------------------------<  91  4.4   |  29  |  1.2    Ca    8.6      2023 06:24      04-03    141  |  101  |  51<H>  ----------------------------<  103<H>  5.4<H>   |  25  |  1.3    Ca    9.0      2023 06:13    TPro  6.9  /  Alb  2.8<L>  /  TBili  0.5  /  DBili  x   /  AST  39  /  ALT  12  /  AlkPhos  184<H>  04-03    PT/INR - ( 2023 17:38 )   PT: 12.20 sec;   INR: 1.07 ratio      PTT - ( 2023 17:38 )  PTT:33.1 sec      Urinalysis Basic - ( 2023 11:05 )  Color: Brown / Appearance: Turbid / S.020 / pH: x  Gluc: x / Ketone: 15  / Bili: Large / Urobili: 2.0 mg/dL   Blood: x / Protein: >=300 mg/dL / Nitrite: Negative   Leuk Esterase: Large / RBC: 26-50 /HPF / WBC 10-25 /HPF   Sq Epi: x / Non Sq Epi: Occasional /HPF / Bacteria: Many        MEDICATIONS  (STANDING):    ascorbic acid 500 milliGRAM(s) Oral daily  aspirin  chewable 81 milliGRAM(s) Oral daily  calcium carbonate   1250 mG (OsCal) 1 Tablet(s) Oral daily  clopidogrel Tablet 75 milliGRAM(s) Oral daily  donepezil 10 milliGRAM(s) Oral at bedtime  ferrous    sulfate 325 milliGRAM(s) Oral daily  folic acid 1 milliGRAM(s) Oral daily  heparin   Injectable 5000 Unit(s) SubCutaneous every 8 hours  levothyroxine 50 MICROGram(s) Oral daily  linezolid  IVPB      linezolid  IVPB 600 milliGRAM(s) IV Intermittent every 12 hours  linezolid  IVPB 600 milliGRAM(s) IV Intermittent once  linezolid  IVPB 600 milliGRAM(s) IV Intermittent every 12 hours  metoprolol succinate ER 50 milliGRAM(s) Oral every 12 hours  pantoprazole    Tablet 40 milliGRAM(s) Oral before breakfast  piperacillin/tazobactam IVPB.. 3.375 Gram(s) IV Intermittent every 8 hours  sacubitril 24 mG/valsartan 26 mG 1 Tablet(s) Oral two times a day  sodium chloride 0.9%. 1000 milliLiter(s) (100 mL/Hr) IV Continuous <Continuous>  spironolactone 25 milliGRAM(s) Oral <User Schedule>  zinc sulfate 220 milliGRAM(s) Oral daily        RADIOLOGY & ADDITIONAL TESTS:    23 : Xray Foot AP + Lateral + Oblique, Left (23 @ 20:47) Study limited by portable technique and severe osteopenia. Hammertoes   further limit evaluation. Again demonstrated is a partial amputation of the second digit. There is adjacent air. Questionable residual proximal   second phalanx with some indistinctness, which could reflect osteomyelitis.    According to history, there is ulceration of the distal interphalangeal joint of the fifth digit. This area not well evaluated due to significant hammertoes.    Erosive change of the first distal metatarsal. Calcaneal spurring.      23: Xray Chest 1 View- PORTABLE-Urgent (23 @ 18:31) No radiographic evidence of acute cardiopulmonary disease.        MICROBIOLOGY DATA;    Culture - Urine (23 @ 11:05)   Specimen Source: Clean Catch Clean Catch (Midstream)  Culture Results:   Culture grew 3 or more types of organisms which indicate   collection contamination; consider recollection only if clinically   indicated.  Culture - Abscess with Gram Stain (23 @ 19:10)   - Vancomycin: S 1  - Tetracycline: S 2  - Trimethoprim/Sulfamethoxazole: S <=0.5/9.5  - Ampicillin/Sulbactam: R 16/8  - Cefazolin: R >16  - Clindamycin: R >4  - Daptomycin: S 0.5  - Erythromycin: R >4  - Gentamicin: R >8 Should not be used as monotherapy  - Linezolid: S 2  - Oxacillin: R >2  - Penicillin: R >8  - Rifampin: S <=1 Should not be used as monotherapy  Specimen Source: .Abscess Left 5th toe wound  Culture Results:   Moderate Methicillin Resistant Staphylococcus aureus  Organism Identification: Methicillin resistant Staphylococcus aureus  Organism: Methicillin resistant Staphylococcus aureus    Method Type: MICCOVID-19 PCR . (23 @ 17:39)   COVID-19 PCR: NotDetec    Culture - Blood (23 @ 17:37)   Specimen Source: .Blood Blood-Peripheral  Culture Results: No growth to date.    Culture - Blood (23 @ 17:37)   Specimen Source: .Blood Blood-Peripheral  Culture Results: No growth to date.    COVID-19 PCR . (23 @ 17:39)   COVID-19 PCR: NotDetec:         Patient is seen and examined at the bed side, is afebrile. The Wound/abscess culture grew MRSA. The Leukocytosis trended down to normal.       REVIEW OF SYSTEMS: Unable to obtain due to mental status       ALLERGIES: Keflex (Unknown), losartan (Unknown), morphine (Unknown)  Rocephin (Unknown), sulfamethoxazole (Hives)        Vital Signs Last 24 Hrs  T(C): 35.6 (2023 12:40), Max: 36.7 (2023 21:14)  T(F): 96.1 (2023 12:40), Max: 98.1 (2023 21:14)  HR: 55 (2023 12:40) (55 - 62)  BP: 157/65 (2023 12:40) (102/87 - 157/65)  BP(mean): --  RR: 16 (2023 12:40) (16 - 16)  SpO2: 99% (2023 12:40) (99% - 100%)    Parameters below as of 2023 02:12  Patient On (Oxygen Delivery Method): nasal cannula  O2 Flow (L/min): 2        PHYSICAL EXAM:  GENERAL: Not in distress   CHEST/LUNG:  Not using accessory muscles   HEART: s1 and s2 present  ABDOMEN:  Nontender and  Nondistended  EXTREMITIES: left foot bandage in placed  CNS: Awake and Alert      LABS:                        7.8    11.72 )-----------( 127      ( 2023 06:24 )             25.4                           9.4    18.45 )-----------( 171      ( 2023 06:13 )             31.6       04-04    140  |  102  |  45<H>  ----------------------------<  91  4.4   |  29  |  1.2    Ca    8.6      2023 06:24      04-03    141  |  101  |  51<H>  ----------------------------<  103<H>  5.4<H>   |  25  |  1.3    Ca    9.0      2023 06:13    TPro  6.9  /  Alb  2.8<L>  /  TBili  0.5  /  DBili  x   /  AST  39  /  ALT  12  /  AlkPhos  184<H>  -03    PT/INR - ( 2023 17:38 )   PT: 12.20 sec;   INR: 1.07 ratio      PTT - ( 2023 17:38 )  PTT:33.1 sec      Urinalysis Basic - ( 2023 11:05 )  Color: Brown / Appearance: Turbid / S.020 / pH: x  Gluc: x / Ketone: 15  / Bili: Large / Urobili: 2.0 mg/dL   Blood: x / Protein: >=300 mg/dL / Nitrite: Negative   Leuk Esterase: Large / RBC: 26-50 /HPF / WBC 10-25 /HPF   Sq Epi: x / Non Sq Epi: Occasional /HPF / Bacteria: Many        MEDICATIONS  (STANDING):    ascorbic acid 500 milliGRAM(s) Oral daily  aspirin  chewable 81 milliGRAM(s) Oral daily  calcium carbonate   1250 mG (OsCal) 1 Tablet(s) Oral daily  clopidogrel Tablet 75 milliGRAM(s) Oral daily  donepezil 10 milliGRAM(s) Oral at bedtime  ferrous    sulfate 325 milliGRAM(s) Oral daily  folic acid 1 milliGRAM(s) Oral daily  heparin   Injectable 5000 Unit(s) SubCutaneous every 8 hours  levothyroxine 50 MICROGram(s) Oral daily  linezolid  IVPB      linezolid  IVPB 600 milliGRAM(s) IV Intermittent every 12 hours  linezolid  IVPB 600 milliGRAM(s) IV Intermittent once  linezolid  IVPB 600 milliGRAM(s) IV Intermittent every 12 hours  metoprolol succinate ER 50 milliGRAM(s) Oral every 12 hours  pantoprazole    Tablet 40 milliGRAM(s) Oral before breakfast  piperacillin/tazobactam IVPB.. 3.375 Gram(s) IV Intermittent every 8 hours  sacubitril 24 mG/valsartan 26 mG 1 Tablet(s) Oral two times a day  sodium chloride 0.9%. 1000 milliLiter(s) (100 mL/Hr) IV Continuous <Continuous>  spironolactone 25 milliGRAM(s) Oral <User Schedule>  zinc sulfate 220 milliGRAM(s) Oral daily        RADIOLOGY & ADDITIONAL TESTS:    < from: Xray Foot AP + Lateral + Oblique, Left (23 @ 16:59) >    Since the prior study, patient status post amputation distal to the fifth   metatarsal. Postsurgical air is noted. Patient also status post   amputation distal to the second metatarsal. Hammertoes are noted within   the third and fourth digits limiting evaluation. Bones are osteopenic   with degenerative changes. Calcaneal spurring.        < from: CT Abdomen and Pelvis w/ IV Cont (23 @ 17:55) >  1. Since 2022, new moderate mural thickening involving the sigmoid, descending and transverse colon, with moderate pericolonic fat   stranding, suspicious for acute colitis; no focal drainable fluid collection. Correlation with direct visualization is suggested when acute   episode resolves to evaluate for underlying neoplastic process.    2. Multiple new wedge-shaped areas of hypoenhancement within the spleen,  suspicious for splenic infarcts.    3. Nodular cirrhotic liver with decreased small volume abdominopelvic ascites.    4. Unchanged cholelithiasis with unchanged mild gallbladder wall thickening.    5. Interval resolution of previously seen bilateral pleural effusions.        23 : Xray Foot AP + Lateral + Oblique, Left (23 @ 20:47) Study limited by portable technique and severe osteopenia. Hammertoes   further limit evaluation. Again demonstrated is a partial amputation of the second digit. There is adjacent air. Questionable residual proximal   second phalanx with some indistinctness, which could reflect osteomyelitis.    According to history, there is ulceration of the distal interphalangeal joint of the fifth digit. This area not well evaluated due to significant hammertoes.    Erosive change of the first distal metatarsal. Calcaneal spurring.      23: Xray Chest 1 View- PORTABLE-Urgent (23 @ 18:31) No radiographic evidence of acute cardiopulmonary disease.        MICROBIOLOGY DATA;    Culture - Urine (23 @ 11:05)   Specimen Source: Clean Catch Clean Catch (Midstream)  Culture Results:   Culture grew 3 or more types of organisms which indicate   collection contamination; consider recollection only if clinically   indicated.  Culture - Abscess with Gram Stain (23 @ 19:10)   - Vancomycin: S 1  - Tetracycline: S 2  - Trimethoprim/Sulfamethoxazole: S <=0.5/9.5  - Ampicillin/Sulbactam: R 16/8  - Cefazolin: R >16  - Clindamycin: R >4  - Daptomycin: S 0.5  - Erythromycin: R >4  - Gentamicin: R >8 Should not be used as monotherapy  - Linezolid: S 2  - Oxacillin: R >2  - Penicillin: R >8  - Rifampin: S <=1 Should not be used as monotherapy  Specimen Source: .Abscess Left 5th toe wound  Culture Results:   Moderate Methicillin Resistant Staphylococcus aureus  Organism Identification: Methicillin resistant Staphylococcus aureus  Organism: Methicillin resistant Staphylococcus aureus    Method Type: MICCOVID-19 PCR . (23 @ 17:39)   COVID-19 PCR: NotDetec    Culture - Blood (23 @ 17:37)   Specimen Source: .Blood Blood-Peripheral  Culture Results: No growth to date.    Culture - Blood (23 @ 17:37)   Specimen Source: .Blood Blood-Peripheral  Culture Results: No growth to date.    COVID-19 PCR . (23 @ 17:39)   COVID-19 PCR: NotDetec:

## 2023-04-05 NOTE — PROGRESS NOTE ADULT - NSPROGADDITIONALINFOA_GEN_ALL_CORE
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

## 2023-04-05 NOTE — PROGRESS NOTE ADULT - SUBJECTIVE AND OBJECTIVE BOX
Podiatry Progress Note    Subjective:  ARIEL INIGUEZ is a  82y Female.   Seen bedside.   Patient is a 82y old  Female who presents with a chief complaint of Weakness with poor PO intake (04 Apr 2023 12:59)      Past Medical History and Surgical History  PAST MEDICAL & SURGICAL HISTORY:  Hypothyroid      Hypertension      RA (rheumatoid arthritis)      High cholesterol      Poor circulation  PAD      Incontinence      Dementia      Rheumatoid arthritis      Osteopenia      Melanoma of skin      Depression with anxiety      Bilateral cataracts      Peripheral arterial disease  (s/p B/L leg stents & on Plavix)      History of cirrhosis of liver      History of chronic CHF      History of hip surgery  Left ORIF and Left ORIF femur      H/O: hysterectomy           Objective:  Vital Signs Last 24 Hrs  T(C): 36.4 (05 Apr 2023 04:53), Max: 36.7 (04 Apr 2023 21:14)  T(F): 97.6 (05 Apr 2023 04:53), Max: 98.1 (04 Apr 2023 21:14)  HR: 60 (05 Apr 2023 04:53) (57 - 62)  BP: 102/87 (05 Apr 2023 04:53) (102/87 - 158/72)  BP(mean): --  RR: 16 (05 Apr 2023 04:53) (16 - 22)  SpO2: 100% (05 Apr 2023 02:12) (96% - 100%)    Parameters below as of 05 Apr 2023 02:12  Patient On (Oxygen Delivery Method): nasal cannula  O2 Flow (L/min): 2                          7.8    11.72 )-----------( 127      ( 04 Apr 2023 06:24 )             25.4                 04-04    140  |  102  |  45<H>  ----------------------------<  91  4.4   |  29  |  1.2    Ca    8.6      04 Apr 2023 06:24          Physical Exam - Lower Extremity Focused:   Derm:   left 5th toe amputation site well coapted with nylon, mild surrounding erythema.   Vascular: DP and PT Pulses Diminished; Foot is Warm to Warm to the touch; Capillary Refill Time < 3 Seconds;    Neuro: Protective Sensation Diminished / Moderately Neuropathic   MSK: Pain On Palpation at Wound Site     Assessment:  L 5th toe infection   s/p 5th toe amp 4/4/23    Plan:  Chart reviewed and Patient evaluated. All Questions and Concerns Addressed and Answered  Local Wound Care; Wound Flushed w/ NS; Wound Packed w/ Betadine Soaked Gauze / DSD / Kerlix   Weight Bearing Status; WBAT w/ Heel Touch w/ Surgical Shoe;   Continue w/ Local Wound Care; Q24 Dressing Changes;  No Further Sx Debridement;   Patient Stable Per Podiatry Standpoint; Follow Up as an Outpatient w/ Dr. Miller at [address] [place] [address]. ;   Discussed Plan w/ Attending;     Podiatry [id number]

## 2023-04-05 NOTE — PROGRESS NOTE ADULT - ASSESSMENT
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 On admission, she found to have no fever, but hypoxia requiring 4 liter oxygen via NC. The CXR shows no infiltrate but Xray of left foot shows  a partial amputation of the second digit. There is adjacent air. Questionable residual proximal second phalanx with some indistinctness, which could reflect osteomyelitis. She has started on Clindamycin and Levaquin. and the ID consult requested to assist with further evaluation and antibiotic management.    # Left DFU with air and OM of partially amputated second toe- wound flushed and packed at the bed side by Podiatry on 4/2  # Leukocytosis  # Allergies:  Cephalosporins ( Keflex, Rocephine ) and Bactrim    would recommend:    1. Continue Linezolid  as Antitoxin and MRSA Coverage   2. Wound care as per Podiatry   3. Supplemental oxygenation and bronchodilator as needed      Attending Attestation:    Spent more than 35 minutes on total encounter, more than 50 % of the visit was spent counseling and/or coordinating care by the Attending physician. 	  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 On admission, she found to have no fever, but hypoxia requiring 4 liter oxygen via NC. The CXR shows no infiltrate but Xray of left foot shows  a partial amputation of the second digit. There is adjacent air. Questionable residual proximal second phalanx with some indistinctness, which could reflect osteomyelitis. She has started on Clindamycin and Levaquin. and the ID consult requested to assist with further evaluation and antibiotic management.    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 Stool for C.difficile Toxin PCR in the setting of colitis and diarrhea , if positive the D/c Zosyn and start oral Vancomycin   2. Continue Linezolid  as Antitoxin and MRSA Coverage   3. Wound care as per Podiatry   4. Supplemental oxygenation and bronchodilator as needed  5. TTE in the setting of splenic infarct   5. Isolation as per protocol    d/w Nursing staff     Attending Attestation:    Spent more than 35 minutes on total encounter, more than 50 % of the visit was spent counseling and/or coordinating care by the Attending physician.

## 2023-04-05 NOTE — PROGRESS NOTE ADULT - SUBJECTIVE AND OBJECTIVE BOX
Progress note    INTERVAL HPI/OVERNIGHT EVENTS:    Patient seen and examined at bedside. No acute distress, resting comfortably.      REVIEW OF SYSTEMS:  All other 13 Review of systems were reviewed and are negative    FAMILY HISTORY:    T(C): 35.6 (04-05-23 @ 12:40), Max: 36.7 (04-04-23 @ 21:14)  HR: 55 (04-05-23 @ 12:40) (55 - 62)  BP: 157/65 (04-05-23 @ 12:40) (102/87 - 158/72)  RR: 16 (04-05-23 @ 12:40) (16 - 22)  SpO2: 99% (04-05-23 @ 12:40) (96% - 100%)  Wt(kg): --Vital Signs Last 24 Hrs  T(C): 35.6 (05 Apr 2023 12:40), Max: 36.7 (04 Apr 2023 21:14)  T(F): 96.1 (05 Apr 2023 12:40), Max: 98.1 (04 Apr 2023 21:14)  HR: 55 (05 Apr 2023 12:40) (55 - 62)  BP: 157/65 (05 Apr 2023 12:40) (102/87 - 158/72)  BP(mean): --  RR: 16 (05 Apr 2023 12:40) (16 - 22)  SpO2: 99% (05 Apr 2023 12:40) (96% - 100%)    Parameters below as of 05 Apr 2023 02:12  Patient On (Oxygen Delivery Method): nasal cannula  O2 Flow (L/min): 2    Keflex (Unknown)  losartan (Unknown)  morphine (Unknown)  Rocephin (Unknown)  sulfamethoxazole (Hives)      PHYSICAL EXAM:  GENERAL: NAD, well-groomed, well-developed  HEAD:  Atraumatic, Normocephalic  EYES: EOMI, PERRLA, conjunctiva and sclera clear  ENMT: No tonsillar erythema, exudates, or enlargement; Moist mucous membranes, Good dentition, No lesions  NECK: Supple, No JVD, Normal thyroid  NERVOUS SYSTEM:  Good concentration; Motor Strength 5/5 B/L upper and lower extremities; DTRs 2+ intact and symmetric  CHEST/LUNG: Clear to percussion bilaterally; No rales, rhonchi, wheezing, or rubs  HEART: Regular rate and rhythm; No murmurs, rubs, or gallops  ABDOMEN: Soft, Nontender, Nondistended; Bowel sounds present  EXTREMITIES:  2+ Peripheral Pulses, No clubbing, cyanosis, or edema  LYMPH: No lymphadenopathy noted  SKIN: No rashes or lesions    Consultant(s) Notes Reviewed:  [x ] YES  [ ] NO  Care Discussed with Consultants/Other Providers [ x] YES  [ ] NO    LABS:      RADIOLOGY & ADDITIONAL TESTS:    Imaging Personally Reviewed:  [ ] YES  [ ] NO  acetaminophen     Tablet .. 650 milliGRAM(s) Oral every 6 hours PRN  aluminum hydroxide/magnesium hydroxide/simethicone Suspension 30 milliLiter(s) Oral every 4 hours PRN  ascorbic acid 500 milliGRAM(s) Oral daily  aspirin  chewable 81 milliGRAM(s) Oral daily  calcium carbonate   1250 mG (OsCal) 1 Tablet(s) Oral daily  clopidogrel Tablet 75 milliGRAM(s) Oral daily  donepezil 10 milliGRAM(s) Oral at bedtime  ferrous    sulfate 325 milliGRAM(s) Oral daily  folic acid 1 milliGRAM(s) Oral daily  heparin   Injectable 5000 Unit(s) SubCutaneous every 8 hours  levothyroxine 50 MICROGram(s) Oral daily  linezolid  IVPB      linezolid  IVPB 600 milliGRAM(s) IV Intermittent once  linezolid  IVPB 600 milliGRAM(s) IV Intermittent every 12 hours  linezolid  IVPB 600 milliGRAM(s) IV Intermittent every 12 hours  metoprolol succinate ER 50 milliGRAM(s) Oral every 12 hours  ondansetron Injectable 4 milliGRAM(s) IV Push every 8 hours PRN  oxyCODONE    IR 5 milliGRAM(s) Oral three times a day PRN  pantoprazole    Tablet 40 milliGRAM(s) Oral before breakfast  piperacillin/tazobactam IVPB.. 3.375 Gram(s) IV Intermittent every 8 hours  sacubitril 24 mG/valsartan 26 mG 1 Tablet(s) Oral two times a day  sodium chloride 0.9%. 1000 milliLiter(s) IV Continuous <Continuous>  spironolactone 25 milliGRAM(s) Oral <User Schedule>  zinc sulfate 220 milliGRAM(s) Oral daily      HEALTH ISSUES - PROBLEM Dx:    81 y/o F w/PMHx of dementia, HTN, HDL, HFrEF, mitral valve regurgitation hypothyroidism, TIA, cirrhosis, neuropathy,  gout, RA, cataract, diplopia, skin ca, left 2nd toe amputation 1/2023 presented for evaluation of "fever" as per pts full time aide    Metabolic encephalopathy secondary to Sepsis present on admission d/t UTI and Left 5th Digit Ulcer   CT ab/pelvis: Acute colitis, splenic infarct?  - Podiatry following recs appreciated  - 4/4: Complete amputation L 1st toe   - ID following  - Continue zyvox and linezolid  - f/u Bcx and Ucx  - CT chest- R basilar consolidation/atelectasis, infxn vs inflam  - TTE - no vegetations    Dementia  - c/w donepezil    HTN  HFrEF  - c/w lasix, spironolactone   - c/w metoprolol and Entresto    Hyperkalemia  -lokelma x1    MVR  - c/w plavix    Hypothyroidism  - c/w synthroid    Gout  - c/w allopurinol     DNR/DNI  Dispo: acute, will need 1 year f/u w/ ct chest for nodules    Total time spent to complete patient's bedside assessment, review medical chart, discuss medical plan of care with covering medical team was ____35____ with > 50% of time spent face to face w/ patient, discussion with patient/family and/or coordination of care

## 2023-04-05 NOTE — PROGRESS NOTE ADULT - CONVERSATION/DISCUSSION
Pt was ambulating from bathroom with walker and RN. Pt verbalized" I was trying to kick roll of tape on the floor out of the way. Pt slid to the ground landing on buttocks. RN present during fall.
Diagnosis/Prognosis/MOLST Discussed

## 2023-04-06 NOTE — PHYSICAL THERAPY INITIAL EVALUATION ADULT - GENERAL OBSERVATIONS, REHAB EVAL
8;50-9;15 pt was seen for PT IE at bed side, pt is agreeable, chart thoroughly reviewed, RN Bettina is aware. Pt was received semi haynes in bed, in no apparent distress, +hep lock, +primafit, dressing L foot, +call bell within reach, bed side table at reach.

## 2023-04-06 NOTE — CDI QUERY NOTE - NSCDIOTHERTXTBX_GEN_ALL_CORE_HH
Clinical Indicators:    4/2 H&P Adult:... 83 y/o F w/PMHx of dementia, HTN, HDL, HFrEF, mitral valve  regurgitation hypothyroidism, TIA, cirrhosis, neuropathy,  gout, dementia, RA,  cataract diplopia, skin ca, left 2nd toe amputation 1/2023; ...Peripheral  arterial disease (s/p B/L leg stents & on Plavix)  Poor circulation PAD; ...Ischemic Changes Noted on the Left 5th Digit Extending  to the MTP Joint, toe is tepid, capillary refill is delayed to the toes  Vascular: DP and PT Pulses Diminished; Foot is Warm to Warm to the touch; ...    4/2 Consult Note Adult-Podiatry Resident/Attending:... Ischemic Changes Noted on  the Left 5th Digit Extending to the MTP Joint, toe is tepid, capillary refill is  delayed to the toes; ... Cyanosis Left 5th Digit Extending to MTP Joint    4/5 Progress Note Adult-Podiatry Resident:... Vascular: DP and PT Pulses  Diminished; Foot is Warm to Warm to the touch; Capillary Refill Time < 3  Seconds; Neuro: Protective Sensation Diminished / Moderately Neuropathic; ...     Based on the clinical indicators and your professional judgment, please clarify  if ischemic changes of the left 5th toe with delayed capillary refill, cyanosis  and superficial necrosis can be further specified as:    Ischemic ulcer of the left 5th toe with gangrene  Ischemic ulcer of the left 5th toe without gangrene  Other (please specify):  Clinically unable to specify left 5th toe ulcer
Clinical Indicators:    4/2 H&P Adult:... 83 y/o F w/PMHx of dementia, HTN, HDL, HFrEF, mitral valve  regurgitation hypothyroidism, TIA, cirrhosis, neuropathy,  gout, dementia, RA,  cataract diplopia, skin ca, left 2nd toe amputation 1/2023; ...Peripheral  arterial disease (s/p B/L leg stents & on Plavix)  Poor circulation PAD; ...Ischemic Changes Noted on the Left 5th Digit Extending  to the MTP Joint, toe is tepid, capillary refill is delayed to the toes  Vascular: DP and PT Pulses Diminished; Foot is Warm to Warm to the touch; ...    4/3 Consult Note Adult-Infectious Disease Attending: ...Left DFU with air and OM  of partially amputated second toe- wound flushed and packed at the bed side by  Podiatry on 4/2; ...    No anti-diabetic medications ordered    No A1c results: Glucoses 134-> 141 from admit    Query     Based on your professional judgment and the clinical indicators, please clarify  if the ID Consult's diagnosis of diabetes can be further specified as:    Diabetes Type 2 is not under treatment and was ruled out  Diabetes is borderline based on the glucose laboratory results  Other (please specify):  Clinically unable to determine if the patient is diabetic    thank you,  Kylee Mckay  [phone]

## 2023-04-06 NOTE — PHYSICAL THERAPY INITIAL EVALUATION ADULT - PERTINENT HX OF CURRENT PROBLEM, REHAB EVAL
81 y/o F w/PMHx of dementia, HTN, HDL, HFrEF, mitral valve regurgitation hypothyroidism, TIA, cirrhosis, neuropathy,  gout, RA, cataract, diplopia, skin ca, left 2nd toe amputation 1/2023 presented for evaluation of "fever" as per pts full time aide  # Sepsis # Left 2nd toe amputation

## 2023-04-06 NOTE — CONSULT NOTE ADULT - SUBJECTIVE AND OBJECTIVE BOX
Patient is a 82y old  Female who presents with a chief complaint of Weakness with poor PO intake (04 Apr 2023 12:59)      HPI:  81 y/o F w/PMHx of dementia, HTN, HDL, HFrEF, mitral valve regurgitation hypothyroidism, TIA, cirrhosis, neuropathy,  gout, dementia, RA, cataract diplopia, skin ca, left 2nd toe amputation 1/2023 presented for evaluation of "fever" as per pts full time aide. No actual temp was taken by the aide. patient was diagnosed with COVID at home, but negative in ED, presented with generalized weakness and worsening mental status as per daughter, pt is unable to provide full history at this time. Daughter states pt has no respiratory distress, but notes confusion with decreased p.o. intake, pt is able to maintain urine output.  daughter denies any diarrhea, but admits to vomiting clear mucus after coughing. Pt denies CP, SOB, abd pain.   Pt is AA+Ox3m but confused with other questions.  (02 Apr 2023 21:04)    Hem/Onc : History as above , patient refused to be interviewed or examined "leave , me alone "  Chart reviewed , in addition to above she was found with multiple splenic infarcts , suspected colitis ( c diff negative ) , infected toe s/p amputation , abscess +/- osteomyelitis , wound culture : MRSA , negative blood culture , leukocytosis resolved with antibiotics , CT chest new and old nodules ?  negative TTE .  Normocytic anemia , platelets trending down 2 days after admission . on plavix, ASA , heparin prophylaxis .  Review of prior admission rheumatoid factor > 400         ROS:  Negative except for:    PAST MEDICAL & SURGICAL HISTORY:  Hypothyroid      Hypertension      RA (rheumatoid arthritis)      High cholesterol      Poor circulation  PAD      Incontinence      Dementia      Rheumatoid arthritis      Osteopenia      Melanoma of skin      Depression with anxiety      Bilateral cataracts      Peripheral arterial disease  (s/p B/L leg stents & on Plavix)      History of cirrhosis of liver      History of chronic CHF      History of hip surgery  Left ORIF and Left ORIF femur      H/O: hysterectomy          SOCIAL HISTORY:    FAMILY HISTORY:      MEDICATIONS  (STANDING):  ascorbic acid 500 milliGRAM(s) Oral daily  aspirin  chewable 81 milliGRAM(s) Oral daily  calcium carbonate   1250 mG (OsCal) 1 Tablet(s) Oral daily  clopidogrel Tablet 75 milliGRAM(s) Oral daily  donepezil 10 milliGRAM(s) Oral at bedtime  ferrous    sulfate 325 milliGRAM(s) Oral daily  folic acid 1 milliGRAM(s) Oral daily  furosemide    Tablet 20 milliGRAM(s) Oral daily  heparin   Injectable 5000 Unit(s) SubCutaneous every 8 hours  levothyroxine 50 MICROGram(s) Oral daily  linezolid  IVPB      linezolid  IVPB 600 milliGRAM(s) IV Intermittent once  linezolid  IVPB 600 milliGRAM(s) IV Intermittent every 12 hours  linezolid  IVPB 600 milliGRAM(s) IV Intermittent every 12 hours  metoprolol succinate ER 50 milliGRAM(s) Oral every 12 hours  pantoprazole    Tablet 40 milliGRAM(s) Oral before breakfast  piperacillin/tazobactam IVPB.. 3.375 Gram(s) IV Intermittent every 8 hours  sacubitril 24 mG/valsartan 26 mG 1 Tablet(s) Oral two times a day  sodium chloride 0.9%. 1000 milliLiter(s) (100 mL/Hr) IV Continuous <Continuous>  spironolactone 25 milliGRAM(s) Oral <User Schedule>  zinc sulfate 220 milliGRAM(s) Oral daily    MEDICATIONS  (PRN):  acetaminophen     Tablet .. 650 milliGRAM(s) Oral every 6 hours PRN Temp greater or equal to 38C (100.4F), Mild Pain (1 - 3)  aluminum hydroxide/magnesium hydroxide/simethicone Suspension 30 milliLiter(s) Oral every 4 hours PRN Dyspepsia  ondansetron Injectable 4 milliGRAM(s) IV Push every 8 hours PRN Nausea and/or Vomiting  oxyCODONE    IR 5 milliGRAM(s) Oral three times a day PRN Moderate Pain (4 - 6)      Allergies    Keflex (Unknown)  losartan (Unknown)  morphine (Unknown)  Rocephin (Unknown)  sulfamethoxazole (Hives)    Intolerances        Vital Signs Last 24 Hrs  T(C): 35.8 (06 Apr 2023 14:02), Max: 35.8 (06 Apr 2023 14:02)  T(F): 96.4 (06 Apr 2023 14:02), Max: 96.4 (06 Apr 2023 14:02)  HR: 80 (06 Apr 2023 14:02) (70 - 80)  BP: 131/61 (06 Apr 2023 14:02) (131/61 - 149/66)  BP(mean): --  RR: 16 (06 Apr 2023 14:02) (16 - 16)  SpO2: --      LABS:                          8.5    6.22  )-----------( 99       ( 06 Apr 2023 05:54 )             26.8         Mean Cell Volume : 99.6 fL  Mean Cell Hemoglobin : 31.6 pg  Mean Cell Hemoglobin Concentration : 31.7 g/dL        Serial CBC's  04-06 @ 05:54  Hct-26.8 / Hgb-8.5 / Plat-99 / RBC-2.69 / WBC-6.22  Serial CBC's  04-04 @ 06:24  Hct-25.4 / Hgb-7.8 / Plat-127 / RBC-2.54 / WBC-11.72  Serial CBC's  04-03 @ 06:13  Hct-31.6 / Hgb-9.4 / Plat-171 / RBC-3.10 / WBC-18.45      04-06    134<L>  |  99  |  29<H>  ----------------------------<  139<H>  4.3   |  24  |  1.3    Ca    8.4      06 Apr 2023 05:54    TPro  6.2  /  Alb  2.3<L>  /  TBili  0.3  /  DBili  x   /  AST  37  /  ALT  11  /  AlkPhos  100  04-06                   Patient is a 82y old  Female who presents with a chief complaint of Weakness with poor PO intake (04 Apr 2023 12:59)      HPI:  83 y/o F w/PMHx of dementia, HTN, HDL, HFrEF, mitral valve regurgitation hypothyroidism, TIA, cirrhosis, neuropathy,  gout, dementia, RA, cataract diplopia, skin ca, left 2nd toe amputation 1/2023 presented for evaluation of "fever" as per pts full time aide. No actual temp was taken by the aide. patient was diagnosed with COVID at home, but negative in ED, presented with generalized weakness and worsening mental status as per daughter, pt is unable to provide full history at this time. Daughter states pt has no respiratory distress, but notes confusion with decreased p.o. intake, pt is able to maintain urine output.  daughter denies any diarrhea, but admits to vomiting clear mucus after coughing. Pt denies CP, SOB, abd pain.   Pt is AA+Ox3m but confused with other questions.  (02 Apr 2023 21:04)    Hem/Onc : History as above , patient refused to be interviewed or examined "leave , me alone "  Chart reviewed , in addition to above she was found with multiple splenic infarcts , suspected colitis ( c diff negative ) , infected toe s/p amputation , abscess +/- osteomyelitis , wound culture : MRSA , negative blood culture , leukocytosis resolved with antibiotics , CT chest new and old nodules ?  CRP >160  negative TTE .  Normocytic anemia , platelets trending down 2 days after admission . on plavix, ASA , heparin prophylaxis .  Review of prior admission rheumatoid factor > 400 ( history of RA )         ROS:  Negative except for:    PAST MEDICAL & SURGICAL HISTORY:  Hypothyroid      Hypertension      RA (rheumatoid arthritis)      High cholesterol      Poor circulation  PAD      Incontinence      Dementia      Rheumatoid arthritis      Osteopenia      Melanoma of skin      Depression with anxiety      Bilateral cataracts      Peripheral arterial disease  (s/p B/L leg stents & on Plavix)      History of cirrhosis of liver      History of chronic CHF      History of hip surgery  Left ORIF and Left ORIF femur      H/O: hysterectomy          SOCIAL HISTORY:    FAMILY HISTORY:      MEDICATIONS  (STANDING):  ascorbic acid 500 milliGRAM(s) Oral daily  aspirin  chewable 81 milliGRAM(s) Oral daily  calcium carbonate   1250 mG (OsCal) 1 Tablet(s) Oral daily  clopidogrel Tablet 75 milliGRAM(s) Oral daily  donepezil 10 milliGRAM(s) Oral at bedtime  ferrous    sulfate 325 milliGRAM(s) Oral daily  folic acid 1 milliGRAM(s) Oral daily  furosemide    Tablet 20 milliGRAM(s) Oral daily  heparin   Injectable 5000 Unit(s) SubCutaneous every 8 hours  levothyroxine 50 MICROGram(s) Oral daily  linezolid  IVPB      linezolid  IVPB 600 milliGRAM(s) IV Intermittent once  linezolid  IVPB 600 milliGRAM(s) IV Intermittent every 12 hours  linezolid  IVPB 600 milliGRAM(s) IV Intermittent every 12 hours  metoprolol succinate ER 50 milliGRAM(s) Oral every 12 hours  pantoprazole    Tablet 40 milliGRAM(s) Oral before breakfast  piperacillin/tazobactam IVPB.. 3.375 Gram(s) IV Intermittent every 8 hours  sacubitril 24 mG/valsartan 26 mG 1 Tablet(s) Oral two times a day  sodium chloride 0.9%. 1000 milliLiter(s) (100 mL/Hr) IV Continuous <Continuous>  spironolactone 25 milliGRAM(s) Oral <User Schedule>  zinc sulfate 220 milliGRAM(s) Oral daily    MEDICATIONS  (PRN):  acetaminophen     Tablet .. 650 milliGRAM(s) Oral every 6 hours PRN Temp greater or equal to 38C (100.4F), Mild Pain (1 - 3)  aluminum hydroxide/magnesium hydroxide/simethicone Suspension 30 milliLiter(s) Oral every 4 hours PRN Dyspepsia  ondansetron Injectable 4 milliGRAM(s) IV Push every 8 hours PRN Nausea and/or Vomiting  oxyCODONE    IR 5 milliGRAM(s) Oral three times a day PRN Moderate Pain (4 - 6)      Allergies    Keflex (Unknown)  losartan (Unknown)  morphine (Unknown)  Rocephin (Unknown)  sulfamethoxazole (Hives)    Intolerances        Vital Signs Last 24 Hrs  T(C): 35.8 (06 Apr 2023 14:02), Max: 35.8 (06 Apr 2023 14:02)  T(F): 96.4 (06 Apr 2023 14:02), Max: 96.4 (06 Apr 2023 14:02)  HR: 80 (06 Apr 2023 14:02) (70 - 80)  BP: 131/61 (06 Apr 2023 14:02) (131/61 - 149/66)  BP(mean): --  RR: 16 (06 Apr 2023 14:02) (16 - 16)  SpO2: --      LABS:                          8.5    6.22  )-----------( 99       ( 06 Apr 2023 05:54 )             26.8         Mean Cell Volume : 99.6 fL  Mean Cell Hemoglobin : 31.6 pg  Mean Cell Hemoglobin Concentration : 31.7 g/dL        Serial CBC's  04-06 @ 05:54  Hct-26.8 / Hgb-8.5 / Plat-99 / RBC-2.69 / WBC-6.22  Serial CBC's  04-04 @ 06:24  Hct-25.4 / Hgb-7.8 / Plat-127 / RBC-2.54 / WBC-11.72  Serial CBC's  04-03 @ 06:13  Hct-31.6 / Hgb-9.4 / Plat-171 / RBC-3.10 / WBC-18.45      04-06    134<L>  |  99  |  29<H>  ----------------------------<  139<H>  4.3   |  24  |  1.3    Ca    8.4      06 Apr 2023 05:54    TPro  6.2  /  Alb  2.3<L>  /  TBili  0.3  /  DBili  x   /  AST  37  /  ALT  11  /  AlkPhos  100  04-06

## 2023-04-06 NOTE — PHYSICAL THERAPY INITIAL EVALUATION ADULT - PHYSICAL ASSIST/NONPHYSICAL ASSIST: STAND/SIT, REHAB EVAL
Airway  Urgency: elective    Date/Time: 4/24/2020 12:11 PM  Airway not difficult    General Information and Staff    Patient location during procedure: OR  Anesthesiologist: Sylvain Escobedo MD  CRNA: Sreedhar Barboza CRNA    Indications and Patient Condition  Indications for airway management: airway protection    Preoxygenated: yes  Mask difficulty assessment: 0 - not attempted    Final Airway Details  Final airway type: supraglottic airway      Successful airway: I-gel  Size 4    Number of attempts at approach: 1  Assessment: lips, teeth, and gum same as pre-op    Additional Comments  LMA placed without difficulty, ventilation with assist, equal breath sounds and symmetric chest rise and fall.               1 person assist

## 2023-04-06 NOTE — PHYSICAL THERAPY INITIAL EVALUATION ADULT - RANGE OF MOTION EXAMINATION, REHAB EVAL
L foot and ankle unable to assess 2/2 dressing and ace wrap/bilateral lower extremity ROM was WFL (within functional limits)/deficits as listed below

## 2023-04-06 NOTE — PHYSICAL THERAPY INITIAL EVALUATION ADULT - PATIENT/FAMILY/SIGNIFICANT OTHER GOALS STATEMENT, PT EVAL
pt did not verbalize any goals but agreed that she needs to be able to walk with RW from bed to bathroom at home

## 2023-04-06 NOTE — PHYSICAL THERAPY INITIAL EVALUATION ADULT - ADDITIONAL COMMENTS
pt is a poor historian, info has to be verified with family. As per pt she lives alone in a private house with a walk in entrance and one level inside, has 24/7 HHA, ambulated with SC and RW based on needs

## 2023-04-06 NOTE — PROCEDURE NOTE - NSPROCDETAILS_GEN_ALL_CORE
location identified, draped/prepped, sterile technique used/blood seen on insertion/dressing applied/flushes easily/secured in place/sterile technique, catheter placed
blood seen on insertion/dressing applied/flushes easily/secured in place

## 2023-04-07 NOTE — DISCHARGE NOTE PROVIDER - NSDCCPCAREPLAN_GEN_ALL_CORE_FT
PRINCIPAL DISCHARGE DIAGNOSIS  Diagnosis: Sepsis  Assessment and Plan of Treatment: -  - You came in with a Fever noticed by your aide. Your urinalysis was positive for UTI, however, urine culture has been contaminated. You were treated with Intravenous Antibiotics. You were also found to have a ulcer on your 5th toe. Podiatry amputated. Wound culture revealed MRSA. You will be discharged on Antibiotics.     PRINCIPAL DISCHARGE DIAGNOSIS  Diagnosis: Sepsis  Assessment and Plan of Treatment: You came in with a Fever noticed by your aide. Your urinalysis was positive for UTI, however, urine culture has been contaminated. You were treated with Intravenous Antibiotics. You were also found to have a ulcer on your 5th toe. Podiatry amputated. Wound culture revealed MRSA. You will be discharged on Antibiotics.      SECONDARY DISCHARGE DIAGNOSES  Diagnosis: Splenic infarct  Assessment and Plan of Treatment: While here your CT abdomen showed Splenic infarct. This may be from an infection or a "hypercoagulable" state. You did not have a systemic infection as your blood cultures and echocardiogram was negative. Hematology was consulted for a hypercoagulable state and blood work was done but because they are very specific, they are not done in the hospital. Please follow up with your PCP and the hematologist Dr. Andrew mcclureing the results of this.    Diagnosis: Pulmonary nodule  Assessment and Plan of Treatment: You were found to have pulmonary nodules. Please follow up with a CT scan in 1 year.     PRINCIPAL DISCHARGE DIAGNOSIS  Diagnosis: Sepsis  Assessment and Plan of Treatment: You came in with a Fever noticed by your aide. Your urinalysis was positive for UTI, however, urine culture has been contaminated. You were treated with Intravenous Antibiotics. You were also found to have a ulcer on your 5th toe. Podiatry amputated. Wound culture revealed MRSA. You will be discharged on Antibiotics.   Wound care instructions: Wound Cleansed w/ NS; Dressed w/Xeroform / DSD / Kerlix / Light ACE; q24  Please follow up with Dr. Miller in outpatient Podiatry Clinic at [address] [place] [address], 1 week post-Discharge        SECONDARY DISCHARGE DIAGNOSES  Diagnosis: Splenic infarct  Assessment and Plan of Treatment: While here your CT abdomen showed Splenic infarct. This may be from an infection or a "hypercoagulable" state. You did not have a systemic infection as your blood cultures and echocardiogram was negative. Hematology was consulted for a hypercoagulable state and blood work was done but because they are very specific, they are not done in the hospital. Please follow up with your PCP and the hematologist Dr. Andrew mcclureing the results of this.    Diagnosis: Pulmonary nodule  Assessment and Plan of Treatment: You were found to have pulmonary nodules. Please follow up with a CT scan in 1 year.     PRINCIPAL DISCHARGE DIAGNOSIS  Diagnosis: Sepsis  Assessment and Plan of Treatment: You came in with a Fever noticed by your aide. Your urinalysis was positive for UTI, however, urine culture has been contaminated. You were treated with Intravenous Antibiotics. You were also found to have a ulcer on your 5th toe. Podiatry amputated. Wound culture revealed MRSA. You will be discharged on Antibiotics.   Wound care instructions: Wound Cleansed w/ NS; Dressed w/Xeroform / DSD / Kerlix / Light ACE; q24  Please follow up with Dr. Miller in outpatient Podiatry Clinic at [address] [place] [address], 1 week post-Discharge        SECONDARY DISCHARGE DIAGNOSES  Diagnosis: Splenic infarct  Assessment and Plan of Treatment: While here your CT abdomen showed Splenic infarct. This may be from an infection or a "hypercoagulable" state. You did not have a systemic infection as your blood cultures and echocardiogram was negative. Hematology was consulted for a hypercoagulable state and blood work was done but because they are very specific, they are not done in the hospital. Please follow up with your PCP and the hematologist Dr. Andrew mcclureing the results of this.    Diagnosis: Pulmonary nodule  Assessment and Plan of Treatment: You were found to have pulmonary nodules. Please follow up with a CT scan in 1 year.    Diagnosis: Chronic systolic congestive heart failure  Assessment and Plan of Treatment: Please do not continue your Entresto as this may have caused your angioedema (swelling of the tongue)

## 2023-04-07 NOTE — DISCHARGE NOTE PROVIDER - HOSPITAL COURSE
82 year old Female with a past medical history of dementia, HTN, HDL, HFrEF, mitral valve regurgitation hypothyroidism, TIA, cirrhosis, neuropathy,  gout, RA, cataract, diplopia, skin ca, left 2nd toe amputation 1/2023 presented for evaluation of "fever" as per pts full time aide. Patient was admitted to medicine for Metabolic encephalopathy secondary to Sepsis present on admission due to UTI and Left 5th Digit Ulcer. UA revealed large LE and Many bacteria, without Nitrites. Urine culture was contaminated. Patient was treated with Intravenous Antibiotics. Podiatry was consulted and performed a complete amputation of Left 5th toe. Infectious disease was consulted and recommended to continue with Zyvox and Linezolid. Wound culture revealed MRSA. Echocardiogram revealed no signs of vegetations. C. Diff was negative. Patient is stable for discharge.      82 year old Female with a past medical history of dementia, HTN, HDL, HFrEF, mitral valve regurgitation hypothyroidism, TIA, cirrhosis, neuropathy,  gout, RA, cataract, diplopia, skin ca, left 2nd toe amputation 1/2023 presented for evaluation of "fever" as per pts full time aide. Patient was admitted to medicine for Metabolic encephalopathy secondary to Sepsis present on admission due to UTI and Left 5th Digit Ulcer. UA revealed large LE and Many bacteria, without Nitrites. Urine culture was contaminated. Patient was treated with Intravenous Antibiotics. Podiatry was consulted and performed a complete amputation of Left 5th toe. Infectious disease was consulted and recommended to continue with Zyvox and Linezolid. Wound culture revealed MRSA. Echocardiogram revealed no signs of vegetations. C. Diff was negative. Patient is stable for discharge. While here, a CT abd showed a splenic infarct. Blood cultures were negative. Hypercoagulable w/u was started and instructed to follow up as an outpatient for the results. 82 year old Female with a past medical history of dementia, HTN, HDL, HFrEF, mitral valve regurgitation hypothyroidism, TIA, cirrhosis, neuropathy,  gout, RA, cataract, diplopia, skin ca, left 2nd toe amputation 1/2023 presented for evaluation of "fever" as per pts full time aide. Patient was admitted to medicine for Metabolic encephalopathy secondary to Sepsis present on admission due to UTI and Left 5th Digit Ulcer. UA revealed large LE and Many bacteria, without Nitrites. Urine culture was contaminated. Patient was treated with Intravenous Antibiotics. Podiatry was consulted and performed a complete amputation of Left 5th toe. Infectious disease was consulted and recommended to continue with Zyvox and Linezolid. Wound culture revealed MRSA. Echocardiogram revealed no signs of vegetations. C. Diff was negative. Patient is stable for discharge. While here, a CT abd showed a splenic infarct. Blood cultures were negative. Hypercoagulable w/u was started and instructed to follow up as an outpatient for the results.  Of note, patient did have episode of angioedema and patient was given steroids, benadryl, famotidine and resolved. Seen by intensivist and recommended to monitor on the floor. Symptoms since have resolved and tongue is back to baseline confirmed by patient. She did not experience any respiratory distress throughout encounter.

## 2023-04-07 NOTE — CONSULT NOTE ADULT - ATTENDING COMMENTS
4/4  pt sent to ED for left 5th toe infection. exposed bone. Needs a 5th toe amputation  case d/w with patients daughter Ela. Xray findings discussed-->due to poor visualization OM could not be confirmed. However given superficial necrosis of distal lateral 5th toe and dorsal pipj ulcer w/ exposed bone, the toe is unlikely to be salvagable. The daughter was in agreement to proceed with surgery/amputation  Case d/w with patients cardiologist. Dr. Plummer; patient was recently seen. No additional cardiology preop work up needed. Ok to proceed with surgery. Local w/ sedation
pt seen examined 46 M spent discussing acse with med staff and reviewing lab and xray and examining the patient  Tounge swelling  pt not sob or in resp distress    -Change antibiotics to Doxy  - start solumedrol Iv  Benadryl 25po q6 for one day then prn

## 2023-04-07 NOTE — PROGRESS NOTE ADULT - SUBJECTIVE AND OBJECTIVE BOX
HIRA, ARIEL  82y, Female  Allergy: Keflex (Unknown)  losartan (Unknown)  morphine (Unknown)  Rocephin (Unknown)  sulfamethoxazole (Hives)      LOS  5d    CHIEF COMPLAINT: 5th digit ulcer (07 Apr 2023 07:25)      INTERVAL EVENTS/HPI  - No acute events overnight  - T(F): , Max: 99 (04-06-23 @ 20:10)  - Denies any worsening symptoms  - Tolerating medication  - WBC Count: 6.22 (04-06-23 @ 05:54)     - Creatinine, Serum: 1.1 (04-07-23 @ 05:48)  Creatinine, Serum: 1.3 (04-06-23 @ 05:54)       ROS  General: Denies rigors, nightsweats  HEENT: Denies headache, rhinorrhea, sore throat, eye pain  CV: Denies CP, palpitations  PULM: Denies wheezing, hemoptysis  GI: Denies hematemesis, hematochezia, melena  : Denies discharge, hematuria  MSK: Denies arthralgias, myalgias  SKIN: Denies rash, lesions  NEURO: Denies paresthesias, weakness  PSYCH: Denies depression, anxiety    VITALS:  T(F): 98.8, Max: 99 (04-06-23 @ 20:10)  HR: 52  BP: 169/71  RR: 26Vital Signs Last 24 Hrs  T(C): 37.1 (07 Apr 2023 04:16), Max: 37.2 (06 Apr 2023 20:10)  T(F): 98.8 (07 Apr 2023 04:16), Max: 99 (06 Apr 2023 20:10)  HR: 52 (07 Apr 2023 06:23) (52 - 80)  BP: 169/71 (07 Apr 2023 06:23) (115/53 - 169/71)  BP(mean): --  RR: 26 (07 Apr 2023 04:16) (16 - 26)  SpO2: 96% (07 Apr 2023 04:16) (95% - 96%)    Parameters below as of 07 Apr 2023 04:16  Patient On (Oxygen Delivery Method): room air        PHYSICAL EXAM:  Gen: NAD, resting in bed  HEENT: Normocephalic, atraumatic  Neck: supple, no lymphadenopathy  CV: Regular rate & regular rhythm  Lungs: decreased BS at bases, no fremitus  Abdomen: Soft, BS present  Ext: Warm, well perfused  Neuro: non focal, awake  Skin: no rash, no erythema  Lines: no phlebitis    FH: Non-contributory  Social Hx: Non-contributory    TESTS & MEASUREMENTS:                        8.5    6.22  )-----------( 99       ( 06 Apr 2023 05:54 )             26.8     04-07    134<L>  |  100  |  24<H>  ----------------------------<  89  3.8   |  23  |  1.1    Ca    8.4      07 Apr 2023 05:48    TPro  6.2  /  Alb  2.3<L>  /  TBili  0.3  /  DBili  x   /  AST  37  /  ALT  11  /  AlkPhos  100  04-06      LIVER FUNCTIONS - ( 06 Apr 2023 05:54 )  Alb: 2.3 g/dL / Pro: 6.2 g/dL / ALK PHOS: 100 U/L / ALT: 11 U/L / AST: 37 U/L / GGT: x               Culture - Urine (collected 04-03-23 @ 11:05)  Source: Clean Catch Clean Catch (Midstream)  Final Report (04-04-23 @ 19:47):    Culture grew 3 or more types of organisms which indicate    collection contamination; consider recollection only if clinically    indicated.    Culture - Abscess with Gram Stain (collected 04-02-23 @ 19:10)  Source: .Abscess Left 5th toe wound  Preliminary Report (04-04-23 @ 16:57):    Moderate Methicillin Resistant Staphylococcus aureus  Organism: Methicillin resistant Staphylococcus aureus (04-04-23 @ 16:57)  Organism: Methicillin resistant Staphylococcus aureus (04-04-23 @ 16:57)      Method Type: GREGORIO      -  Ampicillin/Sulbactam: R 16/8      -  Cefazolin: R >16      -  Clindamycin: R >4      -  Daptomycin: S 0.5      -  Erythromycin: R >4      -  Gentamicin: R >8 Should not be used as monotherapy      -  Linezolid: S 2      -  Oxacillin: R >2      -  Penicillin: R >8      -  Rifampin: S <=1 Should not be used as monotherapy      -  Tetracycline: S 2      -  Trimethoprim/Sulfamethoxazole: S <=0.5/9.5      -  Vancomycin: S 1    Culture - Blood (collected 04-02-23 @ 17:37)  Source: .Blood Blood-Peripheral  Preliminary Report (04-04-23 @ 01:03):    No growth to date.    Culture - Blood (collected 04-02-23 @ 17:36)  Source: .Blood Blood-Peripheral  Preliminary Report (04-04-23 @ 01:03):    No growth to date.        Lactate, Blood: 1.7 mmol/L (04-04-23 @ 06:24)  Lactate, Blood: 2.9 mmol/L (04-03-23 @ 22:30)  Lactate, Blood: 3.3 mmol/L (04-03-23 @ 16:05)  Lactate, Blood: 4.0 mmol/L (04-03-23 @ 10:20)  Lactate, Blood: 2.2 mmol/L (04-02-23 @ 19:46)  Blood Gas Venous - Lactate: 5.00 mmol/L (04-02-23 @ 19:07)      INFECTIOUS DISEASES TESTING  COVID-19 PCR: NotDetec (04-02-23 @ 17:39)  COVID-19 PCR: NotDetec (01-21-23 @ 13:40)  COVID-19 PCR: NotDetec (01-21-23 @ 09:00)  COVID-19 PCR: NotDetec (01-17-23 @ 16:40)  COVID-19 PCR: NotDetec (01-09-23 @ 11:08)  COVID-19 PCR: NotDetec (01-02-23 @ 15:30)  COVID-19 PCR: NotDetec (09-19-22 @ 14:50)  COVID-19 PCR: NotDetec (09-16-22 @ 01:05)  COVID-19 PCR: NotDetec (09-09-22 @ 11:30)      INFLAMMATORY MARKERS  C-Reactive Protein, Serum: 163.5 mg/L (04-03-23 @ 14:06)  Sedimentation Rate, Erythrocyte: 140 mm/Hr (01-09-23 @ 07:44)  C-Reactive Protein, Serum: 90 mg/L (01-09-23 @ 07:44)  Sedimentation Rate, Erythrocyte: 130 mm/Hr (01-07-23 @ 07:37)      RADIOLOGY & ADDITIONAL TESTS:  I have personally reviewed the last available Chest xray  CXR      CT      CARDIOLOGY TESTING  12 Lead ECG:   Ventricular Rate 58 BPM    Atrial Rate 58 BPM    P-R Interval 104 ms    QRS Duration 98 ms    Q-T Interval 532 ms    QTC Calculation(Bazett) 522 ms    P Axis 83 degrees    R Axis 24 degrees    T Axis 73 degrees    Diagnosis Line Sinus bradycardia with short CO  Nonspecific ST and T wave abnormality  Abnormal ECG    Confirmed by SHILPI CERVANTES MD ([id number]) on 4/4/2023 9:33:34 AM (04-04-23 @ 09:00)  12 Lead ECG:   Ventricular Rate 60 BPM    Atrial Rate 60 BPM    QRS Duration 96 ms    Q-T Interval 428 ms    QTC Calculation(Bazett) 428 ms    R Axis 25 degrees    T Axis 247 degrees    Diagnosis Line Junctional rhythm  Nonspecific ST-T changes  Confirmed by SHILPI CERVANTES MD ([id number]) on 4/4/2023 9:33:25 AM (04-04-23 @ 08:59)      MEDICATIONS  ascorbic acid 500 Oral daily  aspirin  chewable 81 Oral daily  calcium carbonate   1250 mG (OsCal) 1 Oral daily  clopidogrel Tablet 75 Oral daily  donepezil 10 Oral at bedtime  ferrous    sulfate 325 Oral daily  folic acid 1 Oral daily  furosemide    Tablet 20 Oral daily  heparin   Injectable 5000 SubCutaneous every 8 hours  levothyroxine 50 Oral daily  linezolid  IVPB     linezolid  IVPB 600 IV Intermittent once  linezolid  IVPB 600 IV Intermittent every 12 hours  linezolid  IVPB 600 IV Intermittent every 12 hours  metoprolol succinate ER 50 Oral every 12 hours  pantoprazole    Tablet 40 Oral before breakfast  piperacillin/tazobactam IVPB.. 3.375 IV Intermittent every 8 hours  sacubitril 24 mG/valsartan 26 mG 1 Oral two times a day  sodium chloride 0.9%. 1000 IV Continuous <Continuous>  spironolactone 25 Oral <User Schedule>  zinc sulfate 220 Oral daily      WEIGHT  Weight (kg): 38.6 (01-18-23 @ 11:27)  Creatinine, Serum: 1.1 mg/dL (04-07-23 @ 05:48)      ANTIBIOTICS:  linezolid  IVPB      linezolid  IVPB 600 milliGRAM(s) IV Intermittent once  linezolid  IVPB 600 milliGRAM(s) IV Intermittent every 12 hours  linezolid  IVPB 600 milliGRAM(s) IV Intermittent every 12 hours  piperacillin/tazobactam IVPB.. 3.375 Gram(s) IV Intermittent every 8 hours      All available historical records have been reviewed

## 2023-04-07 NOTE — DISCHARGE NOTE PROVIDER - CARE PROVIDERS DIRECT ADDRESSES
,[email or web address] [email or web address] of [email or web address]

## 2023-04-07 NOTE — DISCHARGE NOTE PROVIDER - CARE PROVIDER_API CALL
Mayelin Paz)  Internal Medicine  [address] [place] [address]  Phone: [phone]  Fax: [phone]  Follow Up Time: 1-3 days    Andrew Mcghee)  Internal Medicine; Medical Oncology  [address] [place] [address]  Phone: [phone]  Fax: [phone]  Follow Up Time: 2 weeks    Adria Miller)  Foot Surgery; Podiatric Medicine  [address] [place] [address], [address] [place] [address]  Phone: [phone]  Fax: [phone]  Follow Up Time: 1 week   Andrew Mcghee)  Internal Medicine; Medical Oncology  [address] [place] [address]  Phone: [phone]  Fax: [phone]  Follow Up Time: 2 weeks    Adria Miller (DPM)  Foot Surgery; Podiatric Medicine  [address] [place] [address], [address] [place] [address]  Phone: [phone]  Fax: [phone]  Follow Up Time: 1 week    Jessica Cyr (DO)  Family Medicine  [address] [facility] [address]  [place] [address]  Phone: [phone]  Fax: [phone]  Follow Up Time:

## 2023-04-07 NOTE — DISCHARGE NOTE PROVIDER - PROVIDER TOKENS
PROVIDER:[TOKEN:[[id number]],FOLLOWUP:[1-3 days]],PROVIDER:[TOKEN:[[id number]],FOLLOWUP:[2 weeks]],PROVIDER:[TOKEN:[[id number]],FOLLOWUP:[1 week]] PROVIDER:[TOKEN:[[id number]],FOLLOWUP:[2 weeks]],PROVIDER:[TOKEN:[[id number]],FOLLOWUP:[1 week]],PROVIDER:[TOKEN:[[id number]]]

## 2023-04-07 NOTE — DISCHARGE NOTE PROVIDER - NSDCFUSCHEDAPPT_GEN_ALL_CORE_FT
Caron Montes  [facility] Physician [facility]  VASCULAR [address]  Scheduled Appointment: 05/08/2023    Roberta Hong  [facility] Physician [facility]  CARDIOLOGY [address]  Scheduled Appointment: 05/31/2023

## 2023-04-07 NOTE — PROVIDER CONTACT NOTE (OTHER) - SITUATION
Pt c/o tongue swelling. Pt is AAOx4 with periods of confusion able to be reoriented at baseline. Tongue noted to be swollen. SpO2 100% on RA, O2 at 2LPM applied prophylactically.

## 2023-04-07 NOTE — DISCHARGE NOTE PROVIDER - NSDCMRMEDTOKEN_GEN_ALL_CORE_FT
allopurinol 100 mg oral tablet: 1 tab(s) orally 2 times a day  ascorbic acid 500 mg oral tablet: 1 tab(s) orally once a day  Aspir 81 oral delayed release tablet: 1 tab(s) orally once a day  calcium (as carbonate and lactate)-vitamin D 200 mg-6.25 mcg oral tablet: 2 tab(s) orally 2 times a day  donepezil 10 mg oral tablet: 1 tab(s) orally once a day (at bedtime)  DULoxetine 60 mg oral delayed release capsule: 1 cap(s) orally once a day  Entresto 24 mg-26 mg oral tablet: 1 tab(s) orally 2 times a day  ferrous sulfate 325 mg (65 mg elemental iron) oral tablet: orally once a day  folic acid 1 mg oral tablet: 1 tab(s) orally once a day  furosemide 40 mg oral tablet: 1 tab(s) orally once a day  levothyroxine 50 mcg (0.05 mg) oral tablet: 1 tab(s) orally once a day  linezolid 600 mg oral tablet: 1 tab(s) orally every 12 hours  metoprolol succinate 50 mg oral tablet, extended release: 1 tab(s) orally 2 times a day  Orencia 125 mg/mL subcutaneous solution: 1 dose(s) subcutaneous once a week on Tuesdays  oxybutynin 10 mg/24 hr oral tablet, extended release: 1 tab(s) orally 2 times a day  pantoprazole 40 mg oral delayed release tablet: 1 tab(s) orally 2 times a day   Plavix 75 mg oral tablet: 1 tab(s) orally once a day   spironolactone 25 mg oral tablet: 1 tab(s) orally every other day  zinc sulfate 220 mg oral capsule: 1 cap(s) orally once a day   allopurinol 100 mg oral tablet: 1 tab(s) orally 2 times a day  ascorbic acid 500 mg oral tablet: 1 tab(s) orally once a day  Aspir 81 oral delayed release tablet: 1 tab(s) orally once a day  calcium (as carbonate and lactate)-vitamin D 200 mg-6.25 mcg oral tablet: 2 tab(s) orally 2 times a day  donepezil 10 mg oral tablet: 1 tab(s) orally once a day (at bedtime)  doxycycline hyclate 100 mg oral tablet: 1 tab(s) orally 2 times a day  DULoxetine 60 mg oral delayed release capsule: 1 cap(s) orally once a day  Entresto 24 mg-26 mg oral tablet: 1 tab(s) orally 2 times a day  ferrous sulfate 325 mg (65 mg elemental iron) oral tablet: orally once a day  folic acid 1 mg oral tablet: 1 tab(s) orally once a day  furosemide 20 mg oral tablet: 1 tab(s) orally once a day  levothyroxine 50 mcg (0.05 mg) oral tablet: 1 tab(s) orally once a day  metoprolol succinate 50 mg oral tablet, extended release: 1 tab(s) orally 2 times a day  Orencia 125 mg/mL subcutaneous solution: 1 dose(s) subcutaneous once a week on Tuesdays  oxybutynin 10 mg/24 hr oral tablet, extended release: 1 tab(s) orally 2 times a day  pantoprazole 40 mg oral delayed release tablet: 1 tab(s) orally 2 times a day   Plavix 75 mg oral tablet: 1 tab(s) orally once a day   spironolactone 25 mg oral tablet: 1 tab(s) orally every other day  zinc sulfate 220 mg oral capsule: 1 cap(s) orally once a day   allopurinol 100 mg oral tablet: 1 tab(s) orally 2 times a day  ascorbic acid 500 mg oral tablet: 1 tab(s) orally once a day  Aspir 81 oral delayed release tablet: 1 tab(s) orally once a day  calcium (as carbonate and lactate)-vitamin D 200 mg-6.25 mcg oral tablet: 2 tab(s) orally 2 times a day  donepezil 10 mg oral tablet: 1 tab(s) orally once a day (at bedtime)  doxycycline hyclate 100 mg oral tablet: 1 tab(s) orally 2 times a day  DULoxetine 60 mg oral delayed release capsule: 1 cap(s) orally once a day  Eliquis 5 mg oral tablet: 1 tab(s) orally 2 times a day  Eliquis Starter Pack for Treatment of DVT and PE 5 mg oral tablet: 2 tab(s) orally 2 times a day  Entresto 24 mg-26 mg oral tablet: 1 tab(s) orally 2 times a day  ferrous sulfate 325 mg (65 mg elemental iron) oral tablet: orally once a day  folic acid 1 mg oral tablet: 1 tab(s) orally once a day  furosemide 20 mg oral tablet: 1 tab(s) orally once a day  levothyroxine 50 mcg (0.05 mg) oral tablet: 1 tab(s) orally once a day  metoprolol succinate 50 mg oral tablet, extended release: 1 tab(s) orally 2 times a day  Orencia 125 mg/mL subcutaneous solution: 1 dose(s) subcutaneous once a week on Tuesdays  oxybutynin 10 mg/24 hr oral tablet, extended release: 1 tab(s) orally 2 times a day  pantoprazole 40 mg oral delayed release tablet: 1 tab(s) orally 2 times a day   Plavix 75 mg oral tablet: 1 tab(s) orally once a day   spironolactone 25 mg oral tablet: 1 tab(s) orally every other day  zinc sulfate 220 mg oral capsule: 1 cap(s) orally once a day   allopurinol 100 mg oral tablet: 1 tab(s) orally 2 times a day  ascorbic acid 500 mg oral tablet: 1 tab(s) orally once a day  Aspir 81 oral delayed release tablet: 1 tab(s) orally once a day  calcium (as carbonate and lactate)-vitamin D 200 mg-6.25 mcg oral tablet: 2 tab(s) orally 2 times a day  diphenhydrAMINE 25 mg oral tablet: 1 tab(s) orally every 8 hours  donepezil 10 mg oral tablet: 1 tab(s) orally once a day (at bedtime)  doxycycline hyclate 100 mg oral tablet: 1 tab(s) orally 2 times a day  DULoxetine 60 mg oral delayed release capsule: 1 cap(s) orally once a day  Eliquis 5 mg oral tablet: 1 tab(s) orally 2 times a day  Eliquis Starter Pack for Treatment of DVT and PE 5 mg oral tablet: 2 tab(s) orally 2 times a day  famotidine 10 mg oral tablet: 1 tab(s) orally 2 times a day  ferrous sulfate 325 mg (65 mg elemental iron) oral tablet: orally once a day  folic acid 1 mg oral tablet: 1 tab(s) orally once a day  furosemide 20 mg oral tablet: 1 tab(s) orally once a day  levothyroxine 50 mcg (0.05 mg) oral tablet: 1 tab(s) orally once a day  metoprolol succinate 50 mg oral tablet, extended release: 1 tab(s) orally 2 times a day  Orencia 125 mg/mL subcutaneous solution: 1 dose(s) subcutaneous once a week on Tuesdays  oxybutynin 10 mg/24 hr oral tablet, extended release: 1 tab(s) orally 2 times a day  pantoprazole 40 mg oral delayed release tablet: 1 tab(s) orally 2 times a day   spironolactone 25 mg oral tablet: 1 tab(s) orally every other day  zinc sulfate 220 mg oral capsule: 1 cap(s) orally once a day   allopurinol 100 mg oral tablet: 1 tab(s) orally 2 times a day  ascorbic acid 500 mg oral tablet: 1 tab(s) orally once a day  Aspir 81 oral delayed release tablet: 1 tab(s) orally once a day  calcium (as carbonate and lactate)-vitamin D 200 mg-6.25 mcg oral tablet: 2 tab(s) orally 2 times a day  diphenhydrAMINE 25 mg oral tablet: 1 tab(s) orally every 8 hours  donepezil 10 mg oral tablet: 1 tab(s) orally once a day (at bedtime)  doxycycline hyclate 100 mg oral tablet: 1 tab(s) orally 2 times a day  DULoxetine 60 mg oral delayed release capsule: 1 cap(s) orally once a day  Eliquis 5 mg oral tablet: 1 tab(s) orally 2 times a day  Eliquis Starter Pack for Treatment of DVT and PE 5 mg oral tablet: 2 tab(s) orally 2 times a day  famotidine 10 mg oral tablet: 1 tab(s) orally 2 times a day  ferrous sulfate 325 mg (65 mg elemental iron) oral tablet: orally once a day  folic acid 1 mg oral tablet: 1 tab(s) orally once a day  furosemide 20 mg oral tablet: 1 tab(s) orally once a day  levothyroxine 50 mcg (0.05 mg) oral tablet: 1 tab(s) orally once a day  metoprolol succinate 50 mg oral tablet, extended release: 1 tab(s) orally 2 times a day  Orencia 125 mg/mL subcutaneous solution: 1 dose(s) subcutaneous once a week on Tuesdays  oxybutynin 10 mg/24 hr oral tablet, extended release: 1 tab(s) orally 2 times a day  pantoprazole 40 mg oral delayed release tablet: 1 tab(s) orally 2 times a day   predniSONE 20 mg oral tablet: 1 tab(s) orally once a day  spironolactone 25 mg oral tablet: 1 tab(s) orally every other day  zinc sulfate 220 mg oral capsule: 1 cap(s) orally once a day   allopurinol 100 mg oral tablet: 1 tab(s) orally 2 times a day  ascorbic acid 500 mg oral tablet: 1 tab(s) orally once a day  Aspir 81 oral delayed release tablet: 1 tab(s) orally once a day  calcium (as carbonate and lactate)-vitamin D 200 mg-6.25 mcg oral tablet: 2 tab(s) orally 2 times a day  diphenhydrAMINE 25 mg oral tablet: 1 tab(s) orally every 8 hours  donepezil 10 mg oral tablet: 1 tab(s) orally once a day (at bedtime)  doxycycline hyclate 100 mg oral tablet: 1 tab(s) orally 2 times a day  DULoxetine 60 mg oral delayed release capsule: 1 cap(s) orally once a day  Eliquis 5 mg oral tablet: 2 tab(s) orally 2 times a day  Eliquis 5 mg oral tablet: 1 tab(s) orally 2 times a day  famotidine 10 mg oral tablet: 1 tab(s) orally 2 times a day  ferrous sulfate 325 mg (65 mg elemental iron) oral tablet: orally once a day  folic acid 1 mg oral tablet: 1 tab(s) orally once a day  furosemide 20 mg oral tablet: 1 tab(s) orally once a day  levothyroxine 50 mcg (0.05 mg) oral tablet: 1 tab(s) orally once a day  metoprolol succinate 50 mg oral tablet, extended release: 1 tab(s) orally 2 times a day  Orencia 125 mg/mL subcutaneous solution: 1 dose(s) subcutaneous once a week on Tuesdays  oxybutynin 10 mg/24 hr oral tablet, extended release: 1 tab(s) orally 2 times a day  pantoprazole 40 mg oral delayed release tablet: 1 tab(s) orally 2 times a day   predniSONE 20 mg oral tablet: 1 tab(s) orally once a day  spironolactone 25 mg oral tablet: 1 tab(s) orally every other day  zinc sulfate 220 mg oral capsule: 1 cap(s) orally once a day

## 2023-04-07 NOTE — PROGRESS NOTE ADULT - ASSESSMENT
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 On admission, she found to have no fever, but hypoxia requiring 4 liter oxygen via NC. The CXR shows no infiltrate but Xray of left foot shows  a partial amputation of the second digit. There is adjacent air. Questionable residual proximal second phalanx with some indistinctness, which could reflect osteomyelitis. She has started on Clindamycin and Levaquin. and the ID consult requested to assist with further evaluation and antibiotic management.    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  Spectra [phone]

## 2023-04-07 NOTE — CONSULT NOTE ADULT - NSCONSULTADDITIONALINFOA_GEN_ALL_CORE
pt seen examined   Tounge swelling  pt not sob or in resp distress    -Change antibiotics to Doxy  - start solumedrol Iv  Benadryl 25po q6 for one day then prn

## 2023-04-07 NOTE — PROGRESS NOTE ADULT - SUBJECTIVE AND OBJECTIVE BOX
Podiatry Progress Note    Subjective:   ARIEL INIGUEZ is a pleasant well-nourished, well developed 82y Female in no acute distress, alert awake, and oriented to person, place and time.  Patient is a 82y old  Female who presents with a chief complaint of 5th digit ulcer (07 Apr 2023 07:25). Pt seen & evaluated at bedside. Dressings D/C/I to       PAST MEDICAL & SURGICAL HISTORY:  Hypothyroid      Hypertension      RA (rheumatoid arthritis)      High cholesterol      Poor circulation  PAD      Incontinence      Dementia      Rheumatoid arthritis      Osteopenia      Melanoma of skin      Depression with anxiety      Bilateral cataracts      Peripheral arterial disease  (s/p B/L leg stents & on Plavix)      History of cirrhosis of liver      History of chronic CHF      History of hip surgery  Left ORIF and Left ORIF femur      H/O: hysterectomy           Objective:  Vital Signs Last 24 Hrs  T(C): 34.7 (07 Apr 2023 14:47), Max: 37.2 (06 Apr 2023 20:10)  T(F): 94.4 (07 Apr 2023 14:47), Max: 99 (06 Apr 2023 20:10)  HR: 65 (07 Apr 2023 14:47) (52 - 73)  BP: 146/61 (07 Apr 2023 14:47) (115/53 - 169/71)  BP(mean): --  RR: 16 (07 Apr 2023 14:47) (16 - 26)  SpO2: 96% (07 Apr 2023 04:16) (95% - 96%)    Parameters below as of 07 Apr 2023 04:16  Patient On (Oxygen Delivery Method): room air                            8.5    6.22  )-----------( 99       ( 06 Apr 2023 05:54 )             26.8                 04-07    134<L>  |  100  |  24<H>  ----------------------------<  89  3.8   |  23  |  1.1    Ca    8.4      07 Apr 2023 05:48    TPro  6.2  /  Alb  2.3<L>  /  TBili  0.3  /  DBili  x   /  AST  37  /  ALT  11  /  AlkPhos  100  04-06      --------------  ACC: [id number] EXAM: XR FOOT COMP MIN 3 VIEWS LT ORDERED BY: SHAKIR AGUIAR  PROCEDURE DATE: 04/04/2023  INTERPRETATION: Clinical History / Reason for exam: Osteomyelitis  Comparison made to April 2, 2023.  3 views of the left foot.  Findings/impression:  Since the prior study, patient status post amputation distal to the fifth metatarsal. Postsurgical air is noted. Patient also status post amputation distal to the second metatarsal. Hammertoes are noted within the third and fourth digits limiting evaluation. Bones are osteopenic with degenerative changes. Calcaneal spurring.  --- End of Report ---  POLI HARDY MD; Attending Radiologist  This document has been electronically signed. Apr 5 2023 11:15AM  ----------------    Physical Exam - Lower Extremity Focused:   #Left Lower Extremity  Derm:   5th toe amputation site   -Nylon sutures intact; Skin edges well-coapted; Dried sanguinous drainage to incision site   -Slight erythema to periwound - improved;    -No malodor or signs of ischemia   -Toe is warm, capillary refill is instant to the toes     Vascular: DP and PT Pulses Diminished; Foot is Warm to Warm to the touch   Neuro: Protective Sensation Diminished / Moderately Neuropathic   MSK: 2nd & 5th digit amputation. Mild Pain On Palpation at 5th digit amputation site. 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 All Questions and Concerns Addressed and Answered  Discussed diagnosis and treatment with patient  Wound Cleansed w/ NS; Dressed w/Xeroform / DSD / Kerlix / Light ACE; q24  Local Wound Care; As Stated Above   Wound Culture 4/2/23: Moderate MRSA; Abx per ID  POXR Imaging Left Foot; Since the prior study, patient status post amputation distal to the fifth metatarsal. Postsurgical air is noted; See full report above;   Lower Extremity Arterial Duplex B/L 4/4/23: Normal arterial flow in the bilateral lower extremities.  ID Recs appreciated; F/u Surgical Path; Abx recs appreciated;   Patient stable from Podiatry standpoint.  Patient can f/u w/ Dr. Miller in o/p Podiatry Clinic at [address] [place] [address] III, 1 week post-DSC;  No further surgical intervention per Podiatry  Discussed Plan w/ Attending    Podiatry

## 2023-04-07 NOTE — CONSULT NOTE ADULT - ASSESSMENT
IMPRESSION:  Suspected anaphylaxis secondary to abx use    PLAN:    CNS: avoid cns depressants    HEENT: Oral care    PULMONARY:    HOB @ 45 degrees.  Monitor airway, keep Pulse ox >94%  Albuterol PRN for wheezing/cough  Benadryl PRN for urticaria  start Famotidine 20mg qd  Start Methyprednisolone 1-2mg/kg qd   Epinephrine at bedside in case of airway compromise    CARDIOVASCULAR: Keep MAP >65    GI: GI prophylaxis.  PO Feeding.  Bowel regimen     RENAL:  Follow up lytes.  Correct as needed    INFECTIOUS DISEASE: d/c current zosyn and zyvox for suspected anaphylaxis    HEMATOLOGICAL:  DVT prophylaxis.    ENDOCRINE:  Follow up FS.  Insulin protocol if needed.    MUSCULOSKELETAL: pt/ot    Dispo: floor, please recall ICU PRN    
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 On admission, she found to have no fever, but hypoxia requiring 4 liter oxygen via NC. The CXR shows no infiltrate but Xray of left foot shows  a partial amputation of the second digit. There is adjacent air. Questionable residual proximal second phalanx with some indistinctness, which could reflect osteomyelitis. She has started on Clindamycin and Levaquin. and the ID consult requested to assist with further evaluation and antibiotic management.    # Left DFU with air and OM of partially amputated second toe- wound flushed and packed at the bed side by Podiatry on 4/2  # Leukocytosis  # Allergies:  Cephalosporins ( Keflex, Rocephine ) and Bactrim    would recommend:    1. Follow up wound and  Blood cultures, are in process  2. Monitor WBC count  3. Please change Clindamycin to Linezolid  as Antitoxin and MRSA Coverage   4. Change Levaquin to Zosyn since allergic to cephalosporins   5. Wound care as per Podiatry   6. CT chest to reassess for pneumonia since was hypoxic to 86 % in ER and Negative CXR  7. Supplemental oxygenation and bronchodilator as needed    will follow the patient with you and make further recommendation based on the clinical course and Lab results  Thank you for the opportunity to participate in Ms. INIGUEZ's care      Attending Attestation:    Spent more than 65 minutes on total encounter, more than 50 % of the visit was spent counseling and/or coordinating care by the Attending physician.      
82 year old female with multiple medical problems admitted with suspected sepsis (  abscess / osteo? )  responding to antibiotics , leukocytosis resolved . noted with multiple splenic infracts , no history of thrombophilia. NO vegetations on echo , MRSA on wound culture , negative blood culture ( endocarditis less likely ) , very high RF ( history of RA ? ) , multiple pulmonary nodules ( etiology ?)   mild thrombocytopenia   likely from sepsis . rule out DIC   normocytic anemia ( chronic disease including CKD )   suggest : [facility] APL panel ( lupus anticoagulant , cardiolipin antibodies , beta 2 glycoprotein antibodies ), DIC ( D dimers , fibrinogen ) ( no coagulopathy )                continue on ASA , plavix ,                ferritin , B12

## 2023-04-07 NOTE — DISCHARGE NOTE PROVIDER - NSDCFUADDAPPT_GEN_ALL_CORE_FT
APPTS ARE READY TO BE MADE: [ ] YES    Best Family or Patient Contact (if needed):    Additional Information about above appointments (if needed):    1: Dr. Mayelin Paz  2: Dr. Andrew Mcghee  3: Dr. Adria Miller    Other comments or requests:    APPTS ARE READY TO BE MADE: [ ] YES    Best Family or Patient Contact (if needed):    Additional Information about above appointments (if needed):    1: Dr. Jessica Cyr  2: Dr. Andrew Mcghee  3: Dr. Adria Miller    Other comments or requests:

## 2023-04-07 NOTE — CONSULT NOTE ADULT - SUBJECTIVE AND OBJECTIVE BOX
Patient is a 82y old  Female who presents with a chief complaint of 5th digit ulcer (07 Apr 2023 07:25)      HPI:  83 y/o F w/PMHx of dementia, HTN, HDL, HFrEF, mitral valve regurgitation hypothyroidism, TIA, cirrhosis, neuropathy,  gout, dementia, RA, cataract diplopia, skin ca, left 2nd toe amputation 1/2023 presented for evaluation of "fever" as per pts full time aide. No actual temp was taken by the aide. patient was diagnosed with COVID at home, but negative in ED, presented with generalized weakness and worsening mental status as per daughter, pt is unable to provide full history at this time. Daughter states pt has no respiratory distress, but notes confusion with decreased p.o. intake, pt is able to maintain urine output.  daughter denies any diarrhea, but admits to vomiting clear mucus after coughing. Pt denies CP, SOB, abd pain.   Pt is AA+Ox3m but confused with other questions.  (02 Apr 2023 21:04)      PAST MEDICAL & SURGICAL HISTORY:  Hypothyroid      Hypertension      RA (rheumatoid arthritis)      High cholesterol      Poor circulation  PAD      Incontinence      Dementia      Rheumatoid arthritis      Osteopenia      Melanoma of skin      Depression with anxiety      Bilateral cataracts      Peripheral arterial disease  (s/p B/L leg stents & on Plavix)      History of cirrhosis of liver      History of chronic CHF      History of hip surgery  Left ORIF and Left ORIF femur      H/O: hysterectomy          SOCIAL HX:   Smoking                         ETOH                            Other    FAMILY HISTORY:  .  No cardiovascular or pulmonary family history     REVIEW OF SYSTEMS:    All ROS are negative except per HPI     Allergies    Keflex (Unknown)  losartan (Unknown)  morphine (Unknown)  Rocephin (Unknown)  sulfamethoxazole (Hives)    Intolerances          PHYSICAL EXAM  Vital Signs Last 24 Hrs  T(C): 34.7 (07 Apr 2023 14:47), Max: 37.2 (06 Apr 2023 20:10)  T(F): 94.4 (07 Apr 2023 14:47), Max: 99 (06 Apr 2023 20:10)  HR: 65 (07 Apr 2023 14:47) (52 - 73)  BP: 146/61 (07 Apr 2023 14:47) (115/53 - 169/71)  BP(mean): --  RR: 16 (07 Apr 2023 14:47) (16 - 26)  SpO2: 96% (07 Apr 2023 04:16) (95% - 96%)    Parameters below as of 07 Apr 2023 04:16  Patient On (Oxygen Delivery Method): room air        CONSTITUTIONAL:  Well nourished.  NAD    ENT:   Airway patent,   No thrush    EYES:   Clear bilaterally,   pupils equal,   round and reactive to light.    CARDIAC:   Normal rate,   regular rhythm.    no edema      CAROTID:   normal systolic impulse  no bruits    RESPIRATORY:   No wheezing  Nnormal chest expansion  Not tachypneic,  No use of accessory muscles    GASTROINTESTINAL:  Abdomen soft,   non-tender,   no guarding,   + BS    GENITOURINARY  normal genitalia for sex  no edema    MUSCULOSKELETAL:   range of motion is not limited,  no clubbing, cyanosis    NEUROLOGICAL:   Alert and oriented   no motor deficits.    SKIN:   Skin normal color for race,   No evidence of rash.    PSYCHIATRIC:   normal mood and affect.   no apparent risk to self or others.    HEME LYMPH:   No cervical  lymphadenopathy.  no inguinal lymphadenopathy          LABS:                          8.5    6.22  )-----------( 99       ( 06 Apr 2023 05:54 )             26.8                                               04-07    134<L>  |  100  |  24<H>  ----------------------------<  89  3.8   |  23  |  1.1    Ca    8.4      07 Apr 2023 05:48    TPro  6.2  /  Alb  2.3<L>  /  TBili  0.3  /  DBili  x   /  AST  37  /  ALT  11  /  AlkPhos  100  04-06      PT/INR - ( 07 Apr 2023 12:39 )   PT: 11.30 sec;   INR: 0.99 ratio         PTT - ( 07 Apr 2023 12:39 )  PTT:34.3 sec                                                                                     LIVER FUNCTIONS - ( 06 Apr 2023 05:54 )  Alb: 2.3 g/dL / Pro: 6.2 g/dL / ALK PHOS: 100 U/L / ALT: 11 U/L / AST: 37 U/L / GGT: x                                                                                                MEDICATIONS  (STANDING):  ascorbic acid 500 milliGRAM(s) Oral daily  aspirin  chewable 81 milliGRAM(s) Oral daily  calcium carbonate   1250 mG (OsCal) 1 Tablet(s) Oral daily  diphenhydrAMINE 25 milliGRAM(s) Oral every 6 hours  donepezil 10 milliGRAM(s) Oral at bedtime  ferrous    sulfate 325 milliGRAM(s) Oral daily  folic acid 1 milliGRAM(s) Oral daily  furosemide    Tablet 20 milliGRAM(s) Oral daily  levothyroxine 50 MICROGram(s) Oral daily  metoprolol succinate ER 50 milliGRAM(s) Oral every 12 hours  pantoprazole    Tablet 40 milliGRAM(s) Oral before breakfast  spironolactone 25 milliGRAM(s) Oral <User Schedule>  zinc sulfate 220 milliGRAM(s) Oral daily    MEDICATIONS  (PRN):  acetaminophen     Tablet .. 650 milliGRAM(s) Oral every 6 hours PRN Temp greater or equal to 38C (100.4F), Mild Pain (1 - 3)  aluminum hydroxide/magnesium hydroxide/simethicone Suspension 30 milliLiter(s) Oral every 4 hours PRN Dyspepsia  ondansetron Injectable 4 milliGRAM(s) IV Push every 8 hours PRN Nausea and/or Vomiting  oxyCODONE    IR 5 milliGRAM(s) Oral three times a day PRN Moderate Pain (4 - 6)      X-Rays reviewed:    CXR interpreted by me:

## 2023-04-07 NOTE — PROGRESS NOTE ADULT - SUBJECTIVE AND OBJECTIVE BOX
Progress note    INTERVAL HPI/OVERNIGHT EVENTS:          REVIEW OF SYSTEMS:  All other 13 Review of systems were reviewed and are negative    FAMILY HISTORY:    T(C): 37.1 (04-07-23 @ 04:16), Max: 37.2 (04-06-23 @ 20:10)  HR: 52 (04-07-23 @ 06:23) (52 - 80)  BP: 169/71 (04-07-23 @ 06:23) (115/53 - 169/71)  RR: 26 (04-07-23 @ 04:16) (16 - 26)  SpO2: 96% (04-07-23 @ 04:16) (95% - 96%)  Wt(kg): --Vital Signs Last 24 Hrs  T(C): 37.1 (07 Apr 2023 04:16), Max: 37.2 (06 Apr 2023 20:10)  T(F): 98.8 (07 Apr 2023 04:16), Max: 99 (06 Apr 2023 20:10)  HR: 52 (07 Apr 2023 06:23) (52 - 80)  BP: 169/71 (07 Apr 2023 06:23) (115/53 - 169/71)  BP(mean): --  RR: 26 (07 Apr 2023 04:16) (16 - 26)  SpO2: 96% (07 Apr 2023 04:16) (95% - 96%)    Parameters below as of 07 Apr 2023 04:16  Patient On (Oxygen Delivery Method): room air      Keflex (Unknown)  losartan (Unknown)  morphine (Unknown)  Rocephin (Unknown)  sulfamethoxazole (Hives)      PHYSICAL EXAM:  GENERAL: NAD, well-groomed, well-developed  HEAD:  Atraumatic, Normocephalic  EYES: EOMI, PERRLA, conjunctiva and sclera clear  ENMT: No tonsillar erythema, exudates, or enlargement; Moist mucous membranes, Good dentition, No lesions  NECK: Supple, No JVD, Normal thyroid  NERVOUS SYSTEM:  Alert & Oriented X3, Good concentration; Motor Strength 5/5 B/L upper and lower extremities; DTRs 2+ intact and symmetric  CHEST/LUNG: Clear to percussion bilaterally; No rales, rhonchi, wheezing, or rubs  HEART: Regular rate and rhythm; No murmurs, rubs, or gallops  ABDOMEN: Soft, Nontender, Nondistended; Bowel sounds present  EXTREMITIES:  2+ Peripheral Pulses, No clubbing, cyanosis, or edema  LYMPH: No lymphadenopathy noted  SKIN: No rashes or lesions    Consultant(s) Notes Reviewed:  [x ] YES  [ ] NO  Care Discussed with Consultants/Other Providers [ x] YES  [ ] NO    LABS:      RADIOLOGY & ADDITIONAL TESTS:    Imaging Personally Reviewed:  [ ] YES  [ ] NO  acetaminophen     Tablet .. 650 milliGRAM(s) Oral every 6 hours PRN  aluminum hydroxide/magnesium hydroxide/simethicone Suspension 30 milliLiter(s) Oral every 4 hours PRN  ascorbic acid 500 milliGRAM(s) Oral daily  aspirin  chewable 81 milliGRAM(s) Oral daily  calcium carbonate   1250 mG (OsCal) 1 Tablet(s) Oral daily  clopidogrel Tablet 75 milliGRAM(s) Oral daily  donepezil 10 milliGRAM(s) Oral at bedtime  ferrous    sulfate 325 milliGRAM(s) Oral daily  folic acid 1 milliGRAM(s) Oral daily  furosemide    Tablet 20 milliGRAM(s) Oral daily  heparin   Injectable 5000 Unit(s) SubCutaneous every 8 hours  levothyroxine 50 MICROGram(s) Oral daily  linezolid  IVPB      linezolid  IVPB 600 milliGRAM(s) IV Intermittent once  linezolid  IVPB 600 milliGRAM(s) IV Intermittent every 12 hours  linezolid  IVPB 600 milliGRAM(s) IV Intermittent every 12 hours  metoprolol succinate ER 50 milliGRAM(s) Oral every 12 hours  ondansetron Injectable 4 milliGRAM(s) IV Push every 8 hours PRN  oxyCODONE    IR 5 milliGRAM(s) Oral three times a day PRN  pantoprazole    Tablet 40 milliGRAM(s) Oral before breakfast  piperacillin/tazobactam IVPB.. 3.375 Gram(s) IV Intermittent every 8 hours  sacubitril 24 mG/valsartan 26 mG 1 Tablet(s) Oral two times a day  sodium chloride 0.9%. 1000 milliLiter(s) IV Continuous <Continuous>  spironolactone 25 milliGRAM(s) Oral <User Schedule>  zinc sulfate 220 milliGRAM(s) Oral daily      HEALTH ISSUES - PROBLEM Dx:    83 y/o F w/PMHx of dementia, HTN, HDL, HFrEF, mitral valve regurgitation hypothyroidism, TIA, cirrhosis, neuropathy,  gout, RA, cataract, diplopia, skin ca, left 2nd toe amputation 1/2023 presented for evaluation of "fever" as per pts full time aide    Metabolic encephalopathy secondary to Sepsis present on admission d/t UTI and Left 5th Digit Ulcer   CT ab/pelvis: Acute colitis, splenic infarct?  - Podiatry following recs appreciated  - 4/4: Complete amputation L 5st toe   - ID recs appreciated  - Continue zyvox and linezolid  - f/u Bcx and Ucx  - CT chest- R basilar consolidation/atelectasis, infxn vs inflam  - TTE - no vegetations  - C diff negative  - Heme/onc consult recs appreciated  - Check APL panel ( lupus anticoagulant , cardiolipin antibodies , beta 2 glycoprotein antibodies ), DIC ( D dimers , fibrinogen )     Dementia  - c/w donepezil    HTN  HFrEF  - c/w lasix, spironolactone   - c/w metoprolol and Entresto    Hyperkalemia  -lokelma as needed    DM  - Diabetes is borderline based on the glucose laboratory results  - A1c >11    MVR  - c/w plavix and ASA, Will need to confirm med rec with family    Hypothyroidism  - c/w synthroid    Gout  - Holding allopurinol     DNR/DNI  Dispo: acute, will need 1 year f/u w/ ct chest for nodules      Total time spent to complete patient's bedside assessment, review medical chart, discuss medical plan of care with covering medical team was ____35____ with > 50% of time spent face to face w/ patient, discussion with patient/family and/or coordination of care Progress note    INTERVAL HPI/OVERNIGHT EVENTS:    Patient seen and examined at bedside. No acute distress. She states she wants to go home.      REVIEW OF SYSTEMS:  All other 13 Review of systems were reviewed and are negative    FAMILY HISTORY:    T(C): 37.1 (04-07-23 @ 04:16), Max: 37.2 (04-06-23 @ 20:10)  HR: 52 (04-07-23 @ 06:23) (52 - 80)  BP: 169/71 (04-07-23 @ 06:23) (115/53 - 169/71)  RR: 26 (04-07-23 @ 04:16) (16 - 26)  SpO2: 96% (04-07-23 @ 04:16) (95% - 96%)  Wt(kg): --Vital Signs Last 24 Hrs  T(C): 37.1 (07 Apr 2023 04:16), Max: 37.2 (06 Apr 2023 20:10)  T(F): 98.8 (07 Apr 2023 04:16), Max: 99 (06 Apr 2023 20:10)  HR: 52 (07 Apr 2023 06:23) (52 - 80)  BP: 169/71 (07 Apr 2023 06:23) (115/53 - 169/71)  BP(mean): --  RR: 26 (07 Apr 2023 04:16) (16 - 26)  SpO2: 96% (07 Apr 2023 04:16) (95% - 96%)    Parameters below as of 07 Apr 2023 04:16  Patient On (Oxygen Delivery Method): room air      Keflex (Unknown)  losartan (Unknown)  morphine (Unknown)  Rocephin (Unknown)  sulfamethoxazole (Hives)      PHYSICAL EXAM:  GENERAL: NAD, well-groomed, well-developed  HEAD:  Atraumatic, Normocephalic  EYES: EOMI, PERRLA, conjunctiva and sclera clear  ENMT: No tonsillar erythema, exudates, or enlargement; Moist mucous membranes, Good dentition, No lesions  NECK: Supple, No JVD, Normal thyroid  NERVOUS SYSTEM:  Alert & Oriented X3, Good concentration; Motor Strength 5/5 B/L upper and lower extremities; DTRs 2+ intact and symmetric  CHEST/LUNG: Clear to percussion bilaterally; No rales, rhonchi, wheezing, or rubs  HEART: Regular rate and rhythm; No murmurs, rubs, or gallops  ABDOMEN: Soft, Nontender, Nondistended; Bowel sounds present  EXTREMITIES:  2+ Peripheral Pulses, No clubbing, cyanosis, or edema  LYMPH: No lymphadenopathy noted  SKIN: No rashes or lesions    Consultant(s) Notes Reviewed:  [x ] YES  [ ] NO  Care Discussed with Consultants/Other Providers [ x] YES  [ ] NO    LABS:      RADIOLOGY & ADDITIONAL TESTS:    Imaging Personally Reviewed:  [ ] YES  [ ] NO  acetaminophen     Tablet .. 650 milliGRAM(s) Oral every 6 hours PRN  aluminum hydroxide/magnesium hydroxide/simethicone Suspension 30 milliLiter(s) Oral every 4 hours PRN  ascorbic acid 500 milliGRAM(s) Oral daily  aspirin  chewable 81 milliGRAM(s) Oral daily  calcium carbonate   1250 mG (OsCal) 1 Tablet(s) Oral daily  clopidogrel Tablet 75 milliGRAM(s) Oral daily  donepezil 10 milliGRAM(s) Oral at bedtime  ferrous    sulfate 325 milliGRAM(s) Oral daily  folic acid 1 milliGRAM(s) Oral daily  furosemide    Tablet 20 milliGRAM(s) Oral daily  heparin   Injectable 5000 Unit(s) SubCutaneous every 8 hours  levothyroxine 50 MICROGram(s) Oral daily  linezolid  IVPB      linezolid  IVPB 600 milliGRAM(s) IV Intermittent once  linezolid  IVPB 600 milliGRAM(s) IV Intermittent every 12 hours  linezolid  IVPB 600 milliGRAM(s) IV Intermittent every 12 hours  metoprolol succinate ER 50 milliGRAM(s) Oral every 12 hours  ondansetron Injectable 4 milliGRAM(s) IV Push every 8 hours PRN  oxyCODONE    IR 5 milliGRAM(s) Oral three times a day PRN  pantoprazole    Tablet 40 milliGRAM(s) Oral before breakfast  piperacillin/tazobactam IVPB.. 3.375 Gram(s) IV Intermittent every 8 hours  sacubitril 24 mG/valsartan 26 mG 1 Tablet(s) Oral two times a day  sodium chloride 0.9%. 1000 milliLiter(s) IV Continuous <Continuous>  spironolactone 25 milliGRAM(s) Oral <User Schedule>  zinc sulfate 220 milliGRAM(s) Oral daily      HEALTH ISSUES - PROBLEM Dx:    81 y/o F w/PMHx of dementia, HTN, HDL, HFrEF, mitral valve regurgitation hypothyroidism, TIA, cirrhosis, neuropathy,  gout, RA, cataract, diplopia, skin ca, left 2nd toe amputation 1/2023 presented for evaluation of "fever" as per pts full time aide    Metabolic encephalopathy secondary to Sepsis present on admission d/t UTI and Left 5th Digit Ulcer   CT ab/pelvis: Acute colitis, splenic infarct?  - Podiatry following recs appreciated  - 4/4: Complete amputation L 5st toe   - ID recs appreciated  - Continue zyvox and linezolid  - f/u Bcx and Ucx  - CT chest- R basilar consolidation/atelectasis, infxn vs inflam  - TTE - no vegetations  - C diff negative  - Heme/onc consult recs appreciated  - Check APL panel ( lupus anticoagulant , cardiolipin antibodies , beta 2 glycoprotein antibodies ), DIC ( D dimers , fibrinogen )     Dementia  - c/w donepezil    HTN  HFrEF  - c/w lasix, spironolactone   - c/w metoprolol and Entresto    Hyperkalemia  -lokelma as needed    DM  - Diabetes is borderline based on the glucose laboratory results  - A1c >11    MVR  - c/w plavix and ASA, Will need to confirm med rec with family    Hypothyroidism  - c/w synthroid    Gout  - Holding allopurinol     DNR/DNI  Dispo: acute, will need 1 year f/u w/ ct chest for nodules      Total time spent to complete patient's bedside assessment, review medical chart, discuss medical plan of care with covering medical team was ____35____ with > 50% of time spent face to face w/ patient, discussion with patient/family and/or coordination of care

## 2023-04-07 NOTE — PROVIDER CONTACT NOTE (OTHER) - ASSESSMENT
Pt AAOx4, /78, pulse 85, SpO2 100% on 2LPM via NC. Able to stick out tongue. Pt denies trouble breathing.

## 2023-04-08 NOTE — DISCHARGE NOTE NURSING/CASE MANAGEMENT/SOCIAL WORK - NSDCFUADDAPPT_GEN_ALL_CORE_FT
APPTS ARE READY TO BE MADE: [ ] YES    Best Family or Patient Contact (if needed):    Additional Information about above appointments (if needed):    1: Dr. Jessica Cyr  2: Dr. Andrew Mcghee  3: Dr. Adria Miller    Other comments or requests:

## 2023-04-08 NOTE — DISCHARGE NOTE NURSING/CASE MANAGEMENT/SOCIAL WORK - PATIENT PORTAL LINK FT
You can access the FollowMyHealth Patient Portal offered by [facility] by registering at the following website: [email or web address]. By joining [employer]’s FollowMyHealth portal, you will also be able to view your health information using other applications (apps) compatible with our system.

## 2023-04-08 NOTE — PROGRESS NOTE ADULT - PROVIDER SPECIALTY LIST ADULT
Hospitalist
Infectious Disease
Podiatry
Podiatry
Infectious Disease
Infectious Disease
Hospitalist

## 2023-04-08 NOTE — PROGRESS NOTE ADULT - SUBJECTIVE AND OBJECTIVE BOX
Progress note    INTERVAL HPI/OVERNIGHT EVENTS:    Patient seen and examined at bedside. Her tongue is back to baseline, denies any shortness of breath, speaking like she was prior to her episode last night. She wants to go home.      REVIEW OF SYSTEMS:  All other 13 Review of systems were reviewed and are negative    FAMILY HISTORY:    T(C): 35.6 (04-08-23 @ 04:47), Max: 35.7 (04-07-23 @ 21:57)  HR: 88 (04-08-23 @ 04:47) (65 - 88)  BP: 177/72 (04-08-23 @ 04:47) (146/61 - 177/72)  RR: 16 (04-08-23 @ 04:47) (16 - 16)  SpO2: 93% (04-08-23 @ 04:47) (93% - 97%)  Wt(kg): --Vital Signs Last 24 Hrs  T(C): 35.6 (08 Apr 2023 04:47), Max: 35.7 (07 Apr 2023 21:57)  T(F): 96 (08 Apr 2023 04:47), Max: 96.3 (07 Apr 2023 21:57)  HR: 88 (08 Apr 2023 04:47) (65 - 88)  BP: 177/72 (08 Apr 2023 04:47) (146/61 - 177/72)  BP(mean): --  RR: 16 (08 Apr 2023 04:47) (16 - 16)  SpO2: 93% (08 Apr 2023 04:47) (93% - 97%)      Keflex (Unknown)  losartan (Unknown)  morphine (Unknown)  Rocephin (Unknown)  sulfamethoxazole (Hives)      PHYSICAL EXAM:  GENERAL: NAD, well-groomed, well-developed  HEAD:  Atraumatic, Normocephalic  EYES: EOMI, PERRLA, conjunctiva and sclera clear  ENMT: normal nonedematous tongue.  NECK: Supple, No JVD, Normal thyroid  NERVOUS SYSTEM:  Alert & Oriented X3, Good concentration; Motor Strength 5/5 B/L upper and lower extremities; DTRs 2+ intact and symmetric  CHEST/LUNG: Clear to percussion bilaterally; No rales, rhonchi, wheezing, or rubs  HEART: Regular rate and rhythm; No murmurs, rubs, or gallops  ABDOMEN: Soft, Nontender, Nondistended; Bowel sounds present  EXTREMITIES:  2+ Peripheral Pulses, No clubbing, cyanosis, or edema  LYMPH: No lymphadenopathy noted  SKIN: No rashes or lesions    Consultant(s) Notes Reviewed:  [x ] YES  [ ] NO  Care Discussed with Consultants/Other Providers [ x] YES  [ ] NO    LABS:      RADIOLOGY & ADDITIONAL TESTS:    Imaging Personally Reviewed:  [ ] YES  [ ] NO  acetaminophen     Tablet .. 650 milliGRAM(s) Oral every 6 hours PRN  aluminum hydroxide/magnesium hydroxide/simethicone Suspension 30 milliLiter(s) Oral every 4 hours PRN  apixaban 10 milliGRAM(s) Oral every 12 hours  ascorbic acid 500 milliGRAM(s) Oral daily  aspirin  chewable 81 milliGRAM(s) Oral daily  calcium carbonate   1250 mG (OsCal) 1 Tablet(s) Oral daily  diphenhydrAMINE 25 milliGRAM(s) Oral every 6 hours PRN  donepezil 10 milliGRAM(s) Oral at bedtime  doxycycline IVPB 100 milliGRAM(s) IV Intermittent every 12 hours  famotidine    Tablet 20 milliGRAM(s) Oral two times a day  ferrous    sulfate 325 milliGRAM(s) Oral daily  folic acid 1 milliGRAM(s) Oral daily  furosemide    Tablet 20 milliGRAM(s) Oral daily  levothyroxine 50 MICROGram(s) Oral daily  metoprolol succinate ER 50 milliGRAM(s) Oral every 12 hours  ondansetron Injectable 4 milliGRAM(s) IV Push every 8 hours PRN  oxyCODONE    IR 5 milliGRAM(s) Oral three times a day PRN  pantoprazole    Tablet 40 milliGRAM(s) Oral before breakfast  spironolactone 25 milliGRAM(s) Oral <User Schedule>  zinc sulfate 220 milliGRAM(s) Oral daily      HEALTH ISSUES - PROBLEM Dx:    83 y/o F w/PMHx of dementia, HTN, HDL, HFrEF, mitral valve regurgitation hypothyroidism, TIA, cirrhosis, neuropathy,  gout, RA, cataract, diplopia, skin ca, left 2nd toe amputation 1/2023 presented for evaluation of "fever" as per pts full time aide    Metabolic encephalopathy secondary to Sepsis present on admission d/t UTI and Left 5th Digit Ulcer   CT ab/pelvis: Acute colitis, splenic infarct?  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 Recommended to d/w cardiology to dc one antiplatelet. D/w patients cardiologist inhouse who stated okay to dc plavix if starting Eliquis. Patient will be sent home on ASA and eliquis and will f/u with cardiology.    Dementia  - c/w donepezil    HTN  HFrEF  - c/w lasix, spironolactone   - c/w metoprolol and DC entresto, as this may have cause her angioedema    Hyperkalemia  -lokelma as needed    DM  - Diabetes is borderline based on the glucose laboratory results  - A1c >11    MVR  - Will dc plavix, c/w aspirin as we will start Eliquis    Hypothyroidism  - c/w synthroid    Gout  - Holding allopurinol     DNR/DNI  Dispo: will need 1 year f/u w/ ct chest for nodules      FOr discharge today

## 2023-04-08 NOTE — CHART NOTE - NSCHARTNOTEFT_GEN_A_CORE
Called for + wound (abscess) culture, Moderate MRSA. For now continue antibiotics including Zyvox, as ordered
Limited Note:  Patient length of stay >7days    Medical history:  83 y/o F w/PMHx of dementia, HTN, HDL, HFrEF, mitral valve regurgitation hypothyroidism, TIA, cirrhosis, neuropathy,  gout, RA, cataract, diplopia, skin ca, left 2nd toe amputation 1/2023 presented for evaluation of "fever" as per pts full time aide    Metabolic encephalopathy secondary to Sepsis present on admission d/t UTI and Left 5th Digit Ulcer     Subjective Information:   Met with pt and family at bedside. Pt reports that her appetite has improved and she was able to consume almost 100% of breakfast this AM. Pt has also been consuming ensure oral supplement 2x/day during admission; meeting nutritional needs.     Education:  Provided nutrition education regarding oral supplements.     Diet Order:  Diet, Regular:   DASH/TLC {Sodium & Cholesterol Restricted} (04-04-23 @ 16:22) [Active]    Anthropometrics:  no ht or wt documented in EMR  denies any unintentional weight loss    Appearance: no signs of musle/fat depletion on NFPE   Cognition: alert   Skin: No pressure injuries; left venous ulcer   Edema: no edema per documentation     Continue current diet order at this time    No nutrition interventions warranted at this time  patient is not at nutrition risk, will follow up PRN/ if consulted again  Guerita Oliva, RD #[id number] or via TEAMS
PACU ANESTHESIA ADMISSION NOTE      Procedure: Complete amputation of first toe of left foot by open approach      Post op diagnosis:  Diabetic toe ulcer        ____  Intubated  TV:______       Rate: ______      FiO2: ______    __x__  Patent Airway    ___x_  Full return of protective reflexes    __x__  Full recovery from anesthesia / back to baseline     Vitals:   T: 97.4          R: 16               BP: 114/58             Sat: 96%                   P: 58      Mental Status:  __x__ Awake   ___x__ Alert   _____ Drowsy   _____ Sedated    Nausea/Vomiting:  __x__ NO  ______Yes,   See Post - Op Orders          Pain Scale (0-10):  _____    Treatment: ____ None    __x__ See Post - Op/PCA Orders    Post - Operative Fluids:   ____ Oral   __x__ See Post - Op Orders    Plan: Discharge:   ____Home       __x___Floor     _____Critical Care    _____  Other:_________________
Patient had 3 BM's today  Will order   C. diff   GI PCR   Discussed with MD
Pt is scheduled for surgery; L 5th toe amputation, tomorrow, 4/4/23  Medical clearance obtained in H&P  NPO order active at [facility]  Please optimize lab values prior to OR     Thank You
I received a call from RN that patient developed a swollen tongue. Upon examination, tongue does appear swollen, Mallenpati 3-4. Patient denies any respiratory distress but speech is slow secondary to swollen tongue. Concern for airway protection. She is DNR/DNI. Will give High dose solumedrol and benadryl STAT. Check tryptase and C1, Dc abx, stop entresto which was started 6 months ago by patients cardiologist. D/w intensivist dominick who came and evaluated patient. Per ICU team, will monitor on the floor at this time.
Pharmacy notes significant potential interaction between Zyvox and Cymbalta. For now will discontinue Cymbalta pending review by day team

## 2023-04-19 PROBLEM — Z98.890 POSTOPERATIVE STATE: Status: ACTIVE | Noted: 2023-01-01

## 2023-04-19 PROBLEM — I73.9 PVD (PERIPHERAL VASCULAR DISEASE): Status: ACTIVE | Noted: 2022-01-01

## 2023-04-19 PROBLEM — L03.032 CELLULITIS OF TOE OF LEFT FOOT: Status: ACTIVE | Noted: 2023-01-01

## 2023-04-19 NOTE — ED ADULT NURSE NOTE - TEMPLATE
Problem: Patient Care Overview  Goal: Plan of Care/Patient Progress Review  Outcome: No Change  [id number]: A&O, VSS on CPAP ex soft BP's and slightly tachy. Denied pain and nausea and rested comfortably between cares. BLE with lymphedema wraps in place. BG stable. on tele w/ Afib. No urine output overnight; bladder scanned this morning for 75 cc; pt stated he has no urge to void. Will monitor and reassess. Up with AX2, GB, and walker. PICC patent ex white lumen occluded. Tolerating consistent CHO diet with FR of 1.5 L. Continue to monitor and follow POC.       General

## 2023-04-19 NOTE — ED PROVIDER NOTE - NSFOLLOWUPINSTRUCTIONS_ED_ALL_ED_FT
You have had variable blood counts over the last month, most likely secondary to recent illness, procedures and chronic iron deficiency anemia.    Please follow up with your doctor for continued outpatient monitoring.    Your most recent hemoglobins were:    4/19 -- 7.1 -- received 1 unit PRBC in ED  4/8 -- 9.1  4/8 -- 8.5  4/4 -- 7.8    Anemia    Anemia is a condition in which the concentration of red blood cells or hemoglobin in the blood is below normal. Hemoglobin is a substance in red blood cells that carries oxygen to the tissues of the body. Anemia results in not enough oxygen reaching these tissues which can cause symptoms such as weakness, dizziness/lightheadedness, shortness of breath, chest pain, paleness, or nausea. The cause of your anemia may or may not be determined immediately. If your hemoglobin was dangerously low, you may have received a blood transfusion. Usually reactions to transfusions occur immediately but monitor yourself for any fevers, rash, or shortness of breath.    SEEK IMMEDIATE MEDICAL CARE IF YOU HAVE ANY OF THE FOLLOWING SYMPTOMS: extreme weakness/chest pain/shortness of breath, black or bloody stools, vomiting blood, fainting, fever, or any signs of dehydration.

## 2023-04-19 NOTE — ED PROVIDER NOTE - CLINICAL SUMMARY MEDICAL DECISION MAKING FREE TEXT BOX
Hgb 7.1 today --- has been variable over the last month, patient transfused one unit.     Anemia likely 2/2 iron deficiency, no signs of Gi bleeding.    Will dc home with PMD follow up, return precautions, follow up.

## 2023-04-19 NOTE — HISTORY OF PRESENT ILLNESS
[Wheelchair] : a wheelchair [FreeTextEntry1] : 82 s/p left 5th toe amputation. patient doing well. Has a vascular follow up appointment to evaluate RLE

## 2023-04-19 NOTE — ED PROVIDER NOTE - OBJECTIVE STATEMENT
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 Previous Hgb in ED 9.1, 8.5. Patient denies blood in stool, abdominal pain, dizziness, CP, palpitations. Notes she just feels tired all the time.    Had one previous transfusion but circumstances are unclear, may have been related to acute blood loss due to GI bleeding.

## 2023-04-19 NOTE — PHYSICAL EXAM
[General Appearance - Alert] : alert [General Appearance - In No Acute Distress] : in no acute distress [Delayed in the Right Toes] : capillary refills delayed in the right toes [Delayed in the Left Toes] : capillary refills delayed in the left toes [Oriented To Time, Place, And Person] : oriented to person, place, and time [Impaired Insight] : insight and judgment were intact [FreeTextEntry1] : incision site well approximated. no skin necrosis. sutures intact. no erythema around the incision site.\par right 2nd toe early cyanotic changes at the distal tuft \par superficial skin abrasion right first toe

## 2023-04-19 NOTE — ED PROVIDER NOTE - PATIENT PORTAL LINK FT
You can access the FollowMyHealth Patient Portal offered by [facility] by registering at the following website: [email or web address]. By joining [employer]’s FollowMyHealth portal, you will also be able to view your health information using other applications (apps) compatible with our system.

## 2023-04-19 NOTE — ED PROVIDER NOTE - PHYSICAL EXAMINATION
VITAL SIGNS: I have reviewed nursing notes and confirm.  CONSTITUTIONAL: thin, chronically ill  SKIN: dry, poor turgor  HEAD: Normocephalic; atraumatic.  EYES: PERRL, EOM intact; conjunctiva and sclera clear.  ENT: No nasal discharge; airway clear  CARD: S1, S2 normal; no murmurs, gallops, or rubs. Regular rate and rhythm.  RESP: No wheezes, rales or rhonchi.  ABD: Normal bowel sounds; soft; non-distended; non-tender  EXT: Normal ROM.   NEURO: Alert, oriented. Grossly unremarkable. No focal deficits.  PSYCH: Cooperative, appropriate.

## 2023-04-26 PROBLEM — I70.212 ATHEROSCLER OF NATIVE ARTERY OF LEFT LEG WITH INTERMIT CLAUDICATION: Status: ACTIVE | Noted: 2023-01-01

## 2023-05-08 ENCOUNTER — APPOINTMENT (OUTPATIENT)
Dept: VASCULAR SURGERY | Facility: CLINIC | Age: 83
End: 2023-05-08

## 2023-05-08 NOTE — ED ADULT NURSE NOTE - MODE OF DISCHARGE
Ambulatory This was a shared visit with the SMITH. I reviewed and verified the documentation and independently performed the documented:

## 2023-05-31 ENCOUNTER — APPOINTMENT (OUTPATIENT)
Dept: CARDIOLOGY | Facility: CLINIC | Age: 83
End: 2023-05-31

## 2023-06-21 ENCOUNTER — APPOINTMENT (OUTPATIENT)
Dept: PODIATRY | Facility: CLINIC | Age: 83
End: 2023-06-21

## 2023-11-06 NOTE — PHYSICAL THERAPY INITIAL EVALUATION ADULT - STANDING BALANCE: STATIC
----- Message from Patient Portal,  sent at 11/4/2023  6:14 AM CDT -----  Regarding: Notification of Unviewed Test Results  Contact: [phone]  JEFFERY CRESPO has not viewed the following results:  - MAMMO SCREENING BILATERAL W EVETTE   poor balance

## 2024-05-17 NOTE — DISCHARGE NOTE NURSING/CASE MANAGEMENT/SOCIAL WORK - NSTRANSFERBELONGINGSDISPO_GEN_A_NUR
not applicable You can access the FollowMyHealth Patient Portal offered by [facility] by registering at the following website: [email or web address]. By joining [employer]’s FollowMyHealth portal, you will also be able to view your health information using other applications (apps) compatible with our system.

## 2024-05-24 NOTE — ASSESSMENT
[FreeTextEntry1] : -suture removed\par -incision has coapted. Noted overlying dry eschar over incision line\par -f/u vascular surgery\par -will monitor right 2nd toe for gangrenous changes 
Patient
Jacob Natarajan(Attending)

## 2025-06-23 NOTE — CONSULT NOTE ADULT - PROVIDER SPECIALTY LIST ADULT
Infectious Disease
Podiatry
Wound Care
Cardiology
Vascular Surgery
Physiatry
<-- Click to add NO significant Past Surgical History

## 2025-07-14 NOTE — PHYSICAL THERAPY INITIAL EVALUATION ADULT - ACTIVE RANGE OF MOTION EXAMINATION, REHAB EVAL
4 = No assist / stand by assistance Both upper extremities/Both lower extremities joints within functional limits and painfree AAROM